# Patient Record
Sex: FEMALE | Race: WHITE | NOT HISPANIC OR LATINO | Employment: OTHER | URBAN - METROPOLITAN AREA
[De-identification: names, ages, dates, MRNs, and addresses within clinical notes are randomized per-mention and may not be internally consistent; named-entity substitution may affect disease eponyms.]

---

## 2017-01-04 RX ORDER — ATORVASTATIN CALCIUM 40 MG/1
40 TABLET, FILM COATED ORAL DAILY
Qty: 30 TABLET | Refills: 2 | Status: SHIPPED | OUTPATIENT
Start: 2017-01-04 | End: 2017-03-29 | Stop reason: SDUPTHER

## 2017-01-23 ENCOUNTER — CLINICAL SUPPORT (OUTPATIENT)
Dept: FAMILY MEDICINE CLINIC | Facility: CLINIC | Age: 82
End: 2017-01-23

## 2017-01-23 DIAGNOSIS — E53.8 B12 DEFICIENCY: ICD-10-CM

## 2017-01-23 PROCEDURE — 96372 THER/PROPH/DIAG INJ SC/IM: CPT | Performed by: INTERNAL MEDICINE

## 2017-01-23 RX ADMIN — CYANOCOBALAMIN 1000 MCG: 1000 INJECTION, SOLUTION INTRAMUSCULAR; SUBCUTANEOUS at 09:58

## 2017-02-27 ENCOUNTER — CLINICAL SUPPORT (OUTPATIENT)
Dept: FAMILY MEDICINE CLINIC | Facility: CLINIC | Age: 82
End: 2017-02-27

## 2017-02-27 DIAGNOSIS — E53.8 B12 DEFICIENCY: ICD-10-CM

## 2017-02-27 PROCEDURE — 90471 IMMUNIZATION ADMIN: CPT | Performed by: INTERNAL MEDICINE

## 2017-02-27 RX ADMIN — CYANOCOBALAMIN 1000 MCG: 1000 INJECTION, SOLUTION INTRAMUSCULAR; SUBCUTANEOUS at 09:45

## 2017-03-27 ENCOUNTER — CLINICAL SUPPORT (OUTPATIENT)
Dept: FAMILY MEDICINE CLINIC | Facility: CLINIC | Age: 82
End: 2017-03-27

## 2017-03-27 DIAGNOSIS — E53.8 B12 DEFICIENCY: ICD-10-CM

## 2017-03-27 PROCEDURE — 96372 THER/PROPH/DIAG INJ SC/IM: CPT | Performed by: INTERNAL MEDICINE

## 2017-03-27 RX ADMIN — CYANOCOBALAMIN 1000 MCG: 1000 INJECTION, SOLUTION INTRAMUSCULAR; SUBCUTANEOUS at 09:51

## 2017-03-29 RX ORDER — LEVOTHYROXINE SODIUM 0.05 MG/1
TABLET ORAL
Qty: 90 TABLET | Refills: 3 | Status: SHIPPED | OUTPATIENT
Start: 2017-03-29 | End: 2017-04-24 | Stop reason: SDUPTHER

## 2017-03-29 RX ORDER — ATORVASTATIN CALCIUM 40 MG/1
TABLET, FILM COATED ORAL
Qty: 30 TABLET | Refills: 12 | Status: SHIPPED | OUTPATIENT
Start: 2017-03-29 | End: 2018-03-14 | Stop reason: HOSPADM

## 2017-04-03 ENCOUNTER — RESULTS ENCOUNTER (OUTPATIENT)
Dept: FAMILY MEDICINE CLINIC | Facility: CLINIC | Age: 82
End: 2017-04-03

## 2017-04-03 DIAGNOSIS — E78.2 MIXED HYPERLIPIDEMIA: ICD-10-CM

## 2017-04-03 DIAGNOSIS — N18.30 CKD (CHRONIC KIDNEY DISEASE), STAGE III (HCC): ICD-10-CM

## 2017-04-03 DIAGNOSIS — E03.9 ACQUIRED HYPOTHYROIDISM: ICD-10-CM

## 2017-04-03 DIAGNOSIS — E53.8 B12 DEFICIENCY: ICD-10-CM

## 2017-04-03 DIAGNOSIS — I10 HTN (HYPERTENSION), BENIGN: ICD-10-CM

## 2017-04-03 DIAGNOSIS — D64.9 ANEMIA, UNSPECIFIED TYPE: ICD-10-CM

## 2017-04-19 LAB
ALBUMIN SERPL-MCNC: 4 G/DL (ref 3.2–4.6)
ALBUMIN/GLOB SERPL: 1.7 {RATIO} (ref 1.2–2.2)
ALP SERPL-CCNC: 89 IU/L (ref 39–117)
ALT SERPL-CCNC: 14 IU/L (ref 0–32)
AST SERPL-CCNC: 18 IU/L (ref 0–40)
BASOPHILS # BLD AUTO: 0 X10E3/UL (ref 0–0.2)
BASOPHILS NFR BLD AUTO: 1 %
BILIRUB SERPL-MCNC: 0.4 MG/DL (ref 0–1.2)
BUN SERPL-MCNC: 19 MG/DL (ref 10–36)
BUN/CREAT SERPL: 16 (ref 12–28)
CALCIUM SERPL-MCNC: 9 MG/DL (ref 8.7–10.3)
CHLORIDE SERPL-SCNC: 101 MMOL/L (ref 96–106)
CHOLEST SERPL-MCNC: 169 MG/DL (ref 100–199)
CO2 SERPL-SCNC: 23 MMOL/L (ref 18–29)
CREAT SERPL-MCNC: 1.16 MG/DL (ref 0.57–1)
EOSINOPHIL # BLD AUTO: 0.4 X10E3/UL (ref 0–0.4)
EOSINOPHIL NFR BLD AUTO: 5 %
ERYTHROCYTE [DISTWIDTH] IN BLOOD BY AUTOMATED COUNT: 13.8 % (ref 12.3–15.4)
GLOBULIN SER CALC-MCNC: 2.3 G/DL (ref 1.5–4.5)
GLUCOSE SERPL-MCNC: 99 MG/DL (ref 65–99)
HCT VFR BLD AUTO: 33.2 % (ref 34–46.6)
HDLC SERPL-MCNC: 60 MG/DL
HGB BLD-MCNC: 10.7 G/DL (ref 11.1–15.9)
IMM GRANULOCYTES # BLD: 0 X10E3/UL (ref 0–0.1)
IMM GRANULOCYTES NFR BLD: 0 %
IRON SATN MFR SERPL: 17 % (ref 15–55)
IRON SERPL-MCNC: 59 UG/DL (ref 27–139)
LDLC SERPL CALC-MCNC: 83 MG/DL (ref 0–99)
LYMPHOCYTES # BLD AUTO: 2 X10E3/UL (ref 0.7–3.1)
LYMPHOCYTES NFR BLD AUTO: 28 %
MCH RBC QN AUTO: 27.9 PG (ref 26.6–33)
MCHC RBC AUTO-ENTMCNC: 32.2 G/DL (ref 31.5–35.7)
MCV RBC AUTO: 87 FL (ref 79–97)
METHYLMALONATE SERPL-SCNC: 226 NMOL/L (ref 0–378)
MONOCYTES # BLD AUTO: 0.6 X10E3/UL (ref 0.1–0.9)
MONOCYTES NFR BLD AUTO: 8 %
NEUTROPHILS # BLD AUTO: 4 X10E3/UL (ref 1.4–7)
NEUTROPHILS NFR BLD AUTO: 58 %
PLATELET # BLD AUTO: 271 X10E3/UL (ref 150–379)
POTASSIUM SERPL-SCNC: 3.8 MMOL/L (ref 3.5–5.2)
PROT SERPL-MCNC: 6.3 G/DL (ref 6–8.5)
RBC # BLD AUTO: 3.83 X10E6/UL (ref 3.77–5.28)
SODIUM SERPL-SCNC: 139 MMOL/L (ref 134–144)
T3FREE SERPL-MCNC: 2.1 PG/ML (ref 2–4.4)
T4 FREE SERPL-MCNC: 0.95 NG/DL (ref 0.82–1.77)
TIBC SERPL-MCNC: 350 UG/DL (ref 250–450)
TRIGL SERPL-MCNC: 129 MG/DL (ref 0–149)
TSH SERPL DL<=0.005 MIU/L-ACNC: 13.34 UIU/ML (ref 0.45–4.5)
UIBC SERPL-MCNC: 291 UG/DL (ref 118–369)
VLDLC SERPL CALC-MCNC: 26 MG/DL (ref 5–40)
WBC # BLD AUTO: 7 X10E3/UL (ref 3.4–10.8)

## 2017-04-24 ENCOUNTER — OFFICE VISIT (OUTPATIENT)
Dept: FAMILY MEDICINE CLINIC | Facility: CLINIC | Age: 82
End: 2017-04-24

## 2017-04-24 VITALS
SYSTOLIC BLOOD PRESSURE: 126 MMHG | DIASTOLIC BLOOD PRESSURE: 64 MMHG | OXYGEN SATURATION: 98 % | HEIGHT: 65 IN | WEIGHT: 155 LBS | BODY MASS INDEX: 25.83 KG/M2 | HEART RATE: 117 BPM

## 2017-04-24 DIAGNOSIS — I10 HTN (HYPERTENSION), BENIGN: Primary | ICD-10-CM

## 2017-04-24 DIAGNOSIS — E03.9 ACQUIRED HYPOTHYROIDISM: ICD-10-CM

## 2017-04-24 DIAGNOSIS — D64.9 ANEMIA, UNSPECIFIED TYPE: ICD-10-CM

## 2017-04-24 DIAGNOSIS — E53.8 B12 DEFICIENCY: ICD-10-CM

## 2017-04-24 PROCEDURE — 96372 THER/PROPH/DIAG INJ SC/IM: CPT | Performed by: INTERNAL MEDICINE

## 2017-04-24 PROCEDURE — 99214 OFFICE O/P EST MOD 30 MIN: CPT | Performed by: INTERNAL MEDICINE

## 2017-04-24 RX ORDER — LEVOTHYROXINE SODIUM 0.07 MG/1
75 TABLET ORAL DAILY
Qty: 90 TABLET | Refills: 0 | Status: SHIPPED | OUTPATIENT
Start: 2017-04-24 | End: 2017-07-24 | Stop reason: SDUPTHER

## 2017-04-24 RX ORDER — OFLOXACIN 3 MG/ML
SOLUTION/ DROPS OPHTHALMIC
COMMUNITY
Start: 2017-04-07 | End: 2017-10-30

## 2017-04-24 RX ORDER — MULTIVIT WITH MINERALS/LUTEIN
1000 TABLET ORAL DAILY
Qty: 180 TABLET | Refills: 1 | Status: ON HOLD | OUTPATIENT
Start: 2017-04-24 | End: 2020-07-20

## 2017-04-24 RX ORDER — DOXYCYCLINE HYCLATE 50 MG/1
324 CAPSULE, GELATIN COATED ORAL 2 TIMES DAILY
Qty: 180 TABLET | Refills: 1 | Status: SHIPPED | OUTPATIENT
Start: 2017-04-24 | End: 2017-07-14 | Stop reason: SINTOL

## 2017-04-24 RX ADMIN — CYANOCOBALAMIN 1000 MCG: 1000 INJECTION, SOLUTION INTRAMUSCULAR; SUBCUTANEOUS at 10:22

## 2017-04-24 NOTE — PROGRESS NOTES
Chief Complaint   Patient presents with   • Hypertension     4 month f/u & review labs   • Hypothyroidism   • Anemia     She was diagnosed with hypothyroidism 40-50 years ago.  She was on a dose as high as 100 µg, but was gradually decreased, and she now takes 50.  She feels sluggish, although she has been dancing less recently.  She has gained 4 pounds since last we saw her.    She also has history of anemia that was evaluated by CBC in 2012 at which time iron deficiency and low normal B12 levels were found.  Iron supplements and B12 injections were started and both had followed by the [facility] until she saw us and had the B12 shots started almost a year ago.  She still has the fatigue that's noted above.  She does not take an iron supplement.    She also has chronic, benign, essential hypertension and hyperlipidemia, both of them present for several years.  She tolerates her medications without side effect.  She denies any cardiovascular or neurologic symptoms.  We had tried cutting back her hydrochlorothiazide, but she developed marked edema, so she resumed the 25 mg dose with resolution of the swelling in her ankles.    She has never smoked.  She has an occasional beer or wine.  She stays active by square dancing, although this has declined recently.    Family history reviewed; no bleeding issues or chronic anemia.    Allergies   Allergen Reactions   • Barbiturates Hives   • Codeine Hives       Current Outpatient Prescriptions:   •  amLODIPine (NORVASC) 10 MG tablet, TAKE ONE TABLET BY MOUTH EVERY DAY, Disp: 30 tablet, Rfl: 5  •  atorvastatin (LIPITOR) 40 MG tablet, TAKE ONE TABLET BY MOUTH EVERY DAY, Disp: 30 tablet, Rfl: 12  •  dorzolamide-timolol (COSOPT) 22.3-6.8 MG/ML ophthalmic solution, INSTILL ONE DROP INTO LEFT EYE TWICE DAILY, Disp: , Rfl: 6  •  hydrochlorothiazide (HYDRODIURIL) 25 MG tablet, Take 25 mg by mouth Daily., Disp: , Rfl:   •  latanoprost (XALATAN) 0.005 % ophthalmic solution, INSTILL ONE  "DROP INTO LEFT EYE AT BEDTIME, Disp: , Rfl: 6  •  levothyroxine (SYNTHROID, LEVOTHROID) 50 MCG tablet, TAKE ONE TABLET BY MOUTH EVERY DAY, Disp: 90 tablet, Rfl: 3  •  ofloxacin (OCUFLOX) 0.3 % ophthalmic solution, , Disp: , Rfl:   •  pantoprazole (PROTONIX) 40 MG EC tablet, TAKE ONE TABLET BY MOUTH EVERY DAY, Disp: 90 tablet, Rfl: 3  •  prednisoLONE acetate (PRED FORTE) 1 % ophthalmic suspension, INSTILL ONE DROP INTO LEFT EYE EVERY MORNING, Disp: , Rfl: 11    Current Facility-Administered Medications:   •  cyanocobalamin injection 1,000 mcg, 1,000 mcg, Intramuscular, Daily, Jae Bray MD, 1,000 mcg at 04/24/17 1022    ROS:     She denies any new vision changes, although there has been a decline in her vision: She has glaucoma.  Dyspepsia/reflux is controlled with pantoprazole; no dysphagia or odynophagia.  She denies chest pain, angina, claudication, and strokelike symptoms.  She denies easy bruisability and free bleeding.  Lower extremity edema resolved with resumption of hydrochlorothiazide 25 mg.  She has nocturnal cramps in her feet.    /64 (BP Location: Right arm, Patient Position: Sitting, Cuff Size: Adult)  Pulse 117  Ht 64.5\" (163.8 cm)  Wt 155 lb (70.3 kg)  SpO2 98%  Breastfeeding? No  BMI 26.19 kg/m2     EXAM:   She is well-developed, well-nourished, and in good spirits.  Her sclerae are anicteric and the conjunctivae are pink.  No thyromegaly or mass appreciated, no carotid bruit noted.  Regular rate and rhythm with a normal S1 and S2.  There is a 2/6 holosystolic murmur at the right upper sternal border, somewhat less audible along the left sternal border.  No S3 or S4 appreciated.  Abdomen is soft, nontender, with no evidence of hepatosplenomegaly or mass.  No abdominal bruit noted.  Bowel sounds are normal.  There is nonpitting lower extremity edema; mild-moderate varicosities are also noted.  She has good pedal pulses.  Station, gait, and coordination are normal.  Alert and " oriented ×4 and answers all questions appropriately.  No significant ecchymoses about her trunk or extremities.    Mei was seen today for hypertension, hypothyroidism and anemia.    Diagnoses and all orders for this visit:    HTN (hypertension), benign    Acquired hypothyroidism  -     T3, Free; Future  -     T4, Free; Future  -     TSH; Future    Anemia, unspecified type  -     CBC & Differential; Future  -     Iron Profile; Future  -     Ferritin; Future  -     Reticulocytes; Future  -     Erythropoietin; Future    B12 deficiency    Other orders  -     levothyroxine (SYNTHROID, LEVOTHROID) 75 MCG tablet; Take 1 tablet by mouth Daily.  -     ferrous gluconate (FERGON) 324 MG tablet; Take 1 tablet by mouth 2 (Two) Times a Day.  -     ascorbic acid (VITAMIN C) 1000 MG tablet; Take 1 tablet by mouth Daily.    For the blood pressure, she will continue the medications unchanged.  Labs were reviewed with her today, and all are in order.  Since going back on the higher dose of hydrochlorothiazide, her renal function has not changed, and her electrolytes are still in order.    Cholesterol levels look great on this dose of cholesterol medicine, and her liver enzymes are normal.    Thyroid appears undertreated at this point, with a TSH just above 13.  Therefore, we will ask her to use 75 µg of thyroid hormone, and we will recheck her labs in about 6 or 8 weeks.    Her iron stores are still in the normal range, but barely.  I have asked her to start taking an iron tablet with 1000 mg of vitamin C bid to see if that also helps with her stamina and her blood counts.  We will recheck those labs in about 6 or 8 weeks as well.

## 2017-05-03 RX ORDER — AMLODIPINE BESYLATE 10 MG/1
TABLET ORAL
Qty: 30 TABLET | Refills: 12 | Status: SHIPPED | OUTPATIENT
Start: 2017-05-03 | End: 2018-05-09 | Stop reason: SDUPTHER

## 2017-05-09 RX ORDER — HYDROCHLOROTHIAZIDE 25 MG/1
TABLET ORAL
Qty: 90 TABLET | Refills: 1 | Status: SHIPPED | OUTPATIENT
Start: 2017-05-09 | End: 2017-08-08 | Stop reason: SDUPTHER

## 2017-05-22 ENCOUNTER — CLINICAL SUPPORT (OUTPATIENT)
Dept: FAMILY MEDICINE CLINIC | Facility: CLINIC | Age: 82
End: 2017-05-22

## 2017-05-22 DIAGNOSIS — E53.8 B12 DEFICIENCY: ICD-10-CM

## 2017-05-22 PROCEDURE — 96372 THER/PROPH/DIAG INJ SC/IM: CPT | Performed by: INTERNAL MEDICINE

## 2017-05-22 RX ADMIN — CYANOCOBALAMIN 1000 MCG: 1000 INJECTION, SOLUTION INTRAMUSCULAR; SUBCUTANEOUS at 09:54

## 2017-06-12 ENCOUNTER — RESULTS ENCOUNTER (OUTPATIENT)
Dept: FAMILY MEDICINE CLINIC | Facility: CLINIC | Age: 82
End: 2017-06-12

## 2017-06-12 DIAGNOSIS — D64.9 ANEMIA, UNSPECIFIED TYPE: ICD-10-CM

## 2017-06-12 DIAGNOSIS — E03.9 ACQUIRED HYPOTHYROIDISM: ICD-10-CM

## 2017-06-26 ENCOUNTER — CLINICAL SUPPORT (OUTPATIENT)
Dept: FAMILY MEDICINE CLINIC | Facility: CLINIC | Age: 82
End: 2017-06-26

## 2017-06-26 DIAGNOSIS — E53.8 B12 DEFICIENCY: ICD-10-CM

## 2017-06-26 PROCEDURE — 96372 THER/PROPH/DIAG INJ SC/IM: CPT | Performed by: INTERNAL MEDICINE

## 2017-06-26 RX ADMIN — CYANOCOBALAMIN 1000 MCG: 1000 INJECTION, SOLUTION INTRAMUSCULAR; SUBCUTANEOUS at 10:15

## 2017-06-27 LAB
BASOPHILS # BLD AUTO: 0.03 10*3/MM3 (ref 0–0.2)
BASOPHILS NFR BLD AUTO: 0.5 % (ref 0–1.5)
EOSINOPHIL # BLD AUTO: 0.35 10*3/MM3 (ref 0–0.7)
EOSINOPHIL NFR BLD AUTO: 5.4 % (ref 0.3–6.2)
EPO SERPL-ACNC: 6.6 MIU/ML (ref 2.6–18.5)
ERYTHROCYTE [DISTWIDTH] IN BLOOD BY AUTOMATED COUNT: 13 % (ref 11.7–13)
FERRITIN SERPL-MCNC: 36.45 NG/ML (ref 13–150)
HCT VFR BLD AUTO: 36.4 % (ref 35.6–45.5)
HGB BLD-MCNC: 11.7 G/DL (ref 11.9–15.5)
IMM GRANULOCYTES # BLD: 0 10*3/MM3 (ref 0–0.03)
IMM GRANULOCYTES NFR BLD: 0 % (ref 0–0.5)
IRON SATN MFR SERPL: 17 % (ref 20–50)
IRON SERPL-MCNC: 67 MCG/DL (ref 37–145)
LYMPHOCYTES # BLD AUTO: 2.12 10*3/MM3 (ref 0.9–4.8)
LYMPHOCYTES NFR BLD AUTO: 32.9 % (ref 19.6–45.3)
MCH RBC QN AUTO: 29 PG (ref 26.9–32)
MCHC RBC AUTO-ENTMCNC: 32.1 G/DL (ref 32.4–36.3)
MCV RBC AUTO: 90.1 FL (ref 80.5–98.2)
MONOCYTES # BLD AUTO: 0.48 10*3/MM3 (ref 0.2–1.2)
MONOCYTES NFR BLD AUTO: 7.4 % (ref 5–12)
NEUTROPHILS # BLD AUTO: 3.47 10*3/MM3 (ref 1.9–8.1)
NEUTROPHILS NFR BLD AUTO: 53.8 % (ref 42.7–76)
PLATELET # BLD AUTO: 276 10*3/MM3 (ref 140–500)
RBC # BLD AUTO: 4.04 10*6/MM3 (ref 3.9–5.2)
RETICS/RBC NFR AUTO: 0.98 % (ref 0.5–1.5)
T3FREE SERPL-MCNC: 3 PG/ML (ref 2–4.4)
T4 FREE SERPL-MCNC: 1.71 NG/DL (ref 0.93–1.7)
TIBC SERPL-MCNC: 403 MCG/DL
TSH SERPL DL<=0.005 MIU/L-ACNC: 0.42 MIU/ML (ref 0.27–4.2)
UIBC SERPL-MCNC: 336 MCG/DL
WBC # BLD AUTO: 6.45 10*3/MM3 (ref 4.5–10.7)

## 2017-07-14 ENCOUNTER — OFFICE VISIT (OUTPATIENT)
Dept: FAMILY MEDICINE CLINIC | Facility: CLINIC | Age: 82
End: 2017-07-14

## 2017-07-14 VITALS
HEIGHT: 65 IN | OXYGEN SATURATION: 97 % | WEIGHT: 155.2 LBS | BODY MASS INDEX: 25.86 KG/M2 | HEART RATE: 83 BPM | SYSTOLIC BLOOD PRESSURE: 124 MMHG | DIASTOLIC BLOOD PRESSURE: 66 MMHG

## 2017-07-14 DIAGNOSIS — R59.0 AXILLARY LYMPHADENOPATHY: ICD-10-CM

## 2017-07-14 DIAGNOSIS — R91.1 PULMONARY NODULE: ICD-10-CM

## 2017-07-14 DIAGNOSIS — E03.9 ACQUIRED HYPOTHYROIDISM: ICD-10-CM

## 2017-07-14 DIAGNOSIS — D64.9 ANEMIA, UNSPECIFIED TYPE: Primary | ICD-10-CM

## 2017-07-14 DIAGNOSIS — R10.13 EPIGASTRIC PAIN: ICD-10-CM

## 2017-07-14 DIAGNOSIS — E53.8 B12 DEFICIENCY: ICD-10-CM

## 2017-07-14 PROCEDURE — 99214 OFFICE O/P EST MOD 30 MIN: CPT | Performed by: INTERNAL MEDICINE

## 2017-07-14 PROCEDURE — 96372 THER/PROPH/DIAG INJ SC/IM: CPT | Performed by: INTERNAL MEDICINE

## 2017-07-14 RX ORDER — SUCRALFATE 1 G/1
TABLET ORAL
Refills: 0 | COMMUNITY
Start: 2017-06-27 | End: 2017-07-14

## 2017-07-14 RX ORDER — RANITIDINE 300 MG/1
TABLET ORAL
Refills: 0 | COMMUNITY
Start: 2017-06-27 | End: 2017-07-14

## 2017-07-14 RX ADMIN — CYANOCOBALAMIN 1000 MCG: 1000 INJECTION, SOLUTION INTRAMUSCULAR; SUBCUTANEOUS at 10:27

## 2017-07-14 NOTE — PATIENT INSTRUCTIONS
Stop the medicines given to you by the emergency room.  Stay on the pantoprazole.  Stop taking the iron.    Keep taking the thyroid hormone at the 75 µg dose.    Return after you get the CT scans of the chest, abdomen and pelvis.

## 2017-07-14 NOTE — PROGRESS NOTES
Chief Complaint   Patient presents with   • Anemia     6 week f/u & review labs   • Hypothyroidism     She has a mild, iron deficiency anemia.  We started her on iron at an office visit in April and asked her to follow-up for retesting.  She also has a B12 deficiency and receives B12 injections once monthly.    She has chronic hypothyroidism diagnosed 40-50 years ago.  Dose was gradually decreased from 100 µg until she was on 50 µg, but she felt sluggish and was starting to gain weight.  TSH was above 13 in April, so we increased her dose to 75 µg.      She went to the urgent care center on 6/27/17 because of epigastric/substernal discomfort.  EKG was reportedly unremarkable, so she was treated with ranitidine and sucralfate for presumed reflux disease; She was already on pantoprazole once daily.  Symptoms have persisted unchanged since that evaluation and since starting those medications.  She reports intermittent nausea, postprandial discomfort, and occasional pain in her upper back, under the shoulder blades. new    No further waking.  Improved energy.  No edema.  No bright red blood per rectum or melena.  Mild nausea without vomiting.    Allergies   Allergen Reactions   • Barbiturates Hives   • Codeine Hives       Current Outpatient Prescriptions:   •  amLODIPine (NORVASC) 10 MG tablet, TAKE ONE TABLET BY MOUTH EVERY DAY, Disp: 30 tablet, Rfl: 12  •  ascorbic acid (VITAMIN C) 1000 MG tablet, Take 1 tablet by mouth Daily., Disp: 180 tablet, Rfl: 1  •  atorvastatin (LIPITOR) 40 MG tablet, TAKE ONE TABLET BY MOUTH EVERY DAY, Disp: 30 tablet, Rfl: 12  •  dorzolamide-timolol (COSOPT) 22.3-6.8 MG/ML ophthalmic solution, INSTILL ONE DROP INTO LEFT EYE TWICE DAILY, Disp: , Rfl: 6  •  ferrous gluconate (FERGON) 324 MG tablet, Take 1 tablet by mouth 2 (Two) Times a Day., Disp: 180 tablet, Rfl: 1  •  hydrochlorothiazide (HYDRODIURIL) 25 MG tablet, TAKE ONE TABLET BY MOUTH EVERY DAY, Disp: 90 tablet, Rfl: 1  •  latanoprost  "(XALATAN) 0.005 % ophthalmic solution, INSTILL ONE DROP INTO LEFT EYE AT BEDTIME, Disp: , Rfl: 6  •  levothyroxine (SYNTHROID, LEVOTHROID) 75 MCG tablet, Take 1 tablet by mouth Daily., Disp: 90 tablet, Rfl: 0  •  ofloxacin (OCUFLOX) 0.3 % ophthalmic solution, , Disp: , Rfl:   •  pantoprazole (PROTONIX) 40 MG EC tablet, TAKE ONE TABLET BY MOUTH EVERY DAY, Disp: 90 tablet, Rfl: 3  •  prednisoLONE acetate (PRED FORTE) 1 % ophthalmic suspension, INSTILL ONE DROP INTO LEFT EYE EVERY MORNING, Disp: , Rfl: 11  •  raNITIdine (ZANTAC) 300 MG tablet, TAKE ONE TABLET BY MOUTH NIGHTLY, Disp: , Rfl: 0  •  sucralfate (CARAFATE) 1 G tablet, Take 1 tablet by mouth 4 (four) times daily for 30 days, Disp: , Rfl: 0    Current Facility-Administered Medications:   •  cyanocobalamin injection 1,000 mcg, 1,000 mcg, Intramuscular, Daily, Jae Bray MD, 1,000 mcg at 07/14/17 1027      /66 (BP Location: Right arm, Patient Position: Sitting, Cuff Size: Adult)  Pulse 83  Ht 64.5\" (163.8 cm)  Wt 155 lb 3.2 oz (70.4 kg)  SpO2 97%  Breastfeeding? No  BMI 26.23 kg/m2     Well-developed, well-nourished, in no acute distress.  Her mood is upbeat and her affect is full and appropriate.  Regular rate and rhythm with a 2/6 systolic murmur throughout upper sternal border.  Normal S1 and S2.  No S3 appreciated.  Clear to auscultation bilaterally with good breath sounds at both bases.  Abdomen is soft, nondistended, with normoactive bowel sounds.  She is tender in the epigastric and right upper quadrant areas.  Oneill sign is negative, but there is increased pain with deep inspiration.     Mei was seen today for anemia and hypothyroidism.    Diagnoses and all orders for this visit:    Anemia, unspecified type    Acquired hypothyroidism    B12 deficiency    Epigastric pain  -     CT Abdomen Pelvis With Contrast    Pulmonary nodule  -     CT Chest With Contrast    Axillary lymphadenopathy  -     CT Chest With Contrast    Thyroid " hormone levels look good, so she will stay on 75 µg daily.    Iron levels look better, but the GI discomfort that has started since she started on the iron is concerning.  She will stop taking the iron for now.  She will stay on the pantoprazole and stop taking the ranitidine in the sucralfate.    Abdominal symptoms have developed since starting the iron and despite being on pantoprazole.  The iron may be contributing to this, so we will ask her to stop the iron for now.  We will check the CT scans as noted above and have her follow-up thereafter.

## 2017-07-22 ENCOUNTER — HOSPITAL ENCOUNTER (OUTPATIENT)
Dept: CT IMAGING | Facility: HOSPITAL | Age: 82
Discharge: HOME | End: 2017-07-22
Attending: INTERNAL MEDICINE | Admitting: INTERNAL MEDICINE

## 2017-07-22 PROCEDURE — 0 IOPAMIDOL 61 % SOLUTION: Performed by: INTERNAL MEDICINE

## 2017-07-22 PROCEDURE — 71260 CT THORAX DX C+: CPT

## 2017-07-22 PROCEDURE — 74177 CT ABD & PELVIS W/CONTRAST: CPT

## 2017-07-22 PROCEDURE — 82565 ASSAY OF CREATININE: CPT

## 2017-07-22 RX ADMIN — IOPAMIDOL 80 ML: 612 INJECTION, SOLUTION INTRAVENOUS at 10:15

## 2017-07-24 LAB — CREAT BLDA-MCNC: 1.4 MG/DL (ref 0.6–1.3)

## 2017-07-25 ENCOUNTER — OFFICE VISIT (OUTPATIENT)
Dept: FAMILY MEDICINE CLINIC | Facility: CLINIC | Age: 82
End: 2017-07-25

## 2017-07-25 VITALS
HEART RATE: 95 BPM | HEIGHT: 65 IN | WEIGHT: 155.7 LBS | BODY MASS INDEX: 25.94 KG/M2 | SYSTOLIC BLOOD PRESSURE: 130 MMHG | TEMPERATURE: 98.3 F | DIASTOLIC BLOOD PRESSURE: 60 MMHG | OXYGEN SATURATION: 94 %

## 2017-07-25 DIAGNOSIS — Z23 NEED FOR STREPTOCOCCUS PNEUMONIAE VACCINATION: Primary | ICD-10-CM

## 2017-07-25 DIAGNOSIS — Z00.00 MEDICARE ANNUAL WELLNESS VISIT, SUBSEQUENT: ICD-10-CM

## 2017-07-25 PROCEDURE — G0009 ADMIN PNEUMOCOCCAL VACCINE: HCPCS | Performed by: NURSE PRACTITIONER

## 2017-07-25 PROCEDURE — G0439 PPPS, SUBSEQ VISIT: HCPCS | Performed by: NURSE PRACTITIONER

## 2017-07-25 PROCEDURE — 90670 PCV13 VACCINE IM: CPT | Performed by: NURSE PRACTITIONER

## 2017-07-25 RX ORDER — LEVOTHYROXINE SODIUM 0.07 MG/1
TABLET ORAL
Qty: 90 TABLET | Refills: 3 | Status: SHIPPED | OUTPATIENT
Start: 2017-07-25 | End: 2018-08-27 | Stop reason: SDUPTHER

## 2017-07-25 NOTE — PATIENT INSTRUCTIONS
Medicare Wellness  Personal Prevention Plan of Service     Date of Office Visit:  2017  Encounter Provider:  COCO Garcia  Place of Service:  [facility] INTERNAL MEDICINE  Patient Name: Mei Crane  :  1926    As part of the Medicare Wellness portion of your visit today, we are providing you with this personalized preventive plan of services (PPPS). This plan is based upon recommendations of the [place] Preventive Services Task Force (USPSTF) and the Advisory Committee on Immunization Practices (ACIP).    This lists the preventive care services that should be considered, and provides dates of when you are due. Items listed as completed are up-to-date and do not require any further intervention.    Health Maintenance   Topic Date Due   • TDAP/TD VACCINES (1 - Tdap) 1945   • PNEUMOCOCCAL VACCINES (65+ LOW/MEDIUM RISK) (1 of 2 - PCV13) 1991   • ZOSTER VACCINE  2016   • DXA SCAN  2016   • INFLUENZA VACCINE  2017   • LIPID PANEL  2018   • MEDICARE ANNUAL WELLNESS  2018   • MAMMOGRAM  2018       Orders Placed This Encounter   Procedures   • Pneumococcal Conjugate Vaccine 13-Valent All       No Follow-up on file.

## 2017-07-25 NOTE — PROGRESS NOTES
QUICK REFERENCE INFORMATION:  The ABCs of the Annual Wellness Visit    Subsequent Medicare Wellness Visit    HEALTH RISK ASSESSMENT    2/14/1926    Recent Hospitalizations:  No hospitalization(s) within the last year..        Current Medical Providers:  Patient Care Team:  Jae Bray MD as PCP - General  Jae Bray MD as PCP - Claims Attributed  MD Cecil Dukes OD (Optometry)  Yannick Santo MD as Consulting Physician (Ophthalmology)        Smoking Status:  History   Smoking Status   • Never Smoker   Smokeless Tobacco   • Never Used       Alcohol Consumption:  History   Alcohol Use   • Yes     Comment: occasionally       Depression Screen:   PHQ-9 Depression Screening 7/25/2017   Little interest or pleasure in doing things 1   Feeling down, depressed, or hopeless 1   Trouble falling or staying asleep, or sleeping too much 0   Feeling tired or having little energy 1   Poor appetite or overeating 0   Feeling bad about yourself - or that you are a failure or have let yourself or your family down 0   Trouble concentrating on things, such as reading the newspaper or watching television 0   Moving or speaking so slowly that other people could have noticed. Or the opposite - being so fidgety or restless that you have been moving around a lot more than usual 0   Thoughts that you would be better off dead, or of hurting yourself in some way 0   PHQ-9 Total Score 3   If you checked off any problems, how difficult have these problems made it for you to do your work, take care of things at home, or get along with other people? Not difficult at all       Health Habits and Functional and Cognitive Screening:  Functional & Cognitive Status 7/25/2017   Do you have difficulty preparing food and eating? No   Do you have difficulty bathing yourself? No   Do you have difficulty getting dressed? No   Do you have difficulty using the toilet? No   Do you have difficulty moving around  from place to place? No   In the past year have you fallen or experienced a near fall? Yes   Do you need help using the phone?  No   Are you deaf or do you have serious difficulty hearing?  No   Do you need help with transportation? Yes   Do you need help shopping? No   Do you need help preparing meals?  No   Do you need help with housework?  No   Do you need help with laundry? No   Do you need help taking your medications? No   Do you need help managing money? No   Do you have difficulty concentrating, remembering or making decisions? No       Health Habits  Current Diet: Unhealthy Diet  Dental Exam: Up to date (Dr. Meyer)  Eye Exam: Up to date (Dawson)  Exercise (times per week): 1 times per week  Current Exercise Activities Include: Dancing      Does the patient have evidence of cognitive impairment? No    Aspirin use counseling: Contraindicated from taking ASA      Recent Lab Results:  CMP:  Lab Results   Component Value Date    GLU 99 04/17/2017    BUN 19 04/17/2017    CREATININE 1.40 (H) 07/22/2017    EGFRIFNONA 41 (L) 04/17/2017    EGFRIFAFRI 48 (L) 04/17/2017    BCR 16 04/17/2017     04/17/2017    K 3.8 04/17/2017    CO2 23 04/17/2017    CALCIUM 9.0 04/17/2017    PROTENTOTREF 6.3 04/17/2017    ALBUMIN 4.0 04/17/2017    LABGLOBREF 2.3 04/17/2017    LABIL2 1.7 04/17/2017    BILITOT 0.4 04/17/2017    ALKPHOS 89 04/17/2017    AST 18 04/17/2017    ALT 14 04/17/2017     Lipid Panel:  Lab Results   Component Value Date    TRIG 129 04/17/2017    HDL 60 04/17/2017    VLDL 26 04/17/2017     HbA1c:       Visual Acuity:   Visual Acuity Screening    Right eye Left eye Both eyes   Without correction: 20/200 20/100 20/100   With correction: 20/200 20/100 20/100       Age-appropriate Screening Schedule:  Refer to the list below for future screening recommendations based on patient's age, sex and/or medical conditions. Orders for these recommended tests are listed in the plan section. The patient has been  provided with a written plan.    Health Maintenance   Topic Date Due   • TDAP/TD VACCINES (1 - Tdap) 02/14/1945   • PNEUMOCOCCAL VACCINES (65+ LOW/MEDIUM RISK) (1 of 2 - PCV13) 02/14/1991   • ZOSTER VACCINE  02/24/2016   • DXA SCAN  07/25/2016   • INFLUENZA VACCINE  08/01/2017   • LIPID PANEL  04/17/2018   • MAMMOGRAM  08/01/2018        Subjective   History of Present Illness    Mei Crane is a 89 y.o. female who presents for an Subsequent Wellness Visit.    The following portions of the patient's history were reviewed and updated as appropriate: allergies, current medications, past family history, past medical history, past social history, past surgical history and problem list.    Outpatient Medications Prior to Visit   Medication Sig Dispense Refill   • amLODIPine (NORVASC) 10 MG tablet TAKE ONE TABLET BY MOUTH EVERY DAY 30 tablet 12   • ascorbic acid (VITAMIN C) 1000 MG tablet Take 1 tablet by mouth Daily. 180 tablet 1   • atorvastatin (LIPITOR) 40 MG tablet TAKE ONE TABLET BY MOUTH EVERY DAY 30 tablet 12   • dorzolamide-timolol (COSOPT) 22.3-6.8 MG/ML ophthalmic solution INSTILL ONE DROP INTO LEFT EYE TWICE DAILY  6   • hydrochlorothiazide (HYDRODIURIL) 25 MG tablet TAKE ONE TABLET BY MOUTH EVERY DAY 90 tablet 1   • latanoprost (XALATAN) 0.005 % ophthalmic solution INSTILL ONE DROP INTO LEFT EYE AT BEDTIME  6   • levothyroxine (SYNTHROID, LEVOTHROID) 75 MCG tablet Take 1 tablet by mouth Daily. 90 tablet 0   • ofloxacin (OCUFLOX) 0.3 % ophthalmic solution      • pantoprazole (PROTONIX) 40 MG EC tablet TAKE ONE TABLET BY MOUTH EVERY DAY 90 tablet 3   • prednisoLONE acetate (PRED FORTE) 1 % ophthalmic suspension INSTILL ONE DROP INTO LEFT EYE EVERY MORNING  11     Facility-Administered Medications Prior to Visit   Medication Dose Route Frequency Provider Last Rate Last Dose   • cyanocobalamin injection 1,000 mcg  1,000 mcg Intramuscular Daily Jae Bray MD   1,000 mcg at 07/14/17 1027       Patient  "Active Problem List   Diagnosis   • HTN (hypertension), benign   • Acquired hypothyroidism   • Hyperlipidemia   • Gastroesophageal reflux disease without esophagitis   • Glaucoma   • Macular degeneration   • Anemia   • Osteopenia   • CKD (chronic kidney disease), stage III   • B12 deficiency       Advance Care Planning:  has NO advance directive - information provided to the patient today    Identification of Risk Factors:  Risk factors include: cardiovascular risk, lack of transportation and vision limitations.    Review of Systems   Constitutional: Negative.    HENT: Negative.    Eyes: Positive for visual disturbance (has macular degeneration and glucoma).   Respiratory: Negative.    Cardiovascular: Negative.    Gastrointestinal: Negative.    Endocrine: Negative.    Genitourinary: Negative.    Musculoskeletal: Negative.    Skin: Negative.    Neurological: Negative.    Psychiatric/Behavioral: Negative.        Compared to one year ago, the patient feels her physical health is the same.  Compared to one year ago, the patient feels her mental health is the same.    Objective     Physical Exam   Constitutional: She is oriented to person, place, and time. She appears well-developed and well-nourished.   Cardiovascular: Normal rate, regular rhythm and intact distal pulses.  Exam reveals no gallop and no friction rub.    No murmur heard.  Pulmonary/Chest: Effort normal and breath sounds normal. No respiratory distress. She has no wheezes. She has no rales.   Neurological: She is alert and oriented to person, place, and time.   Skin: Skin is warm and dry.   Psychiatric:   No acute distress   Vitals reviewed.      Vitals:    07/25/17 0942   BP: 130/60   BP Location: Left arm   Patient Position: Sitting   Cuff Size: Adult   Pulse: 95   Temp: 98.3 °F (36.8 °C)   TempSrc: Oral   SpO2: 94%   Weight: 155 lb 11.2 oz (70.6 kg)   Height: 64.5\" (163.8 cm)   PainSc: 0-No pain       Body mass index is 26.31 kg/(m^2).  Discussed the " patient's BMI with her. The BMI is in the acceptable range.    Assessment/Plan   Patient Self-Management and Personalized Health Advice  The patient has been provided with information about: designing advance directives and preventive services including:   · Pneumococcal vaccine .    Visit Diagnoses:  No diagnosis found.    No orders of the defined types were placed in this encounter.      Outpatient Encounter Prescriptions as of 7/25/2017   Medication Sig Dispense Refill   • amLODIPine (NORVASC) 10 MG tablet TAKE ONE TABLET BY MOUTH EVERY DAY 30 tablet 12   • ascorbic acid (VITAMIN C) 1000 MG tablet Take 1 tablet by mouth Daily. 180 tablet 1   • atorvastatin (LIPITOR) 40 MG tablet TAKE ONE TABLET BY MOUTH EVERY DAY 30 tablet 12   • dorzolamide-timolol (COSOPT) 22.3-6.8 MG/ML ophthalmic solution INSTILL ONE DROP INTO LEFT EYE TWICE DAILY  6   • hydrochlorothiazide (HYDRODIURIL) 25 MG tablet TAKE ONE TABLET BY MOUTH EVERY DAY 90 tablet 1   • latanoprost (XALATAN) 0.005 % ophthalmic solution INSTILL ONE DROP INTO LEFT EYE AT BEDTIME  6   • levothyroxine (SYNTHROID, LEVOTHROID) 75 MCG tablet Take 1 tablet by mouth Daily. 90 tablet 0   • ofloxacin (OCUFLOX) 0.3 % ophthalmic solution      • pantoprazole (PROTONIX) 40 MG EC tablet TAKE ONE TABLET BY MOUTH EVERY DAY 90 tablet 3   • prednisoLONE acetate (PRED FORTE) 1 % ophthalmic suspension INSTILL ONE DROP INTO LEFT EYE EVERY MORNING  11     Facility-Administered Encounter Medications as of 7/25/2017   Medication Dose Route Frequency Provider Last Rate Last Dose   • cyanocobalamin injection 1,000 mcg  1,000 mcg Intramuscular Daily Jae Bray MD   1,000 mcg at 07/14/17 1027       Reviewed use of high risk medication in the elderly: yes  Reviewed for potential of harmful drug interactions in the elderly: yes    Follow Up:  No Follow-up on file.     An After Visit Summary and PPPS with all of these plans were given to the patient.

## 2017-08-07 ENCOUNTER — OFFICE VISIT (OUTPATIENT)
Dept: FAMILY MEDICINE CLINIC | Facility: CLINIC | Age: 82
End: 2017-08-07

## 2017-08-07 VITALS
BODY MASS INDEX: 25.87 KG/M2 | OXYGEN SATURATION: 97 % | WEIGHT: 155.3 LBS | DIASTOLIC BLOOD PRESSURE: 62 MMHG | SYSTOLIC BLOOD PRESSURE: 126 MMHG | HEART RATE: 96 BPM | HEIGHT: 65 IN

## 2017-08-07 DIAGNOSIS — R10.13 EPIGASTRIC PAIN: Primary | ICD-10-CM

## 2017-08-07 DIAGNOSIS — E03.9 ACQUIRED HYPOTHYROIDISM: ICD-10-CM

## 2017-08-07 DIAGNOSIS — N18.30 CKD (CHRONIC KIDNEY DISEASE), STAGE III (HCC): ICD-10-CM

## 2017-08-07 DIAGNOSIS — D64.9 ANEMIA, UNSPECIFIED TYPE: ICD-10-CM

## 2017-08-07 DIAGNOSIS — E53.8 B12 DEFICIENCY: ICD-10-CM

## 2017-08-07 DIAGNOSIS — R91.1 PULMONARY NODULE: ICD-10-CM

## 2017-08-07 PROBLEM — K80.20 CALCULUS OF GALLBLADDER WITHOUT CHOLECYSTITIS: Status: ACTIVE | Noted: 2017-08-07

## 2017-08-07 PROCEDURE — 99212 OFFICE O/P EST SF 10 MIN: CPT | Performed by: INTERNAL MEDICINE

## 2017-08-07 PROCEDURE — 96372 THER/PROPH/DIAG INJ SC/IM: CPT | Performed by: INTERNAL MEDICINE

## 2017-08-07 RX ORDER — DOXYCYCLINE HYCLATE 50 MG/1
324 CAPSULE, GELATIN COATED ORAL
Qty: 30 TABLET | Refills: 12 | Status: SHIPPED | OUTPATIENT
Start: 2017-08-07 | End: 2018-03-14

## 2017-08-07 RX ADMIN — CYANOCOBALAMIN 1000 MCG: 1000 INJECTION, SOLUTION INTRAMUSCULAR; SUBCUTANEOUS at 14:36

## 2017-08-07 NOTE — PROGRESS NOTES
Subjective   Mei Crane is a 89 y.o. female who presents today for:    Abdominal Pain (Discuss CT Chest & Abd/pelvis)    History of Present Illness   She went to the urgent care center on 6/27/17 because of epigastric/substernal discomfort.  EKG was reportedly unremarkable, so she was treated with ranitidine and sucralfate for presumed reflux disease; She was already on pantoprazole once daily.  Symptoms have persisted unchanged since that evaluation and since starting those medications.  She reported intermittent nausea, postprandial discomfort, and occasional pain in her upper back, under the shoulder blades.    We stopped the ranitidine and Carafate, and also stopped her iron supplement, and her symptoms subsided and have not recurred.  Labs done around that time were unrevealing; she still had an iron deficiency anemia.    Ms. Crane  reports that she has never smoked. She has never used smokeless tobacco. She reports that she drinks alcohol. She reports that she does not use illicit drugs.     Allergies   Allergen Reactions   • Barbiturates Hives   • Codeine Hives       Current Outpatient Prescriptions:   •  amLODIPine (NORVASC) 10 MG tablet, TAKE ONE TABLET BY MOUTH EVERY DAY, Disp: 30 tablet, Rfl: 12  •  ascorbic acid (VITAMIN C) 1000 MG tablet, Take 1 tablet by mouth Daily., Disp: 180 tablet, Rfl: 1  •  atorvastatin (LIPITOR) 40 MG tablet, TAKE ONE TABLET BY MOUTH EVERY DAY, Disp: 30 tablet, Rfl: 12  •  Cholecalciferol (VITAMIN D PO), Take  by mouth., Disp: , Rfl:   •  dorzolamide-timolol (COSOPT) 22.3-6.8 MG/ML ophthalmic solution, INSTILL ONE DROP INTO LEFT EYE TWICE DAILY, Disp: , Rfl: 6  •  hydrochlorothiazide (HYDRODIURIL) 25 MG tablet, TAKE ONE TABLET BY MOUTH EVERY DAY, Disp: 90 tablet, Rfl: 1  •  latanoprost (XALATAN) 0.005 % ophthalmic solution, INSTILL ONE DROP INTO LEFT EYE AT BEDTIME, Disp: , Rfl: 6  •  levothyroxine (SYNTHROID, LEVOTHROID) 75 MCG tablet, TAKE ONE TABLET BY MOUTH EVERY DAY,  "Disp: 90 tablet, Rfl: 3  •  ofloxacin (OCUFLOX) 0.3 % ophthalmic solution, , Disp: , Rfl:   •  pantoprazole (PROTONIX) 40 MG EC tablet, TAKE ONE TABLET BY MOUTH EVERY DAY, Disp: 90 tablet, Rfl: 3  •  prednisoLONE acetate (PRED FORTE) 1 % ophthalmic suspension, INSTILL ONE DROP INTO LEFT EYE EVERY MORNING, Disp: , Rfl: 11    Current Facility-Administered Medications:   •  cyanocobalamin injection 1,000 mcg, 1,000 mcg, Intramuscular, Daily, Jae Bray MD, 1,000 mcg at 07/14/17 1027      Review of Systems   Constitutional: Negative for diaphoresis.   Eyes: Positive for visual disturbance.   Respiratory: Negative for cough and chest tightness.    Cardiovascular: Negative for chest pain, palpitations and leg swelling.   Gastrointestinal: Negative for abdominal pain and blood in stool.   Musculoskeletal: Positive for arthralgias (feet) and back pain (improves with rest). Negative for myalgias and neck pain.   Neurological: Negative for dizziness, syncope, numbness and headaches.   Hematological: Does not bruise/bleed easily.         Objective   Vitals:    08/07/17 1422   BP: 126/62   BP Location: Left arm   Patient Position: Sitting   Cuff Size: Adult   Pulse: 96   SpO2: 97%   Weight: 155 lb 4.8 oz (70.4 kg)   Height: 64.5\" (163.8 cm)     Physical Exam  RRR; 1/6 systolic murmur at the USBs.  Are anicteric and the conjunctivae are pink.  No abdominal bruits.  Normoactive bowel sounds.    Assessment/Plan   Mei was seen today for abdominal pain.    Diagnoses and all orders for this visit:    Epigastric pain  Comments:  Improved; monitor for recurrence.    Pulmonary nodule  Comments:  Recheck in a year.    Anemia, unspecified type  Comments:  Restart the iron and vitamin C supplement.    B12 deficiency    Other orders  -     ferrous gluconate (FERGON) 324 MG tablet; Take 1 tablet by mouth Daily With Breakfast.      "

## 2017-08-08 RX ORDER — HYDROCHLOROTHIAZIDE 25 MG/1
TABLET ORAL
Qty: 90 TABLET | Refills: 1 | Status: SHIPPED | OUTPATIENT
Start: 2017-08-08 | End: 2018-03-14

## 2017-08-29 DIAGNOSIS — R92.8 ABNORMAL MAMMOGRAM OF LEFT BREAST: Primary | ICD-10-CM

## 2017-08-29 PROCEDURE — G0206 DX MAMMO INCL CAD UNI: HCPCS | Performed by: INTERNAL MEDICINE

## 2017-10-30 ENCOUNTER — OFFICE VISIT (OUTPATIENT)
Dept: FAMILY MEDICINE CLINIC | Facility: CLINIC | Age: 82
End: 2017-10-30

## 2017-10-30 VITALS
OXYGEN SATURATION: 93 % | BODY MASS INDEX: 26.11 KG/M2 | HEART RATE: 107 BPM | TEMPERATURE: 98 F | WEIGHT: 156.7 LBS | SYSTOLIC BLOOD PRESSURE: 124 MMHG | HEIGHT: 65 IN | DIASTOLIC BLOOD PRESSURE: 58 MMHG

## 2017-10-30 DIAGNOSIS — J40 BRONCHITIS: Primary | ICD-10-CM

## 2017-10-30 PROCEDURE — 99213 OFFICE O/P EST LOW 20 MIN: CPT | Performed by: NURSE PRACTITIONER

## 2017-10-30 RX ORDER — PREDNISONE 20 MG/1
20 TABLET ORAL 2 TIMES DAILY
Qty: 10 TABLET | Refills: 0 | Status: SHIPPED | OUTPATIENT
Start: 2017-10-30 | End: 2017-11-04

## 2017-10-30 RX ORDER — CEFDINIR 300 MG/1
CAPSULE ORAL
Refills: 0 | COMMUNITY
Start: 2017-10-27 | End: 2017-12-18

## 2017-10-30 RX ORDER — BENZONATATE 100 MG/1
CAPSULE ORAL
Refills: 0 | COMMUNITY
Start: 2017-10-27 | End: 2017-12-18

## 2017-10-30 NOTE — PROGRESS NOTES
Subjective   Mei Crane is a 89 y.o. female who presents today for:    Congestion (Went to  on 10/27/2017) and Dizziness    HPI Comments: Ms. Crane presents today for cough and congestion x 2 weeks, following her flu shot. She states that her cough is worse at night, and that she hasn't been able to get much sleep in the last two weeks because of it. She states that her cough is worse upon lying down, but is present continually, with moderate amounts of thin, clear sputum. She complains of no fever, chills, sinus pain/pressure, nasal drainage, or plugged ear sensation. She went to an urgent care clinic 2 days ago and was diagnosed with bronchitis, at which time she was prescribed tessalon pearls for cough and a seven-day course of Omnicef, which she states she has been taking as prescribed, but hasn't finished the course of antibiotics yet. She states that before she was given the tessalon, she was taking Mucinex, which provided no relief.      I have reviewed the patient's medical history in detail and updated the computerized patient record.    Ms. Crane  reports that she has never smoked. She has never used smokeless tobacco. She reports that she drinks alcohol. She reports that she does not use illicit drugs.     Allergies   Allergen Reactions   • Barbiturates Hives   • Codeine Hives       Current Outpatient Prescriptions:   •  amLODIPine (NORVASC) 10 MG tablet, TAKE ONE TABLET BY MOUTH EVERY DAY, Disp: 30 tablet, Rfl: 12  •  ascorbic acid (VITAMIN C) 1000 MG tablet, Take 1 tablet by mouth Daily., Disp: 180 tablet, Rfl: 1  •  atorvastatin (LIPITOR) 40 MG tablet, TAKE ONE TABLET BY MOUTH EVERY DAY, Disp: 30 tablet, Rfl: 12  •  benzonatate (TESSALON) 100 MG capsule, TAKE ONE CAPSULE BY MOUTH THREE TIMES DAILY AS NEEDED FOR COUGH, Disp: , Rfl: 0  •  cefdinir (OMNICEF) 300 MG capsule, TAKE ONE CAPSULE BY MOUTH TWICE DAILY FOR SEVEN DAYS, Disp: , Rfl: 0  •  Cholecalciferol (VITAMIN D PO), Take  by mouth.,  Disp: , Rfl:   •  dorzolamide-timolol (COSOPT) 22.3-6.8 MG/ML ophthalmic solution, INSTILL ONE DROP INTO LEFT EYE TWICE DAILY, Disp: , Rfl: 6  •  ferrous gluconate (FERGON) 324 MG tablet, Take 1 tablet by mouth Daily With Breakfast., Disp: 30 tablet, Rfl: 12  •  hydrochlorothiazide (HYDRODIURIL) 25 MG tablet, TAKE ONE TABLET BY MOUTH EVERY DAY, Disp: 90 tablet, Rfl: 1  •  latanoprost (XALATAN) 0.005 % ophthalmic solution, INSTILL ONE DROP INTO LEFT EYE AT BEDTIME, Disp: , Rfl: 6  •  levothyroxine (SYNTHROID, LEVOTHROID) 75 MCG tablet, TAKE ONE TABLET BY MOUTH EVERY DAY, Disp: 90 tablet, Rfl: 3  •  pantoprazole (PROTONIX) 40 MG EC tablet, TAKE ONE TABLET BY MOUTH EVERY DAY, Disp: 90 tablet, Rfl: 3    Current Facility-Administered Medications:   •  cyanocobalamin injection 1,000 mcg, 1,000 mcg, Intramuscular, Daily, Jae Bray MD, 1,000 mcg at 08/07/17 1436      Review of Systems   Constitutional: Positive for fatigue. Negative for chills and fever.   HENT: Positive for congestion and sneezing. Negative for ear discharge, ear pain, postnasal drip, rhinorrhea, sinus pain, sinus pressure, sore throat and tinnitus.    Eyes: Negative for pain, discharge, itching and visual disturbance.   Respiratory: Positive for cough, chest tightness and shortness of breath. Negative for wheezing.    Cardiovascular: Negative for chest pain, palpitations and leg swelling.   Gastrointestinal: Negative for abdominal distention, abdominal pain, constipation, diarrhea, nausea and vomiting.   Endocrine: Negative for polydipsia, polyphagia and polyuria.   Genitourinary: Negative for dysuria, frequency and urgency.   Musculoskeletal: Negative.    Skin: Negative.    Neurological: Negative.    Psychiatric/Behavioral: Negative.          Objective   Vitals:    10/30/17 1459   BP: 124/58   BP Location: Left arm   Patient Position: Sitting   Cuff Size: Adult   Pulse: 107   Temp: 98 °F (36.7 °C)   TempSrc: Oral   SpO2: 93%   Weight: 156 lb  "11.2 oz (71.1 kg)   Height: 64.5\" (163.8 cm)     Physical Exam   Constitutional: She is oriented to person, place, and time. She appears well-developed and well-nourished.   HENT:   Head: Normocephalic.   Right Ear: Hearing, tympanic membrane, external ear and ear canal normal.   Left Ear: Hearing, tympanic membrane, external ear and ear canal normal.   Nose: Nose normal. No mucosal edema, rhinorrhea or sinus tenderness. Right sinus exhibits no maxillary sinus tenderness and no frontal sinus tenderness. Left sinus exhibits no maxillary sinus tenderness and no frontal sinus tenderness.   Mouth/Throat: Uvula is midline, oropharynx is clear and moist and mucous membranes are normal. No oropharyngeal exudate.   Eyes: Conjunctivae and EOM are normal. Pupils are equal, round, and reactive to light. Right eye exhibits no discharge. Left eye exhibits no discharge.   Neck: Normal range of motion. Neck supple. No JVD present. No tracheal deviation present. No thyromegaly present.   Cardiovascular: Normal rate, regular rhythm and normal heart sounds.    No murmur heard.  Pulmonary/Chest: Effort normal. No stridor. No respiratory distress. She has decreased breath sounds in the right upper field, the right middle field, the right lower field, the left upper field, the left middle field and the left lower field. She has no wheezes. She has no rhonchi. She has no rales.   Abdominal: Soft. Bowel sounds are normal. She exhibits no distension. There is no tenderness.   Musculoskeletal: Normal range of motion.   Lymphadenopathy:     She has no cervical adenopathy.   Neurological: She is alert and oriented to person, place, and time.   Skin: Skin is warm and dry.   Psychiatric:   No acute abnormality   Vitals reviewed.        Assessment/Plan   Mei was seen today for congestion and dizziness.    Diagnoses and all orders for this visit:    Bronchitis    Other orders  -     predniSONE (DELTASONE) 20 MG tablet; Take 1 tablet by mouth 2 " (Two) Times a Day for 5 days.    1. Bronchitis: Educated patient on the importance of finishing her course of previously prescribed Omnicef to clear up the infectious organism that may be causing her bronchitis. Continue tessalon pearls for cough, but educated her to not take Mucinex concurrently with the tessalon, as this may worsen her cough. Ordered prednisone 20 mg BID x 5 days to decrease her lung consolidation and improve airflow.     2. Follow up if symptoms worsen, fail to alleviate, as needed, and at next scheduled appointment with Dr. Bray.

## 2017-11-01 RX ORDER — PANTOPRAZOLE SODIUM 40 MG/1
TABLET, DELAYED RELEASE ORAL
Qty: 90 TABLET | Refills: 1 | Status: SHIPPED | OUTPATIENT
Start: 2017-11-01 | End: 2018-02-23 | Stop reason: SDUPTHER

## 2017-12-01 ENCOUNTER — RESULTS ENCOUNTER (OUTPATIENT)
Dept: FAMILY MEDICINE CLINIC | Facility: CLINIC | Age: 82
End: 2017-12-01

## 2017-12-01 DIAGNOSIS — E03.9 ACQUIRED HYPOTHYROIDISM: ICD-10-CM

## 2017-12-01 DIAGNOSIS — D64.9 ANEMIA, UNSPECIFIED TYPE: ICD-10-CM

## 2017-12-01 DIAGNOSIS — N18.30 CKD (CHRONIC KIDNEY DISEASE), STAGE III (HCC): ICD-10-CM

## 2017-12-12 LAB
25(OH)D3+25(OH)D2 SERPL-MCNC: 40.8 NG/ML (ref 30–100)
ALBUMIN SERPL-MCNC: 4.1 G/DL (ref 3.5–5.2)
ALBUMIN/GLOB SERPL: 1.7 G/DL
ALP SERPL-CCNC: 69 U/L (ref 39–117)
ALT SERPL-CCNC: 12 U/L (ref 1–33)
AST SERPL-CCNC: 16 U/L (ref 1–32)
BASOPHILS # BLD AUTO: 0.04 10*3/MM3 (ref 0–0.2)
BASOPHILS NFR BLD AUTO: 0.6 % (ref 0–1.5)
BILIRUB SERPL-MCNC: 0.3 MG/DL (ref 0.1–1.2)
BUN SERPL-MCNC: 11 MG/DL (ref 8–23)
BUN/CREAT SERPL: 9.6 (ref 7–25)
CALCIUM SERPL-MCNC: 8.9 MG/DL (ref 8.2–9.6)
CHLORIDE SERPL-SCNC: 102 MMOL/L (ref 98–107)
CO2 SERPL-SCNC: 23.4 MMOL/L (ref 22–29)
CREAT SERPL-MCNC: 1.14 MG/DL (ref 0.57–1)
EOSINOPHIL # BLD AUTO: 0.24 10*3/MM3 (ref 0–0.7)
EOSINOPHIL NFR BLD AUTO: 3.5 % (ref 0.3–6.2)
ERYTHROCYTE [DISTWIDTH] IN BLOOD BY AUTOMATED COUNT: 13.6 % (ref 11.7–13)
FERRITIN SERPL-MCNC: 54.94 NG/ML (ref 13–150)
GFR SERPLBLD CREATININE-BSD FMLA CKD-EPI: 45 ML/MIN/1.73
GFR SERPLBLD CREATININE-BSD FMLA CKD-EPI: 54 ML/MIN/1.73
GLOBULIN SER CALC-MCNC: 2.4 GM/DL
GLUCOSE SERPL-MCNC: 89 MG/DL (ref 65–99)
HCT VFR BLD AUTO: 36.6 % (ref 35.6–45.5)
HGB BLD-MCNC: 11.1 G/DL (ref 11.9–15.5)
IMM GRANULOCYTES # BLD: 0.02 10*3/MM3 (ref 0–0.03)
IMM GRANULOCYTES NFR BLD: 0.3 % (ref 0–0.5)
IRON SATN MFR SERPL: 16 % (ref 20–50)
IRON SERPL-MCNC: 84 MCG/DL (ref 37–145)
LYMPHOCYTES # BLD AUTO: 2.53 10*3/MM3 (ref 0.9–4.8)
LYMPHOCYTES NFR BLD AUTO: 36.6 % (ref 19.6–45.3)
MCH RBC QN AUTO: 28 PG (ref 26.9–32)
MCHC RBC AUTO-ENTMCNC: 30.3 G/DL (ref 32.4–36.3)
MCV RBC AUTO: 92.2 FL (ref 80.5–98.2)
MONOCYTES # BLD AUTO: 0.6 10*3/MM3 (ref 0.2–1.2)
MONOCYTES NFR BLD AUTO: 8.7 % (ref 5–12)
NEUTROPHILS # BLD AUTO: 3.48 10*3/MM3 (ref 1.9–8.1)
NEUTROPHILS NFR BLD AUTO: 50.3 % (ref 42.7–76)
PLATELET # BLD AUTO: 331 10*3/MM3 (ref 140–500)
POTASSIUM SERPL-SCNC: 3.6 MMOL/L (ref 3.5–5.2)
PROT SERPL-MCNC: 6.5 G/DL (ref 6–8.5)
RBC # BLD AUTO: 3.97 10*6/MM3 (ref 3.9–5.2)
SODIUM SERPL-SCNC: 140 MMOL/L (ref 136–145)
T3FREE SERPL-MCNC: 2.1 PG/ML (ref 2–4.4)
T4 FREE SERPL-MCNC: 1.72 NG/DL (ref 0.93–1.7)
TIBC SERPL-MCNC: 522 MCG/DL
TSH SERPL DL<=0.005 MIU/L-ACNC: 3.68 MIU/ML (ref 0.27–4.2)
UIBC SERPL-MCNC: 438 MCG/DL
WBC # BLD AUTO: 6.91 10*3/MM3 (ref 4.5–10.7)

## 2017-12-18 ENCOUNTER — OFFICE VISIT (OUTPATIENT)
Dept: FAMILY MEDICINE CLINIC | Facility: CLINIC | Age: 82
End: 2017-12-18

## 2017-12-18 VITALS
WEIGHT: 154.2 LBS | HEART RATE: 100 BPM | SYSTOLIC BLOOD PRESSURE: 148 MMHG | BODY MASS INDEX: 25.69 KG/M2 | HEIGHT: 65 IN | OXYGEN SATURATION: 95 % | DIASTOLIC BLOOD PRESSURE: 60 MMHG

## 2017-12-18 DIAGNOSIS — N18.30 CKD (CHRONIC KIDNEY DISEASE), STAGE III (HCC): ICD-10-CM

## 2017-12-18 DIAGNOSIS — I10 HTN (HYPERTENSION), BENIGN: ICD-10-CM

## 2017-12-18 DIAGNOSIS — D64.9 ANEMIA, UNSPECIFIED TYPE: ICD-10-CM

## 2017-12-18 DIAGNOSIS — E03.9 ACQUIRED HYPOTHYROIDISM: ICD-10-CM

## 2017-12-18 DIAGNOSIS — J18.9 COMMUNITY ACQUIRED PNEUMONIA, UNSPECIFIED LATERALITY: Primary | ICD-10-CM

## 2017-12-18 PROCEDURE — 71020 XR CHEST PA AND LATERAL: CPT | Performed by: INTERNAL MEDICINE

## 2017-12-18 PROCEDURE — 99214 OFFICE O/P EST MOD 30 MIN: CPT | Performed by: INTERNAL MEDICINE

## 2017-12-18 RX ORDER — FENOFIBRATE 160 MG/1
160 TABLET ORAL DAILY
COMMUNITY
Start: 2017-11-30 | End: 2018-03-09 | Stop reason: SDUPTHER

## 2017-12-18 RX ORDER — ASPIRIN 81 MG/1
81 TABLET ORAL DAILY
COMMUNITY
End: 2020-06-05 | Stop reason: HOSPADM

## 2017-12-18 NOTE — PROGRESS NOTES
Subjective   Mei Crane is a 89 y.o. female who presents today for:    Anemia (4 month f/u & review labs) and Pneumonia ([facility] f/u)    History of Present Illness     [facility] was [facility] evaluated [facility], treated with omnicef and prednisone for bronchitis.  She was re-evaluated here shortly thereafter and advised to finish her meds.  She was hospitalized one week later for pneumonia, for 3 days, then spent 3 weeks at the [facility] for rehabilitation.  She is now at home with physical therapy and occupational therapy.  Per their report last week, she is nearing the end of physical therapy since she has reached her goals.  She has been homebound this whole time.  She is unable to drive herself, partially because of the leg weakness that developed in association with her illness, and partially because of macular degeneration primarily affecting her left eye.    She has a mild, iron deficiency anemia.  We started her on iron at an office visit in April and had her follow-up for retesting. Iron was held in August due to UGI symptoms / abdominal pain.  The pain improved once we stopped the iron.  She also has a B12 deficiency and receives B12 injections once monthly.      She has chronic HTN that has been stable.    She has chronic hypothyroidism that is treated with T4 supplement.    Ms. Crane  reports that she has never smoked. She has never used smokeless tobacco. She reports that she drinks alcohol. She reports that she does not use illicit drugs.     Allergies   Allergen Reactions   • Barbiturates Hives   • Codeine Hives       Current Outpatient Prescriptions:   •  amLODIPine (NORVASC) 10 MG tablet, TAKE ONE TABLET BY MOUTH EVERY DAY, Disp: 30 tablet, Rfl: 12  •  ascorbic acid (VITAMIN C) 1000 MG tablet, Take 1 tablet by mouth Daily., Disp: 180 tablet, Rfl: 1  •  aspirin 81 MG EC tablet, Take 81 mg by mouth Daily., Disp: , Rfl:   •  Cholecalciferol (VITAMIN D PO), Take  by mouth., Disp: , Rfl:   •   "dorzolamide-timolol (COSOPT) 22.3-6.8 MG/ML ophthalmic solution, INSTILL ONE DROP INTO LEFT EYE TWICE DAILY, Disp: , Rfl: 6  •  fenofibrate 160 MG tablet, Take 160 mg by mouth Daily., Disp: , Rfl:   •  latanoprost (XALATAN) 0.005 % ophthalmic solution, INSTILL ONE DROP INTO LEFT EYE AT BEDTIME, Disp: , Rfl: 6  •  levothyroxine (SYNTHROID, LEVOTHROID) 75 MCG tablet, TAKE ONE TABLET BY MOUTH EVERY DAY, Disp: 90 tablet, Rfl: 3  •  pantoprazole (PROTONIX) 40 MG EC tablet, TAKE ONE TABLET BY MOUTH EVERY DAY, Disp: 90 tablet, Rfl: 1  •  Probiotic Product (PROBIOTIC DAILY PO), Take 1 tablet by mouth Daily., Disp: , Rfl:   •  atorvastatin (LIPITOR) 40 MG tablet, TAKE ONE TABLET BY MOUTH EVERY DAY, Disp: 30 tablet, Rfl: 12        Current Facility-Administered Medications:   •  cyanocobalamin injection 1,000 mcg, 1,000 mcg, Intramuscular, Daily, Jae Bray MD, 1,000 mcg at 08/07/17 1436      Review of Systems   Constitutional: Positive for activity change and fatigue. Negative for fever and unexpected weight change.   Respiratory: Negative for shortness of breath.    Cardiovascular: Negative for chest pain and palpitations.   Musculoskeletal: Positive for gait problem.   Neurological: Positive for weakness (legs, after her hospitalizaiton).         Objective   Vitals:    12/18/17 1043 12/18/17 1113   BP: 146/70 148/60   BP Location: Right arm    Patient Position: Sitting    Cuff Size: Adult    Pulse: 100    SpO2: 95%    Weight: 69.9 kg (154 lb 3.2 oz)    Height: 163.8 cm (64.5\")      Physical Exam    Well-developed, well-nourished, in good spirits.  Left iris is deep blue; right iris is brown.  No scleral injection noted.  Conjunctiva are mildly pale.  Regular with frequent premature beats; 2/6 holosystolic murmur loudest at the right upper sternal border.   A few coarse crackles at the bases improve with deep inspiration.  She has normal respiratory effort.  No wheezes appreciated.    Assessment/Plan   Mei was " seen today for anemia and pneumonia.    Diagnoses and all orders for this visit:    HTN (hypertension), benign    Anemia, unspecified type    Acquired hypothyroidism    CKD (chronic kidney disease), stage III    Although it has been a long road to recovery, she appears to be recovering well from her pneumonia last month.  We will get a follow-up chest x-ray today.  She should finish up with physical therapy and occupational therapy at home.  She should get back into see her eye doctor to get the next shot for her macular degeneration.    We reviewed labs that were done in anticipation of today's office visit.  Her hemoglobin has not budged from the time of her hospitalization (11/7/17); her iron stores remain slightly low off an iron supplement.    Her renal function has remained stable through this ordeal.  She should continue to drink plenty of fluids.    Her thyroid hormone levels, reviewed today, were also normal.  We will make no change to her thyroid hormone dose.    Chest x-ray shows improved aeration; no evidence of pneumonia.  Not much change from CXR 4/29/16.

## 2018-02-26 RX ORDER — PANTOPRAZOLE SODIUM 40 MG/1
TABLET, DELAYED RELEASE ORAL
Qty: 90 TABLET | Refills: 1 | Status: SHIPPED | OUTPATIENT
Start: 2018-02-26 | End: 2018-11-26 | Stop reason: SDUPTHER

## 2018-03-08 ENCOUNTER — OFFICE VISIT (OUTPATIENT)
Dept: FAMILY MEDICINE CLINIC | Facility: CLINIC | Age: 83
End: 2018-03-08

## 2018-03-08 VITALS
OXYGEN SATURATION: 96 % | BODY MASS INDEX: 26.44 KG/M2 | HEIGHT: 65 IN | SYSTOLIC BLOOD PRESSURE: 114 MMHG | HEART RATE: 93 BPM | DIASTOLIC BLOOD PRESSURE: 64 MMHG | WEIGHT: 158.7 LBS

## 2018-03-08 DIAGNOSIS — E03.9 ACQUIRED HYPOTHYROIDISM: ICD-10-CM

## 2018-03-08 DIAGNOSIS — D64.9 ANEMIA, UNSPECIFIED TYPE: ICD-10-CM

## 2018-03-08 DIAGNOSIS — E53.8 B12 DEFICIENCY: ICD-10-CM

## 2018-03-08 DIAGNOSIS — I10 HTN (HYPERTENSION), BENIGN: Primary | ICD-10-CM

## 2018-03-08 DIAGNOSIS — N18.30 CKD (CHRONIC KIDNEY DISEASE), STAGE III (HCC): ICD-10-CM

## 2018-03-08 PROBLEM — J18.9 CAP (COMMUNITY ACQUIRED PNEUMONIA): Status: RESOLVED | Noted: 2017-11-07 | Resolved: 2018-03-08

## 2018-03-08 PROCEDURE — 99214 OFFICE O/P EST MOD 30 MIN: CPT | Performed by: INTERNAL MEDICINE

## 2018-03-08 RX ORDER — PREDNISOLONE ACETATE 10 MG/ML
1 SUSPENSION/ DROPS OPHTHALMIC DAILY
Refills: 3 | COMMUNITY
Start: 2018-02-23

## 2018-03-08 NOTE — PROGRESS NOTES
Subjective   Mei Crane is a 89 y.o. female who presents today for:    Anemia (3 month f/u); Hypothyroidism; and Chronic Kidney Disease    History of Present Illness   She has multifactorial anemia that was both B12 and iron deficiency related.  Despite supplements, counts remained a little on the low side.  Iron caused GI distress, so it was stopped and her GI symptoms resolved off of it.  She was getting B12 shots; those were stopped sometime in 2017 around the time of her hospitalization for pneumonia.  She has not taken an oral B12 supplement.  She complains of fatigue/low energy.    She has hypothyroidism treated with Synthroid.  Her most recent thyroid hormone levels were perfect (December, 2017).    She also has chronic hypertension previously treated with amlodipine and hydrochlorothiazide.  Following her hospitalization for pneumonia, the hydrochlorothiazide was stopped.  She continues to take amlodipine once daily.    She also has dyslipidemia managed with fenofibrate and atorvastatin.  She has chronic reflux symptoms treated with pantoprazole 40 mg daily.    Ms. Crane  reports that she has never smoked. She has never used smokeless tobacco. She reports that she drinks alcohol. She reports that she does not use illicit drugs.     Allergies   Allergen Reactions   • Barbiturates Hives   • Codeine Hives       Current Outpatient Prescriptions:   •  amLODIPine (NORVASC) 10 MG tablet, TAKE ONE TABLET BY MOUTH EVERY DAY, Disp: 30 tablet, Rfl: 12  •  ascorbic acid (VITAMIN C) 1000 MG tablet, Take 1 tablet by mouth Daily., Disp: 180 tablet, Rfl: 1  •  aspirin 81 MG EC tablet, Take 81 mg by mouth Daily., Disp: , Rfl:   •  atorvastatin (LIPITOR) 40 MG tablet, TAKE ONE TABLET BY MOUTH EVERY DAY, Disp: 30 tablet, Rfl: 12  •  Cholecalciferol (VITAMIN D PO), Take  by mouth., Disp: , Rfl:   •  dorzolamide-timolol (COSOPT) 22.3-6.8 MG/ML ophthalmic solution, INSTILL ONE DROP INTO LEFT EYE TWICE DAILY, Disp: , Rfl:  "6  •  fenofibrate 160 MG tablet, Take 160 mg by mouth Daily., Disp: , Rfl:   •  ferrous gluconate (FERGON) 324 MG tablet, Take 1 tablet by mouth Daily With Breakfast., Disp: 30 tablet, Rfl: 12  •  hydrochlorothiazide (HYDRODIURIL) 25 MG tablet, TAKE ONE TABLET BY MOUTH EVERY DAY, Disp: 90 tablet, Rfl: 1  •  levothyroxine (SYNTHROID, LEVOTHROID) 75 MCG tablet, TAKE ONE TABLET BY MOUTH EVERY DAY, Disp: 90 tablet, Rfl: 3  •  pantoprazole (PROTONIX) 40 MG EC tablet, TAKE ONE TABLET BY MOUTH EVERY DAY, Disp: 90 tablet, Rfl: 1  •  prednisoLONE acetate (PRED FORTE) 1 % ophthalmic suspension, INSTILL ONE DROP IN THE LEFT EYE FOUR TIMES DAILY, Disp: , Rfl: 3  •  Probiotic Product (PROBIOTIC DAILY PO), Take 1 tablet by mouth Daily., Disp: , Rfl:   •  latanoprost (XALATAN) 0.005 % ophthalmic solution, INSTILL ONE DROP INTO LEFT EYE AT BEDTIME, Disp: , Rfl: 6    Current Facility-Administered Medications:   •  cyanocobalamin injection 1,000 mcg, 1,000 mcg, Intramuscular, Daily, Jae Bray MD, 1,000 mcg at 08/07/17 1436      Review of Systems   Constitutional: Positive for fatigue.   Eyes: Positive for visual disturbance (deccreased bilaterally).   Respiratory: Negative for cough and shortness of breath.    Cardiovascular: Negative for chest pain and palpitations.   Musculoskeletal: Positive for arthralgias.   Hematological: Does not bruise/bleed easily.   Right thigh pain.      Objective   Vitals:    03/08/18 1538   BP: 114/64   BP Location: Right arm   Patient Position: Sitting   Cuff Size: Adult   Pulse: 93   SpO2: 96%   Weight: 72 kg (158 lb 11.2 oz)   Height: 163.8 cm (64.5\")     Physical Exam   Constitutional: She is oriented to person, place, and time. She appears well-developed and well-nourished. No distress.   Neck: Carotid bruit is not present. No thyroid mass and no thyromegaly present.   Cardiovascular: Normal rate, regular rhythm and S1 normal.    Murmur heard.   Systolic murmur is present with a grade of " 2/6   Pulmonary/Chest: Effort normal. She has no wheezes. She has no rales.   Neurological: She is alert and oriented to person, place, and time.   Nonpitting lower extremity edema bilaterally.  No lower extremity tenderness or warmth.  Nontender to palpation of the quad muscles of the right leg.  No lower extremity weakness; strength is 5+/5 on full extension at the knee and hip as well as plantar and dorsiflexion of the foot.      Assessment/Plan   Mei was seen today for anemia, hypothyroidism and chronic kidney disease.    Diagnoses and all orders for this visit:    HTN (hypertension), benign  -     Comprehensive Metabolic Panel    CKD (chronic kidney disease), stage III  -     Comprehensive Metabolic Panel  -     CBC & Differential  -     Vitamin D 25 Hydroxy  -     Microalbumin / Creatinine Urine Ratio - Urine, Clean Catch    Anemia, unspecified type  -     CBC & Differential  -     Iron Profile  -     Methylmalonic Acid, Serum  -     Vitamin B12  -     Ferritin    B12 deficiency    Acquired hypothyroidism  -     T3, Free  -     T4, Free  -     TSH    She will remain off the hydrochlorothiazide since her blood pressure is doing so well.    We will make no change in her medication regimen at this time.  I have asked her to return with her medications so that we can reconcile her meds to our list.    We will check the labs as ordered above.  We will give her a B12 shot after those labs are drawn and make further recommendations regarding B12 supplementation thereafter.  She should not restart iron until we see the results of her labs.  In essence, further recommendations will follow once we see her lab results.

## 2018-03-09 RX ORDER — FENOFIBRATE 160 MG/1
160 TABLET ORAL DAILY
Qty: 30 TABLET | Refills: 1 | Status: SHIPPED | OUTPATIENT
Start: 2018-03-09 | End: 2018-04-21 | Stop reason: SDUPTHER

## 2018-03-13 LAB
25(OH)D3+25(OH)D2 SERPL-MCNC: 33.6 NG/ML (ref 30–100)
ALBUMIN SERPL-MCNC: 4.6 G/DL (ref 3.2–4.6)
ALBUMIN/CREAT UR: 52.5 MG/G CREAT (ref 0–30)
ALBUMIN/GLOB SERPL: 2.2 {RATIO} (ref 1.2–2.2)
ALP SERPL-CCNC: 56 IU/L (ref 39–117)
ALT SERPL-CCNC: 12 IU/L (ref 0–32)
AST SERPL-CCNC: 20 IU/L (ref 0–40)
BASOPHILS # BLD AUTO: 0 X10E3/UL (ref 0–0.2)
BASOPHILS NFR BLD AUTO: 0 %
BILIRUB SERPL-MCNC: <0.2 MG/DL (ref 0–1.2)
BUN SERPL-MCNC: 21 MG/DL (ref 10–36)
BUN/CREAT SERPL: 16 (ref 12–28)
CALCIUM SERPL-MCNC: 9.2 MG/DL (ref 8.7–10.3)
CHLORIDE SERPL-SCNC: 102 MMOL/L (ref 96–106)
CO2 SERPL-SCNC: 23 MMOL/L (ref 18–29)
CREAT SERPL-MCNC: 1.28 MG/DL (ref 0.57–1)
CREAT UR-MCNC: 62.7 MG/DL
EOSINOPHIL # BLD AUTO: 0.4 X10E3/UL (ref 0–0.4)
EOSINOPHIL NFR BLD AUTO: 5 %
ERYTHROCYTE [DISTWIDTH] IN BLOOD BY AUTOMATED COUNT: 13.5 % (ref 12.3–15.4)
FERRITIN SERPL-MCNC: 50 NG/ML (ref 15–150)
GFR SERPLBLD CREATININE-BSD FMLA CKD-EPI: 36 ML/MIN/1.73
GFR SERPLBLD CREATININE-BSD FMLA CKD-EPI: 42 ML/MIN/1.73
GLOBULIN SER CALC-MCNC: 2.1 G/DL (ref 1.5–4.5)
GLUCOSE SERPL-MCNC: 101 MG/DL (ref 65–99)
HCT VFR BLD AUTO: 34.5 % (ref 34–46.6)
HGB BLD-MCNC: 11.1 G/DL (ref 11.1–15.9)
IMM GRANULOCYTES # BLD: 0 X10E3/UL (ref 0–0.1)
IMM GRANULOCYTES NFR BLD: 0 %
IRON SATN MFR SERPL: 12 % (ref 15–55)
IRON SERPL-MCNC: 58 UG/DL (ref 27–139)
LYMPHOCYTES # BLD AUTO: 2.8 X10E3/UL (ref 0.7–3.1)
LYMPHOCYTES NFR BLD AUTO: 38 %
MCH RBC QN AUTO: 27.5 PG (ref 26.6–33)
MCHC RBC AUTO-ENTMCNC: 32.2 G/DL (ref 31.5–35.7)
MCV RBC AUTO: 86 FL (ref 79–97)
METHYLMALONATE SERPL-SCNC: 286 NMOL/L (ref 0–378)
MICROALBUMIN UR-MCNC: 32.9 UG/ML
MONOCYTES # BLD AUTO: 0.5 X10E3/UL (ref 0.1–0.9)
MONOCYTES NFR BLD AUTO: 7 %
NEUTROPHILS # BLD AUTO: 3.5 X10E3/UL (ref 1.4–7)
NEUTROPHILS NFR BLD AUTO: 50 %
PLATELET # BLD AUTO: 348 X10E3/UL (ref 150–379)
POTASSIUM SERPL-SCNC: 4.1 MMOL/L (ref 3.5–5.2)
PROT SERPL-MCNC: 6.7 G/DL (ref 6–8.5)
RBC # BLD AUTO: 4.03 X10E6/UL (ref 3.77–5.28)
SODIUM SERPL-SCNC: 141 MMOL/L (ref 134–144)
T3FREE SERPL-MCNC: 1.9 PG/ML (ref 2–4.4)
T4 FREE SERPL-MCNC: 1.45 NG/DL (ref 0.82–1.77)
TIBC SERPL-MCNC: 486 UG/DL (ref 250–450)
TSH SERPL DL<=0.005 MIU/L-ACNC: 1.7 UIU/ML (ref 0.45–4.5)
UIBC SERPL-MCNC: 428 UG/DL (ref 118–369)
VIT B12 SERPL-MCNC: 455 PG/ML (ref 232–1245)
WBC # BLD AUTO: 7.3 X10E3/UL (ref 3.4–10.8)

## 2018-03-14 ENCOUNTER — CLINICAL SUPPORT (OUTPATIENT)
Dept: FAMILY MEDICINE CLINIC | Facility: CLINIC | Age: 83
End: 2018-03-14

## 2018-03-14 DIAGNOSIS — E53.8 B12 DEFICIENCY: ICD-10-CM

## 2018-03-14 PROCEDURE — 96372 THER/PROPH/DIAG INJ SC/IM: CPT | Performed by: INTERNAL MEDICINE

## 2018-03-14 RX ORDER — ACETAMINOPHEN 500 MG
1000 TABLET ORAL EVERY 6 HOURS PRN
COMMUNITY
End: 2020-04-21

## 2018-03-14 RX ORDER — FENOFIBRATE 160 MG/1
TABLET ORAL
Qty: 30 TABLET | Refills: 2 | OUTPATIENT
Start: 2018-03-14

## 2018-03-14 RX ADMIN — CYANOCOBALAMIN 1000 MCG: 1000 INJECTION, SOLUTION INTRAMUSCULAR; SUBCUTANEOUS at 09:11

## 2018-04-09 ENCOUNTER — CLINICAL SUPPORT (OUTPATIENT)
Dept: FAMILY MEDICINE CLINIC | Facility: CLINIC | Age: 83
End: 2018-04-09

## 2018-04-09 DIAGNOSIS — E53.8 B12 DEFICIENCY: ICD-10-CM

## 2018-04-09 PROCEDURE — 96372 THER/PROPH/DIAG INJ SC/IM: CPT | Performed by: INTERNAL MEDICINE

## 2018-04-09 RX ADMIN — CYANOCOBALAMIN 1000 MCG: 1000 INJECTION, SOLUTION INTRAMUSCULAR; SUBCUTANEOUS at 10:17

## 2018-04-23 RX ORDER — FENOFIBRATE 160 MG/1
TABLET ORAL
Qty: 30 TABLET | Refills: 2 | Status: SHIPPED | OUTPATIENT
Start: 2018-04-23 | End: 2018-06-08 | Stop reason: ALTCHOICE

## 2018-05-07 ENCOUNTER — CLINICAL SUPPORT (OUTPATIENT)
Dept: FAMILY MEDICINE CLINIC | Facility: CLINIC | Age: 83
End: 2018-05-07

## 2018-05-07 DIAGNOSIS — E53.8 B12 DEFICIENCY: ICD-10-CM

## 2018-05-07 PROCEDURE — 96372 THER/PROPH/DIAG INJ SC/IM: CPT | Performed by: INTERNAL MEDICINE

## 2018-05-07 RX ADMIN — CYANOCOBALAMIN 1000 MCG: 1000 INJECTION, SOLUTION INTRAMUSCULAR; SUBCUTANEOUS at 10:34

## 2018-05-09 RX ORDER — AMLODIPINE BESYLATE 10 MG/1
TABLET ORAL
Qty: 30 TABLET | Refills: 1 | Status: SHIPPED | OUTPATIENT
Start: 2018-05-09 | End: 2018-06-11 | Stop reason: SDUPTHER

## 2018-05-22 DIAGNOSIS — E53.8 B12 DEFICIENCY: ICD-10-CM

## 2018-05-22 DIAGNOSIS — E78.2 MIXED HYPERLIPIDEMIA: ICD-10-CM

## 2018-05-22 DIAGNOSIS — I10 HTN (HYPERTENSION), BENIGN: Primary | ICD-10-CM

## 2018-05-22 DIAGNOSIS — D64.9 ANEMIA, UNSPECIFIED TYPE: ICD-10-CM

## 2018-05-22 DIAGNOSIS — N18.30 CKD (CHRONIC KIDNEY DISEASE), STAGE III (HCC): ICD-10-CM

## 2018-05-22 DIAGNOSIS — E03.9 ACQUIRED HYPOTHYROIDISM: ICD-10-CM

## 2018-05-28 RX ORDER — LEVOTHYROXINE SODIUM 0.07 MG/1
TABLET ORAL
Qty: 90 TABLET | OUTPATIENT
Start: 2018-05-28

## 2018-05-30 ENCOUNTER — RESULTS ENCOUNTER (OUTPATIENT)
Dept: FAMILY MEDICINE CLINIC | Facility: CLINIC | Age: 83
End: 2018-05-30

## 2018-05-30 DIAGNOSIS — I10 HTN (HYPERTENSION), BENIGN: ICD-10-CM

## 2018-05-30 DIAGNOSIS — D64.9 ANEMIA, UNSPECIFIED TYPE: ICD-10-CM

## 2018-05-30 DIAGNOSIS — E53.8 B12 DEFICIENCY: ICD-10-CM

## 2018-05-30 DIAGNOSIS — E78.2 MIXED HYPERLIPIDEMIA: ICD-10-CM

## 2018-05-30 DIAGNOSIS — E03.9 ACQUIRED HYPOTHYROIDISM: ICD-10-CM

## 2018-05-30 DIAGNOSIS — N18.30 CKD (CHRONIC KIDNEY DISEASE), STAGE III (HCC): ICD-10-CM

## 2018-06-01 ENCOUNTER — CLINICAL SUPPORT (OUTPATIENT)
Dept: FAMILY MEDICINE CLINIC | Facility: CLINIC | Age: 83
End: 2018-06-01

## 2018-06-01 DIAGNOSIS — E53.8 B12 DEFICIENCY: ICD-10-CM

## 2018-06-01 PROCEDURE — 96372 THER/PROPH/DIAG INJ SC/IM: CPT | Performed by: INTERNAL MEDICINE

## 2018-06-01 RX ADMIN — CYANOCOBALAMIN 1000 MCG: 1000 INJECTION, SOLUTION INTRAMUSCULAR; SUBCUTANEOUS at 10:11

## 2018-06-05 LAB
ALBUMIN SERPL-MCNC: 4.2 G/DL (ref 3.5–5.2)
ALBUMIN/GLOB SERPL: 1.8 G/DL
ALP SERPL-CCNC: 47 U/L (ref 39–117)
ALT SERPL-CCNC: 10 U/L (ref 1–33)
AST SERPL-CCNC: 19 U/L (ref 1–32)
BASOPHILS # BLD AUTO: 0.02 10*3/MM3 (ref 0–0.2)
BASOPHILS NFR BLD AUTO: 0.3 % (ref 0–1.5)
BILIRUB SERPL-MCNC: 0.4 MG/DL (ref 0.1–1.2)
BUN SERPL-MCNC: 22 MG/DL (ref 8–23)
BUN/CREAT SERPL: 17.3 (ref 7–25)
CALCIUM SERPL-MCNC: 9.3 MG/DL (ref 8.2–9.6)
CHLORIDE SERPL-SCNC: 105 MMOL/L (ref 98–107)
CHOLEST SERPL-MCNC: 208 MG/DL (ref 0–200)
CO2 SERPL-SCNC: 23.5 MMOL/L (ref 22–29)
CREAT SERPL-MCNC: 1.27 MG/DL (ref 0.57–1)
EOSINOPHIL # BLD AUTO: 0.33 10*3/MM3 (ref 0–0.7)
EOSINOPHIL NFR BLD AUTO: 4.7 % (ref 0.3–6.2)
ERYTHROCYTE [DISTWIDTH] IN BLOOD BY AUTOMATED COUNT: 13.4 % (ref 11.7–13)
GFR SERPLBLD CREATININE-BSD FMLA CKD-EPI: 39 ML/MIN/1.73
GFR SERPLBLD CREATININE-BSD FMLA CKD-EPI: 48 ML/MIN/1.73
GLOBULIN SER CALC-MCNC: 2.3 GM/DL
GLUCOSE SERPL-MCNC: 93 MG/DL (ref 65–99)
HCT VFR BLD AUTO: 38.8 % (ref 35.6–45.5)
HDLC SERPL-MCNC: 58 MG/DL (ref 40–60)
HGB BLD-MCNC: 11.7 G/DL (ref 11.9–15.5)
IMM GRANULOCYTES # BLD: 0 10*3/MM3 (ref 0–0.03)
IMM GRANULOCYTES NFR BLD: 0 % (ref 0–0.5)
LDLC SERPL CALC-MCNC: 127 MG/DL (ref 0–100)
LYMPHOCYTES # BLD AUTO: 2.68 10*3/MM3 (ref 0.9–4.8)
LYMPHOCYTES NFR BLD AUTO: 38.5 % (ref 19.6–45.3)
MCH RBC QN AUTO: 27.9 PG (ref 26.9–32)
MCHC RBC AUTO-ENTMCNC: 30.2 G/DL (ref 32.4–36.3)
MCV RBC AUTO: 92.6 FL (ref 80.5–98.2)
METHYLMALONATE SERPL-SCNC: 340 NMOL/L (ref 0–378)
MONOCYTES # BLD AUTO: 0.49 10*3/MM3 (ref 0.2–1.2)
MONOCYTES NFR BLD AUTO: 7 % (ref 5–12)
NEUTROPHILS # BLD AUTO: 3.44 10*3/MM3 (ref 1.9–8.1)
NEUTROPHILS NFR BLD AUTO: 49.5 % (ref 42.7–76)
PLATELET # BLD AUTO: 323 10*3/MM3 (ref 140–500)
POTASSIUM SERPL-SCNC: 4.5 MMOL/L (ref 3.5–5.2)
PROT SERPL-MCNC: 6.5 G/DL (ref 6–8.5)
RBC # BLD AUTO: 4.19 10*6/MM3 (ref 3.9–5.2)
SODIUM SERPL-SCNC: 144 MMOL/L (ref 136–145)
T3FREE SERPL-MCNC: 2.2 PG/ML (ref 2–4.4)
T4 FREE SERPL-MCNC: 1.67 NG/DL (ref 0.93–1.7)
TRIGL SERPL-MCNC: 114 MG/DL (ref 0–150)
TSH SERPL DL<=0.005 MIU/L-ACNC: 0.93 MIU/ML (ref 0.27–4.2)
VIT B12 SERPL-MCNC: 455 PG/ML (ref 211–946)
VLDLC SERPL CALC-MCNC: 22.8 MG/DL (ref 5–40)
WBC # BLD AUTO: 6.96 10*3/MM3 (ref 4.5–10.7)

## 2018-06-08 ENCOUNTER — OFFICE VISIT (OUTPATIENT)
Dept: FAMILY MEDICINE CLINIC | Facility: CLINIC | Age: 83
End: 2018-06-08

## 2018-06-08 VITALS
DIASTOLIC BLOOD PRESSURE: 64 MMHG | HEART RATE: 101 BPM | HEIGHT: 65 IN | WEIGHT: 157.9 LBS | OXYGEN SATURATION: 96 % | BODY MASS INDEX: 26.31 KG/M2 | SYSTOLIC BLOOD PRESSURE: 150 MMHG

## 2018-06-08 DIAGNOSIS — I10 HTN (HYPERTENSION), BENIGN: Primary | ICD-10-CM

## 2018-06-08 DIAGNOSIS — E03.9 ACQUIRED HYPOTHYROIDISM: ICD-10-CM

## 2018-06-08 DIAGNOSIS — D64.9 ANEMIA, UNSPECIFIED TYPE: ICD-10-CM

## 2018-06-08 DIAGNOSIS — E78.2 MIXED HYPERLIPIDEMIA: ICD-10-CM

## 2018-06-08 DIAGNOSIS — E53.8 B12 DEFICIENCY: ICD-10-CM

## 2018-06-08 DIAGNOSIS — N18.30 CKD (CHRONIC KIDNEY DISEASE), STAGE III (HCC): ICD-10-CM

## 2018-06-08 PROCEDURE — 99214 OFFICE O/P EST MOD 30 MIN: CPT | Performed by: INTERNAL MEDICINE

## 2018-06-08 RX ORDER — HYDROCHLOROTHIAZIDE 12.5 MG/1
12.5 TABLET ORAL DAILY
Qty: 30 TABLET | Refills: 2 | Status: SHIPPED | OUTPATIENT
Start: 2018-06-08 | End: 2018-09-05 | Stop reason: SDUPTHER

## 2018-06-08 RX ORDER — FERROUS GLUCONATE 324(37.5)
324 TABLET ORAL
Qty: 30 TABLET | Refills: 6 | Status: SHIPPED | OUTPATIENT
Start: 2018-06-08 | End: 2020-09-22 | Stop reason: SDUPTHER

## 2018-06-08 NOTE — PROGRESS NOTES
Subjective   Mei Crane is a 89 y.o. female who presents today for:    Hypertension (3 month f/u & review labs); Anemia; Hypothyroidism; and Chronic Kidney Disease    History of Present Illness   She has multifactorial anemia that was both B12 and iron deficiency related.  Despite supplements, counts remained a little on the low side.  Iron caused GI distress, so it was stopped and her GI symptoms resolved off of it.  She was getting B12 shots; those were stopped sometime in 2017 around the time of her hospitalization for pneumonia, and to restart it out of our office visit 3/8/18 when we identified the B12 shots had been inadvertently discontinued.    She has hypothyroidism treated with Synthroid.  Her most recent thyroid hormone levels were perfect (December, 2017).     She also has chronic hypertension previously treated with amlodipine and hydrochlorothiazide.  Following her hospitalization for pneumonia, the hydrochlorothiazide was stopped.  She continues to take amlodipine once daily.    She also has dyslipidemia managed with fenofibrate and atorvastatin.      She has chronic reflux symptoms treated with pantoprazole 40 mg daily.    Ms. Crane  reports that she has never smoked. She has never used smokeless tobacco. She reports that she drinks alcohol. She reports that she does not use drugs.     Allergies   Allergen Reactions   • Barbiturates Hives   • Codeine Hives       Current Outpatient Prescriptions:   •  acetaminophen (TYLENOL) 500 MG tablet, Take 1,000 mg by mouth Every 6 (Six) Hours As Needed for Mild Pain  or Moderate Pain ., Disp: , Rfl:   •  amLODIPine (NORVASC) 10 MG tablet, TAKE ONE TABLET BY MOUTH EVERY DAY, Disp: 30 tablet, Rfl: 1  •  ascorbic acid (VITAMIN C) 1000 MG tablet, Take 1 tablet by mouth Daily., Disp: 180 tablet, Rfl: 1  •  aspirin 81 MG EC tablet, Take 81 mg by mouth Daily., Disp: , Rfl:   •  Cholecalciferol (VITAMIN D PO), Take 2,000 Units by mouth Daily., Disp: , Rfl:   •   "fenofibrate 160 MG tablet, TAKE ONE TABLET BY MOUTH EVERY DAY, Disp: 30 tablet, Rfl: 2  •  levothyroxine (SYNTHROID, LEVOTHROID) 75 MCG tablet, TAKE ONE TABLET BY MOUTH EVERY DAY, Disp: 90 tablet, Rfl: 3  •  pantoprazole (PROTONIX) 40 MG EC tablet, TAKE ONE TABLET BY MOUTH EVERY DAY, Disp: 90 tablet, Rfl: 1  •  prednisoLONE acetate (PRED FORTE) 1 % ophthalmic suspension, INSTILL ONE DROP IN THE LEFT EYE FOUR TIMES DAILY, Disp: , Rfl: 3    Current Facility-Administered Medications:   •  cyanocobalamin injection 1,000 mcg, 1,000 mcg, Intramuscular, Daily, Jae Bray MD, 1,000 mcg at 06/01/18 1011      Review of Systems   Constitutional: Positive for fatigue (fatigues easily).   Eyes: Positive for visual disturbance.   Respiratory: Negative for chest tightness and shortness of breath.    Cardiovascular: Positive for leg swelling (late-day pedal edema). Negative for chest pain.   Musculoskeletal: Positive for arthralgias.         Objective   Vitals:    06/08/18 1122   BP: 150/64   BP Location: Left arm   Patient Position: Sitting   Cuff Size: Adult   Pulse: 101   SpO2: 96%   Weight: 71.6 kg (157 lb 14.4 oz)   Height: 163.8 cm (64.5\")     Physical Exam  Well-developed, well-nourished, in no acute distress.  Alert and oriented ×4.  She answers all questions appropriately.  No thyromegaly or mass.  No carotid bruit.  Regular rate and rhythm.  No murmur or rub appreciated.  Chest is clear bilaterally.  Respiratory effort is normal.  Trace, nonpitting ankle edema bilaterally, worse in the right than the left.  Prominent varicosities noted in both lower extremities.  No erythema or warmth of the lower extremities noted.        Mei was seen today for hypertension, anemia, hypothyroidism and chronic kidney disease.    Diagnoses and all orders for this visit:    HTN (hypertension), benign  -     hydrochlorothiazide (HYDRODIURIL) 12.5 MG tablet; Take 1 tablet by mouth Daily.  Restart the hydrochlorothiazide.  We will " recheck her blood pressure when she comes in for a B12 shot.    Mixed hyperlipidemia  Stop the fenofibrate.  This can also contribute to the chronic kidney disease.  Monitor after starting the HCTZ.    B12 deficiency  B12 levels look great on labs reviewed today.  She will continue B12 shots every month.    Acquired hypothyroidism  Thyroid function studies are perfect.  She will stay on the same dose of thyroid hormone.    CKD (chronic kidney disease), stage III  As noted above.    Anemia, unspecified type  -     ferrous gluconate 324 (37.5 Fe) MG tablet tablet; Take 1 tablet by mouth Daily With Breakfast.  Resume the iron for the iron deficiency side of her anemia.  Fortunately, her hemoglobin is staying stable at 11.7.  We will keep an eye on her blood counts with labs in 4 months.

## 2018-06-11 RX ORDER — AMLODIPINE BESYLATE 10 MG/1
TABLET ORAL
Qty: 30 TABLET | Refills: 5 | Status: SHIPPED | OUTPATIENT
Start: 2018-06-11 | End: 2019-01-18 | Stop reason: SDUPTHER

## 2018-07-10 ENCOUNTER — CLINICAL SUPPORT (OUTPATIENT)
Dept: FAMILY MEDICINE CLINIC | Facility: CLINIC | Age: 83
End: 2018-07-10

## 2018-07-10 VITALS — DIASTOLIC BLOOD PRESSURE: 74 MMHG | SYSTOLIC BLOOD PRESSURE: 136 MMHG

## 2018-07-10 DIAGNOSIS — E53.8 B12 DEFICIENCY: ICD-10-CM

## 2018-07-10 PROCEDURE — 96372 THER/PROPH/DIAG INJ SC/IM: CPT | Performed by: INTERNAL MEDICINE

## 2018-07-10 RX ADMIN — CYANOCOBALAMIN 1000 MCG: 1000 INJECTION, SOLUTION INTRAMUSCULAR; SUBCUTANEOUS at 10:08

## 2018-08-07 ENCOUNTER — OFFICE VISIT (OUTPATIENT)
Dept: FAMILY MEDICINE CLINIC | Facility: CLINIC | Age: 83
End: 2018-08-07

## 2018-08-07 VITALS
HEIGHT: 65 IN | DIASTOLIC BLOOD PRESSURE: 64 MMHG | HEART RATE: 72 BPM | WEIGHT: 159 LBS | BODY MASS INDEX: 26.49 KG/M2 | SYSTOLIC BLOOD PRESSURE: 118 MMHG | OXYGEN SATURATION: 98 %

## 2018-08-07 DIAGNOSIS — M79.604 RIGHT LEG PAIN: Primary | ICD-10-CM

## 2018-08-07 DIAGNOSIS — N18.30 CKD (CHRONIC KIDNEY DISEASE), STAGE III (HCC): ICD-10-CM

## 2018-08-07 DIAGNOSIS — E53.8 B12 DEFICIENCY: ICD-10-CM

## 2018-08-07 PROCEDURE — 99213 OFFICE O/P EST LOW 20 MIN: CPT | Performed by: INTERNAL MEDICINE

## 2018-08-07 PROCEDURE — 96372 THER/PROPH/DIAG INJ SC/IM: CPT | Performed by: INTERNAL MEDICINE

## 2018-08-07 RX ADMIN — CYANOCOBALAMIN 1000 MCG: 1000 INJECTION, SOLUTION INTRAMUSCULAR; SUBCUTANEOUS at 15:40

## 2018-08-07 NOTE — PROGRESS NOTES
Chief Complaint   Patient presents with   • Leg Pain     R leg pain--ongoing       She describes the acute onset of right lateral thigh pain for the past week.  It's a fairly constant ache.  She denies any fall or trauma.  She has been using heat and Biofreeze to the area with good benefit.  Associated symptoms include numbness down along the lateral aspect of the right lower leg/calf.  The pain has started to cut into her exercising at the [facility], which she has been doing since 2002.    ROS: No back pain.  No foot numbness.  Mild, subjective weakness when the pain is worse.        Current Outpatient Prescriptions:   •  acetaminophen (TYLENOL) 500 MG tablet, Take 1,000 mg by mouth Every 6 (Six) Hours As Needed for Mild Pain  or Moderate Pain ., Disp: , Rfl:   •  amLODIPine (NORVASC) 10 MG tablet, TAKE ONE TABLET BY MOUTH EVERY DAY, Disp: 30 tablet, Rfl: 5  •  ascorbic acid (VITAMIN C) 1000 MG tablet, Take 1 tablet by mouth Daily., Disp: 180 tablet, Rfl: 1  •  aspirin 81 MG EC tablet, Take 81 mg by mouth Daily., Disp: , Rfl:   •  Cholecalciferol (VITAMIN D PO), Take 2,000 Units by mouth Daily., Disp: , Rfl:   •  ferrous gluconate 324 (37.5 Fe) MG tablet tablet, Take 1 tablet by mouth Daily With Breakfast., Disp: 30 tablet, Rfl: 6  •  hydrochlorothiazide (HYDRODIURIL) 12.5 MG tablet, Take 1 tablet by mouth Daily., Disp: 30 tablet, Rfl: 2  •  levothyroxine (SYNTHROID, LEVOTHROID) 75 MCG tablet, TAKE ONE TABLET BY MOUTH EVERY DAY, Disp: 90 tablet, Rfl: 3  •  pantoprazole (PROTONIX) 40 MG EC tablet, TAKE ONE TABLET BY MOUTH EVERY DAY, Disp: 90 tablet, Rfl: 1  •  prednisoLONE acetate (PRED FORTE) 1 % ophthalmic suspension, INSTILL ONE DROP IN THE LEFT EYE FOUR TIMES DAILY, Disp: , Rfl: 3    Current Facility-Administered Medications:   •  cyanocobalamin injection 1,000 mcg, 1,000 mcg, Intramuscular, Daily, Jae Bray MD, 1,000 mcg at 08/07/18 1540    Allergies   Allergen Reactions   • Barbiturates Hives   •  "Codeine Hives       /64 (BP Location: Left arm, Patient Position: Sitting, Cuff Size: Adult)   Pulse 72   Ht 163.8 cm (64.5\")   Wt 72.1 kg (159 lb)   SpO2 98%   Breastfeeding? No   BMI 26.87 kg/m²     Well-developed, well-nourished female in good spirits.  No rash or ecchymosis about the spine or the legs.  No tenderness about the lumbar spine or SI areas bilaterally.  No pain with range of motion testing at the spine.  Full range of motion of both hips without significant pain on flexion, extension, and internal/external rotation.  Negative straight leg raise and negative bowstring sign bilaterally.    There is tenderness over the right femoral trochanter with continued tenderness along the lateral aspect of the right thigh down to the lateral aspect of the right knee.      Mei was seen today for leg pain.    Diagnoses and all orders for this visit:    Right leg pain    CKD (chronic kidney disease), stage III    B12 deficiency    This is most consistent with IT band syndrome.  We will refer her to physical therapy for additional treatments.  She should continue using 2 extra strength Tylenol when it flares.  If it is at its worst and she cannot sleep because of it, she may try taking an Advil or Aleve.  She should minimize how much she takes because of her underlying chronic kidney disease.      "

## 2018-08-28 RX ORDER — LEVOTHYROXINE SODIUM 0.07 MG/1
TABLET ORAL
Qty: 90 TABLET | Refills: 3 | Status: SHIPPED | OUTPATIENT
Start: 2018-08-28 | End: 2019-08-14 | Stop reason: SDUPTHER

## 2018-09-01 ENCOUNTER — RESULTS ENCOUNTER (OUTPATIENT)
Dept: FAMILY MEDICINE CLINIC | Facility: CLINIC | Age: 83
End: 2018-09-01

## 2018-09-01 DIAGNOSIS — N18.30 CKD (CHRONIC KIDNEY DISEASE), STAGE III (HCC): ICD-10-CM

## 2018-09-01 DIAGNOSIS — D64.9 ANEMIA, UNSPECIFIED TYPE: ICD-10-CM

## 2018-09-01 DIAGNOSIS — I10 HTN (HYPERTENSION), BENIGN: ICD-10-CM

## 2018-09-01 DIAGNOSIS — E78.2 MIXED HYPERLIPIDEMIA: ICD-10-CM

## 2018-09-05 DIAGNOSIS — I10 HTN (HYPERTENSION), BENIGN: ICD-10-CM

## 2018-09-05 RX ORDER — HYDROCHLOROTHIAZIDE 12.5 MG/1
CAPSULE, GELATIN COATED ORAL
Qty: 30 CAPSULE | Refills: 6 | Status: SHIPPED | OUTPATIENT
Start: 2018-09-05 | End: 2019-04-29 | Stop reason: SDUPTHER

## 2018-09-11 ENCOUNTER — CLINICAL SUPPORT (OUTPATIENT)
Dept: FAMILY MEDICINE CLINIC | Facility: CLINIC | Age: 83
End: 2018-09-11

## 2018-09-11 ENCOUNTER — OFFICE VISIT (OUTPATIENT)
Dept: FAMILY MEDICINE CLINIC | Facility: CLINIC | Age: 83
End: 2018-09-11

## 2018-09-11 VITALS
WEIGHT: 158 LBS | DIASTOLIC BLOOD PRESSURE: 64 MMHG | HEIGHT: 65 IN | BODY MASS INDEX: 26.33 KG/M2 | OXYGEN SATURATION: 97 % | SYSTOLIC BLOOD PRESSURE: 150 MMHG | HEART RATE: 56 BPM

## 2018-09-11 DIAGNOSIS — Z00.00 MEDICARE ANNUAL WELLNESS VISIT, SUBSEQUENT: ICD-10-CM

## 2018-09-11 DIAGNOSIS — Z12.39 SCREENING FOR BREAST CANCER: Primary | ICD-10-CM

## 2018-09-11 DIAGNOSIS — E53.8 B12 DEFICIENCY: Primary | ICD-10-CM

## 2018-09-11 PROCEDURE — 96372 THER/PROPH/DIAG INJ SC/IM: CPT | Performed by: INTERNAL MEDICINE

## 2018-09-11 PROCEDURE — G0439 PPPS, SUBSEQ VISIT: HCPCS | Performed by: NURSE PRACTITIONER

## 2018-09-11 RX ORDER — CYANOCOBALAMIN 1000 UG/ML
1000 INJECTION, SOLUTION INTRAMUSCULAR; SUBCUTANEOUS
Status: DISCONTINUED | OUTPATIENT
Start: 2018-09-11 | End: 2021-06-11

## 2018-09-11 RX ADMIN — CYANOCOBALAMIN 1000 MCG: 1000 INJECTION, SOLUTION INTRAMUSCULAR; SUBCUTANEOUS at 09:44

## 2018-09-11 NOTE — PROGRESS NOTES
QUICK REFERENCE INFORMATION:  The ABCs of the Annual Wellness Visit    Subsequent Medicare Wellness Visit    HEALTH RISK ASSESSMENT    2/14/1926    Recent Hospitalizations:  Recently treated at the following:  Other: [facility], February.        Current Medical Providers:  Patient Care Team:  Jae Bray MD as PCP - General  Jae Bray MD as PCP - Claims Attributed  Jae Bray MD Peplinski, Lee Stanley, OD (Optometry)  Yannick Santo MD as Consulting Physician (Ophthalmology)        Smoking Status:  History   Smoking Status   • Never Smoker   Smokeless Tobacco   • Never Used       Alcohol Consumption:  History   Alcohol Use   • Yes     Comment: occasionally       Depression Screen:   PHQ-2/PHQ-9 Depression Screening 9/11/2018   Little interest or pleasure in doing things 0   Feeling down, depressed, or hopeless 0   Trouble falling or staying asleep, or sleeping too much -   Feeling tired or having little energy -   Poor appetite or overeating -   Feeling bad about yourself - or that you are a failure or have let yourself or your family down -   Trouble concentrating on things, such as reading the newspaper or watching television -   Moving or speaking so slowly that other people could have noticed. Or the opposite - being so fidgety or restless that you have been moving around a lot more than usual -   Thoughts that you would be better off dead, or of hurting yourself in some way -   Total Score 0   If you checked off any problems, how difficult have these problems made it for you to do your work, take care of things at home, or get along with other people? -       Health Habits and Functional and Cognitive Screening:  Functional & Cognitive Status 9/11/2018   Do you have difficulty preparing food and eating? No   Do you have difficulty bathing yourself, getting dressed or grooming yourself? No   Do you have difficulty using the toilet? No   Do you have difficulty  moving around from place to place? No   Do you have trouble with steps or getting out of a bed or a chair? No   In the past year have you fallen or experienced a near fall? No   Current Diet Limited Junk Food   Dental Exam Up to date   Eye Exam Up to date   Exercise (times per week) 4 times per week   Current Exercise Activities Include Aerobics   Do you need help using the phone?  No   Are you deaf or do you have serious difficulty hearing?  No   Do you need help with transportation? No   Do you need help shopping? No   Do you need help preparing meals?  No   Do you need help with housework?  No   Do you need help with laundry? No   Do you need help taking your medications? No   Do you need help managing money? No   Do you ever drive or ride in a car without wearing a seat belt? No   Have you felt unusual stress, anger or loneliness in the last month? No   Who do you live with? Alone   If you need help, do you have trouble finding someone available to you? No   Have you been bothered in the last four weeks by sexual problems? No   Do you have difficulty concentrating, remembering or making decisions? No           Does the patient have evidence of cognitive impairment? No    Aspirin use counseling: Taking ASA appropriately as indicated      Recent Lab Results:  CMP:  Lab Results   Component Value Date    GLU 93 06/01/2018    BUN 22 06/01/2018    CREATININE 1.27 (H) 06/01/2018    EGFRIFNONA 39 (L) 06/01/2018    EGFRIFAFRI 48 (L) 06/01/2018    BCR 17.3 06/01/2018     06/01/2018    K 4.5 06/01/2018    CO2 23.5 06/01/2018    CALCIUM 9.3 06/01/2018    PROTENTOTREF 6.5 06/01/2018    ALBUMIN 4.20 06/01/2018    LABGLOBREF 2.3 06/01/2018    LABIL2 1.8 06/01/2018    BILITOT 0.4 06/01/2018    ALKPHOS 47 06/01/2018    AST 19 06/01/2018    ALT 10 06/01/2018     Lipid Panel:  Lab Results   Component Value Date    TRIG 114 06/01/2018    HDL 58 06/01/2018    VLDL 22.8 06/01/2018     HbA1c:       Visual Acuity:  No exam data  present    Age-appropriate Screening Schedule:  Refer to the list below for future screening recommendations based on patient's age, sex and/or medical conditions. Orders for these recommended tests are listed in the plan section. The patient has been provided with a written plan.    Health Maintenance   Topic Date Due   • TDAP/TD VACCINES (1 - Tdap) 02/14/1945   • ZOSTER VACCINE (1 of 2) 02/14/1976   • DXA SCAN  07/25/2016   • PNEUMOCOCCAL VACCINES (65+ LOW/MEDIUM RISK) (2 of 2 - PPSV23) 07/25/2018   • INFLUENZA VACCINE  08/01/2018   • LIPID PANEL  06/01/2019   • MAMMOGRAM  09/08/2019        Subjective   History of Present Illness    Mei Crane is a 89 y.o. female who presents for an Subsequent Wellness Visit.    The following portions of the patient's history were reviewed and updated as appropriate: allergies, current medications, past family history, past medical history, past social history, past surgical history and problem list.    Outpatient Medications Prior to Visit   Medication Sig Dispense Refill   • acetaminophen (TYLENOL) 500 MG tablet Take 1,000 mg by mouth Every 6 (Six) Hours As Needed for Mild Pain  or Moderate Pain .     • amLODIPine (NORVASC) 10 MG tablet TAKE ONE TABLET BY MOUTH EVERY DAY 30 tablet 5   • ascorbic acid (VITAMIN C) 1000 MG tablet Take 1 tablet by mouth Daily. 180 tablet 1   • aspirin 81 MG EC tablet Take 81 mg by mouth Daily.     • Cholecalciferol (VITAMIN D PO) Take 2,000 Units by mouth Daily.     • ferrous gluconate 324 (37.5 Fe) MG tablet tablet Take 1 tablet by mouth Daily With Breakfast. 30 tablet 6   • hydrochlorothiazide (MICROZIDE) 12.5 MG capsule TAKE ONE CAPSULE BY MOUTH DAILY 30 capsule 6   • levothyroxine (SYNTHROID, LEVOTHROID) 75 MCG tablet TAKE ONE TABLET BY MOUTH EVERY DAY 90 tablet 3   • pantoprazole (PROTONIX) 40 MG EC tablet TAKE ONE TABLET BY MOUTH EVERY DAY 90 tablet 1   • prednisoLONE acetate (PRED FORTE) 1 % ophthalmic suspension INSTILL ONE DROP IN THE  "LEFT EYE FOUR TIMES DAILY  3     Facility-Administered Medications Prior to Visit   Medication Dose Route Frequency Provider Last Rate Last Dose   • cyanocobalamin injection 1,000 mcg  1,000 mcg Intramuscular Daily Jae Bray MD   1,000 mcg at 08/07/18 1540       Patient Active Problem List   Diagnosis   • HTN (hypertension), benign   • Acquired hypothyroidism   • Hyperlipidemia   • Gastroesophageal reflux disease without esophagitis   • Glaucoma   • Macular degeneration   • Anemia   • Osteopenia   • CKD (chronic kidney disease), stage III   • B12 deficiency   • Calculus of gallbladder without cholecystitis       Advance Care Planning:  has an advance directive - a copy HAS NOT been provided    Identification of Risk Factors:  Risk factors include: cardiovascular risk.    Review of Systems   Constitutional: Negative.    HENT: Negative.    Respiratory: Negative.    Cardiovascular: Negative.    Gastrointestinal: Negative.    Musculoskeletal: Negative.    Neurological: Negative.    All other systems reviewed and are negative.      Compared to one year ago, the patient feels her physical health is worse.  Compared to one year ago, the patient feels her mental health is the same.    Objective     Physical Exam   Constitutional: She is oriented to person, place, and time. She appears well-developed and well-nourished.   Neurological: She is alert and oriented to person, place, and time.   Skin: Skin is warm and dry.   Psychiatric:   No acute distress   Vitals reviewed.      Vitals:    09/11/18 0918   BP: 150/64   Pulse: 56   SpO2: 97%   Weight: 71.7 kg (158 lb)   Height: 163.8 cm (64.5\")       Patient's Body mass index is 26.7 kg/m². BMI is above normal parameters. Recommendations include: no follow-up required.      Assessment/Plan   Patient Self-Management and Personalized Health Advice  The patient has been provided with information about: prevention of cardiac or vascular disease and preventive services " including:   · none .    Visit Diagnoses:  No diagnosis found.    No orders of the defined types were placed in this encounter.      Outpatient Encounter Prescriptions as of 9/11/2018   Medication Sig Dispense Refill   • acetaminophen (TYLENOL) 500 MG tablet Take 1,000 mg by mouth Every 6 (Six) Hours As Needed for Mild Pain  or Moderate Pain .     • amLODIPine (NORVASC) 10 MG tablet TAKE ONE TABLET BY MOUTH EVERY DAY 30 tablet 5   • ascorbic acid (VITAMIN C) 1000 MG tablet Take 1 tablet by mouth Daily. 180 tablet 1   • aspirin 81 MG EC tablet Take 81 mg by mouth Daily.     • Cholecalciferol (VITAMIN D PO) Take 2,000 Units by mouth Daily.     • ferrous gluconate 324 (37.5 Fe) MG tablet tablet Take 1 tablet by mouth Daily With Breakfast. 30 tablet 6   • hydrochlorothiazide (MICROZIDE) 12.5 MG capsule TAKE ONE CAPSULE BY MOUTH DAILY 30 capsule 6   • levothyroxine (SYNTHROID, LEVOTHROID) 75 MCG tablet TAKE ONE TABLET BY MOUTH EVERY DAY 90 tablet 3   • pantoprazole (PROTONIX) 40 MG EC tablet TAKE ONE TABLET BY MOUTH EVERY DAY 90 tablet 1   • prednisoLONE acetate (PRED FORTE) 1 % ophthalmic suspension INSTILL ONE DROP IN THE LEFT EYE FOUR TIMES DAILY  3     Facility-Administered Encounter Medications as of 9/11/2018   Medication Dose Route Frequency Provider Last Rate Last Dose   • cyanocobalamin injection 1,000 mcg  1,000 mcg Intramuscular Daily Jae Bray MD   1,000 mcg at 08/07/18 1540       Reviewed use of high risk medication in the elderly: yes  Reviewed for potential of harmful drug interactions in the elderly: yes    Follow Up:  No Follow-up on file.     An After Visit Summary and PPPS with all of these plans were given to the patient.

## 2018-09-11 NOTE — PATIENT INSTRUCTIONS
Medicare Wellness  Personal Prevention Plan of Service     Date of Office Visit:  2018  Encounter Provider:  COCO Garcia  Place of Service:  [facility] INTERNAL MED[facility]INE  Patient Name: Mei Crane  :  1926    As part of the Medicare Wellness portion of your visit today, we are providing you with this personalized preventive plan of services (PPPS). This plan is based upon recommendations of the [place] Preventive Services Task Force (USPSTF) and the Advisory Committee on Immunization Practices (ACIP).    This lists the preventive care services that should be considered, and provides dates of when you are due. Items listed as completed are up-to-date and do not require any further intervention.    Health Maintenance   Topic Date Due   • TDAP/TD VACCINES (1 - Tdap) 1945   • ZOSTER VACCINE (1 of 2) 1976   • MEDICARE ANNUAL WELLNESS  2018   • PNEUMOCOCCAL VACCINES (65+ LOW/MEDIUM RISK) (2 of 2 - PPSV23) 2018   • INFLUENZA VACCINE  2018   • LIPID PANEL  2019   • MAMMOGRAM  2019   • DXA SCAN  2020       Orders Placed This Encounter   Procedures   • Mammo Screening Bilateral With CAD     Order Specific Question:   Reason for Exam:     Answer:   screening for breast cancer       No Follow-up on file.

## 2018-09-12 LAB
25(OH)D3+25(OH)D2 SERPL-MCNC: 47 NG/ML (ref 30–100)
ALBUMIN SERPL-MCNC: 4.2 G/DL (ref 3.5–5.2)
ALBUMIN/GLOB SERPL: 1.8 G/DL
ALP SERPL-CCNC: 82 U/L (ref 39–117)
ALT SERPL-CCNC: 14 U/L (ref 1–33)
AST SERPL-CCNC: 15 U/L (ref 1–32)
BASOPHILS # BLD AUTO: 0.02 10*3/MM3 (ref 0–0.2)
BASOPHILS NFR BLD AUTO: 0.3 % (ref 0–1.5)
BILIRUB SERPL-MCNC: 0.2 MG/DL (ref 0.1–1.2)
BUN SERPL-MCNC: 19 MG/DL (ref 8–23)
BUN/CREAT SERPL: 16.7 (ref 7–25)
CALCIUM SERPL-MCNC: 9.4 MG/DL (ref 8.2–9.6)
CHLORIDE SERPL-SCNC: 104 MMOL/L (ref 98–107)
CHOLEST SERPL-MCNC: 243 MG/DL (ref 0–200)
CO2 SERPL-SCNC: 25.6 MMOL/L (ref 22–29)
CREAT SERPL-MCNC: 1.14 MG/DL (ref 0.57–1)
EOSINOPHIL # BLD AUTO: 0.34 10*3/MM3 (ref 0–0.7)
EOSINOPHIL NFR BLD AUTO: 5.3 % (ref 0.3–6.2)
EPO SERPL-ACNC: 9.1 MIU/ML (ref 2.6–18.5)
ERYTHROCYTE [DISTWIDTH] IN BLOOD BY AUTOMATED COUNT: 13.5 % (ref 11.7–13)
FERRITIN SERPL-MCNC: 30.57 NG/ML (ref 13–150)
GLOBULIN SER CALC-MCNC: 2.3 GM/DL
GLUCOSE SERPL-MCNC: 94 MG/DL (ref 65–99)
HCT VFR BLD AUTO: 37.4 % (ref 35.6–45.5)
HDLC SERPL-MCNC: 53 MG/DL (ref 40–60)
HGB BLD-MCNC: 11.5 G/DL (ref 11.9–15.5)
IMM GRANULOCYTES # BLD: 0.01 10*3/MM3 (ref 0–0.03)
IMM GRANULOCYTES NFR BLD: 0.2 % (ref 0–0.5)
IRON SATN MFR SERPL: 14 % (ref 20–50)
IRON SERPL-MCNC: 63 MCG/DL (ref 37–145)
LDLC SERPL CALC-MCNC: 154 MG/DL (ref 0–100)
LYMPHOCYTES # BLD AUTO: 2.08 10*3/MM3 (ref 0.9–4.8)
LYMPHOCYTES NFR BLD AUTO: 32.2 % (ref 19.6–45.3)
MCH RBC QN AUTO: 27.5 PG (ref 26.9–32)
MCHC RBC AUTO-ENTMCNC: 30.7 G/DL (ref 32.4–36.3)
MCV RBC AUTO: 89.5 FL (ref 80.5–98.2)
MONOCYTES # BLD AUTO: 0.55 10*3/MM3 (ref 0.2–1.2)
MONOCYTES NFR BLD AUTO: 8.5 % (ref 5–12)
NEUTROPHILS # BLD AUTO: 3.47 10*3/MM3 (ref 1.9–8.1)
NEUTROPHILS NFR BLD AUTO: 53.7 % (ref 42.7–76)
PLATELET # BLD AUTO: 257 10*3/MM3 (ref 140–500)
POTASSIUM SERPL-SCNC: 4.1 MMOL/L (ref 3.5–5.2)
PROT SERPL-MCNC: 6.5 G/DL (ref 6–8.5)
RBC # BLD AUTO: 4.18 10*6/MM3 (ref 3.9–5.2)
RETICS/RBC NFR AUTO: 0.75 % (ref 0.5–1.5)
SODIUM SERPL-SCNC: 143 MMOL/L (ref 136–145)
TIBC SERPL-MCNC: 445 MCG/DL
TRIGL SERPL-MCNC: 180 MG/DL (ref 0–150)
UIBC SERPL-MCNC: 382 MCG/DL
VLDLC SERPL CALC-MCNC: 36 MG/DL (ref 5–40)
WBC # BLD AUTO: 6.46 10*3/MM3 (ref 4.5–10.7)

## 2018-10-12 ENCOUNTER — OFFICE VISIT (OUTPATIENT)
Dept: FAMILY MEDICINE CLINIC | Facility: CLINIC | Age: 83
End: 2018-10-12

## 2018-10-12 VITALS
OXYGEN SATURATION: 94 % | DIASTOLIC BLOOD PRESSURE: 62 MMHG | WEIGHT: 158.7 LBS | HEART RATE: 91 BPM | BODY MASS INDEX: 26.44 KG/M2 | SYSTOLIC BLOOD PRESSURE: 136 MMHG | HEIGHT: 65 IN

## 2018-10-12 DIAGNOSIS — E78.2 MIXED HYPERLIPIDEMIA: ICD-10-CM

## 2018-10-12 DIAGNOSIS — D64.9 ANEMIA, UNSPECIFIED TYPE: ICD-10-CM

## 2018-10-12 DIAGNOSIS — N18.30 CKD (CHRONIC KIDNEY DISEASE), STAGE III (HCC): ICD-10-CM

## 2018-10-12 DIAGNOSIS — I10 HTN (HYPERTENSION), BENIGN: Primary | ICD-10-CM

## 2018-10-12 DIAGNOSIS — M79.604 RIGHT LEG PAIN: ICD-10-CM

## 2018-10-12 DIAGNOSIS — Z23 FLU VACCINE NEED: ICD-10-CM

## 2018-10-12 PROCEDURE — 90662 IIV NO PRSV INCREASED AG IM: CPT | Performed by: INTERNAL MEDICINE

## 2018-10-12 PROCEDURE — 96372 THER/PROPH/DIAG INJ SC/IM: CPT | Performed by: INTERNAL MEDICINE

## 2018-10-12 PROCEDURE — 99214 OFFICE O/P EST MOD 30 MIN: CPT | Performed by: INTERNAL MEDICINE

## 2018-10-12 PROCEDURE — G0008 ADMIN INFLUENZA VIRUS VAC: HCPCS | Performed by: INTERNAL MEDICINE

## 2018-10-12 RX ADMIN — CYANOCOBALAMIN 1000 MCG: 1000 INJECTION, SOLUTION INTRAMUSCULAR; SUBCUTANEOUS at 16:44

## 2018-10-12 NOTE — PATIENT INSTRUCTIONS
Magnesium oxide 100 mg once a day (increase it to 200 mg if needed for muscle cramps or constipation).

## 2018-10-12 NOTE — PROGRESS NOTES
Subjective   Mei Crane is a 89 y.o. female who presents today for:    No chief complaint on file.    History of Present Illness     She has multifactorial anemia that was both B12 and iron deficiency related.  Despite supplements, counts remained a little on the low side.  Iron caused GI distress, but that has improved with only mild residual constipation.  B12 shots were inadvertently stopped, but she feels better now that she is back on once monthly B12 injections.     She has hypothyroidism treated with Synthroid.  Her most recent thyroid hormone levels were perfect (6/1/18).     She also has chronic hypertension previously treated with amlodipine and hydrochlorothiazide.  Following her hospitalization for pneumonia, the hydrochlorothiazide was stopped, and since restarted it.  She tolerates it without side effect.     She also has dyslipidemia managed with fenofibrate and atorvastatin.       She has chronic reflux symptoms treated with pantoprazole 40 mg daily.      Ms. Crane  reports that she has never smoked. She has never used smokeless tobacco. She reports that she drinks alcohol. She reports that she does not use drugs.     Allergies   Allergen Reactions   • Barbiturates Hives   • Codeine Hives       Current Outpatient Prescriptions:   •  acetaminophen (TYLENOL) 500 MG tablet, Take 1,000 mg by mouth Every 6 (Six) Hours As Needed for Mild Pain  or Moderate Pain ., Disp: , Rfl:   •  amLODIPine (NORVASC) 10 MG tablet, TAKE ONE TABLET BY MOUTH EVERY DAY, Disp: 30 tablet, Rfl: 5  •  ascorbic acid (VITAMIN C) 1000 MG tablet, Take 1 tablet by mouth Daily., Disp: 180 tablet, Rfl: 1  •  aspirin 81 MG EC tablet, Take 81 mg by mouth Daily., Disp: , Rfl:   •  Cholecalciferol (VITAMIN D PO), Take 2,000 Units by mouth Daily., Disp: , Rfl:   •  ferrous gluconate 324 (37.5 Fe) MG tablet tablet, Take 1 tablet by mouth Daily With Breakfast., Disp: 30 tablet, Rfl: 6  •  hydrochlorothiazide (MICROZIDE) 12.5 MG capsule,  "TAKE ONE CAPSULE BY MOUTH DAILY, Disp: 30 capsule, Rfl: 6  •  levothyroxine (SYNTHROID, LEVOTHROID) 75 MCG tablet, TAKE ONE TABLET BY MOUTH EVERY DAY, Disp: 90 tablet, Rfl: 3  •  pantoprazole (PROTONIX) 40 MG EC tablet, TAKE ONE TABLET BY MOUTH EVERY DAY, Disp: 90 tablet, Rfl: 1  •  prednisoLONE acetate (PRED FORTE) 1 % ophthalmic suspension, INSTILL ONE DROP IN THE LEFT EYE FOUR TIMES DAILY, Disp: , Rfl: 3    Current Facility-Administered Medications:   •  cyanocobalamin injection 1,000 mcg, 1,000 mcg, Intramuscular, Q28 Days, Jae Bray MD, 1,000 mcg at 10/12/18 1644      Review of Systems   Constitutional: Negative for activity change, appetite change and fatigue.   Eyes: Positive for visual disturbance (Chronic).   Respiratory: Negative for chest tightness and shortness of breath.    Cardiovascular: Positive for leg swelling. Negative for chest pain and palpitations.   Gastrointestinal: Positive for constipation. Negative for blood in stool, diarrhea, nausea and vomiting.   Musculoskeletal: Positive for myalgias. Negative for arthralgias.   Skin: Negative for rash.           Objective   Vitals:    10/12/18 1628   BP: 136/62   BP Location: Right arm   Patient Position: Sitting   Cuff Size: Adult   Pulse: 91   SpO2: 94%   Weight: 72 kg (158 lb 11.2 oz)   Height: 163.8 cm (64.5\")     Physical Exam    Well-developed, well-nourished female in no acute distress.  Sclerae are anicteric and the conjunctivae are pink.  No carotid bruit.  Regular with frequent premature beats.  2/6 holosystolic murmur across the precordium.  S3 is present.  Chest is clear bilaterally.  Respiratory effort is normal.  Abdomen is soft and nontender.  Bowel sounds are normoactive.  Trace, nonpitting ankle edema bilaterally.  Pedal pulses are full bilaterally.  Tender over the right greater trochanter upon palpation of the lateral aspect of the femur.  This tenderness persists over the lateral aspect of the femur to the right " knee.  Tight hamstrings bilaterally.  Negative straight leg raise and negative bowstring sign bilaterally.  Mild tenderness with right side bending; the pain is on the right side of the spine.  There is also tenderness on the right with left side bending.  No significant pain with flexion and extension at the lumbar spine.        Diagnoses and all orders for this visit:    HTN (hypertension), benign    Mixed hyperlipidemia    CKD (chronic kidney disease), stage III (CMS/HCC)    Anemia, unspecified type    Right leg pain    Flu vaccine need  -     Fluzone High Dose =>65Years    Other orders  -     diclofenac (VOLTAREN) 1 % gel gel; Apply 4 g topically to the appropriate area as directed 4 (Four) Times a Day As Needed (right leg pain).    Blood pressure is adequately controlled on the current regimen.  She has mild chronic kidney disease, but her creatinine looks better this time than the last 2 times.    Although she remains mildly anemic, her iron levels are unchanged.  The remainder of her secondary studies are normal.  We will not increase her iron supplement at this point because of the constipation.    With regard to the constipation and the muscle cramps she is starting to have a night, she should take magnesium as instructed.    For the right lateral leg pain, she may use the Voltaren gel.  She should start off using it 2-3 times a day and may decrease its use if she derives good benefit from it.

## 2018-11-14 ENCOUNTER — CLINICAL SUPPORT (OUTPATIENT)
Dept: FAMILY MEDICINE CLINIC | Facility: CLINIC | Age: 83
End: 2018-11-14

## 2018-11-14 DIAGNOSIS — E53.8 B12 DEFICIENCY: ICD-10-CM

## 2018-11-14 PROCEDURE — 96372 THER/PROPH/DIAG INJ SC/IM: CPT | Performed by: INTERNAL MEDICINE

## 2018-11-14 RX ADMIN — CYANOCOBALAMIN 1000 MCG: 1000 INJECTION, SOLUTION INTRAMUSCULAR; SUBCUTANEOUS at 10:57

## 2018-11-26 RX ORDER — PANTOPRAZOLE SODIUM 40 MG/1
TABLET, DELAYED RELEASE ORAL
Qty: 90 TABLET | Refills: 3 | Status: SHIPPED | OUTPATIENT
Start: 2018-11-26 | End: 2019-12-02 | Stop reason: SDUPTHER

## 2018-11-29 ENCOUNTER — OFFICE VISIT (OUTPATIENT)
Dept: FAMILY MEDICINE CLINIC | Facility: CLINIC | Age: 83
End: 2018-11-29

## 2018-11-29 VITALS
WEIGHT: 158.6 LBS | BODY MASS INDEX: 26.42 KG/M2 | HEIGHT: 65 IN | DIASTOLIC BLOOD PRESSURE: 60 MMHG | HEART RATE: 86 BPM | SYSTOLIC BLOOD PRESSURE: 126 MMHG | OXYGEN SATURATION: 91 %

## 2018-11-29 DIAGNOSIS — M79.604 RIGHT LEG PAIN: Primary | ICD-10-CM

## 2018-11-29 DIAGNOSIS — N18.30 CKD (CHRONIC KIDNEY DISEASE), STAGE III (HCC): ICD-10-CM

## 2018-11-29 PROCEDURE — 73552 X-RAY EXAM OF FEMUR 2/>: CPT | Performed by: INTERNAL MEDICINE

## 2018-11-29 PROCEDURE — 72170 X-RAY EXAM OF PELVIS: CPT | Performed by: INTERNAL MEDICINE

## 2018-11-29 PROCEDURE — 73590 X-RAY EXAM OF LOWER LEG: CPT | Performed by: INTERNAL MEDICINE

## 2018-11-29 PROCEDURE — 99213 OFFICE O/P EST LOW 20 MIN: CPT | Performed by: INTERNAL MEDICINE

## 2018-12-14 ENCOUNTER — CLINICAL SUPPORT (OUTPATIENT)
Dept: FAMILY MEDICINE CLINIC | Facility: CLINIC | Age: 83
End: 2018-12-14

## 2018-12-14 DIAGNOSIS — E53.8 B12 DEFICIENCY: ICD-10-CM

## 2018-12-14 PROCEDURE — 96372 THER/PROPH/DIAG INJ SC/IM: CPT | Performed by: INTERNAL MEDICINE

## 2018-12-14 RX ADMIN — CYANOCOBALAMIN 1000 MCG: 1000 INJECTION, SOLUTION INTRAMUSCULAR; SUBCUTANEOUS at 10:06

## 2018-12-20 DIAGNOSIS — M79.605 PAIN IN BOTH LOWER EXTREMITIES: Primary | ICD-10-CM

## 2018-12-20 DIAGNOSIS — I73.9 PERIPHERAL ARTERY DISEASE (HCC): ICD-10-CM

## 2018-12-20 DIAGNOSIS — M79.604 PAIN IN BOTH LOWER EXTREMITIES: Primary | ICD-10-CM

## 2019-01-17 ENCOUNTER — OFFICE VISIT (OUTPATIENT)
Dept: FAMILY MEDICINE CLINIC | Facility: CLINIC | Age: 84
End: 2019-01-17

## 2019-01-17 VITALS
HEIGHT: 65 IN | DIASTOLIC BLOOD PRESSURE: 72 MMHG | BODY MASS INDEX: 26.33 KG/M2 | SYSTOLIC BLOOD PRESSURE: 136 MMHG | WEIGHT: 158 LBS | HEART RATE: 105 BPM | OXYGEN SATURATION: 96 %

## 2019-01-17 DIAGNOSIS — R00.0 TACHYCARDIA: ICD-10-CM

## 2019-01-17 DIAGNOSIS — I10 HTN (HYPERTENSION), BENIGN: Primary | ICD-10-CM

## 2019-01-17 DIAGNOSIS — E53.8 B12 DEFICIENCY: ICD-10-CM

## 2019-01-17 DIAGNOSIS — N18.30 CKD (CHRONIC KIDNEY DISEASE), STAGE III (HCC): ICD-10-CM

## 2019-01-17 DIAGNOSIS — M79.604 PAIN IN BOTH LOWER EXTREMITIES: ICD-10-CM

## 2019-01-17 DIAGNOSIS — D64.9 ANEMIA, UNSPECIFIED TYPE: ICD-10-CM

## 2019-01-17 DIAGNOSIS — M79.605 PAIN IN BOTH LOWER EXTREMITIES: ICD-10-CM

## 2019-01-17 PROCEDURE — 99214 OFFICE O/P EST MOD 30 MIN: CPT | Performed by: INTERNAL MEDICINE

## 2019-01-17 PROCEDURE — 96372 THER/PROPH/DIAG INJ SC/IM: CPT | Performed by: INTERNAL MEDICINE

## 2019-01-17 PROCEDURE — 93000 ELECTROCARDIOGRAM COMPLETE: CPT | Performed by: INTERNAL MEDICINE

## 2019-01-17 RX ADMIN — CYANOCOBALAMIN 1000 MCG: 1000 INJECTION, SOLUTION INTRAMUSCULAR; SUBCUTANEOUS at 13:32

## 2019-01-17 NOTE — PROGRESS NOTES
Subjective   Mei Crane is a 89 y.o. female who presents today for:    Hypertension (3 month f/u); Anemia; and Hypothyroidism    History of Present Illness   She has chronic hypertension treated with the medications noted below.  She denies any strokelike symptoms.    She also has chronic hypothyroidism treated with levothyroxine 75 µg once daily.    In late November, 2018, she presented complaining of right leg pain that radiated from the right lateral hip down the right lateral leg into the anterior leg and right knee. She has pain in the right leg when she lies on that side.  Otherwise, the pain recurs when she arises after inactivity.  She also has pain with stairs.  She initially tried exercise with no benefit; in fact, it initially caused it to hurt worse.  She has tried Tylenol with no benefit.  She feels that ibuprofen taken sporadically has helped in the past.  She is able to go 10 minutes on the treadmill with no significant increase in pain; in fact, sometimes it feels better with the activity.    She has multifactorial anemia that was both B12 and iron deficiency related.  Despite supplements, counts remained a little on the low side.  Iron caused GI distress, but that has improved with only mild residual constipation.  B12 shots were inadvertently stopped, but she feels better now that she is back on once monthly B12 injections.    Ms. Crane  reports that  has never smoked. she has never used smokeless tobacco. She reports that she drinks alcohol. She reports that she does not use drugs.     Allergies   Allergen Reactions   • Barbiturates Hives   • Codeine Hives       Current Outpatient Medications:   •  acetaminophen (TYLENOL) 500 MG tablet, Take 1,000 mg by mouth Every 6 (Six) Hours As Needed for Mild Pain  or Moderate Pain ., Disp: , Rfl:   •  amLODIPine (NORVASC) 10 MG tablet, TAKE ONE TABLET BY MOUTH EVERY DAY, Disp: 30 tablet, Rfl: 5  •  ascorbic acid (VITAMIN C) 1000 MG tablet, Take 1  "tablet by mouth Daily., Disp: 180 tablet, Rfl: 1  •  aspirin 81 MG EC tablet, Take 81 mg by mouth Daily., Disp: , Rfl:   •  Cholecalciferol (VITAMIN D PO), Take 2,000 Units by mouth Daily., Disp: , Rfl:   •  diclofenac (VOLTAREN) 1 % gel gel, Apply 4 g topically to the appropriate area as directed 4 (Four) Times a Day As Needed (right leg pain)., Disp: 200 g, Rfl: 3  •  ferrous gluconate 324 (37.5 Fe) MG tablet tablet, Take 1 tablet by mouth Daily With Breakfast., Disp: 30 tablet, Rfl: 6  •  hydrochlorothiazide (MICROZIDE) 12.5 MG capsule, TAKE ONE CAPSULE BY MOUTH DAILY, Disp: 30 capsule, Rfl: 6  •  levothyroxine (SYNTHROID, LEVOTHROID) 75 MCG tablet, TAKE ONE TABLET BY MOUTH EVERY DAY, Disp: 90 tablet, Rfl: 3  •  pantoprazole (PROTONIX) 40 MG EC tablet, TAKE ONE TABLET BY MOUTH EVERY DAY, Disp: 90 tablet, Rfl: 3  •  prednisoLONE acetate (PRED FORTE) 1 % ophthalmic suspension, INSTILL ONE DROP IN THE LEFT EYE FOUR TIMES DAILY, Disp: , Rfl: 3    Current Facility-Administered Medications:   •  cyanocobalamin injection 1,000 mcg, 1,000 mcg, Intramuscular, Q28 Days, Jae Bray MD, 1,000 mcg at 01/17/19 1332      Review of Systems   Constitutional: Negative for diaphoresis.   Eyes: Negative for visual disturbance.   Respiratory: Negative for cough and chest tightness.    Cardiovascular: Negative for chest pain, palpitations and leg swelling.        Intermittent orthostatic symptoms.   Gastrointestinal: Negative for abdominal pain.   Musculoskeletal: Negative for myalgias.        Right anterior thigh pain, chronic.   Skin: Negative for rash and wound.   Neurological: Negative for dizziness, syncope, numbness and headaches.   Hematological: Does not bruise/bleed easily.         Objective   Vitals:    01/17/19 1320   BP: 136/72   BP Location: Left arm   Patient Position: Sitting   Cuff Size: Adult   Pulse: 105   SpO2: 96%   Weight: 71.7 kg (158 lb)   Height: 163.8 cm (64.5\")     Physical Exam  HR: " 104    Well-developed, well-nourished, in good spirits.  Sclerae are anicteric and the conjunctivae are pink.  Faint carotid bruit noted bilaterally.  Regular rhythm but tachycardic with frequent premature beats.  1/6 holosystolic murmur at the right upper sternal border.  No lower extremity edema.  Pedal pulses are present, but diminished.  Popliteal pulses are full bilaterally.  Femoral pulses are full bilaterally; there is no femoral bruit.          Mei was seen today for hypertension, anemia and hypothyroidism.    Diagnoses and all orders for this visit:    HTN (hypertension), benign  CKD (chronic kidney disease), stage III (CMS/HCC)  Blood pressure is adequately controlled with the current regimen.  We will need to recheck labs within the next month or 2.    Anemia, unspecified type  B12 deficiency  Most recent hemoglobin was 11.5 in 9/2018.  Recheck within the month.  Check with iron and B12 levels because of her history of deficiencies.    Pain in both lower extremities  Etiology is unclear.  Arterial Doppler report was reviewed today, and while she has mild disease bilaterally, I'm unsure if that is causing her pain.  X-rays from 11/29/18 showed no evidence of bony abnormality or right hip arthritis.  She may have arthritis in the spine causing radicular pain, but this is not a classic presentation for it.  No further recommendations were made today.  She will monitor her symptoms and report back if they escalate.    Tachycardia  -     ECG 12 Lead  Today's ECG shows a normal sinus rhythm with premature beats, left axis deviation, but is otherwise unremarkable.  I have no prior ECG for comparison.  Again, recheck labs as ordered above.  Further recommendations will follow thereafter.

## 2019-01-21 RX ORDER — AMLODIPINE BESYLATE 10 MG/1
TABLET ORAL
Qty: 30 TABLET | Refills: 5 | Status: SHIPPED | OUTPATIENT
Start: 2019-01-21 | End: 2019-08-07 | Stop reason: SDUPTHER

## 2019-01-31 DIAGNOSIS — E53.8 B12 DEFICIENCY: ICD-10-CM

## 2019-01-31 DIAGNOSIS — I10 HTN (HYPERTENSION), BENIGN: Primary | ICD-10-CM

## 2019-01-31 DIAGNOSIS — N18.30 CKD (CHRONIC KIDNEY DISEASE), STAGE III (HCC): ICD-10-CM

## 2019-01-31 DIAGNOSIS — E03.9 ACQUIRED HYPOTHYROIDISM: ICD-10-CM

## 2019-01-31 DIAGNOSIS — D64.9 ANEMIA, UNSPECIFIED TYPE: ICD-10-CM

## 2019-01-31 DIAGNOSIS — E78.2 MIXED HYPERLIPIDEMIA: ICD-10-CM

## 2019-02-03 ENCOUNTER — RESULTS ENCOUNTER (OUTPATIENT)
Dept: FAMILY MEDICINE CLINIC | Facility: CLINIC | Age: 84
End: 2019-02-03

## 2019-02-03 DIAGNOSIS — E78.2 MIXED HYPERLIPIDEMIA: ICD-10-CM

## 2019-02-03 DIAGNOSIS — N18.30 CKD (CHRONIC KIDNEY DISEASE), STAGE III (HCC): ICD-10-CM

## 2019-02-03 DIAGNOSIS — E03.9 ACQUIRED HYPOTHYROIDISM: ICD-10-CM

## 2019-02-03 DIAGNOSIS — D64.9 ANEMIA, UNSPECIFIED TYPE: ICD-10-CM

## 2019-02-15 LAB
25(OH)D3+25(OH)D2 SERPL-MCNC: 44.1 NG/ML (ref 30–100)
ALBUMIN SERPL-MCNC: 4.4 G/DL (ref 3.2–4.6)
ALBUMIN/GLOB SERPL: 1.9 {RATIO} (ref 1.2–2.2)
ALP SERPL-CCNC: 82 IU/L (ref 39–117)
ALT SERPL-CCNC: 9 IU/L (ref 0–32)
AST SERPL-CCNC: 17 IU/L (ref 0–40)
BILIRUB SERPL-MCNC: 0.3 MG/DL (ref 0–1.2)
BUN SERPL-MCNC: 24 MG/DL (ref 10–36)
BUN/CREAT SERPL: 18 (ref 12–28)
CALCIUM SERPL-MCNC: 9.4 MG/DL (ref 8.7–10.3)
CHLORIDE SERPL-SCNC: 102 MMOL/L (ref 96–106)
CHOLEST SERPL-MCNC: 276 MG/DL (ref 100–199)
CO2 SERPL-SCNC: 22 MMOL/L (ref 20–29)
CREAT SERPL-MCNC: 1.32 MG/DL (ref 0.57–1)
FERRITIN SERPL-MCNC: 48 NG/ML (ref 15–150)
GLOBULIN SER CALC-MCNC: 2.3 G/DL (ref 1.5–4.5)
GLUCOSE SERPL-MCNC: 104 MG/DL (ref 65–99)
HDLC SERPL-MCNC: 58 MG/DL
IRON SATN MFR SERPL: 17 % (ref 15–55)
IRON SERPL-MCNC: 59 UG/DL (ref 27–139)
LDLC SERPL CALC-MCNC: 191 MG/DL (ref 0–99)
Lab: NORMAL
METHYLMALONATE SERPL-SCNC: 250 NMOL/L (ref 0–378)
POTASSIUM SERPL-SCNC: 3.7 MMOL/L (ref 3.5–5.2)
PROT SERPL-MCNC: 6.7 G/DL (ref 6–8.5)
SODIUM SERPL-SCNC: 142 MMOL/L (ref 134–144)
T3FREE SERPL-MCNC: 2.3 PG/ML (ref 2–4.4)
T4 FREE SERPL-MCNC: 1.54 NG/DL (ref 0.82–1.77)
TIBC SERPL-MCNC: 340 UG/DL (ref 250–450)
TRIGL SERPL-MCNC: 133 MG/DL (ref 0–149)
TSH SERPL DL<=0.005 MIU/L-ACNC: 0.55 UIU/ML (ref 0.45–4.5)
UIBC SERPL-MCNC: 281 UG/DL (ref 118–369)
VIT B12 SERPL-MCNC: 758 PG/ML (ref 232–1245)
VLDLC SERPL CALC-MCNC: 27 MG/DL (ref 5–40)

## 2019-02-19 ENCOUNTER — OFFICE VISIT (OUTPATIENT)
Dept: FAMILY MEDICINE CLINIC | Facility: CLINIC | Age: 84
End: 2019-02-19

## 2019-02-19 VITALS
HEIGHT: 65 IN | OXYGEN SATURATION: 97 % | BODY MASS INDEX: 26.48 KG/M2 | DIASTOLIC BLOOD PRESSURE: 64 MMHG | HEART RATE: 85 BPM | WEIGHT: 158.9 LBS | SYSTOLIC BLOOD PRESSURE: 124 MMHG

## 2019-02-19 DIAGNOSIS — I10 HTN (HYPERTENSION), BENIGN: Primary | ICD-10-CM

## 2019-02-19 DIAGNOSIS — M51.36 DDD (DEGENERATIVE DISC DISEASE), LUMBAR: ICD-10-CM

## 2019-02-19 DIAGNOSIS — N18.30 CKD (CHRONIC KIDNEY DISEASE), STAGE III (HCC): ICD-10-CM

## 2019-02-19 DIAGNOSIS — E03.9 ACQUIRED HYPOTHYROIDISM: ICD-10-CM

## 2019-02-19 DIAGNOSIS — E78.2 MIXED HYPERLIPIDEMIA: ICD-10-CM

## 2019-02-19 DIAGNOSIS — E53.8 B12 DEFICIENCY: ICD-10-CM

## 2019-02-19 PROBLEM — M51.369 DDD (DEGENERATIVE DISC DISEASE), LUMBAR: Status: ACTIVE | Noted: 2019-02-19

## 2019-02-19 PROCEDURE — 96372 THER/PROPH/DIAG INJ SC/IM: CPT | Performed by: INTERNAL MEDICINE

## 2019-02-19 PROCEDURE — 99214 OFFICE O/P EST MOD 30 MIN: CPT | Performed by: INTERNAL MEDICINE

## 2019-02-19 RX ORDER — ATORVASTATIN CALCIUM 40 MG/1
40 TABLET, FILM COATED ORAL DAILY
Qty: 30 TABLET | Refills: 5 | Status: SHIPPED | OUTPATIENT
Start: 2019-02-19 | End: 2019-10-30 | Stop reason: SDUPTHER

## 2019-02-19 RX ADMIN — CYANOCOBALAMIN 1000 MCG: 1000 INJECTION, SOLUTION INTRAMUSCULAR; SUBCUTANEOUS at 14:10

## 2019-02-19 NOTE — PROGRESS NOTES
Subjective   Mei Crane is a 89 y.o. female who presents today for:    Hypertension (Follow Up on Labs) and Leg Pain    History of Present Illness     Her chronic hypertension is treated with medications below.  She denies any cardiovascular and stroke symptoms.  Next    She also has dyslipidemia and was treated with simvastatin in the remote past.  This was stopped so she could take amlodipine.  She was then put on Lipitor.  However, right hip/leg pain led us to stop it to see if her pain would subside.  There was no change in the leg pain.    She was found on a CT scan of the abdomen and pelvis to have prominent vascular calcifications, so she underwent lower extremity arterial Dopplers which were normal.  Leg pain has persisted unchanged.    She has chronic hypothyroidism treated with levothyroxine as noted below.  Next    She was also found to be anemic and both an iron and a B12 deficiency were uncovered.  She now receives a B12 injection every month.  She takes an iron supplement with vitamin C every day.        Ms. Crane  reports that  has never smoked. she has never used smokeless tobacco. She reports that she drinks alcohol. She reports that she does not use drugs.     Allergies   Allergen Reactions   • Barbiturates Hives   • Codeine Hives       Current Outpatient Medications:   •  acetaminophen (TYLENOL) 500 MG tablet, Take 1,000 mg by mouth Every 6 (Six) Hours As Needed for Mild Pain  or Moderate Pain ., Disp: , Rfl:   •  amLODIPine (NORVASC) 10 MG tablet, TAKE ONE TABLET BY MOUTH EVERY DAY, Disp: 30 tablet, Rfl: 5  •  ascorbic acid (VITAMIN C) 1000 MG tablet, Take 1 tablet by mouth Daily., Disp: 180 tablet, Rfl: 1  •  aspirin 81 MG EC tablet, Take 81 mg by mouth Daily., Disp: , Rfl:   •  Cholecalciferol (VITAMIN D PO), Take 2,000 Units by mouth Daily., Disp: , Rfl:   •  diclofenac (VOLTAREN) 1 % gel gel, Apply 4 g topically to the appropriate area as directed 4 (Four) Times a Day As Needed (right  "leg pain)., Disp: 200 g, Rfl: 3  •  ferrous gluconate 324 (37.5 Fe) MG tablet tablet, Take 1 tablet by mouth Daily With Breakfast., Disp: 30 tablet, Rfl: 6  •  hydrochlorothiazide (MICROZIDE) 12.5 MG capsule, TAKE ONE CAPSULE BY MOUTH DAILY, Disp: 30 capsule, Rfl: 6  •  levothyroxine (SYNTHROID, LEVOTHROID) 75 MCG tablet, TAKE ONE TABLET BY MOUTH EVERY DAY, Disp: 90 tablet, Rfl: 3  •  pantoprazole (PROTONIX) 40 MG EC tablet, TAKE ONE TABLET BY MOUTH EVERY DAY, Disp: 90 tablet, Rfl: 3  •  prednisoLONE acetate (PRED FORTE) 1 % ophthalmic suspension, INSTILL ONE DROP IN THE LEFT EYE FOUR TIMES DAILY, Disp: , Rfl: 3    Current Facility-Administered Medications:   •  cyanocobalamin injection 1,000 mcg, 1,000 mcg, Intramuscular, Q28 Days, Jae Bray MD, 1,000 mcg at 02/19/19 1410      Review of Systems   Constitutional: Positive for fatigue. Negative for diaphoresis.   Eyes: Negative for visual disturbance.   Respiratory: Negative for cough and chest tightness.    Cardiovascular: Negative for chest pain, palpitations and leg swelling.   Gastrointestinal: Negative for abdominal pain.   Endocrine: Negative for cold intolerance and heat intolerance.   Musculoskeletal: Positive for arthralgias and back pain. Negative for myalgias.   Neurological: Negative for dizziness, syncope, numbness and headaches.   Hematological: Does not bruise/bleed easily.         Objective   Vitals:    02/19/19 1404   BP: 124/64   Pulse: 85   SpO2: 97%   Weight: 72.1 kg (158 lb 14.4 oz)   Height: 163.8 cm (64.5\")     Physical Exam   Constitutional: She is oriented to person, place, and time. She appears well-developed and well-nourished. No distress.   Eyes: Conjunctivae are normal. No scleral icterus.   Cardiovascular: Normal rate and regular rhythm.  Occasional extrasystoles are present.   Murmur heard.   Systolic murmur is present with a grade of 2/6.  Pulmonary/Chest: Effort normal and breath sounds normal.   Abdominal: Soft. Bowel " sounds are normal.   Neurological: She is alert and oriented to person, place, and time. Coordination normal.   Psychiatric: She has a normal mood and affect. Her behavior is normal.             Mei was seen today for hypertension and leg pain.    Diagnoses and all orders for this visit:    HTN (hypertension), benign    Mixed hyperlipidemia    B12 deficiency    Acquired hypothyroidism    CKD (chronic kidney disease), stage III (CMS/HCC)    DDD (degenerative disc disease), lumbar  -     Ambulatory Referral to Hospital Pain Management Department    Other orders  -     atorvastatin (LIPITOR) 40 MG tablet; Take 1 tablet by mouth Daily.    Blood pressure is adequately controlled.  Renal function and electrolytes are stable, although the creatinine has bumped a bit up to 1.3.    B12 levels are excellent.  We'll continue with the shot once monthly.    Thyroid function tests are normal.  No change in dose.    Cholesterol levels are not excellent.  Off the Lipitor, she has felt no better, and her LDL is up to 190.  We will restart Lipitor 40 mg once daily and recheck labs in about 3 months.    The right buttock and right leg pain going down to the lower leg is likely the result of degenerative changes in her lumbar spine.  We reviewed the images from her CT of the abdomen and pelvis done 7/14/17.  This shows advanced degenerative changes at L5-S1 on the right.  Her also other degenerative changes noted in the lumbar spine, but none as severe as this level.  I have recommended pursuing an epidural injection to see if we can get her right leg out of pain.

## 2019-02-26 ENCOUNTER — OFFICE VISIT (OUTPATIENT)
Dept: FAMILY MEDICINE CLINIC | Facility: CLINIC | Age: 84
End: 2019-02-26

## 2019-02-26 VITALS
HEIGHT: 65 IN | RESPIRATION RATE: 16 BRPM | TEMPERATURE: 98.2 F | BODY MASS INDEX: 26.16 KG/M2 | WEIGHT: 157 LBS | SYSTOLIC BLOOD PRESSURE: 118 MMHG | DIASTOLIC BLOOD PRESSURE: 62 MMHG | OXYGEN SATURATION: 97 % | HEART RATE: 84 BPM

## 2019-02-26 DIAGNOSIS — R05.3 PERSISTENT COUGH: Primary | ICD-10-CM

## 2019-02-26 PROCEDURE — 99213 OFFICE O/P EST LOW 20 MIN: CPT | Performed by: NURSE PRACTITIONER

## 2019-02-26 RX ORDER — BENZONATATE 100 MG/1
100 CAPSULE ORAL 3 TIMES DAILY PRN
Qty: 90 CAPSULE | Refills: 2 | Status: SHIPPED | OUTPATIENT
Start: 2019-02-26 | End: 2019-09-17

## 2019-02-26 NOTE — PROGRESS NOTES
Subjective   Mei Crane is a 89 y.o. female.     Chief Complaint   Patient presents with   • Cough   • Nasal Congestion     Cough   This is a new problem. The current episode started in the past 7 days. The problem has been gradually worsening. Episode frequency: worse at night. The cough is productive of sputum (at times the cough is productive). Associated symptoms include postnasal drip. Pertinent negatives include no chills, ear congestion, fever, nasal congestion, rhinorrhea, shortness of breath or wheezing. The symptoms are aggravated by lying down. She has tried OTC cough suppressant for the symptoms. Her past medical history is significant for pneumonia (one year ago).      I have reviewed the patient's medical history in detail and updated the computerized patient record.    The following portions of the patient's history were reviewed and updated as appropriate: allergies, current medications, past family history, past medical history, past social history, past surgical history and problem list.       Current Outpatient Medications:   •  acetaminophen (TYLENOL) 500 MG tablet, Take 1,000 mg by mouth Every 6 (Six) Hours As Needed for Mild Pain  or Moderate Pain ., Disp: , Rfl:   •  amLODIPine (NORVASC) 10 MG tablet, TAKE ONE TABLET BY MOUTH EVERY DAY, Disp: 30 tablet, Rfl: 5  •  ascorbic acid (VITAMIN C) 1000 MG tablet, Take 1 tablet by mouth Daily., Disp: 180 tablet, Rfl: 1  •  aspirin 81 MG EC tablet, Take 81 mg by mouth Daily., Disp: , Rfl:   •  atorvastatin (LIPITOR) 40 MG tablet, Take 1 tablet by mouth Daily., Disp: 30 tablet, Rfl: 5  •  Cholecalciferol (VITAMIN D PO), Take 2,000 Units by mouth Daily., Disp: , Rfl:   •  diclofenac (VOLTAREN) 1 % gel gel, Apply 4 g topically to the appropriate area as directed 4 (Four) Times a Day As Needed (right leg pain)., Disp: 200 g, Rfl: 3  •  ferrous gluconate 324 (37.5 Fe) MG tablet tablet, Take 1 tablet by mouth Daily With Breakfast., Disp: 30 tablet, Rfl:  "6  •  hydrochlorothiazide (MICROZIDE) 12.5 MG capsule, TAKE ONE CAPSULE BY MOUTH DAILY, Disp: 30 capsule, Rfl: 6  •  levothyroxine (SYNTHROID, LEVOTHROID) 75 MCG tablet, TAKE ONE TABLET BY MOUTH EVERY DAY, Disp: 90 tablet, Rfl: 3  •  pantoprazole (PROTONIX) 40 MG EC tablet, TAKE ONE TABLET BY MOUTH EVERY DAY, Disp: 90 tablet, Rfl: 3  •  prednisoLONE acetate (PRED FORTE) 1 % ophthalmic suspension, INSTILL ONE DROP IN THE LEFT EYE FOUR TIMES DAILY, Disp: , Rfl: 3    Current Facility-Administered Medications:   •  cyanocobalamin injection 1,000 mcg, 1,000 mcg, Intramuscular, Q28 Days, Jae Bray MD, 1,000 mcg at 02/19/19 1410    Review of Systems   Constitutional: Negative.  Negative for chills and fever.   HENT: Positive for postnasal drip. Negative for congestion and rhinorrhea.    Respiratory: Positive for cough. Negative for shortness of breath and wheezing.    Cardiovascular: Negative.    Gastrointestinal: Negative.    Musculoskeletal: Negative.    Neurological: Negative.         Vitals:    02/26/19 0950   BP: 118/62   BP Location: Right arm   Patient Position: Sitting   Cuff Size: Adult   Pulse: 84   Resp: 16   Temp: 98.2 °F (36.8 °C)   TempSrc: Oral   SpO2: 97%   Weight: 71.2 kg (157 lb)   Height: 163.8 cm (64.5\")       Objective   Physical Exam   Constitutional: She is oriented to person, place, and time. She appears well-developed and well-nourished.   HENT:   Right Ear: Tympanic membrane normal.   Left Ear: Tympanic membrane normal.   Nose: Mucosal edema (red and swollen) and congestion present.   Mouth/Throat: Oropharynx is clear and moist.   Cardiovascular: Normal rate, regular rhythm and normal heart sounds.   Pulmonary/Chest: Effort normal and breath sounds normal. No respiratory distress. She has no wheezes. She has no rhonchi. She has no rales.   Neurological: She is alert and oriented to person, place, and time.   Skin: Skin is warm and dry.   Psychiatric:   No acute distress   Vitals " reviewed.        Assessment/Plan   Mei was seen today for cough and nasal congestion.    Diagnoses and all orders for this visit:    Persistent cough  -     benzonatate (TESSALON) 100 MG capsule; Take 1 capsule by mouth 3 (Three) Times a Day As Needed for Cough.      Her lungs are clear today. She does have nasal congestion and post nasal drainage is present. I have prescribed Tessalon 100 mg three times a day as needed for her cough and I have advised her to start an antihistamine daily to help dry up the drainage in her nose.   Follow up as needed.

## 2019-02-27 ENCOUNTER — APPOINTMENT (OUTPATIENT)
Dept: PAIN MEDICINE | Facility: HOSPITAL | Age: 84
End: 2019-02-27

## 2019-03-19 ENCOUNTER — CLINICAL SUPPORT (OUTPATIENT)
Dept: FAMILY MEDICINE CLINIC | Facility: CLINIC | Age: 84
End: 2019-03-19

## 2019-03-19 DIAGNOSIS — E53.8 B12 DEFICIENCY: ICD-10-CM

## 2019-03-19 PROCEDURE — 96372 THER/PROPH/DIAG INJ SC/IM: CPT | Performed by: INTERNAL MEDICINE

## 2019-03-19 PROCEDURE — 96372 THER/PROPH/DIAG INJ SC/IM: CPT | Performed by: NURSE PRACTITIONER

## 2019-03-19 RX ADMIN — CYANOCOBALAMIN 1000 MCG: 1000 INJECTION, SOLUTION INTRAMUSCULAR; SUBCUTANEOUS at 10:00

## 2019-03-27 DIAGNOSIS — E78.2 MIXED HYPERLIPIDEMIA: Primary | ICD-10-CM

## 2019-03-31 ENCOUNTER — RESULTS ENCOUNTER (OUTPATIENT)
Dept: FAMILY MEDICINE CLINIC | Facility: CLINIC | Age: 84
End: 2019-03-31

## 2019-03-31 DIAGNOSIS — E78.2 MIXED HYPERLIPIDEMIA: ICD-10-CM

## 2019-04-03 LAB
ALBUMIN SERPL-MCNC: 4.5 G/DL (ref 3.5–5.2)
ALBUMIN/GLOB SERPL: 2.4 G/DL
ALP SERPL-CCNC: 83 U/L (ref 39–117)
ALT SERPL-CCNC: 13 U/L (ref 1–33)
AST SERPL-CCNC: 18 U/L (ref 1–32)
BILIRUB SERPL-MCNC: 0.4 MG/DL (ref 0.2–1.2)
BUN SERPL-MCNC: 18 MG/DL (ref 8–23)
BUN/CREAT SERPL: 13.8 (ref 7–25)
CALCIUM SERPL-MCNC: 9.6 MG/DL (ref 8.2–9.6)
CHLORIDE SERPL-SCNC: 102 MMOL/L (ref 98–107)
CHOLEST SERPL-MCNC: 234 MG/DL (ref 0–200)
CO2 SERPL-SCNC: 27 MMOL/L (ref 22–29)
CREAT SERPL-MCNC: 1.3 MG/DL (ref 0.57–1)
GLOBULIN SER CALC-MCNC: 1.9 GM/DL
GLUCOSE SERPL-MCNC: 99 MG/DL (ref 65–99)
HDLC SERPL-MCNC: 58 MG/DL (ref 40–60)
LDLC SERPL CALC-MCNC: 143 MG/DL (ref 0–100)
POTASSIUM SERPL-SCNC: 3.6 MMOL/L (ref 3.5–5.2)
PROT SERPL-MCNC: 6.4 G/DL (ref 6–8.5)
SODIUM SERPL-SCNC: 142 MMOL/L (ref 136–145)
TRIGL SERPL-MCNC: 165 MG/DL (ref 0–150)
VLDLC SERPL CALC-MCNC: 33 MG/DL

## 2019-04-04 NOTE — PROGRESS NOTES
Please let her know her kidney function is improving, her glucose level is normal, and her cholesterol levels have improved slightly.

## 2019-04-16 ENCOUNTER — CLINICAL SUPPORT (OUTPATIENT)
Dept: FAMILY MEDICINE CLINIC | Facility: CLINIC | Age: 84
End: 2019-04-16

## 2019-04-16 DIAGNOSIS — E53.8 B12 DEFICIENCY: ICD-10-CM

## 2019-04-16 PROCEDURE — 96372 THER/PROPH/DIAG INJ SC/IM: CPT | Performed by: NURSE PRACTITIONER

## 2019-04-16 RX ADMIN — CYANOCOBALAMIN 1000 MCG: 1000 INJECTION, SOLUTION INTRAMUSCULAR; SUBCUTANEOUS at 09:59

## 2019-04-29 ENCOUNTER — OFFICE VISIT (OUTPATIENT)
Dept: FAMILY MEDICINE CLINIC | Facility: CLINIC | Age: 84
End: 2019-04-29

## 2019-04-29 VITALS
WEIGHT: 159.6 LBS | OXYGEN SATURATION: 95 % | DIASTOLIC BLOOD PRESSURE: 66 MMHG | TEMPERATURE: 98.3 F | RESPIRATION RATE: 16 BRPM | SYSTOLIC BLOOD PRESSURE: 120 MMHG | HEART RATE: 111 BPM | BODY MASS INDEX: 26.59 KG/M2 | HEIGHT: 65 IN

## 2019-04-29 DIAGNOSIS — I10 HTN (HYPERTENSION), BENIGN: ICD-10-CM

## 2019-04-29 DIAGNOSIS — R25.2 LEG CRAMPS: Primary | ICD-10-CM

## 2019-04-29 DIAGNOSIS — N76.1 SUBACUTE VAGINITIS: ICD-10-CM

## 2019-04-29 PROCEDURE — 99213 OFFICE O/P EST LOW 20 MIN: CPT | Performed by: NURSE PRACTITIONER

## 2019-04-29 RX ORDER — HYDROCHLOROTHIAZIDE 12.5 MG/1
12.5 CAPSULE, GELATIN COATED ORAL DAILY
Qty: 30 CAPSULE | Refills: 6 | Status: SHIPPED | OUTPATIENT
Start: 2019-04-29 | End: 2020-01-02

## 2019-04-29 RX ORDER — ADHESIVE TAPE 3"X 2.3 YD
1 TAPE, NON-MEDICATED TOPICAL DAILY
Qty: 30 TABLET | Refills: 5 | Status: SHIPPED | OUTPATIENT
Start: 2019-04-29 | End: 2020-01-29

## 2019-04-29 RX ORDER — POTASSIUM CHLORIDE 750 MG/1
10 TABLET, EXTENDED RELEASE ORAL DAILY
Qty: 30 TABLET | Refills: 5 | Status: SHIPPED | OUTPATIENT
Start: 2019-04-29 | End: 2020-07-22 | Stop reason: HOSPADM

## 2019-05-09 DIAGNOSIS — M25.559 ARTHRALGIA OF HIP, UNSPECIFIED LATERALITY: Primary | ICD-10-CM

## 2019-05-14 ENCOUNTER — CLINICAL SUPPORT (OUTPATIENT)
Dept: FAMILY MEDICINE CLINIC | Facility: CLINIC | Age: 84
End: 2019-05-14

## 2019-05-14 DIAGNOSIS — E53.8 B12 DEFICIENCY: ICD-10-CM

## 2019-05-14 PROCEDURE — 96372 THER/PROPH/DIAG INJ SC/IM: CPT | Performed by: NURSE PRACTITIONER

## 2019-05-14 RX ADMIN — CYANOCOBALAMIN 1000 MCG: 1000 INJECTION, SOLUTION INTRAMUSCULAR; SUBCUTANEOUS at 10:30

## 2019-06-11 ENCOUNTER — CLINICAL SUPPORT (OUTPATIENT)
Dept: FAMILY MEDICINE CLINIC | Facility: CLINIC | Age: 84
End: 2019-06-11

## 2019-06-11 DIAGNOSIS — E53.8 B12 DEFICIENCY: ICD-10-CM

## 2019-06-11 PROCEDURE — 96372 THER/PROPH/DIAG INJ SC/IM: CPT | Performed by: NURSE PRACTITIONER

## 2019-06-11 RX ADMIN — CYANOCOBALAMIN 1000 MCG: 1000 INJECTION, SOLUTION INTRAMUSCULAR; SUBCUTANEOUS at 10:32

## 2019-06-13 ENCOUNTER — OFFICE VISIT (OUTPATIENT)
Dept: ORTHOPEDIC SURGERY | Facility: CLINIC | Age: 84
End: 2019-06-13

## 2019-06-13 VITALS — TEMPERATURE: 98 F | BODY MASS INDEX: 26.98 KG/M2 | HEIGHT: 64 IN | WEIGHT: 158 LBS

## 2019-06-13 DIAGNOSIS — M70.61 TROCHANTERIC BURSITIS OF RIGHT HIP: ICD-10-CM

## 2019-06-13 DIAGNOSIS — M25.551 HIP PAIN, RIGHT: Primary | ICD-10-CM

## 2019-06-13 PROCEDURE — 99203 OFFICE O/P NEW LOW 30 MIN: CPT | Performed by: ORTHOPAEDIC SURGERY

## 2019-06-13 PROCEDURE — 73502 X-RAY EXAM HIP UNI 2-3 VIEWS: CPT | Performed by: ORTHOPAEDIC SURGERY

## 2019-06-13 PROCEDURE — 20610 DRAIN/INJ JOINT/BURSA W/O US: CPT | Performed by: ORTHOPAEDIC SURGERY

## 2019-06-13 RX ORDER — METHYLPREDNISOLONE ACETATE 80 MG/ML
80 INJECTION, SUSPENSION INTRA-ARTICULAR; INTRALESIONAL; INTRAMUSCULAR; SOFT TISSUE
Status: COMPLETED | OUTPATIENT
Start: 2019-06-13 | End: 2019-06-13

## 2019-06-13 RX ADMIN — METHYLPREDNISOLONE ACETATE 80 MG: 80 INJECTION, SUSPENSION INTRA-ARTICULAR; INTRALESIONAL; INTRAMUSCULAR; SOFT TISSUE at 10:58

## 2019-06-13 NOTE — PROGRESS NOTES
Patient Name: Mei Crane   YOB: 1926  Referring Primary Care Physician: Lexie Toussaint APRN  BMI: Body mass index is 27.12 kg/m².    Chief Complaint:    Chief Complaint   Patient presents with   • Right Hip - Establish Care, Pain        HPI:     Mei Crane is a 89 y.o. female who presents today for evaluation of   Chief Complaint   Patient presents with   • Right Hip - Establish Care, Pain   .  Patient is 89 years old and has a history of right hip pain.  She denies any falls.  She does have history of kidney disease and cannot take anti-inflammatories she has had some balance issues.  [facility]  The pain is achy  This problem is new to this examiner.     Subjective   Medications:   Home Medications:  Current Outpatient Medications on File Prior to Visit   Medication Sig   • acetaminophen (TYLENOL) 500 MG tablet Take 1,000 mg by mouth Every 6 (Six) Hours As Needed for Mild Pain  or Moderate Pain .   • amLODIPine (NORVASC) 10 MG tablet TAKE ONE TABLET BY MOUTH EVERY DAY   • ascorbic acid (VITAMIN C) 1000 MG tablet Take 1 tablet by mouth Daily.   • aspirin 81 MG EC tablet Take 81 mg by mouth Daily.   • atorvastatin (LIPITOR) 40 MG tablet Take 1 tablet by mouth Daily.   • benzonatate (TESSALON) 100 MG capsule Take 1 capsule by mouth 3 (Three) Times a Day As Needed for Cough.   • Cholecalciferol (VITAMIN D PO) Take 2,000 Units by mouth Daily.   • diclofenac (VOLTAREN) 1 % gel gel Apply 4 g topically to the appropriate area as directed 4 (Four) Times a Day As Needed (right leg pain).   • ferrous gluconate 324 (37.5 Fe) MG tablet tablet Take 1 tablet by mouth Daily With Breakfast.   • hydrochlorothiazide (MICROZIDE) 12.5 MG capsule Take 1 capsule by mouth Daily.   • levothyroxine (SYNTHROID, LEVOTHROID) 75 MCG tablet TAKE ONE TABLET BY MOUTH EVERY DAY   • Magnesium Oxide 200 MG tablet Take 1 tablet by mouth Daily.   • pantoprazole (PROTONIX) 40 MG EC tablet TAKE ONE  TABLET BY MOUTH EVERY DAY   • potassium chloride (K-DUR,KLOR-CON) 10 MEQ CR tablet Take 1 tablet by mouth Daily.   • prednisoLONE acetate (PRED FORTE) 1 % ophthalmic suspension INSTILL ONE DROP IN THE LEFT EYE FOUR TIMES DAILY   • terconazole (TERAZOL 7) 0.4 % vaginal cream Insert 1 applicator into the vagina Every Night.     Current Facility-Administered Medications on File Prior to Visit   Medication   • cyanocobalamin injection 1,000 mcg     Current Medications:  Scheduled Meds:    cyanocobalamin 1,000 mcg Intramuscular Q28 Days     Continuous Infusions:   PRN Meds:.    I have reviewed the patient's medical history in detail and updated the computerized patient record.  Review and summarization of old records includes:    Past Medical History:   Diagnosis Date   • Calculus of gallbladder without cholecystitis 8/7/2017   • CKD (chronic kidney disease), stage III (CMS/HCC) 4/20/2016   • DDD (degenerative disc disease), lumbar 2/19/2019      History reviewed. No pertinent surgical history.     Social History     Occupational History   • Not on file   Tobacco Use   • Smoking status: Never Smoker   • Smokeless tobacco: Never Used   Substance and Sexual Activity   • Alcohol use: Yes     Comment: occasionally   • Drug use: No   • Sexual activity: Not on file      Social History     Social History Narrative   • Not on file        Family History   Problem Relation Age of Onset   • Aneurysm Mother         AAA   • Deep vein thrombosis Father        ROS: 14 point review of systems was performed and all other systems were reviewed and are negative except for documented findings in HPI and today's encounter.     Allergies:   Allergies   Allergen Reactions   • Barbiturates Hives   • Codeine Hives     Constitutional:  Denies fever, shaking or chills   Eyes:  Denies change in visual acuity   HENT:  Denies nasal congestion or sore throat   Respiratory:  Denies cough or shortness of breath   Cardiovascular:  Denies chest pain or  "severe LE edema   GI:  Denies abdominal pain, nausea, vomiting, bloody stools or diarrhea   Musculoskeletal:  Numbness, tingling, pain, or loss of motor function only as noted above in history of present illness.  : Denies painful urination or hematuria  Integument:  Denies rash, lesion or ulceration   Neurologic:  Denies headache or focal weakness  Endocrine:  Denies lymphadenopathy  Psych:  Denies confusion or change in mental status   Hem:  Denies active bleeding    OBJECTIVE:  Physical Exam:   Temp 98 °F (36.7 °C)   Ht 162.6 cm (64\")   Wt 71.7 kg (158 lb)   BMI 27.12 kg/m²     General Appearance:    Alert, cooperative, in no acute distress                  Eyes: conjunctiva clear  ENT: external ears and nose atraumatic  CV: no peripheral edema  Resp: normal respiratory effort  Skin: no rashes or wounds; normal turgor  Psych: mood and affect appropriate  Lymph: no nodes appreciated  Neuro: gross sensation intact  Vascular:  Palpable peripheral pulse in noted extremity  Musculoskeletal Extremities: Exam today shows tenderness of her right trochanteric bursa Stinchfield is negative she has good range of motion of the hip SI joints nontender and she does not really have a limp she does walk with a wide-based gait    Radiology:   AP of the hips lateral right hip taken office today without comparison view show moderate arthritic change.    Assessment:     ICD-10-CM ICD-9-CM   1. Hip pain, right M25.551 719.45   2. Trochanteric bursitis of right hip M70.61 726.5        Large Joint Arthrocentesis: R greater trochanteric bursa  Date/Time: 6/13/2019 10:58 AM  Consent given by: patient  Site marked: site marked  Timeout: Immediately prior to procedure a time out was called to verify the correct patient, procedure, equipment, support staff and site/side marked as required   Supporting Documentation  Indications: pain and joint swelling   Procedure Details  Location: hip - R greater trochanteric bursa  Preparation: " Patient was prepped and draped in the usual sterile fashion  Needle size: 22 G  Approach: anterolateral  Medications administered: 80 mg methylPREDNISolone acetate 80 MG/ML; 4 mL lidocaine (cardiac)  Patient tolerance: patient tolerated the procedure well with no immediate complications             Plan: Biomechanics of pertinent body area discussed.  Risks, benefits, alternatives, comparisons, and complications of accepted medicines, injections, recommendations, surgical procedures, and therapies explained and education provided in laymen's terms. Natural history and expected course of this patient's diagnosis discussed along with evaluation of therapies. Questions answered. When appropriate I also discussed proper use of cane, walker, trekking poles.   Cortisone Injection. See procedure note.      6/13/2019    Much of this encounter note is an electronic transcription/translation of spoken language to printed text. The electronic translation of spoken language may permit erroneous, or at times, nonsensical words or phrases to be inadvertently transcribed; Although I have reviewed the note for such errors, some may still exist

## 2019-06-19 ENCOUNTER — OFFICE VISIT (OUTPATIENT)
Dept: FAMILY MEDICINE CLINIC | Facility: CLINIC | Age: 84
End: 2019-06-19

## 2019-06-19 VITALS
SYSTOLIC BLOOD PRESSURE: 118 MMHG | RESPIRATION RATE: 18 BRPM | HEART RATE: 111 BPM | HEIGHT: 64 IN | OXYGEN SATURATION: 99 % | DIASTOLIC BLOOD PRESSURE: 64 MMHG | WEIGHT: 157.1 LBS | BODY MASS INDEX: 26.82 KG/M2 | TEMPERATURE: 98 F

## 2019-06-19 DIAGNOSIS — E78.2 MIXED HYPERLIPIDEMIA: ICD-10-CM

## 2019-06-19 DIAGNOSIS — I10 HTN (HYPERTENSION), BENIGN: Primary | ICD-10-CM

## 2019-06-19 DIAGNOSIS — N18.30 CKD (CHRONIC KIDNEY DISEASE), STAGE III (HCC): ICD-10-CM

## 2019-06-19 PROCEDURE — 99213 OFFICE O/P EST LOW 20 MIN: CPT | Performed by: NURSE PRACTITIONER

## 2019-06-19 NOTE — PROGRESS NOTES
Subjective   Mei Crane is a 89 y.o. female.     Chief Complaint   Patient presents with   • Hypertension     Hypertension   This is a chronic problem. The current episode started more than 1 year ago. The problem has been rapidly improving since onset. The problem is controlled. Pertinent negatives include no chest pain, malaise/fatigue, palpitations, peripheral edema or shortness of breath. Agents associated with hypertension include thyroid hormones. There are no known risk factors for coronary artery disease. Current antihypertension treatment includes diuretics and calcium channel blockers. There are no compliance problems.       I have reviewed the patient's medical history in detail and updated the computerized patient record.    The following portions of the patient's history were reviewed and updated as appropriate: allergies, current medications, past family history, past medical history, past social history, past surgical history and problem list.       Current Outpatient Medications:   •  acetaminophen (TYLENOL) 500 MG tablet, Take 1,000 mg by mouth Every 6 (Six) Hours As Needed for Mild Pain  or Moderate Pain ., Disp: , Rfl:   •  amLODIPine (NORVASC) 10 MG tablet, TAKE ONE TABLET BY MOUTH EVERY DAY, Disp: 30 tablet, Rfl: 5  •  ascorbic acid (VITAMIN C) 1000 MG tablet, Take 1 tablet by mouth Daily., Disp: 180 tablet, Rfl: 1  •  aspirin 81 MG EC tablet, Take 81 mg by mouth Daily., Disp: , Rfl:   •  atorvastatin (LIPITOR) 40 MG tablet, Take 1 tablet by mouth Daily., Disp: 30 tablet, Rfl: 5  •  benzonatate (TESSALON) 100 MG capsule, Take 1 capsule by mouth 3 (Three) Times a Day As Needed for Cough., Disp: 90 capsule, Rfl: 2  •  Cholecalciferol (VITAMIN D PO), Take 2,000 Units by mouth Daily., Disp: , Rfl:   •  diclofenac (VOLTAREN) 1 % gel gel, Apply 4 g topically to the appropriate area as directed 4 (Four) Times a Day As Needed (right leg pain)., Disp: 200 g, Rfl: 3  •  ferrous gluconate 324 (37.5  "Fe) MG tablet tablet, Take 1 tablet by mouth Daily With Breakfast., Disp: 30 tablet, Rfl: 6  •  hydrochlorothiazide (MICROZIDE) 12.5 MG capsule, Take 1 capsule by mouth Daily., Disp: 30 capsule, Rfl: 6  •  levothyroxine (SYNTHROID, LEVOTHROID) 75 MCG tablet, TAKE ONE TABLET BY MOUTH EVERY DAY, Disp: 90 tablet, Rfl: 3  •  Magnesium Oxide 200 MG tablet, Take 1 tablet by mouth Daily., Disp: 30 tablet, Rfl: 5  •  pantoprazole (PROTONIX) 40 MG EC tablet, TAKE ONE TABLET BY MOUTH EVERY DAY, Disp: 90 tablet, Rfl: 3  •  potassium chloride (K-DUR,KLOR-CON) 10 MEQ CR tablet, Take 1 tablet by mouth Daily., Disp: 30 tablet, Rfl: 5  •  prednisoLONE acetate (PRED FORTE) 1 % ophthalmic suspension, INSTILL ONE DROP IN THE LEFT EYE FOUR TIMES DAILY, Disp: , Rfl: 3  •  terconazole (TERAZOL 7) 0.4 % vaginal cream, Insert 1 applicator into the vagina Every Night., Disp: 45 g, Rfl: 5    Current Facility-Administered Medications:   •  cyanocobalamin injection 1,000 mcg, 1,000 mcg, Intramuscular, Q28 Days, Jae Bray MD, 1,000 mcg at 06/11/19 1032    Review of Systems   Constitutional: Negative.  Negative for malaise/fatigue.   Respiratory: Negative.  Negative for shortness of breath.    Cardiovascular: Negative.  Negative for chest pain and palpitations.   Skin: Negative.    Neurological: Negative.         Vitals:    06/19/19 1121   BP: 118/64   BP Location: Right arm   Patient Position: Sitting   Cuff Size: Adult   Pulse: 111   Resp: 18   Temp: 98 °F (36.7 °C)   TempSrc: Oral   SpO2: 99%   Weight: 71.3 kg (157 lb 1.6 oz)   Height: 162.6 cm (64\")       Objective   Physical Exam   Constitutional: She is oriented to person, place, and time. She appears well-developed and well-nourished.   Cardiovascular: Normal rate, regular rhythm, normal heart sounds and intact distal pulses.   Pulmonary/Chest: Effort normal and breath sounds normal.   Neurological: She is alert and oriented to person, place, and time.   Skin: Skin is warm and " dry.   Psychiatric:   No acute distress   Vitals reviewed.        Assessment/Plan   Mei was seen today for hypertension.    Diagnoses and all orders for this visit:    HTN (hypertension), benign    CKD (chronic kidney disease), stage III (CMS/[facility])  -     Basic Metabolic Panel; Future    Mixed hyperlipidemia  -     Lipid Panel With / Chol / HDL Ratio; Future      Her blood pressure is 118/64 and stable. She is to continue taking HCTZ 12.5 mg daily, and Amlodipine 10 mg daily. She is to return in July for a nurse visit for her B 12 injection and also in September for her annual medicare wellness exam.

## 2019-07-09 ENCOUNTER — CLINICAL SUPPORT (OUTPATIENT)
Dept: FAMILY MEDICINE CLINIC | Facility: CLINIC | Age: 84
End: 2019-07-09

## 2019-07-09 RX ADMIN — CYANOCOBALAMIN 1000 MCG: 1000 INJECTION, SOLUTION INTRAMUSCULAR; SUBCUTANEOUS at 10:34

## 2019-07-22 ENCOUNTER — OFFICE VISIT (OUTPATIENT)
Dept: FAMILY MEDICINE CLINIC | Facility: CLINIC | Age: 84
End: 2019-07-22

## 2019-07-22 VITALS
DIASTOLIC BLOOD PRESSURE: 64 MMHG | BODY MASS INDEX: 27.31 KG/M2 | WEIGHT: 160 LBS | HEART RATE: 94 BPM | SYSTOLIC BLOOD PRESSURE: 126 MMHG | HEIGHT: 64 IN | OXYGEN SATURATION: 91 %

## 2019-07-22 DIAGNOSIS — D50.9 IRON DEFICIENCY ANEMIA, UNSPECIFIED IRON DEFICIENCY ANEMIA TYPE: Primary | ICD-10-CM

## 2019-07-22 PROCEDURE — 99213 OFFICE O/P EST LOW 20 MIN: CPT | Performed by: NURSE PRACTITIONER

## 2019-07-23 ENCOUNTER — TELEPHONE (OUTPATIENT)
Dept: ORTHOPEDIC SURGERY | Facility: CLINIC | Age: 84
End: 2019-07-23

## 2019-07-23 DIAGNOSIS — M51.36 DDD (DEGENERATIVE DISC DISEASE), LUMBAR: Primary | ICD-10-CM

## 2019-07-23 DIAGNOSIS — M70.61 TROCHANTERIC BURSITIS OF RIGHT HIP: ICD-10-CM

## 2019-07-23 NOTE — TELEPHONE ENCOUNTER
Notified pt that PT would be what's recommended she said she has tried PT and it didn't seem to help [facility] and will see if she can wait until her next injection

## 2019-07-23 NOTE — TELEPHONE ENCOUNTER
We can order some PT for her.  I will have [facility] call her to get set up if she would like. I do see that she did some last year.  This is the best recommended treatment to help with this but it is usually coming from the low back issues.

## 2019-07-25 LAB
BASOPHILS # BLD AUTO: 0.05 10*3/MM3 (ref 0–0.2)
BASOPHILS NFR BLD AUTO: 0.6 % (ref 0–1.5)
EOSINOPHIL # BLD AUTO: 0.27 10*3/MM3 (ref 0–0.4)
EOSINOPHIL NFR BLD AUTO: 3 % (ref 0.3–6.2)
ERYTHROCYTE [DISTWIDTH] IN BLOOD BY AUTOMATED COUNT: 13.2 % (ref 12.3–15.4)
HCT VFR BLD AUTO: 39.6 % (ref 34–46.6)
HGB BLD-MCNC: 12.2 G/DL (ref 12–15.9)
IMM GRANULOCYTES # BLD AUTO: 0.02 10*3/MM3 (ref 0–0.05)
IMM GRANULOCYTES NFR BLD AUTO: 0.2 % (ref 0–0.5)
LYMPHOCYTES # BLD AUTO: 2.93 10*3/MM3 (ref 0.7–3.1)
LYMPHOCYTES NFR BLD AUTO: 32.8 % (ref 19.6–45.3)
Lab: NORMAL
MCH RBC QN AUTO: 27.8 PG (ref 26.6–33)
MCHC RBC AUTO-ENTMCNC: 30.8 G/DL (ref 31.5–35.7)
MCV RBC AUTO: 90.2 FL (ref 79–97)
METHYLMALONATE SERPL-SCNC: 243 NMOL/L (ref 0–378)
MONOCYTES # BLD AUTO: 0.7 10*3/MM3 (ref 0.1–0.9)
MONOCYTES NFR BLD AUTO: 7.8 % (ref 5–12)
NEUTROPHILS # BLD AUTO: 4.97 10*3/MM3 (ref 1.7–7)
NEUTROPHILS NFR BLD AUTO: 55.6 % (ref 42.7–76)
NRBC BLD AUTO-RTO: 0 /100 WBC (ref 0–0.2)
PLATELET # BLD AUTO: 290 10*3/MM3 (ref 140–450)
RBC # BLD AUTO: 4.39 10*6/MM3 (ref 3.77–5.28)
WBC # BLD AUTO: 8.94 10*3/MM3 (ref 3.4–10.8)

## 2019-08-06 ENCOUNTER — OFFICE VISIT (OUTPATIENT)
Dept: FAMILY MEDICINE CLINIC | Facility: CLINIC | Age: 84
End: 2019-08-06

## 2019-08-06 VITALS
TEMPERATURE: 98.2 F | DIASTOLIC BLOOD PRESSURE: 60 MMHG | WEIGHT: 160 LBS | HEART RATE: 91 BPM | RESPIRATION RATE: 18 BRPM | HEIGHT: 64 IN | BODY MASS INDEX: 27.31 KG/M2 | OXYGEN SATURATION: 97 % | SYSTOLIC BLOOD PRESSURE: 124 MMHG

## 2019-08-06 DIAGNOSIS — M79.604 PAIN IN BOTH LOWER EXTREMITIES: Primary | ICD-10-CM

## 2019-08-06 DIAGNOSIS — M79.605 PAIN IN BOTH LOWER EXTREMITIES: Primary | ICD-10-CM

## 2019-08-06 PROCEDURE — 99213 OFFICE O/P EST LOW 20 MIN: CPT | Performed by: NURSE PRACTITIONER

## 2019-08-06 PROCEDURE — 96372 THER/PROPH/DIAG INJ SC/IM: CPT | Performed by: NURSE PRACTITIONER

## 2019-08-06 RX ORDER — DULOXETIN HYDROCHLORIDE 20 MG/1
20 CAPSULE, DELAYED RELEASE ORAL DAILY
Qty: 30 CAPSULE | Refills: 5 | Status: SHIPPED | OUTPATIENT
Start: 2019-08-06 | End: 2019-09-11 | Stop reason: SDUPTHER

## 2019-08-06 RX ADMIN — CYANOCOBALAMIN 1000 MCG: 1000 INJECTION, SOLUTION INTRAMUSCULAR; SUBCUTANEOUS at 10:06

## 2019-08-06 NOTE — PROGRESS NOTES
Subjective   Mei Crane is a 89 y.o. female.     Ms. Crane presents today with complaints of cramping of her feet and lower legs that are worse at night. She is under the care of Dr. Krishna for her back and hip pain. She had an injection in June to manage her pain but she is not due for another injection until next month. She is wanting to know if there is any thing else she can do for the pain in her lower legs.     I have reviewed the patient's medical history in detail and updated the computerized patient record.    The following portions of the patient's history were reviewed and updated as appropriate: allergies, current medications, past family history, past medical history, past social history, past surgical history and problem list.       Current Outpatient Medications:   •  acetaminophen (TYLENOL) 500 MG tablet, Take 1,000 mg by mouth Every 6 (Six) Hours As Needed for Mild Pain  or Moderate Pain ., Disp: , Rfl:   •  ascorbic acid (VITAMIN C) 1000 MG tablet, Take 1 tablet by mouth Daily., Disp: 180 tablet, Rfl: 1  •  aspirin 81 MG EC tablet, Take 81 mg by mouth Daily., Disp: , Rfl:   •  atorvastatin (LIPITOR) 40 MG tablet, Take 1 tablet by mouth Daily., Disp: 30 tablet, Rfl: 5  •  benzonatate (TESSALON) 100 MG capsule, Take 1 capsule by mouth 3 (Three) Times a Day As Needed for Cough., Disp: 90 capsule, Rfl: 2  •  Cholecalciferol (VITAMIN D PO), Take 2,000 Units by mouth Daily., Disp: , Rfl:   •  diclofenac (VOLTAREN) 1 % gel gel, Apply 4 g topically to the appropriate area as directed 4 (Four) Times a Day As Needed (right leg pain)., Disp: 200 g, Rfl: 3  •  ferrous gluconate 324 (37.5 Fe) MG tablet tablet, Take 1 tablet by mouth Daily With Breakfast., Disp: 30 tablet, Rfl: 6  •  hydrochlorothiazide (MICROZIDE) 12.5 MG capsule, Take 1 capsule by mouth Daily., Disp: 30 capsule, Rfl: 6  •  levothyroxine (SYNTHROID, LEVOTHROID) 75 MCG tablet, TAKE ONE TABLET BY MOUTH EVERY DAY, Disp: 90 tablet, Rfl: 3  •   "Magnesium Oxide 200 MG tablet, Take 1 tablet by mouth Daily., Disp: 30 tablet, Rfl: 5  •  pantoprazole (PROTONIX) 40 MG EC tablet, TAKE ONE TABLET BY MOUTH EVERY DAY, Disp: 90 tablet, Rfl: 3  •  potassium chloride (K-DUR,KLOR-CON) 10 MEQ CR tablet, Take 1 tablet by mouth Daily., Disp: 30 tablet, Rfl: 5  •  prednisoLONE acetate (PRED FORTE) 1 % ophthalmic suspension, INSTILL ONE DROP IN THE LEFT EYE FOUR TIMES DAILY, Disp: , Rfl: 3  •  terconazole (TERAZOL 7) 0.4 % vaginal cream, Insert 1 applicator into the vagina Every Night., Disp: 45 g, Rfl: 5  •  amLODIPine (NORVASC) 10 MG tablet, Take 1 tablet by mouth Daily., Disp: 30 tablet, Rfl: 5  •  DULoxetine (CYMBALTA) 20 MG capsule, Take 1 capsule by mouth Daily., Disp: 30 capsule, Rfl: 5    Current Facility-Administered Medications:   •  cyanocobalamin injection 1,000 mcg, 1,000 mcg, Intramuscular, Q28 Days, Jae Bray MD, 1,000 mcg at 08/06/19 1006    Review of Systems   Constitutional: Negative.    Musculoskeletal: Positive for arthralgias.        Lower extremity pain that is worse at night.   Neurological: Negative.         Vitals:    08/06/19 1001   BP: 124/60   BP Location: Right arm   Patient Position: Sitting   Cuff Size: Adult   Pulse: 91   Resp: 18   Temp: 98.2 °F (36.8 °C)   TempSrc: Oral   SpO2: 97%   Weight: 72.6 kg (160 lb)   Height: 162.6 cm (64\")       Objective   Physical Exam   Constitutional: She is oriented to person, place, and time. She appears well-developed and well-nourished.   Musculoskeletal: Normal range of motion. She exhibits no edema or tenderness.   Neurological: She is alert and oriented to person, place, and time.   Skin: Skin is warm and dry.   Psychiatric:   No acute distress   Vitals reviewed.        Assessment/Plan   Mei was seen today for extremity weakness.    Diagnoses and all orders for this visit:    Pain in both lower extremities    Other orders  -     DULoxetine (CYMBALTA) 20 MG capsule; Take 1 capsule by mouth " Daily.      Ms Crane presents today with complaints of leg pain and cramps that is worse at night. We discussed various options to help with the symptoms. She is to start Cymbalta 20 mg nightly to help with the pain.   She is to follow up at her next scheduled appointment.

## 2019-08-07 LAB
BUN SERPL-MCNC: 19 MG/DL (ref 8–23)
BUN/CREAT SERPL: 16.5 (ref 7–25)
CALCIUM SERPL-MCNC: 9.1 MG/DL (ref 8.2–9.6)
CHLORIDE SERPL-SCNC: 102 MMOL/L (ref 98–107)
CO2 SERPL-SCNC: 25.9 MMOL/L (ref 22–29)
CREAT SERPL-MCNC: 1.15 MG/DL (ref 0.57–1)
GLUCOSE SERPL-MCNC: 119 MG/DL (ref 65–99)
POTASSIUM SERPL-SCNC: 3.9 MMOL/L (ref 3.5–5.2)
SODIUM SERPL-SCNC: 142 MMOL/L (ref 136–145)

## 2019-08-07 RX ORDER — AMLODIPINE BESYLATE 10 MG/1
10 TABLET ORAL DAILY
Qty: 30 TABLET | Refills: 5 | Status: SHIPPED | OUTPATIENT
Start: 2019-08-07 | End: 2020-03-16

## 2019-08-07 NOTE — PROGRESS NOTES
Please let her know her kidney function is improving and her sodium and potassium levels are normal.

## 2019-08-14 RX ORDER — LEVOTHYROXINE SODIUM 0.07 MG/1
75 TABLET ORAL DAILY
Qty: 90 TABLET | Refills: 1 | Status: SHIPPED | OUTPATIENT
Start: 2019-08-14 | End: 2020-02-17

## 2019-09-01 ENCOUNTER — RESULTS ENCOUNTER (OUTPATIENT)
Dept: FAMILY MEDICINE CLINIC | Facility: CLINIC | Age: 84
End: 2019-09-01

## 2019-09-01 DIAGNOSIS — E78.2 MIXED HYPERLIPIDEMIA: ICD-10-CM

## 2019-09-01 DIAGNOSIS — N18.30 CKD (CHRONIC KIDNEY DISEASE), STAGE III (HCC): ICD-10-CM

## 2019-09-11 ENCOUNTER — OFFICE VISIT (OUTPATIENT)
Dept: FAMILY MEDICINE CLINIC | Facility: CLINIC | Age: 84
End: 2019-09-11

## 2019-09-11 VITALS
HEART RATE: 85 BPM | HEIGHT: 64 IN | OXYGEN SATURATION: 99 % | RESPIRATION RATE: 18 BRPM | BODY MASS INDEX: 27.61 KG/M2 | SYSTOLIC BLOOD PRESSURE: 124 MMHG | WEIGHT: 161.7 LBS | DIASTOLIC BLOOD PRESSURE: 60 MMHG

## 2019-09-11 DIAGNOSIS — Z76.0 MEDICATION REFILL: ICD-10-CM

## 2019-09-11 DIAGNOSIS — M25.551 PAIN OF RIGHT HIP JOINT: ICD-10-CM

## 2019-09-11 DIAGNOSIS — E03.9 ACQUIRED HYPOTHYROIDISM: Primary | ICD-10-CM

## 2019-09-11 PROCEDURE — 99213 OFFICE O/P EST LOW 20 MIN: CPT | Performed by: NURSE PRACTITIONER

## 2019-09-11 RX ORDER — DULOXETIN HYDROCHLORIDE 20 MG/1
20 CAPSULE, DELAYED RELEASE ORAL DAILY
Qty: 30 CAPSULE | Refills: 5 | Status: SHIPPED | OUTPATIENT
Start: 2019-09-11 | End: 2019-09-11 | Stop reason: DRUGHIGH

## 2019-09-11 NOTE — PROGRESS NOTES
Subjective   Mei Crane is a 89 y.o. female.     Ms. Crane presents today to get a new prescription for Cymbalta 20 mg. I had started her on Cymbalta at her last visit for chronic hip pain. She never started the medication because it was too expensive. She wants a new prescription today so she take take it to another pharmacy to get filled.      I have reviewed the patient's medical history in detail and updated the computerized patient record.    The following portions of the patient's history were reviewed and updated as appropriate: allergies, current medications, past family history, past medical history, past social history, past surgical history and problem list.     Current Outpatient Medications:   •  acetaminophen (TYLENOL) 500 MG tablet, Take 1,000 mg by mouth Every 6 (Six) Hours As Needed for Mild Pain  or Moderate Pain ., Disp: , Rfl:   •  amLODIPine (NORVASC) 10 MG tablet, Take 1 tablet by mouth Daily., Disp: 30 tablet, Rfl: 5  •  ascorbic acid (VITAMIN C) 1000 MG tablet, Take 1 tablet by mouth Daily., Disp: 180 tablet, Rfl: 1  •  aspirin 81 MG EC tablet, Take 81 mg by mouth Daily., Disp: , Rfl:   •  atorvastatin (LIPITOR) 40 MG tablet, Take 1 tablet by mouth Daily., Disp: 30 tablet, Rfl: 5  •  benzonatate (TESSALON) 100 MG capsule, Take 1 capsule by mouth 3 (Three) Times a Day As Needed for Cough., Disp: 90 capsule, Rfl: 2  •  Cholecalciferol (VITAMIN D PO), Take 2,000 Units by mouth Daily., Disp: , Rfl:   •  diclofenac (VOLTAREN) 1 % gel gel, Apply 4 g topically to the appropriate area as directed 4 (Four) Times a Day As Needed (right leg pain)., Disp: 200 g, Rfl: 3  •  DULoxetine (CYMBALTA) 20 MG capsule, Take 1 capsule by mouth Daily., Disp: 30 capsule, Rfl: 5  •  ferrous gluconate 324 (37.5 Fe) MG tablet tablet, Take 1 tablet by mouth Daily With Breakfast., Disp: 30 tablet, Rfl: 6  •  hydrochlorothiazide (MICROZIDE) 12.5 MG capsule, Take 1 capsule by mouth Daily., Disp: 30 capsule, Rfl: 6  •  " levothyroxine (SYNTHROID, LEVOTHROID) 75 MCG tablet, Take 1 tablet by mouth Daily., Disp: 90 tablet, Rfl: 1  •  Magnesium Oxide 200 MG tablet, Take 1 tablet by mouth Daily., Disp: 30 tablet, Rfl: 5  •  pantoprazole (PROTONIX) 40 MG EC tablet, TAKE ONE TABLET BY MOUTH EVERY DAY, Disp: 90 tablet, Rfl: 3  •  potassium chloride (K-DUR,KLOR-CON) 10 MEQ CR tablet, Take 1 tablet by mouth Daily., Disp: 30 tablet, Rfl: 5  •  prednisoLONE acetate (PRED FORTE) 1 % ophthalmic suspension, INSTILL ONE DROP IN THE LEFT EYE FOUR TIMES DAILY, Disp: , Rfl: 3  •  terconazole (TERAZOL 7) 0.4 % vaginal cream, Insert 1 applicator into the vagina Every Night., Disp: 45 g, Rfl: 5    Current Facility-Administered Medications:   •  cyanocobalamin injection 1,000 mcg, 1,000 mcg, Intramuscular, Q28 Days, Jae Bray MD, 1,000 mcg at 08/06/19 1006     Review of Systems   Constitutional: Positive for fatigue.   Musculoskeletal: Positive for arthralgias (right hip and leg pain).   Neurological: Negative.         Vitals:    09/11/19 0850   BP: 124/60   BP Location: Left arm   Patient Position: Sitting   Cuff Size: Adult   Pulse: 85   Resp: 18   SpO2: 99%   Weight: 73.3 kg (161 lb 11.2 oz)   Height: 162.6 cm (64\")       Objective   Physical Exam   Constitutional: She is oriented to person, place, and time. She appears well-developed and well-nourished.   Neurological: She is alert and oriented to person, place, and time.   Psychiatric:   No acute distress   Vitals reviewed.        Assessment/Plan   Mei was seen today for hip pain.    Diagnoses and all orders for this visit:    Acquired hypothyroidism  -     TSH    Pain of right hip joint    Medication refill    Other orders  -     DULoxetine (CYMBALTA) 20 MG capsule; Take 1 capsule by mouth Daily.      Ms. Crane came in today to have her labs drawn. She made an appointment with me to get a new prescription for Cymbalta 20 mg so she can take to another pharmacy. I have given her a new " prescription for the Cymbalta today.  She has also been instructed to call Dr. JERMAINE Krishna to follow up on her hip pain and to get another hip injection.  She is complaining of fatigue. I have ordered a TSH to be done today with her other labs.  Return next week for her medicare wellness exam.

## 2019-09-12 LAB
BUN SERPL-MCNC: 21 MG/DL (ref 8–23)
BUN/CREAT SERPL: 17.8 (ref 7–25)
CALCIUM SERPL-MCNC: 9.1 MG/DL (ref 8.2–9.6)
CHLORIDE SERPL-SCNC: 101 MMOL/L (ref 98–107)
CHOLEST SERPL-MCNC: 181 MG/DL (ref 0–200)
CHOLEST/HDLC SERPL: 3.77 {RATIO}
CO2 SERPL-SCNC: 26.5 MMOL/L (ref 22–29)
CREAT SERPL-MCNC: 1.18 MG/DL (ref 0.57–1)
GLUCOSE SERPL-MCNC: 98 MG/DL (ref 65–99)
HDLC SERPL-MCNC: 48 MG/DL (ref 40–60)
LDLC SERPL CALC-MCNC: 96 MG/DL (ref 0–100)
POTASSIUM SERPL-SCNC: 3.9 MMOL/L (ref 3.5–5.2)
SODIUM SERPL-SCNC: 143 MMOL/L (ref 136–145)
TRIGL SERPL-MCNC: 186 MG/DL (ref 0–150)
TSH SERPL DL<=0.005 MIU/L-ACNC: 0.24 UIU/ML (ref 0.27–4.2)
VLDLC SERPL CALC-MCNC: 37.2 MG/DL

## 2019-09-17 ENCOUNTER — OFFICE VISIT (OUTPATIENT)
Dept: FAMILY MEDICINE CLINIC | Facility: CLINIC | Age: 84
End: 2019-09-17

## 2019-09-17 VITALS
BODY MASS INDEX: 27.49 KG/M2 | WEIGHT: 161 LBS | DIASTOLIC BLOOD PRESSURE: 68 MMHG | SYSTOLIC BLOOD PRESSURE: 116 MMHG | HEART RATE: 120 BPM | OXYGEN SATURATION: 95 % | HEIGHT: 64 IN | RESPIRATION RATE: 18 BRPM | TEMPERATURE: 98.1 F

## 2019-09-17 DIAGNOSIS — Z00.00 MEDICARE ANNUAL WELLNESS VISIT, SUBSEQUENT: Primary | ICD-10-CM

## 2019-09-17 DIAGNOSIS — Z23 NEED FOR 23-POLYVALENT PNEUMOCOCCAL POLYSACCHARIDE VACCINE: ICD-10-CM

## 2019-09-17 PROCEDURE — G0439 PPPS, SUBSEQ VISIT: HCPCS | Performed by: NURSE PRACTITIONER

## 2019-09-17 PROCEDURE — G0009 ADMIN PNEUMOCOCCAL VACCINE: HCPCS | Performed by: NURSE PRACTITIONER

## 2019-09-17 PROCEDURE — 96372 THER/PROPH/DIAG INJ SC/IM: CPT | Performed by: NURSE PRACTITIONER

## 2019-09-17 PROCEDURE — 90732 PPSV23 VACC 2 YRS+ SUBQ/IM: CPT | Performed by: NURSE PRACTITIONER

## 2019-09-17 RX ADMIN — CYANOCOBALAMIN 1000 MCG: 1000 INJECTION, SOLUTION INTRAMUSCULAR; SUBCUTANEOUS at 10:16

## 2019-09-17 NOTE — PATIENT INSTRUCTIONS
Medicare Wellness  Personal Prevention Plan of Service     Date of Office Visit:  2019  Encounter Provider:  COCO Garcia  Place of Service:  [facility] INTERNAL MEDICINE  Patient Name: Mei Crane  :  1926    As part of the Medicare Wellness portion of your visit today, we are providing you with this personalized preventive plan of services (PPPS). This plan is based upon recommendations of the [place] Preventive Services Task Force (USPSTF) and the Advisory Committee on Immunization Practices (ACIP).    This lists the preventive care services that should be considered, and provides dates of when you are due. Items listed as completed are up-to-date and do not require any further intervention.    Health Maintenance   Topic Date Due   • TDAP/TD VACCINES (1 - Tdap) 1945   • PNEUMOCOCCAL VACCINES (65+ LOW/MEDIUM RISK) (2 of 2 - PPSV23) 2018   • INFLUENZA VACCINE  2019   • MEDICARE ANNUAL WELLNESS  2019   • LIPID PANEL  2020   • DXA SCAN  2020   • MAMMOGRAM  2020   • ZOSTER VACCINE  Discontinued       No orders of the defined types were placed in this encounter.      No Follow-up on file.

## 2019-09-24 ENCOUNTER — CLINICAL SUPPORT (OUTPATIENT)
Dept: ORTHOPEDIC SURGERY | Facility: CLINIC | Age: 84
End: 2019-09-24

## 2019-09-24 VITALS — TEMPERATURE: 97.9 F | BODY MASS INDEX: 27.49 KG/M2 | HEIGHT: 64 IN | WEIGHT: 161 LBS

## 2019-09-24 DIAGNOSIS — M70.61 GREATER TROCHANTERIC BURSITIS OF RIGHT HIP: Primary | ICD-10-CM

## 2019-09-24 PROCEDURE — 20610 DRAIN/INJ JOINT/BURSA W/O US: CPT | Performed by: NURSE PRACTITIONER

## 2019-09-24 PROCEDURE — 99213 OFFICE O/P EST LOW 20 MIN: CPT | Performed by: NURSE PRACTITIONER

## 2019-09-24 RX ORDER — METHYLPREDNISOLONE ACETATE 80 MG/ML
80 INJECTION, SUSPENSION INTRA-ARTICULAR; INTRALESIONAL; INTRAMUSCULAR; SOFT TISSUE
Status: COMPLETED | OUTPATIENT
Start: 2019-09-24 | End: 2019-09-24

## 2019-09-24 RX ADMIN — METHYLPREDNISOLONE ACETATE 80 MG: 80 INJECTION, SUSPENSION INTRA-ARTICULAR; INTRALESIONAL; INTRAMUSCULAR; SOFT TISSUE at 08:02

## 2019-09-24 NOTE — PROGRESS NOTES
Patient: Mei Crane  YOB: 1926  Date of Service: 9/24/2019    Chief Complaints:   Chief Complaint   Patient presents with   • Right Hip - Follow-up, Pain       Subjective:    History of Present Illness: Pt is seen in the office today with complaints of   Chief Complaint   Patient presents with   • Right Hip - Follow-up, Pain   . Patient is 89 years old and has a history of right hip pain.  She denies any falls.  She does have history of kidney disease and cannot take anti-inflammatories she has had some balance issues.  She works out the [employer] times a week. She saw Dr. Rodriguez in June and had a cortisone injection that only last a few weeks.  The pain is achy HIP: TIMING:  The pain is described as ACUTE ON CHRONIC.  AGGRAVATING FACTORS:  Is worsened by prolonged standing, sitting, and walking activities.  CHARACTERISTICS:  aching, stiffness, and difficulty walking., LOCATION: greater trochanteric tenderness, ASSOCIATED SYMPTOMS:  Reports numbness, tingling or radicular symptoms. and RELIEVING FACTORS:  Previous treatment has included cortisone injections  OTC Tylenol  activtiy modification.    This problem is new to this examiner.     Allergies:   Allergies   Allergen Reactions   • Barbiturates Hives   • Codeine Hives       Medications:   Home Medications:  Current Outpatient Medications on File Prior to Visit   Medication Sig   • acetaminophen (TYLENOL) 500 MG tablet Take 1,000 mg by mouth Every 6 (Six) Hours As Needed for Mild Pain  or Moderate Pain .   • amLODIPine (NORVASC) 10 MG tablet Take 1 tablet by mouth Daily.   • ascorbic acid (VITAMIN C) 1000 MG tablet Take 1 tablet by mouth Daily.   • aspirin 81 MG EC tablet Take 81 mg by mouth Daily.   • atorvastatin (LIPITOR) 40 MG tablet Take 1 tablet by mouth Daily.   • Cholecalciferol (VITAMIN D PO) Take 2,000 Units by mouth Daily.   • ferrous gluconate 324 (37.5 Fe) MG tablet tablet Take 1 tablet by mouth Daily With Breakfast.   •  hydrochlorothiazide (MICROZIDE) 12.5 MG capsule Take 1 capsule by mouth Daily.   • levothyroxine (SYNTHROID, LEVOTHROID) 75 MCG tablet Take 1 tablet by mouth Daily.   • Magnesium Oxide 200 MG tablet Take 1 tablet by mouth Daily.   • pantoprazole (PROTONIX) 40 MG EC tablet TAKE ONE TABLET BY MOUTH EVERY DAY   • potassium chloride (K-DUR,KLOR-CON) 10 MEQ CR tablet Take 1 tablet by mouth Daily.   • prednisoLONE acetate (PRED FORTE) 1 % ophthalmic suspension INSTILL ONE DROP IN THE LEFT EYE FOUR TIMES DAILY   • terconazole (TERAZOL 7) 0.4 % vaginal cream Insert 1 applicator into the vagina Every Night.     Current Facility-Administered Medications on File Prior to Visit   Medication   • cyanocobalamin injection 1,000 mcg     Current Medications:  Scheduled Meds:    cyanocobalamin 1,000 mcg Intramuscular Q28 Days     Continuous Infusions:   PRN Meds:.    I have reviewed the patient's medical history in detail and updated the computerized patient record.  Review and summarization of old records include:    Past Medical History:   Diagnosis Date   • Calculus of gallbladder without cholecystitis 8/7/2017   • CKD (chronic kidney disease), stage III (CMS/[facility]) 4/20/2016   • DDD (degenerative disc disease), lumbar 2/19/2019      No past surgical history on file.     Social History     Occupational History   • Not on file   Tobacco Use   • Smoking status: Never Smoker   • Smokeless tobacco: Never Used   Substance and Sexual Activity   • Alcohol use: Yes     Comment: occasionally   • Drug use: No   • Sexual activity: Not on file      Social History     Social History Narrative   • Not on file        Family History   Problem Relation Age of Onset   • Aneurysm Mother         AAA   • Deep vein thrombosis Father        ROS: 14 point review of systems was performed and was negative except for documented findings in HPI and today's encounter.     Allergies:   Allergies   Allergen Reactions   • Barbiturates Hives   • Codeine Hives  "    Constitutional:  Denies fever, shaking or chills   Eyes:  Denies change in visual acuity   HENT:  Denies nasal congestion or sore throat   Respiratory:  Denies cough or shortness of breath   Cardiovascular:  Denies chest pain or severe LE edema   GI:  Denies abdominal pain, nausea, vomiting, bloody stools or diarrhea   Musculoskeletal:  Numbness, tingling, or loss of motor function only as noted above in history of present illness.  : Denies painful urination or hematuria  Integument:  Denies rash, lesion or ulceration   Neurologic:  Denies headache or focal weakness  Endocrine:  Denies lymphadenopathy  Psych:  Denies confusion or change in mental status   Hem:  Denies active bleeding      Physical Exam: 89 y.o. female  Wt Readings from Last 3 Encounters:   09/24/19 73 kg (161 lb)   09/17/19 73 kg (161 lb)   09/11/19 73.3 kg (161 lb 11.2 oz)     Ht Readings from Last 1 Encounters:   09/24/19 162.6 cm (64\")     Body mass index is 27.64 kg/m².  Vitals:    09/24/19 1239   Temp: 97.9 °F (36.6 °C)     Vital signs reviewed.   General Appearance:    Alert, cooperative, in no acute distress                  Eyes: conjunctiva clear  ENT: external ears and nose atraumatic  CV: no peripheral edema  Resp: normal respiratory effort  Skin: no rashes or wounds; normal turgor  Psych: mood and affect appropriate  Lymph: no nodes appreciated  Neuro: gross sensation intact  Vascular:  Palpable peripheral pulse in noted extremity  Musculoskeletal Extremities: HIP Exam: antalgic and stiff-legged gait without assistive device right hip, Stinchfield negative, stiffness, KARYNA test negative and trochanteric bursa tenderness 2+ pedal pulses and brisk capillary refill Pedal edema none      Diagnostic Data:  Imaging done previously in the office, images were personally viewed and discussed with the patient:    Indication: pain related symptoms,  Views: 2V AP&LAT right hip(s)   Findings: moderate arthritis  Comparison views: viewed last " xray done in the office.      Procedure:  Large Joint Arthrocentesis: R greater trochanteric bursa  Date/Time: 9/24/2019 8:02 AM  Consent given by: patient  Site marked: site marked  Timeout: Immediately prior to procedure a time out was called to verify the correct patient, procedure, equipment, support staff and site/side marked as required   Supporting Documentation  Indications: pain and joint swelling   Procedure Details  Location: hip - R greater trochanteric bursa  Preparation: Patient was prepped and draped in the usual sterile fashion  Needle size: 22 G  Approach: anterolateral  Medications administered: 4 mL lidocaine (cardiac); 80 mg methylPREDNISolone acetate 80 MG/ML  Patient tolerance: patient tolerated the procedure well with no immediate complications          Assessment:     ICD-10-CM ICD-9-CM   1. Greater trochanteric bursitis of right hip M70.61 726.5       Plan:   Follow up as indicated.  Ice, elevate, and rest as needed.   15 min spent face to face with patient 11 min spent counseling about:  Biomechanics of pertinent body area discussed.  Risks, benefits, alternatives, comparisons, and complications of accepted medicines, injections, recommendations, surgical procedures, and therapies explained and education provided in laymen's terms. Natural history and expected course of this patient's diagnosis discussed along with evaluation of therapies. Questions answered. When appropriate I also discussed proper use of cane, walker, trekking poles.   EXERCISES:  Advice on benefits of, and types of regular/moderate exercise including biomechanical forces involved as it pertains to this complaint.  RICE: Rest, ice, compression, and elevation therapy, Cryotherapy/brachy therapy, and or OTC linaments as indicated with instructions.   Cortisone Injection. See procedure note.    9/24/2019  Jennifer Alfaro, APRN

## 2019-10-15 ENCOUNTER — FLU SHOT (OUTPATIENT)
Dept: FAMILY MEDICINE CLINIC | Facility: CLINIC | Age: 84
End: 2019-10-15

## 2019-10-15 DIAGNOSIS — Z23 FLU VACCINE NEED: Primary | ICD-10-CM

## 2019-10-15 PROCEDURE — G0008 ADMIN INFLUENZA VIRUS VAC: HCPCS | Performed by: NURSE PRACTITIONER

## 2019-10-15 PROCEDURE — 90653 IIV ADJUVANT VACCINE IM: CPT | Performed by: NURSE PRACTITIONER

## 2019-10-16 ENCOUNTER — CLINICAL SUPPORT (OUTPATIENT)
Dept: FAMILY MEDICINE CLINIC | Facility: CLINIC | Age: 84
End: 2019-10-16

## 2019-10-16 PROCEDURE — 96372 THER/PROPH/DIAG INJ SC/IM: CPT | Performed by: NURSE PRACTITIONER

## 2019-10-16 RX ADMIN — CYANOCOBALAMIN 1000 MCG: 1000 INJECTION, SOLUTION INTRAMUSCULAR; SUBCUTANEOUS at 12:23

## 2019-10-30 RX ORDER — ATORVASTATIN CALCIUM 40 MG/1
40 TABLET, FILM COATED ORAL DAILY
Qty: 30 TABLET | Refills: 5 | Status: SHIPPED | OUTPATIENT
Start: 2019-10-30 | End: 2020-06-05 | Stop reason: HOSPADM

## 2019-11-11 ENCOUNTER — OFFICE VISIT (OUTPATIENT)
Dept: FAMILY MEDICINE CLINIC | Facility: CLINIC | Age: 84
End: 2019-11-11

## 2019-11-11 VITALS
TEMPERATURE: 98 F | HEART RATE: 120 BPM | WEIGHT: 160 LBS | DIASTOLIC BLOOD PRESSURE: 68 MMHG | OXYGEN SATURATION: 98 % | BODY MASS INDEX: 27.31 KG/M2 | RESPIRATION RATE: 18 BRPM | HEIGHT: 64 IN | SYSTOLIC BLOOD PRESSURE: 118 MMHG

## 2019-11-11 DIAGNOSIS — M51.36 DDD (DEGENERATIVE DISC DISEASE), LUMBAR: Primary | ICD-10-CM

## 2019-11-11 DIAGNOSIS — M79.671 FOOT PAIN, BILATERAL: ICD-10-CM

## 2019-11-11 DIAGNOSIS — M79.605 LEG PAIN, BILATERAL: ICD-10-CM

## 2019-11-11 DIAGNOSIS — M79.604 LEG PAIN, BILATERAL: ICD-10-CM

## 2019-11-11 DIAGNOSIS — M70.61 TROCHANTERIC BURSITIS OF RIGHT HIP: ICD-10-CM

## 2019-11-11 DIAGNOSIS — M79.672 FOOT PAIN, BILATERAL: ICD-10-CM

## 2019-11-11 PROCEDURE — 96372 THER/PROPH/DIAG INJ SC/IM: CPT | Performed by: NURSE PRACTITIONER

## 2019-11-11 PROCEDURE — 99213 OFFICE O/P EST LOW 20 MIN: CPT | Performed by: NURSE PRACTITIONER

## 2019-11-11 RX ORDER — CELECOXIB 50 MG/1
50 CAPSULE ORAL 2 TIMES DAILY
Qty: 60 CAPSULE | Refills: 5 | Status: SHIPPED | OUTPATIENT
Start: 2019-11-11 | End: 2019-12-06

## 2019-11-11 RX ADMIN — CYANOCOBALAMIN 1000 MCG: 1000 INJECTION, SOLUTION INTRAMUSCULAR; SUBCUTANEOUS at 12:10

## 2019-11-11 NOTE — PROGRESS NOTES
Subjective   Mei Crane is a 89 y.o. female.     Chief Complaint   Patient presents with   • Leg Pain     On going    • Hip Pain     Ms. Crane presents today with complaints of hip, leg and foot pain. This is not a new problem. She back injections every three months, the last injection was in October. She states the injections are not working for her. She has tried Diclofenac but is unable to take it because it makes her nauseated and she has tried Baclofen but she has difficulty walking after taking it. She states she has stiffness in her feet and legs at times and also numbness in her feet.      I have reviewed the patient's medical history in detail and updated the computerized patient record.    The following portions of the patient's history were reviewed and updated as appropriate: allergies, current medications, past family history, past medical history, past social history, past surgical history and problem list.       Current Outpatient Medications:   •  acetaminophen (TYLENOL) 500 MG tablet, Take 1,000 mg by mouth Every 6 (Six) Hours As Needed for Mild Pain  or Moderate Pain ., Disp: , Rfl:   •  amLODIPine (NORVASC) 10 MG tablet, Take 1 tablet by mouth Daily., Disp: 30 tablet, Rfl: 5  •  ascorbic acid (VITAMIN C) 1000 MG tablet, Take 1 tablet by mouth Daily., Disp: 180 tablet, Rfl: 1  •  aspirin 81 MG EC tablet, Take 81 mg by mouth Daily., Disp: , Rfl:   •  atorvastatin (LIPITOR) 40 MG tablet, Take 1 tablet by mouth Daily., Disp: 30 tablet, Rfl: 5  •  Cholecalciferol (VITAMIN D PO), Take 2,000 Units by mouth Daily., Disp: , Rfl:   •  ferrous gluconate 324 (37.5 Fe) MG tablet tablet, Take 1 tablet by mouth Daily With Breakfast., Disp: 30 tablet, Rfl: 6  •  hydrochlorothiazide (MICROZIDE) 12.5 MG capsule, Take 1 capsule by mouth Daily., Disp: 30 capsule, Rfl: 6  •  levothyroxine (SYNTHROID, LEVOTHROID) 75 MCG tablet, Take 1 tablet by mouth Daily., Disp: 90 tablet, Rfl: 1  •  Magnesium Oxide 200 MG  "tablet, Take 1 tablet by mouth Daily., Disp: 30 tablet, Rfl: 5  •  pantoprazole (PROTONIX) 40 MG EC tablet, TAKE ONE TABLET BY MOUTH EVERY DAY, Disp: 90 tablet, Rfl: 3  •  potassium chloride (K-DUR,KLOR-CON) 10 MEQ CR tablet, Take 1 tablet by mouth Daily., Disp: 30 tablet, Rfl: 5  •  prednisoLONE acetate (PRED FORTE) 1 % ophthalmic suspension, INSTILL ONE DROP IN THE LEFT EYE FOUR TIMES DAILY, Disp: , Rfl: 3  •  terconazole (TERAZOL 7) 0.4 % vaginal cream, Insert 1 applicator into the vagina Every Night., Disp: 45 g, Rfl: 5  •  celecoxib (CeleBREX) 50 MG capsule, Take 1 capsule by mouth 2 (Two) Times a Day., Disp: 60 capsule, Rfl: 5    Current Facility-Administered Medications:   •  cyanocobalamin injection 1,000 mcg, 1,000 mcg, Intramuscular, Q28 Days, Jae Bray MD, 1,000 mcg at 11/11/19 1210    Review of Systems   Constitutional: Negative.    Musculoskeletal: Positive for arthralgias (in her hips, feet and legs) and back pain.   Neurological: Positive for numbness (in her feet at times).        Vitals:    11/11/19 1145   BP: 118/68   BP Location: Left arm   Patient Position: Sitting   Cuff Size: Adult   Pulse: 120   Resp: 18   Temp: 98 °F (36.7 °C)   TempSrc: Oral   SpO2: 98%   Weight: 72.6 kg (160 lb)   Height: 162.6 cm (64\")       Objective   Physical Exam   Constitutional: She is oriented to person, place, and time. She appears well-developed and well-nourished.   Musculoskeletal: Normal range of motion. She exhibits no edema.   Neurological: She is alert and oriented to person, place, and time.   Skin: Skin is warm and dry.   Psychiatric:   No acute distress   Vitals reviewed.        Assessment/Plan   Mei was seen today for leg pain and hip pain.    Diagnoses and all orders for this visit:    DDD (degenerative disc disease), lumbar    Trochanteric bursitis of right hip    Foot pain, bilateral    Leg pain, bilateral    Other orders  -     celecoxib (CeleBREX) 50 MG capsule; Take 1 capsule by mouth " 2 (Two) Times a Day.      Ms. Crane presents today with complaints of pain in her hips, feet and legs. She is limites with the medications she can take due to side effects. She is to start Celebrex 50 mg twice a day.   She is to follow up as needed and at her next scheduled appointment.

## 2019-12-02 RX ORDER — PANTOPRAZOLE SODIUM 40 MG/1
40 TABLET, DELAYED RELEASE ORAL DAILY
Qty: 90 TABLET | Refills: 3 | Status: SHIPPED | OUTPATIENT
Start: 2019-12-02 | End: 2020-12-01

## 2019-12-06 ENCOUNTER — OFFICE VISIT (OUTPATIENT)
Dept: FAMILY MEDICINE CLINIC | Facility: CLINIC | Age: 84
End: 2019-12-06

## 2019-12-06 VITALS
HEART RATE: 107 BPM | HEIGHT: 64 IN | TEMPERATURE: 98 F | DIASTOLIC BLOOD PRESSURE: 82 MMHG | WEIGHT: 160 LBS | SYSTOLIC BLOOD PRESSURE: 120 MMHG | RESPIRATION RATE: 18 BRPM | OXYGEN SATURATION: 98 % | BODY MASS INDEX: 27.31 KG/M2

## 2019-12-06 DIAGNOSIS — M51.36 DDD (DEGENERATIVE DISC DISEASE), LUMBAR: Primary | ICD-10-CM

## 2019-12-06 DIAGNOSIS — M79.604 PAIN OF RIGHT LOWER EXTREMITY: ICD-10-CM

## 2019-12-06 DIAGNOSIS — M25.551 PAIN OF RIGHT HIP JOINT: ICD-10-CM

## 2019-12-06 PROCEDURE — 96372 THER/PROPH/DIAG INJ SC/IM: CPT | Performed by: NURSE PRACTITIONER

## 2019-12-06 PROCEDURE — 99214 OFFICE O/P EST MOD 30 MIN: CPT | Performed by: NURSE PRACTITIONER

## 2019-12-06 RX ADMIN — CYANOCOBALAMIN 1000 MCG: 1000 INJECTION, SOLUTION INTRAMUSCULAR; SUBCUTANEOUS at 11:37

## 2019-12-06 NOTE — PROGRESS NOTES
Subjective   Mei Crane is a 89 y.o. female.     Chief Complaint   Patient presents with   • Degenrative Disc Disease     Ms. Crane presents today to follow up on medication changes made at her last visit for her chronic back pain due to degenerative disc disease. She has been under the care of Dr. Krishna for her back pain and is receiving injections every three months. She has been returning to see me about a week or so prior to her next injection stating that the injections do not take away her pain and that she is unable to get around like she used to because her hips and legs hurt now. She has tried many oral medication, the most recent being Celebrex. She reports she cannot take the medication because it does not help to get rid of her pain.       I have reviewed the patient's medical history in detail and updated the computerized patient record.    The following portions of the patient's history were reviewed and updated as appropriate: allergies, current medications, past family history, past medical history, past social history, past surgical history and problem list.       Current Outpatient Medications:   •  acetaminophen (TYLENOL) 500 MG tablet, Take 1,000 mg by mouth Every 6 (Six) Hours As Needed for Mild Pain  or Moderate Pain ., Disp: , Rfl:   •  amLODIPine (NORVASC) 10 MG tablet, Take 1 tablet by mouth Daily., Disp: 30 tablet, Rfl: 5  •  ascorbic acid (VITAMIN C) 1000 MG tablet, Take 1 tablet by mouth Daily., Disp: 180 tablet, Rfl: 1  •  aspirin 81 MG EC tablet, Take 81 mg by mouth Daily., Disp: , Rfl:   •  atorvastatin (LIPITOR) 40 MG tablet, Take 1 tablet by mouth Daily., Disp: 30 tablet, Rfl: 5  •  celecoxib (CeleBREX) 50 MG capsule, Take 1 capsule by mouth 2 (Two) Times a Day., Disp: 60 capsule, Rfl: 5  •  Cholecalciferol (VITAMIN D PO), Take 2,000 Units by mouth Daily., Disp: , Rfl:   •  ferrous gluconate 324 (37.5 Fe) MG tablet tablet, Take 1 tablet by mouth Daily With Breakfast., Disp: 30  "tablet, Rfl: 6  •  hydrochlorothiazide (MICROZIDE) 12.5 MG capsule, Take 1 capsule by mouth Daily., Disp: 30 capsule, Rfl: 6  •  levothyroxine (SYNTHROID, LEVOTHROID) 75 MCG tablet, Take 1 tablet by mouth Daily., Disp: 90 tablet, Rfl: 1  •  Magnesium Oxide 200 MG tablet, Take 1 tablet by mouth Daily., Disp: 30 tablet, Rfl: 5  •  pantoprazole (PROTONIX) 40 MG EC tablet, Take 1 tablet by mouth Daily., Disp: 90 tablet, Rfl: 3  •  potassium chloride (K-DUR,KLOR-CON) 10 MEQ CR tablet, Take 1 tablet by mouth Daily., Disp: 30 tablet, Rfl: 5  •  prednisoLONE acetate (PRED FORTE) 1 % ophthalmic suspension, INSTILL ONE DROP IN THE LEFT EYE FOUR TIMES DAILY, Disp: , Rfl: 3  •  terconazole (TERAZOL 7) 0.4 % vaginal cream, Insert 1 applicator into the vagina Every Night., Disp: 45 g, Rfl: 5    Current Facility-Administered Medications:   •  cyanocobalamin injection 1,000 mcg, 1,000 mcg, Intramuscular, Q28 Days, Jae Bray MD, 1,000 mcg at 11/11/19 1210     Review of Systems   Constitutional: Negative.    Respiratory: Negative.    Cardiovascular: Negative.    Musculoskeletal: Positive for arthralgias.        Lower back, right hip and leg pain   Neurological: Negative.         Vitals:    12/06/19 1119   BP: 120/82   BP Location: Right arm   Patient Position: Sitting   Cuff Size: Adult   Pulse: 107   Resp: 18   Temp: 98 °F (36.7 °C)   TempSrc: Oral   SpO2: 98%   Weight: 72.6 kg (160 lb)   Height: 162.6 cm (64\")       Objective   Physical Exam   Constitutional: She is oriented to person, place, and time. She appears well-developed and well-nourished.   Neurological: She is alert and oriented to person, place, and time.   Skin: Skin is warm and dry.   Psychiatric:   No acute distress   Vitals reviewed.        Assessment/Plan   Mei was seen today for degenrative disc disease.    Diagnoses and all orders for this visit:    DDD (degenerative disc disease), lumbar  -     MRI Lumbar Spine Without Contrast    Pain of right hip " joint  -     MRI hip right wo contrast  -     MRI Lumbar Spine Without Contrast    Pain of right lower extremity  -     MRI hip right wo contrast  -     MRI Lumbar Spine Without Contrast        Ms. Crane presents today to follow up on medication changes made at her last visit for her chronic back pain due to degenerative disc disease. She has been under the care of Dr. Krishna for her back pain and is receiving injections every three months, which she reports does not relieve her pain.   As noted she is no longer taking the Celebrex. I am perplex as to what to prescribe for her next. At the age of 89 I do not feel that an oral narcotic is appropriate choice for long term management of her pain. I have discussed this with her at previous visits. I have also discussed with her that at her age she is limited on what can be done to aleve her pain. I will get an MRI of her hips and lumbar spine to rule out any fractures or acute processes.   She is to follow up as needed and at her next scheduled appointment.

## 2019-12-18 ENCOUNTER — TELEPHONE (OUTPATIENT)
Dept: ORTHOPEDIC SURGERY | Facility: CLINIC | Age: 84
End: 2019-12-18

## 2019-12-31 ENCOUNTER — OFFICE VISIT (OUTPATIENT)
Dept: FAMILY MEDICINE CLINIC | Facility: CLINIC | Age: 84
End: 2019-12-31

## 2019-12-31 VITALS
OXYGEN SATURATION: 98 % | HEART RATE: 84 BPM | SYSTOLIC BLOOD PRESSURE: 124 MMHG | WEIGHT: 157 LBS | BODY MASS INDEX: 26.8 KG/M2 | RESPIRATION RATE: 18 BRPM | HEIGHT: 64 IN | DIASTOLIC BLOOD PRESSURE: 84 MMHG

## 2019-12-31 DIAGNOSIS — G89.29 OTHER CHRONIC PAIN: Primary | ICD-10-CM

## 2019-12-31 PROCEDURE — 99213 OFFICE O/P EST LOW 20 MIN: CPT | Performed by: NURSE PRACTITIONER

## 2019-12-31 RX ORDER — GABAPENTIN 100 MG/1
100 CAPSULE ORAL 2 TIMES DAILY
Qty: 60 CAPSULE | Refills: 0 | Status: SHIPPED | OUTPATIENT
Start: 2019-12-31 | End: 2020-01-16 | Stop reason: DRUGHIGH

## 2020-01-02 DIAGNOSIS — I10 HTN (HYPERTENSION), BENIGN: ICD-10-CM

## 2020-01-02 RX ORDER — HYDROCHLOROTHIAZIDE 12.5 MG/1
CAPSULE, GELATIN COATED ORAL
Qty: 30 CAPSULE | Refills: 6 | Status: SHIPPED | OUTPATIENT
Start: 2020-01-02 | End: 2020-03-20 | Stop reason: SINTOL

## 2020-01-06 ENCOUNTER — CLINICAL SUPPORT (OUTPATIENT)
Dept: FAMILY MEDICINE CLINIC | Facility: CLINIC | Age: 85
End: 2020-01-06

## 2020-01-06 PROCEDURE — 96372 THER/PROPH/DIAG INJ SC/IM: CPT | Performed by: NURSE PRACTITIONER

## 2020-01-06 RX ADMIN — CYANOCOBALAMIN 1000 MCG: 1000 INJECTION, SOLUTION INTRAMUSCULAR; SUBCUTANEOUS at 10:49

## 2020-01-14 ENCOUNTER — CONSULT (OUTPATIENT)
Dept: ORTHOPEDIC SURGERY | Facility: CLINIC | Age: 85
End: 2020-01-14

## 2020-01-14 VITALS — TEMPERATURE: 98 F | BODY MASS INDEX: 26.8 KG/M2 | HEIGHT: 64 IN | WEIGHT: 157 LBS

## 2020-01-14 DIAGNOSIS — M54.50 LOW BACK PAIN, UNSPECIFIED BACK PAIN LATERALITY, UNSPECIFIED CHRONICITY, UNSPECIFIED WHETHER SCIATICA PRESENT: Primary | ICD-10-CM

## 2020-01-14 DIAGNOSIS — M48.062 SPINAL STENOSIS OF LUMBAR REGION WITH NEUROGENIC CLAUDICATION: ICD-10-CM

## 2020-01-14 PROCEDURE — 72100 X-RAY EXAM L-S SPINE 2/3 VWS: CPT | Performed by: ORTHOPAEDIC SURGERY

## 2020-01-14 PROCEDURE — 99214 OFFICE O/P EST MOD 30 MIN: CPT | Performed by: ORTHOPAEDIC SURGERY

## 2020-01-14 NOTE — PROGRESS NOTES
New patient or new problem visit    Chief Complaint   Patient presents with   • Lumbar Spine - Pain       HPI: She is complaining of right leg pain and low back pain more leg than back pain is stabbing constant worse with activity.  Biofreeze helps a little hip injection did not.  No balance difficulties bowel or bladder complaints.    PFSH: See chart- reviewed    Review of Systems   Constitutional: Negative for chills, fever and unexpected weight change.   HENT: Negative for trouble swallowing and voice change.    Eyes: Negative for visual disturbance.   Respiratory: Negative for cough and shortness of breath.    Cardiovascular: Negative for chest pain and leg swelling.   Gastrointestinal: Negative for abdominal pain, nausea and vomiting.   Endocrine: Negative for cold intolerance and heat intolerance.   Genitourinary: Negative for difficulty urinating, frequency and urgency.   Skin: Negative for rash and wound.   Allergic/Immunologic: Negative for immunocompromised state.   Neurological: Negative for weakness and numbness.   Hematological: Does not bruise/bleed easily.   Psychiatric/Behavioral: Negative for dysphoric mood. The patient is not nervous/anxious.        PE: Constitutional: Vital signs above-noted.  Awake, alert and oriented she looks a score younger than her stated age of 89    Psychiatric: Affect and insight do not appear grossly disturbed.    Pulmonary: Breathing is unlabored, color is good.    Skin: Warm, dry and normal turgor    Cardiac: Pedal pulses intact.  No edema.    Eyesight and hearing appear adequate for examination purposes      Musculoskeletal:  There is no tenderness to percussion and palpation of the spine. Motion appears undisturbed.  Posture is unremarkable to coronal and sagittal inspection.    The skin about the area is intact.  There is no palpable or visible deformity.  There is no local spasm.       Neurologic:   Reflexes are absent in the patellae and achilles.   Motor function  is undisturbed in quadriceps, EHL, and gastrocnemius   sensation appears symmetrically intact to light touch.  In the bilateral lower extremities there is no evidence of atrophy.   Clonus is absent..  Gait appears undisturbed.  SLR test negative      MEDICAL DECISION MAKING    XRAY: Plain film x-rays show a slight scoliosis which is well-balanced and multilevel spondylosis.  There is some calcific changes in the aorta.  MRI scan of the lumbar spine shows severe stenosis at L3-4 moderate changes at 4 5 and lesser changes at adjacent levels.  Reviewed the radiologist report with which I agree.    Other: n/a    Impression: Lumbar spinal stenosis over multiple levels with primary leg versus back pain    Plan: For now lumbar epidural steroid injections and hopefully this will work.  If she does fail to improve and consider surgery we might obtain a myelogram and CT scan in order to selectively consolidate levels to minimize the extent of intervention.  Follow-up as needed.

## 2020-01-16 ENCOUNTER — OFFICE VISIT (OUTPATIENT)
Dept: FAMILY MEDICINE CLINIC | Facility: CLINIC | Age: 85
End: 2020-01-16

## 2020-01-16 VITALS
OXYGEN SATURATION: 98 % | BODY MASS INDEX: 26.98 KG/M2 | TEMPERATURE: 98 F | HEART RATE: 82 BPM | SYSTOLIC BLOOD PRESSURE: 124 MMHG | DIASTOLIC BLOOD PRESSURE: 86 MMHG | WEIGHT: 158 LBS | HEIGHT: 64 IN | RESPIRATION RATE: 18 BRPM

## 2020-01-16 DIAGNOSIS — M51.36 DDD (DEGENERATIVE DISC DISEASE), LUMBAR: Primary | ICD-10-CM

## 2020-01-16 PROCEDURE — 99213 OFFICE O/P EST LOW 20 MIN: CPT | Performed by: NURSE PRACTITIONER

## 2020-01-16 RX ORDER — GABAPENTIN 300 MG/1
300 CAPSULE ORAL 2 TIMES DAILY
Qty: 60 CAPSULE | Refills: 0 | Status: SHIPPED | OUTPATIENT
Start: 2020-01-16 | End: 2020-01-29

## 2020-01-16 NOTE — PROGRESS NOTES
Subjective   Mei Crane is a 89 y.o. female.     Chief Complaint   Patient presents with   • Degenrative disc disease     Lumbar      Ms Crane presents today to follow up on her chronic back and leg pain after starting Gabapentin 100 mg twice a day at her last visit. She reports that the gabapentin does not completely relieve  her pain.        I have reviewed the patient's medical history in detail and updated the computerized patient record.    The following portions of the patient's history were reviewed and updated as appropriate: allergies, current medications, past family history, past medical history, past social history, past surgical history and problem list.       Current Outpatient Medications:   •  acetaminophen (TYLENOL) 500 MG tablet, Take 1,000 mg by mouth Every 6 (Six) Hours As Needed for Mild Pain  or Moderate Pain ., Disp: , Rfl:   •  amLODIPine (NORVASC) 10 MG tablet, Take 1 tablet by mouth Daily., Disp: 30 tablet, Rfl: 5  •  ascorbic acid (VITAMIN C) 1000 MG tablet, Take 1 tablet by mouth Daily., Disp: 180 tablet, Rfl: 1  •  aspirin 81 MG EC tablet, Take 81 mg by mouth Daily., Disp: , Rfl:   •  atorvastatin (LIPITOR) 40 MG tablet, Take 1 tablet by mouth Daily., Disp: 30 tablet, Rfl: 5  •  Cholecalciferol (VITAMIN D PO), Take 2,000 Units by mouth Daily., Disp: , Rfl:   •  ferrous gluconate 324 (37.5 Fe) MG tablet tablet, Take 1 tablet by mouth Daily With Breakfast., Disp: 30 tablet, Rfl: 6  •  gabapentin (NEURONTIN) 100 MG capsule, Take 1 capsule by mouth 2 (Two) Times a Day., Disp: 60 capsule, Rfl: 0  •  hydroCHLOROthiazide (MICROZIDE) 12.5 MG capsule, TAKE ONE CAPSULE BY MOUTH EVERY DAY, Disp: 30 capsule, Rfl: 6  •  levothyroxine (SYNTHROID, LEVOTHROID) 75 MCG tablet, Take 1 tablet by mouth Daily., Disp: 90 tablet, Rfl: 1  •  Magnesium Oxide 200 MG tablet, Take 1 tablet by mouth Daily., Disp: 30 tablet, Rfl: 5  •  pantoprazole (PROTONIX) 40 MG EC tablet, Take 1 tablet by mouth Daily.,  Disp: 90 tablet, Rfl: 3  •  potassium chloride (K-DUR,KLOR-CON) 10 MEQ CR tablet, Take 1 tablet by mouth Daily., Disp: 30 tablet, Rfl: 5  •  prednisoLONE acetate (PRED FORTE) 1 % ophthalmic suspension, INSTILL ONE DROP IN THE LEFT EYE FOUR TIMES DAILY, Disp: , Rfl: 3  •  terconazole (TERAZOL 7) 0.4 % vaginal cream, Insert 1 applicator into the vagina Every Night., Disp: 45 g, Rfl: 5    Current Facility-Administered Medications:   •  cyanocobalamin injection 1,000 mcg, 1,000 mcg, Intramuscular, Q28 Days, Jae Bray MD, 1,000 mcg at 01/06/20 1049    Review of Systems   Constitutional: Negative.    Respiratory: Negative.    Cardiovascular: Negative.    Musculoskeletal: Positive for arthralgias and back pain.   Skin: Negative.        Objective      Vitals:    01/16/20 1241   BP: 124/86   Pulse: 82   Resp: 18   Temp: 98 °F (36.7 °C)   SpO2: 98%     Physical Exam   Constitutional: She is oriented to person, place, and time. She appears well-developed and well-nourished.   Cardiovascular: Normal rate, regular rhythm, normal heart sounds and intact distal pulses.   Pulmonary/Chest: Effort normal and breath sounds normal.   Musculoskeletal: Normal range of motion. She exhibits no edema.   Neurological: She is alert and oriented to person, place, and time.   Skin: Skin is warm and dry.   Psychiatric:   No acute distress   Vitals reviewed.        Assessment/Plan   Mei was seen today for degenrative disc disease.    Diagnoses and all orders for this visit:    DDD (degenerative disc disease), lumbar  -     gabapentin (NEURONTIN) 300 MG capsule; Take 1 capsule by mouth 2 (Two) Times a Day.      Ms. Crane presents today to follow up on her chronic back and leg pain. I will increase the Gabapentin 300 mg twice a day. I will only give her 30 days at this time to see if the dose works for her.   She is to follow up in 30 days to re-evaluate her pain control.

## 2020-01-17 ENCOUNTER — TELEPHONE (OUTPATIENT)
Dept: ORTHOPEDIC SURGERY | Facility: CLINIC | Age: 85
End: 2020-01-17

## 2020-01-17 DIAGNOSIS — M48.062 SPINAL STENOSIS OF LUMBAR REGION WITH NEUROGENIC CLAUDICATION: Primary | ICD-10-CM

## 2020-01-17 NOTE — TELEPHONE ENCOUNTER
Patient called stating she wants to know if she can have LESI's done at [facility] [place] ([facility]). She can get transportation easier. Also, patient is not clear on exactly what LESI's are. Needs explanation.

## 2020-01-17 NOTE — TELEPHONE ENCOUNTER
Spoke with patient regarding her epidurals; she no longer had any questions about them but is requesting to have them done at [facility] due to transportation. I told her I was unaware as to whether that was a service that was provided at that location but that we would try.

## 2020-01-21 ENCOUNTER — TELEPHONE (OUTPATIENT)
Dept: ORTHOPEDIC SURGERY | Facility: CLINIC | Age: 85
End: 2020-01-21

## 2020-01-21 NOTE — TELEPHONE ENCOUNTER
CALLED PT ON HER CELL AND HER SISTER TOOK MESSAGE TO LET HER KNOW I FAXED HER INFO TO [facility] PER HER REQUEST AND IT COULD TAKE 2 TO 3 DAYS TO GET A PHONE CALL FOR EPIDURAL AND SHE SAID SHE WOULD TELL HER/DM

## 2020-01-21 NOTE — TELEPHONE ENCOUNTER
Patient called wanting to know if the epidural injections have been approved for the [facility] facility in [place] , if she's unable to get the injection there it's ok to schedule at [facility] [place]. Would like to be kept informed.

## 2020-01-21 NOTE — TELEPHONE ENCOUNTER
Sindy,         I sent to [facility] pain [facility] yesterday and it takes them a bit to load her into the sytem to call her and I will call and let her know.    Katie

## 2020-01-22 ENCOUNTER — TELEPHONE (OUTPATIENT)
Dept: ORTHOPEDIC SURGERY | Facility: CLINIC | Age: 85
End: 2020-01-22

## 2020-01-22 DIAGNOSIS — M48.062 SPINAL STENOSIS OF LUMBAR REGION WITH NEUROGENIC CLAUDICATION: Primary | ICD-10-CM

## 2020-01-22 NOTE — TELEPHONE ENCOUNTER
Pt wanted to go to [facility] for pain mgmt but they do not offer it, so she would like order for lumbar epidural put in for [facility] please/dm

## 2020-01-29 ENCOUNTER — HOSPITAL ENCOUNTER (OUTPATIENT)
Dept: GENERAL RADIOLOGY | Facility: HOSPITAL | Age: 85
Discharge: HOME | End: 2020-01-29

## 2020-01-29 ENCOUNTER — ANESTHESIA (OUTPATIENT)
Dept: PAIN MEDICINE | Facility: HOSPITAL | Age: 85
End: 2020-01-29

## 2020-01-29 ENCOUNTER — HOSPITAL ENCOUNTER (OUTPATIENT)
Dept: PAIN MEDICINE | Facility: HOSPITAL | Age: 85
Discharge: HOME | End: 2020-01-29
Admitting: ORTHOPAEDIC SURGERY

## 2020-01-29 ENCOUNTER — ANESTHESIA EVENT (OUTPATIENT)
Dept: PAIN MEDICINE | Facility: HOSPITAL | Age: 85
End: 2020-01-29

## 2020-01-29 VITALS
HEIGHT: 64 IN | TEMPERATURE: 97.8 F | SYSTOLIC BLOOD PRESSURE: 166 MMHG | DIASTOLIC BLOOD PRESSURE: 84 MMHG | BODY MASS INDEX: 25.61 KG/M2 | RESPIRATION RATE: 16 BRPM | OXYGEN SATURATION: 96 % | HEART RATE: 96 BPM | WEIGHT: 150 LBS

## 2020-01-29 DIAGNOSIS — M48.062 SPINAL STENOSIS OF LUMBAR REGION WITH NEUROGENIC CLAUDICATION: Primary | ICD-10-CM

## 2020-01-29 DIAGNOSIS — R52 PAIN: ICD-10-CM

## 2020-01-29 PROCEDURE — 77003 FLUOROGUIDE FOR SPINE INJECT: CPT

## 2020-01-29 PROCEDURE — C1755 CATHETER, INTRASPINAL: HCPCS

## 2020-01-29 PROCEDURE — 25010000002 METHYLPREDNISOLONE PER 80 MG: Performed by: ANESTHESIOLOGY

## 2020-01-29 RX ORDER — METHYLPREDNISOLONE ACETATE 80 MG/ML
80 INJECTION, SUSPENSION INTRA-ARTICULAR; INTRALESIONAL; INTRAMUSCULAR; SOFT TISSUE ONCE
Status: COMPLETED | OUTPATIENT
Start: 2020-01-29 | End: 2020-01-29

## 2020-01-29 RX ORDER — FENTANYL CITRATE 50 UG/ML
50 INJECTION, SOLUTION INTRAMUSCULAR; INTRAVENOUS AS NEEDED
Status: DISCONTINUED | OUTPATIENT
Start: 2020-01-29 | End: 2020-01-30 | Stop reason: HOSPADM

## 2020-01-29 RX ORDER — MIDAZOLAM HYDROCHLORIDE 1 MG/ML
1 INJECTION INTRAMUSCULAR; INTRAVENOUS
Status: DISCONTINUED | OUTPATIENT
Start: 2020-01-29 | End: 2020-01-30 | Stop reason: HOSPADM

## 2020-01-29 RX ORDER — LIDOCAINE HYDROCHLORIDE 10 MG/ML
1 INJECTION, SOLUTION INFILTRATION; PERINEURAL ONCE
Status: DISCONTINUED | OUTPATIENT
Start: 2020-01-29 | End: 2020-01-30 | Stop reason: HOSPADM

## 2020-01-29 RX ADMIN — METHYLPREDNISOLONE ACETATE 80 MG: 80 INJECTION, SUSPENSION INTRA-ARTICULAR; INTRALESIONAL; INTRAMUSCULAR; SOFT TISSUE at 12:54

## 2020-01-29 NOTE — H&P
"[facility]    History and Physical    Patient Name: Mei Crane  :  1926  MRN:  [id number]  Date of Admission: 2020    Subjective     Patient is a 89 y.o. female presents with chief complaint of chronic, intermitent, moderate, severe low back and leg: bilateral pain.  Onset of symptoms was gradual starting several months ago.  Symptoms are associated/aggravated by activity or twisting. Symptoms improve with rest    The following portions of the patients history were reviewed and updated as appropriate: current medications, allergies, past medical history, past surgical history, past family history, past social history and problem list                Objective     Past Medical History:   Past Medical History:   Diagnosis Date   • Calculus of gallbladder without cholecystitis 2017   • CKD (chronic kidney disease), stage III (CMS/HCC) 2016   • DDD (degenerative disc disease), lumbar 2019   • Disease of thyroid gland    • Hyperlipidemia      Past Surgical History:   Past Surgical History:   Procedure Laterality Date   • CATARACT EXTRACTION Bilateral    • HYSTERECTOMY      age 35     Family History:   Family History   Problem Relation Age of Onset   • Aneurysm Mother         AAA   • Deep vein thrombosis Father      Social History:   Social History     Tobacco Use   • Smoking status: Never Smoker   • Smokeless tobacco: Never Used   Substance Use Topics   • Alcohol use: Yes     Comment: occasionally   • Drug use: No       Vital Signs Range for the last 24 hours  Temperature: Temp:  [36.6 °C (97.8 °F)] 36.6 °C (97.8 °F)   Temp Source: Temp src: Oral   BP: BP: (154)/(80) 154/80   Pulse: Heart Rate:  [100] 100   Respirations: Resp:  [14] 14   SPO2: SpO2:  [94 %] 94 %   O2 Amount (l/min):     O2 Devices Device (Oxygen Therapy): room air   Weight: Weight:  [68 kg (150 lb)] 68 kg (150 lb)     Flowsheet Rows      First Filed Value   Admission Height  162.6 cm (64\") Documented at 2020 1224 "   Admission Weight  68 kg (150 lb) Documented at 01/29/2020 1224          --------------------------------------------------------------------------------    Current Outpatient Medications   Medication Sig Dispense Refill   • acetaminophen (TYLENOL) 500 MG tablet Take 1,000 mg by mouth Every 6 (Six) Hours As Needed for Mild Pain  or Moderate Pain .     • amLODIPine (NORVASC) 10 MG tablet Take 1 tablet by mouth Daily. 30 tablet 5   • ascorbic acid (VITAMIN C) 1000 MG tablet Take 1 tablet by mouth Daily. 180 tablet 1   • atorvastatin (LIPITOR) 40 MG tablet Take 1 tablet by mouth Daily. 30 tablet 5   • Cholecalciferol (VITAMIN D PO) Take 2,000 Units by mouth Daily.     • ferrous gluconate 324 (37.5 Fe) MG tablet tablet Take 1 tablet by mouth Daily With Breakfast. 30 tablet 6   • hydroCHLOROthiazide (MICROZIDE) 12.5 MG capsule TAKE ONE CAPSULE BY MOUTH EVERY DAY 30 capsule 6   • levothyroxine (SYNTHROID, LEVOTHROID) 75 MCG tablet Take 1 tablet by mouth Daily. 90 tablet 1   • pantoprazole (PROTONIX) 40 MG EC tablet Take 1 tablet by mouth Daily. 90 tablet 3   • potassium chloride (K-DUR,KLOR-CON) 10 MEQ CR tablet Take 1 tablet by mouth Daily. 30 tablet 5   • prednisoLONE acetate (PRED FORTE) 1 % ophthalmic suspension INSTILL ONE DROP IN THE LEFT EYE FOUR TIMES DAILY  3   • terconazole (TERAZOL 7) 0.4 % vaginal cream Insert 1 applicator into the vagina Every Night. 45 g 5   • aspirin 81 MG EC tablet Take 81 mg by mouth Daily.       Current Facility-Administered Medications   Medication Dose Route Frequency Provider Last Rate Last Dose   • cyanocobalamin injection 1,000 mcg  1,000 mcg Intramuscular Q28 Days Jae Bray MD   1,000 mcg at 01/06/20 1049       --------------------------------------------------------------------------------  Assessment/Plan      Anesthesia Evaluation     Patient summary reviewed and Nursing notes reviewed         Pain impairs ability to perform ADLs: Sleeping  Modalities previously  tried to control pain with limited effectiveness within the last 4-6 weeks: Rest     Airway   Mallampati: II  TM distance: >3 FB  Neck ROM: full  Dental - normal exam     Pulmonary - negative pulmonary ROS and normal exam   Cardiovascular - normal exam    (+) hypertension, hyperlipidemia,       Neuro/Psych- negative ROS and neuro exam normal  GI/Hepatic/Renal/Endo    (+)  GERD,  renal disease,     Musculoskeletal (-) normal exam    (+) back pain, radiculopathy  Abdominal  - normal exam   Substance History - negative use     OB/GYN negative ob/gyn ROS         Other   arthritis,                 Diagnosis and Plan    Treatment Plan  ASA 3      Procedures: Lumbar Epidural Steroid Injection(LESI), With fluoroscopy,       Anesthetic plan and risks discussed with patient.          Diagnosis     * Lumbar spinal stenosis [M48.061]     * Lumbar radiculopathy [M54.16]

## 2020-01-29 NOTE — ANESTHESIA PROCEDURE NOTES
PAIN Epidural block      Patient reassessed immediately prior to procedure    Patient location during procedure: pain clinic  Indication:procedure for pain  Performed By  Anesthesiologist: Anand Jeffries MD  Preanesthetic Checklist  Completed: patient identified, site marked, surgical consent, pre-op evaluation, timeout performed, risks and benefits discussed and monitors and equipment checked  Additional Notes  Depomedrol - 80mg    Needle position confirmed by fluoroscopy. No contrast due to renal function.    Diagnosis  Post-Op Diagnosis Codes:     * Lumbar spinal stenosis (M48.061)     * Lumbar radiculopathy (M54.16)    Prep:  Pt Position:prone  Sterile Tech:cap, gloves, mask and sterile barrier  Prep:chlorhexidine gluconate and isopropyl alcohol  Monitoring:blood pressure monitoring, continuous pulse oximetry and EKG  Procedure:Sedation: no     Approach:right paramedian  Guidance: fluoroscopy  Location:lumbar  Level:3-4  Needle Type:Tuohy  Needle Gauge:20  Aspiration:negative  Medications:  Depomedrol:80 mg  Preservative Free Saline:2mL  Isovue:0mL    Post Assessment:  Post-procedure: bandaide.  Pt Tolerance:patient tolerated the procedure well with no apparent complications  Complications:no

## 2020-02-06 ENCOUNTER — CLINICAL SUPPORT (OUTPATIENT)
Dept: FAMILY MEDICINE CLINIC | Facility: CLINIC | Age: 85
End: 2020-02-06

## 2020-02-06 DIAGNOSIS — E53.8 B12 DEFICIENCY: ICD-10-CM

## 2020-02-06 PROCEDURE — 96372 THER/PROPH/DIAG INJ SC/IM: CPT | Performed by: NURSE PRACTITIONER

## 2020-02-06 RX ADMIN — CYANOCOBALAMIN 1000 MCG: 1000 INJECTION, SOLUTION INTRAMUSCULAR; SUBCUTANEOUS at 10:28

## 2020-02-17 ENCOUNTER — OFFICE VISIT (OUTPATIENT)
Dept: FAMILY MEDICINE CLINIC | Facility: CLINIC | Age: 85
End: 2020-02-17

## 2020-02-17 VITALS
DIASTOLIC BLOOD PRESSURE: 86 MMHG | WEIGHT: 154 LBS | SYSTOLIC BLOOD PRESSURE: 134 MMHG | BODY MASS INDEX: 26.29 KG/M2 | HEIGHT: 64 IN | HEART RATE: 84 BPM | OXYGEN SATURATION: 96 % | RESPIRATION RATE: 18 BRPM

## 2020-02-17 DIAGNOSIS — M51.36 DDD (DEGENERATIVE DISC DISEASE), LUMBAR: Primary | ICD-10-CM

## 2020-02-17 PROCEDURE — 99213 OFFICE O/P EST LOW 20 MIN: CPT | Performed by: NURSE PRACTITIONER

## 2020-02-17 RX ORDER — LEVOTHYROXINE SODIUM 0.07 MG/1
TABLET ORAL
Qty: 90 TABLET | Refills: 1 | Status: SHIPPED | OUTPATIENT
Start: 2020-02-17 | End: 2020-03-20 | Stop reason: DRUGHIGH

## 2020-02-17 NOTE — PROGRESS NOTES
Subjective   Mei Crane is a 89 y.o. female.     Chief Complaint   Patient presents with   • DDD (degenrative disc disease)     Ms. Crane presents today for a CSE to get refills on Gabapentin. She had stopped taking the gabapentin because it was making her loopy. I have discussed with her that we have tried multiple medications that she has stopped on her own because she did not like the way she felt while taking the medication or it did not help her leg pain.     I have reviewed the patient's medical history in detail and updated the computerized patient record.    The following portions of the patient's history were reviewed and updated as appropriate: allergies, current medications, past family history, past medical history, past social history, past surgical history and problem list.       Current Outpatient Medications:   •  acetaminophen (TYLENOL) 500 MG tablet, Take 1,000 mg by mouth Every 6 (Six) Hours As Needed for Mild Pain  or Moderate Pain ., Disp: , Rfl:   •  amLODIPine (NORVASC) 10 MG tablet, Take 1 tablet by mouth Daily., Disp: 30 tablet, Rfl: 5  •  ascorbic acid (VITAMIN C) 1000 MG tablet, Take 1 tablet by mouth Daily., Disp: 180 tablet, Rfl: 1  •  aspirin 81 MG EC tablet, Take 81 mg by mouth Daily., Disp: , Rfl:   •  atorvastatin (LIPITOR) 40 MG tablet, Take 1 tablet by mouth Daily., Disp: 30 tablet, Rfl: 5  •  Cholecalciferol (VITAMIN D PO), Take 2,000 Units by mouth Daily., Disp: , Rfl:   •  ferrous gluconate 324 (37.5 Fe) MG tablet tablet, Take 1 tablet by mouth Daily With Breakfast., Disp: 30 tablet, Rfl: 6  •  hydroCHLOROthiazide (MICROZIDE) 12.5 MG capsule, TAKE ONE CAPSULE BY MOUTH EVERY DAY, Disp: 30 capsule, Rfl: 6  •  levothyroxine (SYNTHROID, LEVOTHROID) 75 MCG tablet, TAKE ONE TABLET BY MOUTH EVERY DAY, Disp: 90 tablet, Rfl: 1  •  pantoprazole (PROTONIX) 40 MG EC tablet, Take 1 tablet by mouth Daily., Disp: 90 tablet, Rfl: 3  •  potassium chloride (K-DUR,KLOR-CON) 10 MEQ CR  tablet, Take 1 tablet by mouth Daily., Disp: 30 tablet, Rfl: 5  •  prednisoLONE acetate (PRED FORTE) 1 % ophthalmic suspension, INSTILL ONE DROP IN THE LEFT EYE FOUR TIMES DAILY, Disp: , Rfl: 3  •  terconazole (TERAZOL 7) 0.4 % vaginal cream, Insert 1 applicator into the vagina Every Night., Disp: 45 g, Rfl: 5    Current Facility-Administered Medications:   •  cyanocobalamin injection 1,000 mcg, 1,000 mcg, Intramuscular, Q28 Days, Jae Bray MD, 1,000 mcg at 02/06/20 1028    Review of Systems   Constitutional: Negative.    Respiratory: Negative.    Cardiovascular: Negative.    Musculoskeletal: Positive for arthralgias (leg pain and foot pain) and back pain.   Neurological: Negative.        Objective      Vitals:    02/17/20 1122   BP: 134/86   Pulse: 84   Resp: 18   SpO2: 96%     Physical Exam   Constitutional: She is oriented to person, place, and time. She appears well-developed and well-nourished.   Musculoskeletal: Normal range of motion. She exhibits no edema.   Neurological: She is alert and oriented to person, place, and time.   Skin: Skin is warm and dry.   Psychiatric:   No acute distress   Vitals reviewed.        Assessment/Plan   Mei was seen today for ddd (degenrative disc disease).    Diagnoses and all orders for this visit:    DDD (degenerative disc disease), lumbar      Ms. Crane presents today for a CSE to get refills on Gabapentin. She had stopped taking the gabapentin because it was making her loopy. I have discussed with her that we have tried multiple medications that she has stopped on her own because she did not like the way she felt while taking the medication or it did not help her leg pain. I discussed that due to her age I would not prescribed opiates for her leg pain. She has decided to take the gabapentin at night only to see if it will help her leg pain while she sleeps.   She is to follow up with me next month and we will discuss the effectiveness of the gabapentin at  bedtime then.     Addendum:  I have not signed a controlled substance agreement with Ms. Crane today because she may or may not continue taking Gabapentin. I have not given her any new pr

## 2020-02-26 ENCOUNTER — HOSPITAL ENCOUNTER (OUTPATIENT)
Dept: GENERAL RADIOLOGY | Facility: HOSPITAL | Age: 85
Discharge: HOME | End: 2020-02-26

## 2020-02-26 ENCOUNTER — ANESTHESIA EVENT (OUTPATIENT)
Dept: PAIN MEDICINE | Facility: HOSPITAL | Age: 85
End: 2020-02-26

## 2020-02-26 ENCOUNTER — HOSPITAL ENCOUNTER (OUTPATIENT)
Dept: PAIN MEDICINE | Facility: HOSPITAL | Age: 85
Discharge: HOME | End: 2020-02-26
Admitting: ANESTHESIOLOGY

## 2020-02-26 ENCOUNTER — ANESTHESIA (OUTPATIENT)
Dept: PAIN MEDICINE | Facility: HOSPITAL | Age: 85
End: 2020-02-26

## 2020-02-26 VITALS
OXYGEN SATURATION: 95 % | HEART RATE: 96 BPM | DIASTOLIC BLOOD PRESSURE: 88 MMHG | RESPIRATION RATE: 16 BRPM | TEMPERATURE: 98 F | SYSTOLIC BLOOD PRESSURE: 171 MMHG

## 2020-02-26 DIAGNOSIS — R52 PAIN: ICD-10-CM

## 2020-02-26 DIAGNOSIS — M48.062 SPINAL STENOSIS OF LUMBAR REGION WITH NEUROGENIC CLAUDICATION: Primary | ICD-10-CM

## 2020-02-26 PROCEDURE — C1755 CATHETER, INTRASPINAL: HCPCS

## 2020-02-26 PROCEDURE — 25010000002 METHYLPREDNISOLONE PER 80 MG: Performed by: ANESTHESIOLOGY

## 2020-02-26 PROCEDURE — 77003 FLUOROGUIDE FOR SPINE INJECT: CPT

## 2020-02-26 RX ORDER — FENTANYL CITRATE 50 UG/ML
50 INJECTION, SOLUTION INTRAMUSCULAR; INTRAVENOUS AS NEEDED
Status: DISCONTINUED | OUTPATIENT
Start: 2020-02-26 | End: 2020-02-27 | Stop reason: HOSPADM

## 2020-02-26 RX ORDER — LIDOCAINE HYDROCHLORIDE 10 MG/ML
1 INJECTION, SOLUTION INFILTRATION; PERINEURAL ONCE
Status: DISCONTINUED | OUTPATIENT
Start: 2020-02-26 | End: 2020-02-27 | Stop reason: HOSPADM

## 2020-02-26 RX ORDER — METHYLPREDNISOLONE ACETATE 80 MG/ML
80 INJECTION, SUSPENSION INTRA-ARTICULAR; INTRALESIONAL; INTRAMUSCULAR; SOFT TISSUE ONCE
Status: COMPLETED | OUTPATIENT
Start: 2020-02-26 | End: 2020-02-26

## 2020-02-26 RX ORDER — MIDAZOLAM HYDROCHLORIDE 1 MG/ML
1 INJECTION INTRAMUSCULAR; INTRAVENOUS
Status: DISCONTINUED | OUTPATIENT
Start: 2020-02-26 | End: 2020-02-27 | Stop reason: HOSPADM

## 2020-02-26 RX ADMIN — METHYLPREDNISOLONE ACETATE 80 MG: 80 INJECTION, SUSPENSION INTRA-ARTICULAR; INTRALESIONAL; INTRAMUSCULAR; SOFT TISSUE at 12:46

## 2020-02-26 NOTE — INTERVAL H&P NOTE
[facility]  H&P reviewed. No changes since last visit.  Patient states   25-50% improvement since the last procedure/injection.    Diagnosis     * Lumbar spinal stenosis [M48.061]     * Lumbar radiculopathy [M54.16]      Airway assessed since last visit. Airway class equals: 2.

## 2020-03-05 DIAGNOSIS — E03.9 ACQUIRED HYPOTHYROIDISM: Primary | ICD-10-CM

## 2020-03-05 DIAGNOSIS — N18.30 CKD (CHRONIC KIDNEY DISEASE), STAGE III (HCC): ICD-10-CM

## 2020-03-05 DIAGNOSIS — E53.8 B12 DEFICIENCY: ICD-10-CM

## 2020-03-08 ENCOUNTER — RESULTS ENCOUNTER (OUTPATIENT)
Dept: FAMILY MEDICINE CLINIC | Facility: CLINIC | Age: 85
End: 2020-03-08

## 2020-03-08 DIAGNOSIS — E53.8 B12 DEFICIENCY: ICD-10-CM

## 2020-03-08 DIAGNOSIS — E03.9 ACQUIRED HYPOTHYROIDISM: ICD-10-CM

## 2020-03-08 DIAGNOSIS — N18.30 CKD (CHRONIC KIDNEY DISEASE), STAGE III (HCC): ICD-10-CM

## 2020-03-10 ENCOUNTER — CLINICAL SUPPORT (OUTPATIENT)
Dept: FAMILY MEDICINE CLINIC | Facility: CLINIC | Age: 85
End: 2020-03-10

## 2020-03-10 ENCOUNTER — LAB (OUTPATIENT)
Dept: FAMILY MEDICINE CLINIC | Facility: CLINIC | Age: 85
End: 2020-03-10

## 2020-03-10 DIAGNOSIS — E53.8 B12 DEFICIENCY: ICD-10-CM

## 2020-03-10 PROCEDURE — 96372 THER/PROPH/DIAG INJ SC/IM: CPT | Performed by: NURSE PRACTITIONER

## 2020-03-10 RX ADMIN — CYANOCOBALAMIN 1000 MCG: 1000 INJECTION, SOLUTION INTRAMUSCULAR; SUBCUTANEOUS at 10:14

## 2020-03-11 LAB
ALBUMIN SERPL-MCNC: 3.9 G/DL (ref 3.5–5.2)
ALBUMIN/GLOB SERPL: 1.7 G/DL
ALP SERPL-CCNC: 77 U/L (ref 39–117)
ALT SERPL-CCNC: 19 U/L (ref 1–33)
AST SERPL-CCNC: 20 U/L (ref 1–32)
BILIRUB SERPL-MCNC: 0.4 MG/DL (ref 0.2–1.2)
BUN SERPL-MCNC: 32 MG/DL (ref 8–23)
BUN/CREAT SERPL: 25.2 (ref 7–25)
CALCIUM SERPL-MCNC: 9.1 MG/DL (ref 8.2–9.6)
CHLORIDE SERPL-SCNC: 103 MMOL/L (ref 98–107)
CO2 SERPL-SCNC: 25 MMOL/L (ref 22–29)
CREAT SERPL-MCNC: 1.27 MG/DL (ref 0.57–1)
ERYTHROCYTE [DISTWIDTH] IN BLOOD BY AUTOMATED COUNT: 13.1 % (ref 12.3–15.4)
GLOBULIN SER CALC-MCNC: 2.3 GM/DL
GLUCOSE SERPL-MCNC: 92 MG/DL (ref 65–99)
HCT VFR BLD AUTO: 36.2 % (ref 34–46.6)
HGB BLD-MCNC: 12 G/DL (ref 12–15.9)
MCH RBC QN AUTO: 28.1 PG (ref 26.6–33)
MCHC RBC AUTO-ENTMCNC: 33.1 G/DL (ref 31.5–35.7)
MCV RBC AUTO: 84.8 FL (ref 79–97)
PLATELET # BLD AUTO: 309 10*3/MM3 (ref 140–450)
POTASSIUM SERPL-SCNC: 4.1 MMOL/L (ref 3.5–5.2)
PROT SERPL-MCNC: 6.2 G/DL (ref 6–8.5)
RBC # BLD AUTO: 4.27 10*6/MM3 (ref 3.77–5.28)
SODIUM SERPL-SCNC: 139 MMOL/L (ref 136–145)
TSH SERPL DL<=0.005 MIU/L-ACNC: 0.17 UIU/ML (ref 0.27–4.2)
VIT B12 SERPL-MCNC: >2000 PG/ML (ref 211–946)
WBC # BLD AUTO: 9.99 10*3/MM3 (ref 3.4–10.8)

## 2020-03-16 ENCOUNTER — TELEPHONE (OUTPATIENT)
Dept: FAMILY MEDICINE CLINIC | Facility: CLINIC | Age: 85
End: 2020-03-16

## 2020-03-16 RX ORDER — AMLODIPINE BESYLATE 10 MG/1
10 TABLET ORAL DAILY
Qty: 30 TABLET | Refills: 5 | Status: SHIPPED | OUTPATIENT
Start: 2020-03-16 | End: 2020-09-22 | Stop reason: SDUPTHER

## 2020-03-20 ENCOUNTER — TELEPHONE (OUTPATIENT)
Dept: FAMILY MEDICINE CLINIC | Facility: CLINIC | Age: 85
End: 2020-03-20

## 2020-03-20 RX ORDER — LEVOTHYROXINE SODIUM 0.05 MG/1
50 TABLET ORAL DAILY
Qty: 90 TABLET | Refills: 1 | Status: SHIPPED | OUTPATIENT
Start: 2020-03-20 | End: 2020-09-22 | Stop reason: ALTCHOICE

## 2020-03-20 NOTE — TELEPHONE ENCOUNTER
CALLED PATIENT AND GAVE HER THE RESULTS AND INFORMATION.  PATIENT IS RELUCTANT TO CHANGE HER THYROID DOSE, SHE STATES SHE FEELS FINE, BUT WILL CHANGE DOSE IF THAT IS WHAT LATESHA SAUNDERS WANTS.  PATIENT ALSO STATES SHE HAS BEEN TAKING IBUPROFEN SEVERAL TIMES A DAY, AND THAT SHE DOES NOT HAVE A PROBLEM URINATING AND THAT IBUPROFEN IS THE ONLY THING THAT WORKS.  I ADVISED PATIENT TO STOP THE IBUPROFEN AND REPLACE WITH TYLENOL.  PATIENT WAS AGREEABLE TO THE CHANGES.    COPY OF LABS WERE MAILED TO PATIENT.

## 2020-03-20 NOTE — TELEPHONE ENCOUNTER
----- Message from COCO Garcia sent at 3/20/2020  1:32 PM EDT -----  Please call Ms. Crane and let her know her thyroid level is very low which means I have decreasing her dose of medication. I will send a new prescription in. Also her kidney function is decreasing and she needs to stop Hydrochlorothyazide. She also should not take any Ibuprofen, Aleve, Naproxsyn, or aspirin. She should only take Tylenol.   The rest of her labs are normal.

## 2020-04-20 RX ORDER — GABAPENTIN 300 MG/1
CAPSULE ORAL
Qty: 60 CAPSULE | Refills: 0 | OUTPATIENT
Start: 2020-04-20

## 2020-04-21 ENCOUNTER — OFFICE VISIT (OUTPATIENT)
Dept: FAMILY MEDICINE CLINIC | Facility: CLINIC | Age: 85
End: 2020-04-21

## 2020-04-21 DIAGNOSIS — M79.2 NEUROPATHIC PAIN: Primary | ICD-10-CM

## 2020-04-21 PROCEDURE — 99441 PR PHYS/QHP TELEPHONE EVALUATION 5-10 MIN: CPT | Performed by: NURSE PRACTITIONER

## 2020-04-21 RX ORDER — GABAPENTIN 300 MG/1
CAPSULE ORAL
Qty: 60 CAPSULE | Refills: 0 | OUTPATIENT
Start: 2020-04-21

## 2020-04-21 RX ORDER — IBUPROFEN 400 MG/1
400 TABLET ORAL EVERY 6 HOURS PRN
COMMUNITY
End: 2020-07-09

## 2020-04-21 RX ORDER — HYDROCHLOROTHIAZIDE 12.5 MG/1
12.5 CAPSULE, GELATIN COATED ORAL DAILY
COMMUNITY
Start: 2020-03-26 | End: 2020-06-17 | Stop reason: SDUPTHER

## 2020-04-21 RX ORDER — GABAPENTIN 100 MG/1
100 CAPSULE ORAL 2 TIMES DAILY
Qty: 60 CAPSULE | Refills: 3 | Status: SHIPPED | OUTPATIENT
Start: 2020-04-21 | End: 2020-04-23 | Stop reason: DRUGHIGH

## 2020-04-21 NOTE — PROGRESS NOTES
Subjective   Mei Crane is a 89 y.o. female.     Chief Complaint   Patient presents with   • Foot Pain     CSE Gabapentin   • Leg Pain     You have chosen to receive care through a telephone visit. Do you consent to use a telephone visit for your medical care today? Yes  This visit has been rescheduled as a phone visit to comply with patient safety concerns in accordance with [facility] recommendations. Total time of discussion was 10 minutes.    Leg Pain    Incident onset: no incident. There was no injury mechanism. The pain is present in the left leg and right leg. The quality of the pain is described as aching and burning. The pain is severe. The pain has been constant since onset. The symptoms are aggravated by movement. Treatments tried: gabapentin. The treatment provided moderate relief.      I have reviewed the patient's medical history in detail and updated the computerized patient record.    The following portions of the patient's history were reviewed and updated as appropriate: allergies, current medications, past family history, past medical history, past social history, past surgical history and problem list.      Current Outpatient Medications:   •  acetaminophen (TYLENOL) 500 MG tablet, Take 1,000 mg by mouth Every 6 (Six) Hours As Needed for Mild Pain  or Moderate Pain ., Disp: , Rfl:   •  amLODIPine (NORVASC) 10 MG tablet, Take 1 tablet by mouth Daily., Disp: 30 tablet, Rfl: 5  •  ascorbic acid (VITAMIN C) 1000 MG tablet, Take 1 tablet by mouth Daily., Disp: 180 tablet, Rfl: 1  •  aspirin 81 MG EC tablet, Take 81 mg by mouth Daily., Disp: , Rfl:   •  atorvastatin (LIPITOR) 40 MG tablet, Take 1 tablet by mouth Daily., Disp: 30 tablet, Rfl: 5  •  Cholecalciferol (VITAMIN D PO), Take 2,000 Units by mouth Daily., Disp: , Rfl:   •  hydroCHLOROthiazide (MICROZIDE) 12.5 MG capsule, Take 12.5 mg by mouth Daily., Disp: , Rfl:   •  ibuprofen (ADVIL,MOTRIN) 400 MG tablet, Take 400 mg by mouth Every 6 (Six) Hours  As Needed for Mild Pain ., Disp: , Rfl:   •  levothyroxine (Synthroid) 50 MCG tablet, Take 1 tablet by mouth Daily., Disp: 90 tablet, Rfl: 1  •  pantoprazole (PROTONIX) 40 MG EC tablet, Take 1 tablet by mouth Daily., Disp: 90 tablet, Rfl: 3  •  potassium chloride (K-DUR,KLOR-CON) 10 MEQ CR tablet, Take 1 tablet by mouth Daily., Disp: 30 tablet, Rfl: 5  •  prednisoLONE acetate (PRED FORTE) 1 % ophthalmic suspension, INSTILL ONE DROP IN THE LEFT EYE FOUR TIMES DAILY, Disp: , Rfl: 3  •  ferrous gluconate 324 (37.5 Fe) MG tablet tablet, Take 1 tablet by mouth Daily With Breakfast., Disp: 30 tablet, Rfl: 6    Current Facility-Administered Medications:   •  cyanocobalamin injection 1,000 mcg, 1,000 mcg, Intramuscular, Q28 Days, Jae Bray MD, 1,000 mcg at 03/10/20 1014     Review of Systems   Constitutional: Negative.    Respiratory: Negative.    Cardiovascular: Negative.    Musculoskeletal: Positive for arthralgias and myalgias.   Neurological: Negative for confusion.        Neuropathic foot and leg pain       Objective    There were no vitals filed for this visit.     Physical Exam  - deferred telephone visit.     Assessment/Plan   Mei was seen today for foot pain and leg pain.    Diagnoses and all orders for this visit:    Neuropathic pain  -     gabapentin (NEURONTIN) 100 MG capsule; Take 1 capsule by mouth 2 (Two) Times a Day.      Ms. Crane presents per telephone visit to follow up on her neuropathic leg and foot pain since starting Gabapentin. She has decided to continue taking the Gabapentin and would like to take it twice a day.   Due to the Covid-19 pandemic and the associated restrictions I am unable to have her sign a controled substance agreement with me today. I will refill the Gabapentin 100 mg twice a day today. She is to follow up in 3 months and at that time I will do a face to face CSE.

## 2020-04-23 RX ORDER — GABAPENTIN 300 MG/1
300 CAPSULE ORAL 2 TIMES DAILY
Qty: 60 CAPSULE | Refills: 5 | Status: SHIPPED | OUTPATIENT
Start: 2020-04-23 | End: 2020-05-21

## 2020-05-06 ENCOUNTER — APPOINTMENT (OUTPATIENT)
Dept: PAIN MEDICINE | Facility: HOSPITAL | Age: 85
End: 2020-05-06

## 2020-05-20 ENCOUNTER — ANESTHESIA (OUTPATIENT)
Dept: PAIN MEDICINE | Facility: HOSPITAL | Age: 85
End: 2020-05-20

## 2020-05-20 ENCOUNTER — HOSPITAL ENCOUNTER (OUTPATIENT)
Dept: GENERAL RADIOLOGY | Facility: HOSPITAL | Age: 85
Discharge: HOME | End: 2020-05-20

## 2020-05-20 ENCOUNTER — ANESTHESIA EVENT (OUTPATIENT)
Dept: PAIN MEDICINE | Facility: HOSPITAL | Age: 85
End: 2020-05-20

## 2020-05-20 ENCOUNTER — HOSPITAL ENCOUNTER (OUTPATIENT)
Dept: PAIN MEDICINE | Facility: HOSPITAL | Age: 85
Discharge: HOME | End: 2020-05-20
Admitting: ANESTHESIOLOGY

## 2020-05-20 VITALS
HEART RATE: 84 BPM | WEIGHT: 154 LBS | DIASTOLIC BLOOD PRESSURE: 84 MMHG | RESPIRATION RATE: 14 BRPM | OXYGEN SATURATION: 94 % | BODY MASS INDEX: 25.66 KG/M2 | HEIGHT: 65 IN | SYSTOLIC BLOOD PRESSURE: 185 MMHG | TEMPERATURE: 98.4 F

## 2020-05-20 DIAGNOSIS — M48.062 SPINAL STENOSIS OF LUMBAR REGION WITH NEUROGENIC CLAUDICATION: ICD-10-CM

## 2020-05-20 DIAGNOSIS — R52 PAIN: ICD-10-CM

## 2020-05-20 PROCEDURE — C1755 CATHETER, INTRASPINAL: HCPCS

## 2020-05-20 PROCEDURE — 77003 FLUOROGUIDE FOR SPINE INJECT: CPT

## 2020-05-20 PROCEDURE — 25010000002 METHYLPREDNISOLONE PER 80 MG: Performed by: ANESTHESIOLOGY

## 2020-05-20 PROCEDURE — A9270 NON-COVERED ITEM OR SERVICE: HCPCS | Performed by: ANESTHESIOLOGY

## 2020-05-20 PROCEDURE — 63710000001 ACETAMINOPHEN 325 MG TABLET: Performed by: ANESTHESIOLOGY

## 2020-05-20 RX ORDER — METHYLPREDNISOLONE ACETATE 80 MG/ML
80 INJECTION, SUSPENSION INTRA-ARTICULAR; INTRALESIONAL; INTRAMUSCULAR; SOFT TISSUE ONCE
Status: COMPLETED | OUTPATIENT
Start: 2020-05-20 | End: 2020-05-20

## 2020-05-20 RX ORDER — SODIUM CHLORIDE 0.9 % (FLUSH) 0.9 %
1-10 SYRINGE (ML) INJECTION AS NEEDED
Status: DISCONTINUED | OUTPATIENT
Start: 2020-05-20 | End: 2020-05-21 | Stop reason: HOSPADM

## 2020-05-20 RX ORDER — LIDOCAINE HYDROCHLORIDE 10 MG/ML
1 INJECTION, SOLUTION INFILTRATION; PERINEURAL ONCE AS NEEDED
Status: DISCONTINUED | OUTPATIENT
Start: 2020-05-20 | End: 2020-05-21 | Stop reason: HOSPADM

## 2020-05-20 RX ORDER — MIDAZOLAM HYDROCHLORIDE 1 MG/ML
1 INJECTION INTRAMUSCULAR; INTRAVENOUS AS NEEDED
Status: DISCONTINUED | OUTPATIENT
Start: 2020-05-20 | End: 2020-05-21 | Stop reason: HOSPADM

## 2020-05-20 RX ORDER — FENTANYL CITRATE 50 UG/ML
50 INJECTION, SOLUTION INTRAMUSCULAR; INTRAVENOUS AS NEEDED
Status: DISCONTINUED | OUTPATIENT
Start: 2020-05-20 | End: 2020-05-21 | Stop reason: HOSPADM

## 2020-05-20 RX ORDER — ACETAMINOPHEN 325 MG/1
650 TABLET ORAL EVERY 6 HOURS PRN
Status: DISCONTINUED | OUTPATIENT
Start: 2020-05-20 | End: 2020-05-21 | Stop reason: HOSPADM

## 2020-05-20 RX ADMIN — METHYLPREDNISOLONE ACETATE 80 MG: 80 INJECTION, SUSPENSION INTRA-ARTICULAR; INTRALESIONAL; INTRAMUSCULAR; SOFT TISSUE at 10:28

## 2020-05-20 RX ADMIN — ACETAMINOPHEN 325 MG: 325 TABLET, FILM COATED ORAL at 10:39

## 2020-05-20 NOTE — ANESTHESIA PROCEDURE NOTES
PAIN Epidural block      Patient reassessed immediately prior to procedure    Patient location during procedure: pain clinic  Indication:procedure for pain  Performed By  Anesthesiologist: Jae Boone MD  Preanesthetic Checklist  Completed: patient identified and risks and benefits discussed  Additional Notes  Diagnosis:     Post-Op Diagnosis Codes:     * Spinal stenosis of lumbar region with neurogenic claudication (M48.062)    Sedation:  None    No dye secondary to kidney disease    A lumbar epidural steroid injection with fluoroscopic guidance was performed.  Under fluoroscopic guidance, the epidural space was identified and accessed, confirmed by loss of resistance to saline.  The above medications were injected uneventfully.    Prep:  Pt Position:prone  Sterile Tech:cap, gloves, mask and sterile barrier  Prep:chlorhexidine gluconate and isopropyl alcohol  Monitoring:blood pressure monitoring, continuous pulse oximetry and EKG  Procedure:Sedation: no     Approach:left paramedian  Guidance: fluoroscopy  Location:lumbar  Level:3-4  Needle Type:Tuohy  Needle Gauge:20  Aspiration:negative  Medications:  Depomedrol:80  Preservative Free Saline:1mL    Post Assessment:  Pt Tolerance:patient tolerated the procedure well with no apparent complications  Complications:no

## 2020-05-20 NOTE — H&P
INTERVAL HISTORY:    The patient returns for another Lumbar epidural steroid injection today.  They have received 50% improvement since their last injection with a pain level of 4/10 at its worst recently.    Conservative measures tried in the interim walking Neurontin ibuprofen.    Current Outpatient Medications on File Prior to Encounter   Medication Sig Dispense Refill   • amLODIPine (NORVASC) 10 MG tablet Take 1 tablet by mouth Daily. 30 tablet 5   • ascorbic acid (VITAMIN C) 1000 MG tablet Take 1 tablet by mouth Daily. 180 tablet 1   • aspirin 81 MG EC tablet Take 81 mg by mouth Daily.     • atorvastatin (LIPITOR) 40 MG tablet Take 1 tablet by mouth Daily. 30 tablet 5   • Cholecalciferol (VITAMIN D PO) Take 2,000 Units by mouth Daily.     • ferrous gluconate 324 (37.5 Fe) MG tablet tablet Take 1 tablet by mouth Daily With Breakfast. 30 tablet 6   • gabapentin (NEURONTIN) 300 MG capsule Take 1 capsule by mouth 2 (Two) Times a Day. 60 capsule 5   • hydroCHLOROthiazide (MICROZIDE) 12.5 MG capsule Take 12.5 mg by mouth Daily.     • ibuprofen (ADVIL,MOTRIN) 400 MG tablet Take 400 mg by mouth Every 6 (Six) Hours As Needed for Mild Pain .     • levothyroxine (Synthroid) 50 MCG tablet Take 1 tablet by mouth Daily. 90 tablet 1   • pantoprazole (PROTONIX) 40 MG EC tablet Take 1 tablet by mouth Daily. 90 tablet 3   • potassium chloride (K-DUR,KLOR-CON) 10 MEQ CR tablet Take 1 tablet by mouth Daily. 30 tablet 5   • prednisoLONE acetate (PRED FORTE) 1 % ophthalmic suspension INSTILL ONE DROP IN THE LEFT EYE FOUR TIMES DAILY  3     Current Facility-Administered Medications on File Prior to Encounter   Medication Dose Route Frequency Provider Last Rate Last Dose   • cyanocobalamin injection 1,000 mcg  1,000 mcg Intramuscular Q28 Days Jae Bray MD   1,000 mcg at 03/10/20 1014       Past Medical History:   Diagnosis Date   • Calculus of gallbladder without cholecystitis 8/7/2017   • CKD (chronic kidney disease),  stage III (CMS/[facility]) 4/20/2016   • DDD (degenerative disc disease), lumbar 2/19/2019   • Disease of thyroid gland    • Hyperlipidemia    • Low back pain        No hematologic infectious or constitutional symptoms  Negative screen for CHE      Exam:  There were no vitals taken for this visit.  Airway Mallampatti 2  Alert and oriented      Diagnosis:  Post-Op Diagnosis Codes:     * Spinal stenosis of lumbar region with neurogenic claudication [M48.062]    Plan:  Lumbar 3 epidural steroid injection under fluoroscopic guidance    I have encouraged them to continue:  1.  Physical therapy exercises at home as prescribed by physical therapy or from the pain clinic handout (given to the patient).  Continuation of these exercises every day, or multiple times per week, even when the patient has good pain relief, was stressed to the patient as a preventative measure to decrease the frequency and severity of future pain episodes.  2.  Continue pain medicines as already prescribed.  If patient not currently taking any, it is recommended to begin Acetaminophen 1000 mg po q 8 hours.  If other medicines containing Acetaminophen are currently prescribed, maintain daily dose at 3000mg.    3.  If they can tolerate NSAIDS, it is recommended to take Ibuprofen 600 mg po q 6 hours for 7 days during pain exacerbations.   Alternatively, they may substitute an NSAID of their choice (e.g. Aleve)  4.  Heat and ice to the affected area as tolerated for pain control.  It was discussed that heating pads can cause burns.  5.  Low impact exercise such as walking or water exercise was recommended to maintain overall health and aid in weight control.   6.  Follow up as needed for subsequent injections.  7.  Patient was counseled to abstain from tobacco products.

## 2020-05-21 ENCOUNTER — OFFICE VISIT (OUTPATIENT)
Dept: FAMILY MEDICINE CLINIC | Facility: CLINIC | Age: 85
End: 2020-05-21

## 2020-05-21 VITALS
TEMPERATURE: 99 F | BODY MASS INDEX: 26.66 KG/M2 | HEART RATE: 86 BPM | SYSTOLIC BLOOD PRESSURE: 136 MMHG | DIASTOLIC BLOOD PRESSURE: 70 MMHG | WEIGHT: 160 LBS | HEIGHT: 65 IN | OXYGEN SATURATION: 96 %

## 2020-05-21 DIAGNOSIS — M54.31 RIGHT SCIATIC NERVE PAIN: Primary | ICD-10-CM

## 2020-05-21 PROCEDURE — 99213 OFFICE O/P EST LOW 20 MIN: CPT | Performed by: NURSE PRACTITIONER

## 2020-05-21 RX ORDER — CYCLOBENZAPRINE HCL 5 MG
5 TABLET ORAL 3 TIMES DAILY PRN
Qty: 15 TABLET | Refills: 0 | Status: SHIPPED | OUTPATIENT
Start: 2020-05-21 | End: 2020-08-27

## 2020-05-21 NOTE — PROGRESS NOTES
Subjective   Mei Crane is a 89 y.o. female.     Chief Complaint   Patient presents with   • Spasms     in glutes   • Numbness     RT leg     Ms. Crane presents today with complaints of right sciatic nerve pain and muscle spasms in her buttocks. She reports that this has been going on for several weeks. This is not a new problem for her. She has been in on several occasions with lower back pain and pain in her legs. She is seeing Orthopedics and received a spinal injection yesterday. She is also taking Gabapentin for her leg pains. She reports that this does not help her.        I have reviewed the patient's medical history in detail and updated the computerized patient record.    The following portions of the patient's history were reviewed and updated as appropriate: allergies, current medications, past family history, past medical history, past social history, past surgical history and problem list.    Current Outpatient Medications on File Prior to Visit   Medication Sig   • amLODIPine (NORVASC) 10 MG tablet Take 1 tablet by mouth Daily.   • ascorbic acid (VITAMIN C) 1000 MG tablet Take 1 tablet by mouth Daily.   • aspirin 81 MG EC tablet Take 81 mg by mouth Daily.   • Cholecalciferol (VITAMIN D PO) Take 2,000 Units by mouth Daily.   • gabapentin (NEURONTIN) 300 MG capsule Take 1 capsule by mouth 2 (Two) Times a Day.   • hydroCHLOROthiazide (MICROZIDE) 12.5 MG capsule Take 12.5 mg by mouth Daily.   • ibuprofen (ADVIL,MOTRIN) 400 MG tablet Take 400 mg by mouth Every 6 (Six) Hours As Needed for Mild Pain .   • levothyroxine (Synthroid) 50 MCG tablet Take 1 tablet by mouth Daily.   • pantoprazole (PROTONIX) 40 MG EC tablet Take 1 tablet by mouth Daily.   • potassium chloride (K-DUR,KLOR-CON) 10 MEQ CR tablet Take 1 tablet by mouth Daily.   • prednisoLONE acetate (PRED FORTE) 1 % ophthalmic suspension INSTILL ONE DROP IN THE LEFT EYE FOUR TIMES DAILY   • atorvastatin (LIPITOR) 40 MG tablet Take 1 tablet by  mouth Daily.   • ferrous gluconate 324 (37.5 Fe) MG tablet tablet Take 1 tablet by mouth Daily With Breakfast.     Current Facility-Administered Medications on File Prior to Visit   Medication   • cyanocobalamin injection 1,000 mcg   • [DISCONTINUED] acetaminophen (TYLENOL) tablet 650 mg   • [DISCONTINUED] fentaNYL citrate (PF) (SUBLIMAZE) injection 50 mcg   • [DISCONTINUED] iopamidol (ISOVUE-M 200) injection 41%   • [DISCONTINUED] lidocaine (XYLOCAINE) 1 % injection 1 mL   • [DISCONTINUED] midazolam (VERSED) injection 1 mg   • [DISCONTINUED] sodium chloride 0.9 % flush 1-10 mL     Review of Systems   Respiratory: Negative.    Cardiovascular: Negative.    Musculoskeletal: Positive for arthralgias and myalgias.        Right sciatic nerve pain and muscle spasms.    Neurological: Positive for weakness (has to use a cane for support).       Objective    Vitals:    05/21/20 1139   BP: 136/70   Pulse: 86   Temp: 99 °F (37.2 °C)   SpO2: 96%     Physical Exam   Constitutional: She is oriented to person, place, and time. She appears well-developed and well-nourished.   Musculoskeletal: Normal range of motion. She exhibits no edema.   Neurological: She is alert and oriented to person, place, and time.   Skin: Skin is warm and dry.   Psychiatric:   Agitated    Vitals reviewed.        Assessment/Plan   Mei was seen today for spasms and numbness.    Diagnoses and all orders for this visit:    Right sciatic nerve pain  -     cyclobenzaprine (FLEXERIL) 5 MG tablet; Take 1 tablet by mouth 3 (Three) Times a Day As Needed for Muscle Spasms.      I have discussed with Ms. Crane that her recent spinal injection may have irritated the sciatic nerve which is why she is in such pain and having muscle spasms. She dose not agree and that this has been going before her recent spinal injection.   I have tried her on multiple medications over the past year to help her with her back and leg pain. She has reported that most of the medication  we have tried do not relieve her pain so she can get back to being as active as she used to be. I have explained to her that the medication may never fully resolve the pain and that she may not be able to be as active as she once was.   I have tried since January to get her to decide if she is going to take the Gabapentin as prescribed. She repeatedly reports it does not work but then later wants to try it for another 30 days. I am discontinuing the gabapentin at this time since she reports it does not work for her and since I cannot get her to commit to taking it regularly.      She is to follow up as needed and at her next scheduled appointment.

## 2020-06-01 ENCOUNTER — APPOINTMENT (OUTPATIENT)
Dept: CARDIOLOGY | Facility: HOSPITAL | Age: 85
End: 2020-06-01

## 2020-06-01 ENCOUNTER — HOSPITAL ENCOUNTER (INPATIENT)
Facility: HOSPITAL | Age: 85
LOS: 4 days | Discharge: NURSING, REHAB OR LONG-TERM CARE | End: 2020-06-05
Attending: HOSPITALIST | Admitting: HOSPITALIST

## 2020-06-01 PROBLEM — R77.8 ELEVATED TROPONIN: Status: ACTIVE | Noted: 2020-06-01

## 2020-06-01 PROBLEM — R79.89 ELEVATED TROPONIN: Status: ACTIVE | Noted: 2020-06-01

## 2020-06-01 PROBLEM — R29.898 BILATERAL LEG WEAKNESS: Status: ACTIVE | Noted: 2020-06-01

## 2020-06-01 PROBLEM — R53.1 WEAKNESS: Status: ACTIVE | Noted: 2020-06-01

## 2020-06-01 LAB
ANION GAP SERPL CALCULATED.3IONS-SCNC: 12.4 MMOL/L (ref 5–15)
ANION GAP SERPL CALCULATED.3IONS-SCNC: 9.4 MMOL/L (ref 5–15)
AORTIC DIMENSIONLESS INDEX: 0.6 (DI)
APTT PPP: 101.1 SECONDS (ref 22.7–35.4)
APTT PPP: 40.9 SECONDS (ref 22.7–35.4)
APTT PPP: 72.2 SECONDS (ref 22.7–35.4)
BASOPHILS # BLD AUTO: 0.03 10*3/MM3 (ref 0–0.2)
BASOPHILS # BLD AUTO: 0.03 10*3/MM3 (ref 0–0.2)
BASOPHILS # BLD AUTO: 0.04 10*3/MM3 (ref 0–0.2)
BASOPHILS NFR BLD AUTO: 0.3 % (ref 0–1.5)
BASOPHILS NFR BLD AUTO: 0.3 % (ref 0–1.5)
BASOPHILS NFR BLD AUTO: 0.4 % (ref 0–1.5)
BH CV ECHO MEAS - ACS: 0.6 CM
BH CV ECHO MEAS - AI DEC SLOPE: 523 CM/SEC^2
BH CV ECHO MEAS - AI MAX PG: 84.6 MMHG
BH CV ECHO MEAS - AI MAX VEL: 460 CM/SEC
BH CV ECHO MEAS - AI P1/2T: 257.6 MSEC
BH CV ECHO MEAS - AO MAX PG: 42 MMHG
BH CV ECHO MEAS - AO MEAN PG (FULL): 17 MMHG
BH CV ECHO MEAS - AO MEAN PG: 23 MMHG
BH CV ECHO MEAS - AO V2 MAX: 322 CM/SEC
BH CV ECHO MEAS - AO V2 MEAN: 223 CM/SEC
BH CV ECHO MEAS - AO V2 VTI: 60.1 CM
BH CV ECHO MEAS - AVA(I,A): 1.8 CM^2
BH CV ECHO MEAS - AVA(I,D): 1.8 CM^2
BH CV ECHO MEAS - BSA(HAYCOCK): 1.8 M^2
BH CV ECHO MEAS - BSA: 1.8 M^2
BH CV ECHO MEAS - BZI_BMI: 26.6 KILOGRAMS/M^2
BH CV ECHO MEAS - BZI_METRIC_HEIGHT: 162.6 CM
BH CV ECHO MEAS - BZI_METRIC_WEIGHT: 70.3 KG
BH CV ECHO MEAS - EDV(CUBED): 74.1 ML
BH CV ECHO MEAS - EDV(MOD-SP2): 45 ML
BH CV ECHO MEAS - EDV(MOD-SP4): 46 ML
BH CV ECHO MEAS - EDV(TEICH): 78.6 ML
BH CV ECHO MEAS - EF(CUBED): 70.4 %
BH CV ECHO MEAS - EF(MOD-BP): 78 %
BH CV ECHO MEAS - EF(MOD-SP2): 82.2 %
BH CV ECHO MEAS - EF(MOD-SP4): 76.1 %
BH CV ECHO MEAS - EF(TEICH): 62.4 %
BH CV ECHO MEAS - ESV(CUBED): 22 ML
BH CV ECHO MEAS - ESV(MOD-SP2): 8 ML
BH CV ECHO MEAS - ESV(MOD-SP4): 11 ML
BH CV ECHO MEAS - ESV(TEICH): 29.6 ML
BH CV ECHO MEAS - FS: 33.3 %
BH CV ECHO MEAS - IVS/LVPW: 1.1
BH CV ECHO MEAS - IVSD: 1.1 CM
BH CV ECHO MEAS - LAT PEAK E' VEL: 6 CM/SEC
BH CV ECHO MEAS - LV DIASTOLIC VOL/BSA (35-75): 26.2 ML/M^2
BH CV ECHO MEAS - LV MASS(C)D: 147 GRAMS
BH CV ECHO MEAS - LV MASS(C)DI: 83.7 GRAMS/M^2
BH CV ECHO MEAS - LV MAX PG: 10 MMHG
BH CV ECHO MEAS - LV MEAN PG: 6 MMHG
BH CV ECHO MEAS - LV SYSTOLIC VOL/BSA (12-30): 6.3 ML/M^2
BH CV ECHO MEAS - LV V1 MAX: 157 CM/SEC
BH CV ECHO MEAS - LV V1 MEAN: 115 CM/SEC
BH CV ECHO MEAS - LV V1 VTI: 33.6 CM
BH CV ECHO MEAS - LVIDD: 4.2 CM
BH CV ECHO MEAS - LVIDS: 2.8 CM
BH CV ECHO MEAS - LVLD AP2: 6.1 CM
BH CV ECHO MEAS - LVLD AP4: 6.2 CM
BH CV ECHO MEAS - LVLS AP2: 4.8 CM
BH CV ECHO MEAS - LVLS AP4: 4.6 CM
BH CV ECHO MEAS - LVOT AREA (M): 3.1 CM^2
BH CV ECHO MEAS - LVOT AREA: 3.1 CM^2
BH CV ECHO MEAS - LVOT DIAM: 2 CM
BH CV ECHO MEAS - LVPWD: 1 CM
BH CV ECHO MEAS - MED PEAK E' VEL: 7 CM/SEC
BH CV ECHO MEAS - MR MAX PG: 133.6 MMHG
BH CV ECHO MEAS - MR MAX VEL: 578 CM/SEC
BH CV ECHO MEAS - MV A DUR: 0.1 SEC
BH CV ECHO MEAS - MV A MAX VEL: 168 CM/SEC
BH CV ECHO MEAS - MV DEC SLOPE: 477 CM/SEC^2
BH CV ECHO MEAS - MV DEC TIME: 220 SEC
BH CV ECHO MEAS - MV E MAX VEL: 125 CM/SEC
BH CV ECHO MEAS - MV E/A: 0.74
BH CV ECHO MEAS - MV MAX PG: 15 MMHG
BH CV ECHO MEAS - MV MEAN PG: 6 MMHG
BH CV ECHO MEAS - MV P1/2T MAX VEL: 122.5 CM/SEC
BH CV ECHO MEAS - MV P1/2T: 75.2 MSEC
BH CV ECHO MEAS - MV V2 MEAN: 112 CM/SEC
BH CV ECHO MEAS - MV V2 VTI: 33.4 CM
BH CV ECHO MEAS - MVA P1/2T LCG: 1.8 CM^2
BH CV ECHO MEAS - MVA(P1/2T): 2.9 CM^2
BH CV ECHO MEAS - MVA(VTI): 3.2 CM^2
BH CV ECHO MEAS - PA ACC SLOPE: 1066 CM/SEC^2
BH CV ECHO MEAS - PA ACC TIME: 0.11 SEC
BH CV ECHO MEAS - PA MAX PG: 5.6 MMHG
BH CV ECHO MEAS - PA PR(ACCEL): 30.4 MMHG
BH CV ECHO MEAS - PA V2 MAX: 118 CM/SEC
BH CV ECHO MEAS - QP/QS: 0.47
BH CV ECHO MEAS - RAP SYSTOLE: 3 MMHG
BH CV ECHO MEAS - RV MEAN PG: 1 MMHG
BH CV ECHO MEAS - RV V1 MEAN: 42 CM/SEC
BH CV ECHO MEAS - RV V1 VTI: 13 CM
BH CV ECHO MEAS - RVOT AREA: 3.8 CM^2
BH CV ECHO MEAS - RVOT DIAM: 2.2 CM
BH CV ECHO MEAS - RVSP: 43 MMHG
BH CV ECHO MEAS - SI(CUBED): 29.7 ML/M^2
BH CV ECHO MEAS - SI(LVOT): 60.1 ML/M^2
BH CV ECHO MEAS - SI(MOD-SP2): 21.1 ML/M^2
BH CV ECHO MEAS - SI(MOD-SP4): 19.9 ML/M^2
BH CV ECHO MEAS - SI(TEICH): 27.9 ML/M^2
BH CV ECHO MEAS - SV(CUBED): 52.1 ML
BH CV ECHO MEAS - SV(LVOT): 105.6 ML
BH CV ECHO MEAS - SV(MOD-SP2): 37 ML
BH CV ECHO MEAS - SV(MOD-SP4): 35 ML
BH CV ECHO MEAS - SV(RVOT): 49.4 ML
BH CV ECHO MEAS - SV(TEICH): 49 ML
BH CV ECHO MEAS - TAPSE (>1.6): 2 CM2
BH CV ECHO MEAS - TR MAX VEL: 315 CM/SEC
BH CV ECHO MEASUREMENTS AVERAGE E/E' RATIO: 19.23
BH CV VAS BP RIGHT ARM: NORMAL MMHG
BH CV XLRA - RV BASE: 3 CM
BH CV XLRA - RV LENGTH: 5 CM
BH CV XLRA - RV MID: 1 CM
BH CV XLRA - TDI S': 15 CM/SEC
BUN BLD-MCNC: 21 MG/DL (ref 8–23)
BUN BLD-MCNC: 22 MG/DL (ref 8–23)
BUN/CREAT SERPL: 17.6 (ref 7–25)
BUN/CREAT SERPL: 17.9 (ref 7–25)
CALCIUM SPEC-SCNC: 8.3 MG/DL (ref 8.2–9.6)
CALCIUM SPEC-SCNC: 8.4 MG/DL (ref 8.2–9.6)
CHLORIDE SERPL-SCNC: 103 MMOL/L (ref 98–107)
CHLORIDE SERPL-SCNC: 103 MMOL/L (ref 98–107)
CO2 SERPL-SCNC: 23.6 MMOL/L (ref 22–29)
CO2 SERPL-SCNC: 24.6 MMOL/L (ref 22–29)
CREAT BLD-MCNC: 1.19 MG/DL (ref 0.57–1)
CREAT BLD-MCNC: 1.23 MG/DL (ref 0.57–1)
DEPRECATED RDW RBC AUTO: 40.6 FL (ref 37–54)
DEPRECATED RDW RBC AUTO: 41.5 FL (ref 37–54)
DEPRECATED RDW RBC AUTO: 43.9 FL (ref 37–54)
EOSINOPHIL # BLD AUTO: 0.03 10*3/MM3 (ref 0–0.4)
EOSINOPHIL # BLD AUTO: 0.05 10*3/MM3 (ref 0–0.4)
EOSINOPHIL # BLD AUTO: 0.13 10*3/MM3 (ref 0–0.4)
EOSINOPHIL NFR BLD AUTO: 0.3 % (ref 0.3–6.2)
EOSINOPHIL NFR BLD AUTO: 0.4 % (ref 0.3–6.2)
EOSINOPHIL NFR BLD AUTO: 1.4 % (ref 0.3–6.2)
ERYTHROCYTE [DISTWIDTH] IN BLOOD BY AUTOMATED COUNT: 12.6 % (ref 12.3–15.4)
ERYTHROCYTE [DISTWIDTH] IN BLOOD BY AUTOMATED COUNT: 12.9 % (ref 12.3–15.4)
ERYTHROCYTE [DISTWIDTH] IN BLOOD BY AUTOMATED COUNT: 13.2 % (ref 12.3–15.4)
GFR SERPL CREATININE-BSD FRML MDRD: 41 ML/MIN/1.73
GFR SERPL CREATININE-BSD FRML MDRD: 42 ML/MIN/1.73
GLUCOSE BLD-MCNC: 107 MG/DL (ref 65–99)
GLUCOSE BLD-MCNC: 114 MG/DL (ref 65–99)
HCT VFR BLD AUTO: 30.7 % (ref 34–46.6)
HCT VFR BLD AUTO: 31.7 % (ref 34–46.6)
HCT VFR BLD AUTO: 32.7 % (ref 34–46.6)
HGB BLD-MCNC: 10.3 G/DL (ref 12–15.9)
HGB BLD-MCNC: 10.5 G/DL (ref 12–15.9)
HGB BLD-MCNC: 10.7 G/DL (ref 12–15.9)
IMM GRANULOCYTES # BLD AUTO: 0.03 10*3/MM3 (ref 0–0.05)
IMM GRANULOCYTES NFR BLD AUTO: 0.3 % (ref 0–0.5)
INR PPP: 1.06 (ref 0.9–1.1)
LEFT ATRIUM VOLUME INDEX: 40 ML/M2
LYMPHOCYTES # BLD AUTO: 1.58 10*3/MM3 (ref 0.7–3.1)
LYMPHOCYTES # BLD AUTO: 1.61 10*3/MM3 (ref 0.7–3.1)
LYMPHOCYTES # BLD AUTO: 1.88 10*3/MM3 (ref 0.7–3.1)
LYMPHOCYTES NFR BLD AUTO: 13.6 % (ref 19.6–45.3)
LYMPHOCYTES NFR BLD AUTO: 14.1 % (ref 19.6–45.3)
LYMPHOCYTES NFR BLD AUTO: 20.1 % (ref 19.6–45.3)
MAXIMAL PREDICTED HEART RATE: 126 BPM
MCH RBC QN AUTO: 29.2 PG (ref 26.6–33)
MCH RBC QN AUTO: 29.5 PG (ref 26.6–33)
MCH RBC QN AUTO: 29.6 PG (ref 26.6–33)
MCHC RBC AUTO-ENTMCNC: 32.7 G/DL (ref 31.5–35.7)
MCHC RBC AUTO-ENTMCNC: 33.1 G/DL (ref 31.5–35.7)
MCHC RBC AUTO-ENTMCNC: 33.6 G/DL (ref 31.5–35.7)
MCV RBC AUTO: 88.2 FL (ref 79–97)
MCV RBC AUTO: 88.3 FL (ref 79–97)
MCV RBC AUTO: 90.1 FL (ref 79–97)
MONOCYTES # BLD AUTO: 0.93 10*3/MM3 (ref 0.1–0.9)
MONOCYTES # BLD AUTO: 1.19 10*3/MM3 (ref 0.1–0.9)
MONOCYTES # BLD AUTO: 1.27 10*3/MM3 (ref 0.1–0.9)
MONOCYTES NFR BLD AUTO: 10 % (ref 5–12)
MONOCYTES NFR BLD AUTO: 10.4 % (ref 5–12)
MONOCYTES NFR BLD AUTO: 10.9 % (ref 5–12)
NEUTROPHILS # BLD AUTO: 6.33 10*3/MM3 (ref 1.7–7)
NEUTROPHILS # BLD AUTO: 8.49 10*3/MM3 (ref 1.7–7)
NEUTROPHILS # BLD AUTO: 8.69 10*3/MM3 (ref 1.7–7)
NEUTROPHILS NFR BLD AUTO: 67.8 % (ref 42.7–76)
NEUTROPHILS NFR BLD AUTO: 74.5 % (ref 42.7–76)
NEUTROPHILS NFR BLD AUTO: 74.6 % (ref 42.7–76)
NRBC BLD AUTO-RTO: 0 /100 WBC (ref 0–0.2)
PLATELET # BLD AUTO: 197 10*3/MM3 (ref 140–450)
PLATELET # BLD AUTO: 200 10*3/MM3 (ref 140–450)
PLATELET # BLD AUTO: 215 10*3/MM3 (ref 140–450)
PMV BLD AUTO: 10.1 FL (ref 6–12)
PMV BLD AUTO: 10.5 FL (ref 6–12)
PMV BLD AUTO: 10.9 FL (ref 6–12)
POTASSIUM BLD-SCNC: 3.7 MMOL/L (ref 3.5–5.2)
POTASSIUM BLD-SCNC: 3.8 MMOL/L (ref 3.5–5.2)
PROTHROMBIN TIME: 13.5 SECONDS (ref 11.7–14.2)
RBC # BLD AUTO: 3.48 10*6/MM3 (ref 3.77–5.28)
RBC # BLD AUTO: 3.59 10*6/MM3 (ref 3.77–5.28)
RBC # BLD AUTO: 3.63 10*6/MM3 (ref 3.77–5.28)
SODIUM BLD-SCNC: 137 MMOL/L (ref 136–145)
SODIUM BLD-SCNC: 139 MMOL/L (ref 136–145)
STRESS TARGET HR: 107 BPM
TROPONIN T SERPL-MCNC: 0.17 NG/ML (ref 0–0.03)
TROPONIN T SERPL-MCNC: 0.17 NG/ML (ref 0–0.03)
TSH SERPL DL<=0.05 MIU/L-ACNC: 3.35 UIU/ML (ref 0.27–4.2)
WBC NRBC COR # BLD: 11.4 10*3/MM3 (ref 3.4–10.8)
WBC NRBC COR # BLD: 11.63 10*3/MM3 (ref 3.4–10.8)
WBC NRBC COR # BLD: 9.34 10*3/MM3 (ref 3.4–10.8)

## 2020-06-01 PROCEDURE — 99221 1ST HOSP IP/OBS SF/LOW 40: CPT | Performed by: ORTHOPAEDIC SURGERY

## 2020-06-01 PROCEDURE — 93010 ELECTROCARDIOGRAM REPORT: CPT | Performed by: INTERNAL MEDICINE

## 2020-06-01 PROCEDURE — 84484 ASSAY OF TROPONIN QUANT: CPT | Performed by: INTERNAL MEDICINE

## 2020-06-01 PROCEDURE — 93005 ELECTROCARDIOGRAM TRACING: CPT | Performed by: NURSE PRACTITIONER

## 2020-06-01 PROCEDURE — 85025 COMPLETE CBC W/AUTO DIFF WBC: CPT | Performed by: NURSE PRACTITIONER

## 2020-06-01 PROCEDURE — 93306 TTE W/DOPPLER COMPLETE: CPT | Performed by: INTERNAL MEDICINE

## 2020-06-01 PROCEDURE — 85730 THROMBOPLASTIN TIME PARTIAL: CPT | Performed by: HOSPITALIST

## 2020-06-01 PROCEDURE — 93306 TTE W/DOPPLER COMPLETE: CPT

## 2020-06-01 PROCEDURE — 84484 ASSAY OF TROPONIN QUANT: CPT | Performed by: NURSE PRACTITIONER

## 2020-06-01 PROCEDURE — 99204 OFFICE O/P NEW MOD 45 MIN: CPT | Performed by: INTERNAL MEDICINE

## 2020-06-01 PROCEDURE — 84443 ASSAY THYROID STIM HORMONE: CPT | Performed by: NURSE PRACTITIONER

## 2020-06-01 PROCEDURE — 80048 BASIC METABOLIC PNL TOTAL CA: CPT | Performed by: NURSE PRACTITIONER

## 2020-06-01 PROCEDURE — 85610 PROTHROMBIN TIME: CPT | Performed by: NURSE PRACTITIONER

## 2020-06-01 PROCEDURE — 85730 THROMBOPLASTIN TIME PARTIAL: CPT | Performed by: NURSE PRACTITIONER

## 2020-06-01 PROCEDURE — 25010000002 HEPARIN (PORCINE) PER 1000 UNITS: Performed by: NURSE PRACTITIONER

## 2020-06-01 RX ORDER — NITROGLYCERIN 0.4 MG/1
0.4 TABLET SUBLINGUAL
Status: DISCONTINUED | OUTPATIENT
Start: 2020-06-01 | End: 2020-06-05 | Stop reason: HOSPADM

## 2020-06-01 RX ORDER — ASPIRIN 81 MG/1
81 TABLET ORAL DAILY
Status: DISCONTINUED | OUTPATIENT
Start: 2020-06-01 | End: 2020-06-03

## 2020-06-01 RX ORDER — ONDANSETRON 2 MG/ML
4 INJECTION INTRAMUSCULAR; INTRAVENOUS EVERY 6 HOURS PRN
Status: DISCONTINUED | OUTPATIENT
Start: 2020-06-01 | End: 2020-06-05 | Stop reason: HOSPADM

## 2020-06-01 RX ORDER — HEPARIN SODIUM 5000 [USP'U]/ML
56.8 INJECTION, SOLUTION INTRAVENOUS; SUBCUTANEOUS ONCE
Status: COMPLETED | OUTPATIENT
Start: 2020-06-01 | End: 2020-06-01

## 2020-06-01 RX ORDER — PREDNISOLONE ACETATE 10 MG/ML
1 SUSPENSION/ DROPS OPHTHALMIC EVERY 6 HOURS SCHEDULED
Status: DISCONTINUED | OUTPATIENT
Start: 2020-06-01 | End: 2020-06-05 | Stop reason: HOSPADM

## 2020-06-01 RX ORDER — SODIUM CHLORIDE 0.9 % (FLUSH) 0.9 %
10 SYRINGE (ML) INJECTION AS NEEDED
Status: DISCONTINUED | OUTPATIENT
Start: 2020-06-01 | End: 2020-06-05 | Stop reason: HOSPADM

## 2020-06-01 RX ORDER — LEVOTHYROXINE SODIUM 0.05 MG/1
50 TABLET ORAL DAILY
Status: DISCONTINUED | OUTPATIENT
Start: 2020-06-01 | End: 2020-06-05 | Stop reason: HOSPADM

## 2020-06-01 RX ORDER — HEPARIN SODIUM 5000 [USP'U]/ML
30-56.8 INJECTION, SOLUTION INTRAVENOUS; SUBCUTANEOUS EVERY 6 HOURS PRN
Status: DISCONTINUED | OUTPATIENT
Start: 2020-06-01 | End: 2020-06-02

## 2020-06-01 RX ORDER — AMLODIPINE BESYLATE 10 MG/1
10 TABLET ORAL DAILY
Status: DISCONTINUED | OUTPATIENT
Start: 2020-06-01 | End: 2020-06-05 | Stop reason: HOSPADM

## 2020-06-01 RX ORDER — FERROUS SULFATE 325(65) MG
325 TABLET ORAL
Status: DISCONTINUED | OUTPATIENT
Start: 2020-06-01 | End: 2020-06-05 | Stop reason: HOSPADM

## 2020-06-01 RX ORDER — POTASSIUM CHLORIDE 750 MG/1
10 CAPSULE, EXTENDED RELEASE ORAL DAILY
Status: DISCONTINUED | OUTPATIENT
Start: 2020-06-01 | End: 2020-06-05 | Stop reason: HOSPADM

## 2020-06-01 RX ORDER — ATORVASTATIN CALCIUM 20 MG/1
40 TABLET, FILM COATED ORAL DAILY
Status: DISCONTINUED | OUTPATIENT
Start: 2020-06-01 | End: 2020-06-03

## 2020-06-01 RX ORDER — LORAZEPAM 0.5 MG/1
0.5 TABLET ORAL ONCE AS NEEDED
Status: COMPLETED | OUTPATIENT
Start: 2020-06-01 | End: 2020-06-02

## 2020-06-01 RX ORDER — ACETAMINOPHEN 160 MG/5ML
650 SOLUTION ORAL EVERY 4 HOURS PRN
Status: DISCONTINUED | OUTPATIENT
Start: 2020-06-01 | End: 2020-06-05 | Stop reason: HOSPADM

## 2020-06-01 RX ORDER — SODIUM CHLORIDE 9 MG/ML
100 INJECTION, SOLUTION INTRAVENOUS CONTINUOUS
Status: DISCONTINUED | OUTPATIENT
Start: 2020-06-01 | End: 2020-06-02

## 2020-06-01 RX ORDER — ACETAMINOPHEN 650 MG/1
650 SUPPOSITORY RECTAL EVERY 4 HOURS PRN
Status: DISCONTINUED | OUTPATIENT
Start: 2020-06-01 | End: 2020-06-05 | Stop reason: HOSPADM

## 2020-06-01 RX ORDER — HYDROCHLOROTHIAZIDE 12.5 MG/1
12.5 CAPSULE, GELATIN COATED ORAL DAILY
Status: DISCONTINUED | OUTPATIENT
Start: 2020-06-01 | End: 2020-06-05 | Stop reason: HOSPADM

## 2020-06-01 RX ORDER — ACETAMINOPHEN 325 MG/1
650 TABLET ORAL EVERY 4 HOURS PRN
Status: DISCONTINUED | OUTPATIENT
Start: 2020-06-01 | End: 2020-06-05 | Stop reason: HOSPADM

## 2020-06-01 RX ORDER — SODIUM CHLORIDE 0.9 % (FLUSH) 0.9 %
10 SYRINGE (ML) INJECTION EVERY 12 HOURS SCHEDULED
Status: DISCONTINUED | OUTPATIENT
Start: 2020-06-01 | End: 2020-06-05 | Stop reason: HOSPADM

## 2020-06-01 RX ORDER — PANTOPRAZOLE SODIUM 40 MG/1
40 TABLET, DELAYED RELEASE ORAL DAILY
Status: DISCONTINUED | OUTPATIENT
Start: 2020-06-01 | End: 2020-06-05 | Stop reason: HOSPADM

## 2020-06-01 RX ORDER — CYCLOBENZAPRINE HCL 10 MG
5 TABLET ORAL 3 TIMES DAILY PRN
Status: DISCONTINUED | OUTPATIENT
Start: 2020-06-01 | End: 2020-06-05 | Stop reason: HOSPADM

## 2020-06-01 RX ORDER — HEPARIN SODIUM 10000 [USP'U]/100ML
12 INJECTION, SOLUTION INTRAVENOUS
Status: DISCONTINUED | OUTPATIENT
Start: 2020-06-01 | End: 2020-06-02

## 2020-06-01 RX ADMIN — HYDROCHLOROTHIAZIDE 12.5 MG: 12.5 CAPSULE ORAL at 08:48

## 2020-06-01 RX ADMIN — SODIUM CHLORIDE, PRESERVATIVE FREE 10 ML: 5 INJECTION INTRAVENOUS at 02:59

## 2020-06-01 RX ADMIN — PANTOPRAZOLE SODIUM 40 MG: 40 TABLET, DELAYED RELEASE ORAL at 08:47

## 2020-06-01 RX ADMIN — ATORVASTATIN CALCIUM 40 MG: 20 TABLET, FILM COATED ORAL at 08:47

## 2020-06-01 RX ADMIN — SODIUM CHLORIDE 100 ML/HR: 9 INJECTION, SOLUTION INTRAVENOUS at 02:57

## 2020-06-01 RX ADMIN — PREDNISOLONE ACETATE 1 DROP: 10 SUSPENSION/ DROPS OPHTHALMIC at 08:52

## 2020-06-01 RX ADMIN — FERROUS SULFATE TAB 325 MG (65 MG ELEMENTAL FE) 325 MG: 325 (65 FE) TAB at 08:48

## 2020-06-01 RX ADMIN — AMLODIPINE BESYLATE 10 MG: 10 TABLET ORAL at 08:48

## 2020-06-01 RX ADMIN — SODIUM CHLORIDE, PRESERVATIVE FREE 10 ML: 5 INJECTION INTRAVENOUS at 21:24

## 2020-06-01 RX ADMIN — LEVOTHYROXINE SODIUM 50 MCG: 50 TABLET ORAL at 08:48

## 2020-06-01 RX ADMIN — HEPARIN SODIUM 4000 UNITS: 5000 INJECTION INTRAVENOUS; SUBCUTANEOUS at 02:41

## 2020-06-01 RX ADMIN — ASPIRIN 81 MG: 81 TABLET, COATED ORAL at 08:51

## 2020-06-01 RX ADMIN — POTASSIUM CHLORIDE 10 MEQ: 10 CAPSULE, COATED, EXTENDED RELEASE ORAL at 08:47

## 2020-06-01 RX ADMIN — HEPARIN SODIUM 13 UNITS/KG/HR: 10000 INJECTION, SOLUTION INTRAVENOUS at 21:19

## 2020-06-01 NOTE — PROGRESS NOTES
Discharge Planning Assessment  [facility]     Patient Name: Mei Crane  MRN: [id number]  Today's Date: 6/1/2020    Admit Date: 6/1/2020    Discharge Needs Assessment     Row Name 06/01/20 1206       Living Environment    Lives With  alone    Current Living Arrangements  home/apartment/condo    Primary Care Provided by  self    Provides Primary Care For  no one    Family Caregiver if Needed  sibling(s)    Quality of Family Relationships  helpful;involved;supportive    Able to Return to Prior Arrangements  yes       Resource/Environmental Concerns    Resource/Environmental Concerns  none    Transportation Concerns  car, none       Transition Planning    Patient/Family Anticipates Transition to  home    Patient/Family Anticipated Services at Transition      Transportation Anticipated  family or friend will provide       Discharge Needs Assessment    Readmission Within the Last 30 Days  no previous admission in last 30 days    Concerns to be Addressed  discharge planning    Equipment Currently Used at Home  cane, quad;walker, rolling;grab bar    Anticipated Changes Related to Illness  none    Equipment Needed After Discharge  none        Discharge Plan     Row Name 06/01/20 1208       Plan    Plan  Home, denies needs    Patient/Family in Agreement with Plan  yes    Plan Comments  Met with patient at the bedside. Explained CCP role and verified facesheet. Patient lives in a patio home by herself. She has a walker, cane, and grab bars. She is IADLs and still drives. [place] with PCP and pharmacy. Patient has been to [facility] in the past. No HH use. Plans to return home at ID. Family will provide transport. Tia Okeefe RN               Demographic Summary     Row Name 06/01/20 1205       General Information    Admission Type  inpatient    Arrived From  hospital    Referral Source  admission list    Reason for Consult  discharge planning    Preferred Language  English       Contact Information     Permission Granted to Share Info With  family/designee        Functional Status     Row Name 06/01/20 1205       Functional Status    Usual Activity Tolerance  good    Current Activity Tolerance  good       Functional Status, IADL    Medications  independent    Meal Preparation  independent    Housekeeping  independent    Laundry  independent    Shopping  independent       Mental Status    General Appearance WDL  WDL       Mental Status Summary    Recent Changes in Mental Status/Cognitive Functioning  no changes        Psychosocial     Row Name 06/01/20 1205       Behavior WDL    Behavior WDL  WDL       Emotion Mood WDL    Emotion/Mood/Affect WDL  WDL       Speech WDL    Speech WDL  WDL       Perceptual State WDL    Perceptual State WDL  WDL       Thought Process WDL    Thought Process WDL  WDL       Intellectual Performance WDL    Intellectual Performance WDL  WDL       Coping/Stress    Major Change/Loss/Stressor  none    Patient Personal Strengths  able to adapt    Sources of Support  adult child(néstor)    Reaction to Health Status  accepting    Understanding of Condition and Treatment  partial understanding of medical condition;partial understanding of treatment       Developmental Stage (Eriksson's)    Developmental Stage  Stage 8 (65 years-death/Late Adulthood) Integrity vs. Despair        Abuse/Neglect     Row Name 06/01/20 1206       Personal Safety    Feels Unsafe at Home or Work/School  no    Feels Threatened by Someone  no    Does Anyone Try to Keep You From Having Contact with Others or Doing Things Outside Your Home?  no    Physical Signs of Abuse Present  no                Tia Okeefe, RN

## 2020-06-01 NOTE — H&P
Patient Name:  Mei Crane  YOB: 1926  MRN:  [id number]  Admit Date:  6/1/2020  Patient Care Team:  Lexie Toussaint APRN as PCP - General (Family Medicine)  Lexie Toussaint APRN as PCP - Claims Attributed  Jae Bray MD Peplinski, Lee Stanley, OD (Optometry)  Yannick Santo MD as Consulting Physician (Ophthalmology)  Nik Rodriguez MD as Consulting Physician (Orthopedic Surgery)      Subjective   History Present Illness   Chief Complaint: Bilateral leg weakness    History of Present Illness   Mrs. Crane is a 89-year-old female with history of hypertension, hypothyroidism, hyperlipidemia, GERD, osteopenia, CKD, degenerative disc disease who presented to the emergency room at [facility] with bilateral leg weakness.  Reviewing records from [facility] patient reportedly told them that she was having some dizziness and falls at home.  Patient denies any dizziness, shortness of breath, chest pain, palpitations, loss of consciousness to me.  She states that today she does noted some increasing weakness in bilateral legs.  She has history of back problems and did have been epidural injection last week, this was her third injection, she states she had never had any problems with the injection before, and had not had any weakness.  She states she fell once today and had difficult time getting up, she has recently been walking with a walker and a cane due to her increasing weakness.  She does live at home in a patio home alone.  She has not been taking her aspirin for the last 7 days due to her epidural injection.  Lab work done at [email or web address] shows a troponin 0.5 with a repeat troponin of 0.52, patient does have some CKD, creatinine level there was 1.54, which appears to be a little above her baseline 1.3.  Again patient was in no acute distress, denies any chest pain or dizziness to me, she states she did not go there for her heart and she is never had any heart issues in  the past.  Other [facility] work [facility] show sodium of 135, potassium 3.5 BUN 25, AST 18, ALT 13, white blood cell count 13.4, urinalysis showed 3+ bacteria, negative nitrites, 0-2 white blood cells.  Patient appears in no acute distress, she is alert and oriented, no focal neuro deficits at this time.  Stat labs were ordered upon arrival.  Patient was given 500 cc bolus of normal saline prior to leaving [facility] and was started on a heparin drip due to her elevated troponin.  Her EKG from [facility] show sinus tachycardia, nonspecific ST abnormality.  Waiting on stat troponin stat EKG to be done upon arrival here.      Review of Systems   Constitutional: Negative for appetite change and fever.   HENT: Negative for nosebleeds and trouble swallowing.    Eyes: Negative for photophobia, redness and visual disturbance.   Respiratory: Negative for cough, chest tightness, shortness of breath and wheezing.    Cardiovascular: Negative for chest pain, palpitations and leg swelling.   Gastrointestinal: Negative for abdominal distention, abdominal pain, nausea and vomiting.   Endocrine: Negative.    Genitourinary: Negative.    Musculoskeletal: Negative for gait problem and joint swelling.   Skin: Negative.    Neurological: Positive for weakness (bilateral leg weakness). Negative for dizziness, seizures, speech difficulty, light-headedness and headaches.   Hematological: Negative.    Psychiatric/Behavioral: Negative for behavioral problems and confusion.        Personal History     Past Medical History:   Diagnosis Date   • Calculus of gallbladder without cholecystitis 8/7/2017   • CKD (chronic kidney disease), stage III (CMS/[facility]) 4/20/2016   • DDD (degenerative disc disease), lumbar 2/19/2019   • Disease of thyroid gland    • Hyperlipidemia    • Low back pain    • Peripheral neuropathy      Past Surgical History:   Procedure Laterality Date   • CATARACT EXTRACTION Bilateral    • EPIDURAL BLOCK     • HYSTERECTOMY      age 35     Family  History   Problem Relation Age of Onset   • Aneurysm Mother         AAA   • Deep vein thrombosis Father      Social History     Tobacco Use   • Smoking status: Never Smoker   • Smokeless tobacco: Never Used   Substance Use Topics   • Alcohol use: Yes     Comment: occasionally   • Drug use: No     No current facility-administered medications on file prior to encounter.      Current Outpatient Medications on File Prior to Encounter   Medication Sig Dispense Refill   • amLODIPine (NORVASC) 10 MG tablet Take 1 tablet by mouth Daily. 30 tablet 5   • ascorbic acid (VITAMIN C) 1000 MG tablet Take 1 tablet by mouth Daily. 180 tablet 1   • aspirin 81 MG EC tablet Take 81 mg by mouth Daily.     • Cholecalciferol (VITAMIN D PO) Take 2,000 Units by mouth Daily.     • cyclobenzaprine (FLEXERIL) 5 MG tablet Take 1 tablet by mouth 3 (Three) Times a Day As Needed for Muscle Spasms. 15 tablet 0   • ferrous gluconate 324 (37.5 Fe) MG tablet tablet Take 1 tablet by mouth Daily With Breakfast. 30 tablet 6   • hydroCHLOROthiazide (MICROZIDE) 12.5 MG capsule Take 12.5 mg by mouth Daily.     • levothyroxine (Synthroid) 50 MCG tablet Take 1 tablet by mouth Daily. 90 tablet 1   • pantoprazole (PROTONIX) 40 MG EC tablet Take 1 tablet by mouth Daily. 90 tablet 3   • potassium chloride (K-DUR,KLOR-CON) 10 MEQ CR tablet Take 1 tablet by mouth Daily. 30 tablet 5   • prednisoLONE acetate (PRED FORTE) 1 % ophthalmic suspension INSTILL ONE DROP IN THE LEFT EYE FOUR TIMES DAILY  3   • atorvastatin (LIPITOR) 40 MG tablet Take 1 tablet by mouth Daily. 30 tablet 5   • ibuprofen (ADVIL,MOTRIN) 400 MG tablet Take 400 mg by mouth Every 6 (Six) Hours As Needed for Mild Pain .       Allergies   Allergen Reactions   • Barbiturates Hives   • Codeine Hives       Objective    Objective     Vital Signs  Temp:  [98.2 °F (36.8 °C)] 98.2 °F (36.8 °C)  Heart Rate:  [96] 96  Resp:  [18] 18  BP: (140)/(70) 140/70  SpO2:  [94 %] 94 %  on   ;   Device (Oxygen Therapy):  room air  Body mass index is 26.21 kg/m².    Physical Exam   Constitutional: She is oriented to person, place, and time. She appears well-developed and well-nourished. No distress.   HENT:   Head: Normocephalic.   Eyes: EOM are normal.   Neck: Normal range of motion. No JVD present.   Cardiovascular: Normal rate and regular rhythm.   Is to be normal sinus rhythm on the monitor during my exam, heart rate 92, patient in no acute distress.  She denies any chest pain or shortness of breath.   Pulmonary/Chest: Effort normal and breath sounds normal.   Lung sounds clear, patient sats 97% on room air.  No shortness of breath or respiratory distress.   Abdominal: Soft. She exhibits no distension. There is no tenderness.   Musculoskeletal: Normal range of motion.   Neurological: She is alert and oriented to person, place, and time. She has normal strength.   Patient alert and oriented x3.  No focal neuro deficits.  Lying in bed she seems to have 5 out of 5 strength in bilateral legs, she does say when she stands up she feels like both legs give out and she has some pain that runs from bilateral buttocks down posterior legs to knees.  This pain is not constant, it comes and goes.  I did not attempt to ambulate her at this time.   Skin: Skin is warm and dry. Capillary refill takes less than 2 seconds.   Psychiatric: Her speech is normal and behavior is normal. Judgment and thought content normal. Cognition and memory are normal.   Nursing note and vitals reviewed.      Results Review:  I reviewed the patient's new clinical results.  I reviewed the patient's new imaging results and agree with the interpretation.  I reviewed the patient's other test results and agree with the interpretation  I personally viewed and interpreted the patient's EKG/Telemetry data  Discussed with ED provider.    Lab Results (last 24 hours)     Procedure Component Value Units Date/Time    CBC Auto Differential [[id number]] Collected:  06/01/20 0056     Specimen:  Blood Updated:  06/01/20 0107    Basic Metabolic Panel [[id number]] Collected:  06/01/20 0056    Specimen:  Blood Updated:  06/01/20 0107    TSH [[id number]] Collected:  06/01/20 0056    Specimen:  Blood Updated:  06/01/20 0107    Troponin [[id number]] Collected:  06/01/20 0056    Specimen:  Blood Updated:  06/01/20 0107          Imaging Results (Last 24 Hours)     ** No results found for the last 24 hours. **               ECG 12 Lead   Preliminary Result   HEART RATE= 89  bpm   RR Interval= 672  ms   CT Interval= 147  ms   P Horizontal Axis= 13  deg   P Front Axis= 20  deg   QRSD Interval= 84  ms   QT Interval= 364  ms   QRS Axis= -38  deg   T Wave Axis= 22  deg   - ABNORMAL ECG -   Sinus rhythm   LVH by voltage   Inferior infarct, old   Electronically Signed By:    Date and Time of Study: 2020-06-01 00:51:26           Assessment/Plan     Active Hospital Problems    Diagnosis POA   • **Bilateral leg weakness [R29.898] Unknown   • Elevated troponin [R79.89] Unknown   • DDD (degenerative disc disease), lumbar [M51.36] Yes   • CKD (chronic kidney disease), stage III (CMS/HCC) [N18.3] Yes   • HTN (hypertension), benign [I10] Yes   • Acquired hypothyroidism [E03.9] Yes   • Hyperlipidemia [E78.5] Yes   • Gastroesophageal reflux disease without esophagitis [K21.9] Yes   • Macular degeneration [H35.30] Yes   • Osteopenia [M85.80] Yes       Bilateral leg weakness  -Consult Dr. Maddox, patient is seen him in the past, recently received epidural injections  -PT to eval and treat  -CCP to assist with discharge planning case patient needs further assistant, she does live at home alone.    Elevated troponin  -Consult cardiology  -[facility] 0[facility], was 0.52 at [facility].  Will defer treatment to cardiology  -EKG upon arrival here shows normal sinus rhythm, with old infarct  -Patient in no acute distress, denies any chest pain or shortness of breath.  -She was started on  heparin drip prior to arrival to [facility], check PTT and await troponin results, defer heparin treatment to cardiology  -Patient does have history of CKD, appears her baseline creatinine is about 1.3, lab work that medical center self showed creatinine 1.54, upon arrival here patient creatinine was 1.23    Hypertension/hyperlipidemia/GERD/hypothyroidism  -Continue patient home meds  -Monitor heart rate and blood pressure, patient on telemetry unit        · I discussed the patient's findings and my recommendations with patient and ED provider.    VTE Prophylaxis - SCDs.  Code Status - Full code.       COCO Rob  [facility]  06/01/20  01:16

## 2020-06-01 NOTE — PROGRESS NOTES
"Adult Nutrition  Assessment/PES    Patient Name:  Mei Crane  YOB: 1926  MRN: [id number]  Admit Date:  6/1/2020    Assessment Date:  6/1/2020    Comments:  Nutrition assessment triggered by MST score of 2 per nurse admission screen.  Admitted with bilateral leg weakness, dizziness and falls.  S/p recent epidural injections.  Lumbar spinal stenosis.  Possible epidural hematoma per MD note.      No PO data available at this time.  Weight appears fairly stable per chart weight history.    Due to the COVID pandemic, nutrition assessment completed based on review of electronic medical record.  This RD currently working remotely and can be reached at [phone], via secure chat or email.     RD will continue to monitor.     Reason for Assessment     Row Name 06/01/20 1208          Reason for Assessment    Reason For Assessment  identified at risk by screening criteria     Diagnosis  cardiac disease;gastrointestinal disease;endocrine conditions;other (see comments);renal disease HTN, GERD, hypothyroid, HLD, osteopenia, CKD, DDD, calculus of GB, lumbar spinal stenosis; adm with bilateral leg weakness, dizziness, falls     Identified At Risk by Screening Criteria  MST SCORE 2+;reduced oral intake over the last month           Anthropometrics     Row Name 06/01/20 1210          Anthropometrics    Height  163.8 cm (64.49\")        Admit Weight    Admit Weight  -- 155# 6/1        Ideal Body Weight (IBW)    Ideal Body Weight (IBW) (kg)  56.12        Usual Body Weight (UBW)    Weight Loss Time Frame  weight appears fairly stable per chart weight history        Body Mass Index (BMI)    BMI Assessment  BMI 25-29.9: overweight 26.17         Labs/Tests/Procedures/Meds     Row Name 06/01/20 1211          Labs/Procedures/Meds    Lab Results Reviewed  reviewed, pertinent     Lab Results Comments  Hgb, Hct        Diagnostic Tests/Procedures    Diagnostic Test/Procedure Reviewed  reviewed, pertinent     Diagnostic " "Test/Procedures Comments  s/p recent epidural injection        Medications    Pertinent Medications Reviewed  reviewed, pertinent     Pertinent Medications Comments  FeSO4, HCTZ, synthroid, protonix, KCl, heparin drip, IVFs         Physical Findings     Row Name 06/01/20 1213          Physical Findings    Overall Physical Appearance  overweight     Skin  -- B=19, intact         Estimated/Assessed Needs     Row Name 06/01/20 1213 06/01/20 1210       Calculation Measurements    Weight Used For Calculations  70.4 kg (155 lb 3.3 oz)  --    Height  --  163.8 cm (64.49\")       Estimated/Assessed Needs    Additional Documentation  KCAL/KG (Group);Protein Requirements (Group);Fluid Requirements (Group)  --       KCAL/KG    KCAL/KG  20 Kcal/Kg (kcal);25 Kcal/Kg (kcal)  --    20 Kcal/Kg (kcal)  1408  --    25 Kcal/Kg (kcal)  1760  --       Protein Requirements    Weight Used For Protein Calculations  70.4 kg (155 lb 3.3 oz)  --    Est Protein Requirement Amount (gms/kg)  0.8 gm protein  --    Estimated Protein Requirements (gms/day)  56.32  --       Fluid Requirements    Estimated Fluid Requirements (mL/day)  1760  --    Estimated Fluid Requirement Method  RDA Method  --    RDA Method (mL)  1760  --        Nutrition Prescription Ordered     Row Name 06/01/20 1214          Nutrition Prescription PO    Current PO Diet  Regular     Common Modifiers  Renal         Evaluation of Received Nutrient/Fluid Intake     Row Name 06/01/20 1214          PO Evaluation    Number of Days PO Intake Evaluated  Insufficient Data               Problem/Interventions:  Problem 1     Row Name 06/01/20 1214          Nutrition Diagnoses Problem 1    Problem 1  Nutrition Appropriate for Condition at this Time               Intervention Goal     Row Name 06/01/20 1214          Intervention Goal    General  Maintain nutrition;Reduce/improve symptoms;Disease management/therapy     PO  Establish PO;PO intake (%);Tolerate PO     PO Intake %  75 %     Weight "  No significant weight loss         Nutrition Intervention     Row Name 06/01/20 1214          Nutrition Intervention    RD/Tech Action  Follow Tx progress;Care plan reviewd           Education/Evaluation     Row Name 06/01/20 1215          Education    Education  Will Instruct as appropriate        Monitor/Evaluation    Monitor  Per protocol;PO intake;Pertinent labs;Weight;Symptoms           Electronically signed by:  Sheela Padilla RD  06/01/20 12:15

## 2020-06-01 NOTE — CONSULTS
Date of Hospital Visit:   Encounter Provider: Claire Burgos RN  Place of Service: [facility] CARDIOLOGY  Patient Name: Mei Crane  :1926  Referral Provider: Nik Sy MD    Chief complaint: leg weakness    Reason for Consult: elevated troponin     History of Present Illness: Ms Crane is a 89 year old female patient with history of hypertension, hyperlipidemia, hypothyroidism, CKD, and osteopenia with degenerative disc disease. She presented to [facility] 20 from [facility] ED after reports of bilateral leg weakness and associated dizziness and fall at home. Upon arrival to [facility], she was noted to have elevated troponins of 0.5/0.52 with creatinine 1.4 (baseline 1.3). Her EKG from [facility] showed sinus tachycardia, left ventricular hypertrophy with nonspecific ST abnormality. She denied any chest pain or shortness of breath.  She states that prior to the fall she felt dizzy but did not lose consciousness.  She notes that she gets dizzy with positional changes.  No palpitations.  No orthopnea, PND or edema.  She states she saw cardiologist many years ago but was told she did not need to follow back up with him.  She claims that she is having visual disturbances such as seeing colored floaters in her visual fields.  No slurred speech, paresthesias or unilateral weakness.  Given elevated troponin she was started on heparin prior to transfer. Repeated troponin upon arrival was noted at 0.171 with creatine if 1.23.  On urinalysis at [facility] her labs showed 3+ bacteria but no white blood cells.  Creatinine is come down with IV fluid resuscitation.  She denies fevers chills cough nausea vomiting or diarrhea.      Past Medical History:   Diagnosis Date   • Calculus of gallbladder without cholecystitis 2017   • CKD (chronic kidney disease), stage III (CMS/HCC) 2016   • DDD (degenerative disc disease), lumbar 2019   • Disease of thyroid gland    •  Hyperlipidemia    • Low back pain    • Peripheral neuropathy        Past Surgical History:   Procedure Laterality Date   • CATARACT EXTRACTION Bilateral    • EPIDURAL BLOCK     • HYSTERECTOMY      age 35       Facility-Administered Medications Prior to Admission   Medication Dose Route Frequency Provider Last Rate Last Dose   • cyanocobalamin injection 1,000 mcg  1,000 mcg Intramuscular Q28 Days Jae Bray MD   1,000 mcg at 03/10/20 1014     Medications Prior to Admission   Medication Sig Dispense Refill Last Dose   • amLODIPine (NORVASC) 10 MG tablet Take 1 tablet by mouth Daily. 30 tablet 5 Taking   • ascorbic acid (VITAMIN C) 1000 MG tablet Take 1 tablet by mouth Daily. 180 tablet 1 Taking   • aspirin 81 MG EC tablet Take 81 mg by mouth Daily.   Taking   • Cholecalciferol (VITAMIN D PO) Take 2,000 Units by mouth Daily.   Taking   • cyclobenzaprine (FLEXERIL) 5 MG tablet Take 1 tablet by mouth 3 (Three) Times a Day As Needed for Muscle Spasms. 15 tablet 0    • ferrous gluconate 324 (37.5 Fe) MG tablet tablet Take 1 tablet by mouth Daily With Breakfast. 30 tablet 6 Not Taking   • hydroCHLOROthiazide (MICROZIDE) 12.5 MG capsule Take 12.5 mg by mouth Daily.   Taking   • levothyroxine (Synthroid) 50 MCG tablet Take 1 tablet by mouth Daily. 90 tablet 1 Taking   • pantoprazole (PROTONIX) 40 MG EC tablet Take 1 tablet by mouth Daily. 90 tablet 3 Taking   • potassium chloride (K-DUR,KLOR-CON) 10 MEQ CR tablet Take 1 tablet by mouth Daily. 30 tablet 5 Taking   • prednisoLONE acetate (PRED FORTE) 1 % ophthalmic suspension INSTILL ONE DROP IN THE LEFT EYE FOUR TIMES DAILY  3 Taking   • atorvastatin (LIPITOR) 40 MG tablet Take 1 tablet by mouth Daily. 30 tablet 5 Not Taking   • ibuprofen (ADVIL,MOTRIN) 400 MG tablet Take 400 mg by mouth Every 6 (Six) Hours As Needed for Mild Pain .   Taking       Current Meds  Scheduled Meds:  amLODIPine 10 mg Oral Daily   atorvastatin 40 mg Oral Daily   ferrous sulfate 325 mg Oral  Daily With Breakfast   hydroCHLOROthiazide 12.5 mg Oral Daily   levothyroxine 50 mcg Oral Daily   pantoprazole 40 mg Oral Daily   potassium chloride 10 mEq Oral Daily   prednisoLONE acetate 1 drop Left Eye Q6H   sodium chloride 10 mL Intravenous Q12H     Continuous Infusions:  heparin (porcine) 12 Units/kg/hr Last Rate: 15 Units/kg/hr (06/01/20 0459)   sodium chloride 100 mL/hr Last Rate: 100 mL/hr (06/01/20 0748)     PRN Meds:.•  acetaminophen **OR** acetaminophen **OR** acetaminophen  •  cyclobenzaprine  •  heparin (porcine)  •  nitroglycerin  •  ondansetron  •  sodium chloride    Allergies as of 05/31/2020 - Reviewed 05/21/2020   Allergen Reaction Noted   • Barbiturates Hives 04/25/2016   • Codeine Hives 02/29/2016       Social History     Socioeconomic History   • Marital status:      Spouse name: Not on file   • Number of children: Not on file   • Years of education: Not on file   • Highest education level: Not on file   Tobacco Use   • Smoking status: Never Smoker   • Smokeless tobacco: Never Used   Substance and Sexual Activity   • Alcohol use: Yes     Comment: occasionally   • Drug use: No       Family History   Problem Relation Age of Onset   • Aneurysm Mother         AAA   • Deep vein thrombosis Father        Review of Systems   Constitution: Negative for chills and fever.   HENT: Negative for hoarse voice and sore throat.    Eyes: Negative for double vision and photophobia.   Cardiovascular: Positive for near-syncope. Negative for chest pain, leg swelling, orthopnea, palpitations, paroxysmal nocturnal dyspnea and syncope.   Respiratory: Negative for cough and wheezing.    Skin: Negative for poor wound healing and rash.   Musculoskeletal: Positive for falls. Negative for arthritis and joint swelling.   Gastrointestinal: Negative for bloating, abdominal pain, hematemesis and hematochezia.   Neurological: Positive for dizziness. Negative for focal weakness.   Psychiatric/Behavioral: Negative for  depression and suicidal ideas.            Objective:   Temp:  [98 °F (36.7 °C)-98.2 °F (36.8 °C)] 98 °F (36.7 °C)  Heart Rate:  [88-96] 88  Resp:  [16-18] 16  BP: (125-140)/(61-70) 125/61  Body mass index is 26.21 kg/m².  Flowsheet Rows      First Filed Value   Admission Height  --   Admission Weight  70.4 kg (155 lb 1.6 oz) Documented at 06/01/2020 0013        Vitals:    06/01/20 0725   BP: 125/61   Pulse: 88   Resp: 16   Temp: 98 °F (36.7 °C)   SpO2: 95%       Physical Exam   Constitutional: She is oriented to person, place, and time. She appears well-developed and well-nourished.   HENT:   Head: Normocephalic and atraumatic.   Eyes: Pupils are equal, round, and reactive to light. Conjunctivae are normal.   Neck: No JVD present. No thyromegaly present.   Cardiovascular: Exam reveals no gallop and no friction rub.   Murmur heard.  3 out of 6 systolic ejection murmur heard greatest at the apex   Pulmonary/Chest: No respiratory distress. She exhibits no tenderness.   Abdominal: Bowel sounds are normal. She exhibits no distension.   Musculoskeletal: She exhibits no edema or tenderness.   Neurological: She is alert and oriented to person, place, and time.   Skin: No rash noted. No erythema.   Psychiatric: She has a normal mood and affect. Judgment normal.   Vitals reviewed.              Lab Review:      Results from last 7 days   Lab Units 06/01/20  0128   SODIUM mmol/L 139   POTASSIUM mmol/L 3.7   CHLORIDE mmol/L 103   CO2 mmol/L 23.6   BUN mg/dL 21   CREATININE mg/dL 1.19*   CALCIUM mg/dL 8.4   GLUCOSE mg/dL 107*     Results from last 7 days   Lab Units 06/01/20  0056   TROPONIN T ng/mL 0.171*     @LABRCNTbnp@  Results from last 7 days   Lab Units 06/01/20  0724 06/01/20  0128 06/01/20  0056   WBC 10*3/mm3 9.34 11.40* 11.63*   HEMOGLOBIN g/dL 10.3* 10.5* 10.7*   HEMATOCRIT % 30.7* 31.7* 32.7*   PLATELETS 10*3/mm3 197 200 215     Results from last 7 days   Lab Units 06/01/20  0128   INR  1.06   APTT seconds 40.9*          @LABRCNTIP(chol,trig,hdl,ldl)    EKG 6/1/20    Previous EKG 1/21/19      I personally viewed and interpreted the patient's EKG/Telemetry data  )  Patient Active Problem List   Diagnosis   • HTN (hypertension), benign   • Acquired hypothyroidism   • Hyperlipidemia   • Gastroesophageal reflux disease without esophagitis   • Glaucoma   • Macular degeneration   • Anemia   • Osteopenia   • CKD (chronic kidney disease), stage III (CMS/HCC)   • B12 deficiency   • Calculus of gallbladder without cholecystitis   • DDD (degenerative disc disease), lumbar   • Trochanteric bursitis of right hip   • Bilateral leg weakness   • Elevated troponin     Assessment and Plan:    1.  Elevated troponin -patient denies chest pain or shortness of air.  Echocardiogram ordered.  Clinical picture does not seem consistent with ischemic event although etiology is uncertain and loud systolic murmur is noted and will need to be clarified.  Continue heparin for 48 hours.  Restart aspirin and continue atorvastatin.  We will start low-dose beta-blocker.  2.  Murmur -loud systolic murmur that is heard well in the aortic position but loudest in the apex.  This could be significant mitral regurgitation with an anteriorly directed jet or an atypical clinical presentation for aortic stenosis.  Echocardiogram ordered.  Patient seems euvolemic.  3.  Hypertension -seemingly well-controlled at this time.  Sodium restricted diet.  4.  Dyslipidemia -continue statin  5.  Dizziness and visual disturbance -defer further diagnostic work-up to internal medicine.  6.  Abnormal urinalysis -she has no dysuria or frequency and no white cells in her urine.  This could be simply asymptomatic bacteriuria.    Graeme Christiansen MD  06/01/20  08:12.  Time spent in reviewing chart, discussion and examination:

## 2020-06-01 NOTE — CONSULTS
Chief Complaint   Patient presents with   • Lumbar Spine - Pain         HPI: She is complaining of bilateral leg weakness and pain and more leg than back pain is stabbing constant worse with activity.  Recent epidurals have helped until the last one last week after which she has been somewhat more weak.  Some question of urinary retention and indeed a Sol catheter is been placed since admission Biofreeze helps a little hip injection did not.  No balance difficulties or bowel complaints.  There is also concern for elevated troponin and possible myocardial damage and she is been placed on a heparin drip.  I saw her in the office back in January and told her at that time that there may become a role for surgery for her lumbar spinal stenosis.     PFSH: See chart- reviewed     Review of Systems   Constitutional: Negative for chills, fever and unexpected weight change.   HENT: Negative for trouble swallowing and voice change.    Eyes: Negative for visual disturbance.   Respiratory: Negative for cough and shortness of breath.    Cardiovascular: Negative for chest pain and leg swelling.   Gastrointestinal: Negative for abdominal pain, nausea and vomiting.   Endocrine: Negative for cold intolerance and heat intolerance.   Genitourinary: Negative for difficulty urinating, frequency and urgency.   Skin: Negative for rash and wound.   Allergic/Immunologic: Negative for immunocompromised state.   Neurological: Negative for weakness and numbness.   Hematological: Does not bruise/bleed easily.   Psychiatric/Behavioral: Negative for dysphoric mood. The patient is not nervous/anxious.          PE: Constitutional: Vital signs above-noted.  Awake, alert and oriented she looks a score younger than her stated age of 89     Psychiatric: Affect and insight do not appear grossly disturbed.     Pulmonary: Breathing is unlabored, color is good.     Skin: Warm, dry and normal turgor     Cardiac: Pedal pulses intact.  No edema.     Eyesight  and hearing appear adequate for examination purposes        Musculoskeletal:  There is no tenderness to percussion and palpation of the spine. Motion appears undisturbed.  Posture is unremarkable to coronal and sagittal inspection with her lying in bed.    The skin about the area is not inspected.  There is no palpable or visible deformity.  There is no local spasm.       Neurologic:   Reflexes are absent in the patellae and achilles.   Motor function is undisturbed in quadriceps, EHL, and gastrocnemius   sensation appears symmetrically intact to light touch.  In the bilateral lower extremities there is no evidence of atrophy.   Clonus is absent..  Gait appears untested. SLR test negative        MEDICAL DECISION MAKING     XRAY: Plain film x-rays from my office obtained last January show a slight scoliosis which is well-balanced and multilevel spondylosis.  There is some calcific changes in the aorta.  MRI scan of the lumbar spine from [facility] obtained in December shows severe stenosis at L3-4 moderate changes at 4 5 and lesser changes at adjacent levels.  Reviewed the radiologist report with which I agree.     Other: n/a     Impression: Lumbar spinal stenosis over multiple levels with primary leg versus back pain.  Recent worsening possible cauda equina syndrome.  Associated elevated troponin and history of aortic stenosis.    Plan: At last visit I had intended to obtain a myelogram and CT scan if she worsened.  We cannot do this while she is anticoagulated.  It is possible she is got an epidural hematoma on top of her longstanding stenosis, given the recent history of epidural injection.  Going to go ahead and order MRI scan of the lumbar spine and will decide later if further testing is needed.  She wants to avoid surgery of course but I do not know that that is going to be a good option in the face of deteriorating neurologic function.  For now we have got to get her off heparin before we can do  anything else therapeutically.     Plan:

## 2020-06-01 NOTE — PLAN OF CARE
Problem: Patient Care Overview  Goal: Plan of Care Review  Outcome: Ongoing (interventions implemented as appropriate)  Flowsheets (Taken 2020 162)  Progress: no change  Outcome Summary: Report changes to visual fields. MD aware. Denies pain or nausea. Heparin drip continues. Generalized weakness. Refusing MRI at this time.     Problem: Fall Risk (Adult)  Goal: Identify Related Risk Factors and Signs and Symptoms  Description  Related risk factors and signs and symptoms are identified upon initiation of Human Response Clinical Practice Guideline (CPG).  Outcome: Outcome(s) achieved  Flowsheets (Taken 2020 162)  Related Risk Factors (Fall Risk): age-related changes;gait/mobility problems;history of falls  Goal: Absence of Fall  Description  Patient will demonstrate the desired outcomes by discharge/transition of care.  Outcome: Ongoing (interventions implemented as appropriate)  Flowsheets (Taken 2020 162)  Absence of Fall: making progress toward outcome     Problem: Pain, Chronic (Adult)  Goal: Identify Related Risk Factors and Signs and Symptoms  Description  Related risk factors and signs and symptoms are identified upon initiation of Human Response Clinical Practice Guideline (CPG).  Outcome: Outcome(s) achieved  Goal: Acceptable Pain/Comfort Level and Functional Ability  Description  Patient will demonstrate the desired outcomes by discharge/transition of care.  Outcome: Ongoing (interventions implemented as appropriate)  Flowsheets (Taken 2020 1626)  Acceptable Pain/Comfort Level and Functional Ability: making progress toward outcome     Problem: Cardiac: ACS (Acute Coronary Syndrome) (Adult)  Description  Prevent and manage potential problems includin. cardiovascular structural defects  2. chest pain (angina)  3. dysrhythmia/arrhythmia  4. embolism  5. heart failure/shock  6. ischemia leading to infarction  7. pericarditis  8. situational response  Goal: Signs and Symptoms of Listed  Potential Problems Will be Absent, Minimized or Managed (Cardiac: ACS)  Description  Signs and symptoms of listed potential problems will be absent, minimized or managed by discharge/transition of care (reference Cardiac: ACS (Acute Coronary Syndrome) (Adult) CPG).  Outcome: Ongoing (interventions implemented as appropriate)  Flowsheets (Taken 6/1/2020 1626)  Problems Assessed (Acute Coronary Syndrome): all  Problems Present (Acute Coronary Syn): none     Problem: Activity Intolerance (Adult)  Goal: Identify Related Risk Factors and Signs and Symptoms  Description  Related risk factors and signs and symptoms are identified upon initiation of Human Response Clinical Practice Guideline (CPG).  Outcome: Outcome(s) achieved  Flowsheets (Taken 6/1/2020 1626)  Related Risk Factors (Activity Intolerance): fluid/electrolyte imbalance;generalized weakness  Signs and Symptoms (Activity Intolerance): dizziness/faintness  Goal: Activity Tolerance  Description  Patient will demonstrate the desired outcomes by discharge/transition of care.  Outcome: Ongoing (interventions implemented as appropriate)  Flowsheets (Taken 6/1/2020 1626)  Activity Tolerance: making progress toward outcome  Goal: Effective Energy Conservation Techniques  Description  Patient will demonstrate the desired outcomes by discharge/transition of care.  Outcome: Ongoing (interventions implemented as appropriate)  Flowsheets (Taken 6/1/2020 1626)  Effective Energy Conservation Techniques: making progress toward outcome

## 2020-06-02 ENCOUNTER — APPOINTMENT (OUTPATIENT)
Dept: MRI IMAGING | Facility: HOSPITAL | Age: 85
End: 2020-06-02

## 2020-06-02 PROBLEM — R42 DIZZINESS: Status: ACTIVE | Noted: 2020-06-02

## 2020-06-02 PROBLEM — R44.1 HALLUCINATIONS, VISUAL: Status: ACTIVE | Noted: 2020-06-02

## 2020-06-02 LAB
APTT PPP: 71.6 SECONDS (ref 22.7–35.4)
BASOPHILS # BLD AUTO: 0.02 10*3/MM3 (ref 0–0.2)
BASOPHILS NFR BLD AUTO: 0.3 % (ref 0–1.5)
DEPRECATED RDW RBC AUTO: 42.1 FL (ref 37–54)
EOSINOPHIL # BLD AUTO: 0.23 10*3/MM3 (ref 0–0.4)
EOSINOPHIL NFR BLD AUTO: 2.9 % (ref 0.3–6.2)
ERYTHROCYTE [DISTWIDTH] IN BLOOD BY AUTOMATED COUNT: 12.7 % (ref 12.3–15.4)
HCT VFR BLD AUTO: 31.7 % (ref 34–46.6)
HGB BLD-MCNC: 10.3 G/DL (ref 12–15.9)
IMM GRANULOCYTES # BLD AUTO: 0.03 10*3/MM3 (ref 0–0.05)
IMM GRANULOCYTES NFR BLD AUTO: 0.4 % (ref 0–0.5)
LYMPHOCYTES # BLD AUTO: 1.63 10*3/MM3 (ref 0.7–3.1)
LYMPHOCYTES NFR BLD AUTO: 20.6 % (ref 19.6–45.3)
MCH RBC QN AUTO: 29 PG (ref 26.6–33)
MCHC RBC AUTO-ENTMCNC: 32.5 G/DL (ref 31.5–35.7)
MCV RBC AUTO: 89.3 FL (ref 79–97)
MONOCYTES # BLD AUTO: 0.78 10*3/MM3 (ref 0.1–0.9)
MONOCYTES NFR BLD AUTO: 9.9 % (ref 5–12)
NEUTROPHILS # BLD AUTO: 5.22 10*3/MM3 (ref 1.7–7)
NEUTROPHILS NFR BLD AUTO: 65.9 % (ref 42.7–76)
NRBC BLD AUTO-RTO: 0 /100 WBC (ref 0–0.2)
PLATELET # BLD AUTO: 212 10*3/MM3 (ref 140–450)
PMV BLD AUTO: 11.5 FL (ref 6–12)
RBC # BLD AUTO: 3.55 10*6/MM3 (ref 3.77–5.28)
TROPONIN T SERPL-MCNC: 0.14 NG/ML (ref 0–0.03)
WBC NRBC COR # BLD: 7.91 10*3/MM3 (ref 3.4–10.8)

## 2020-06-02 PROCEDURE — 84484 ASSAY OF TROPONIN QUANT: CPT | Performed by: INTERNAL MEDICINE

## 2020-06-02 PROCEDURE — 85730 THROMBOPLASTIN TIME PARTIAL: CPT | Performed by: NURSE PRACTITIONER

## 2020-06-02 PROCEDURE — 0 GADOBENATE DIMEGLUMINE 529 MG/ML SOLUTION: Performed by: INTERNAL MEDICINE

## 2020-06-02 PROCEDURE — 99232 SBSQ HOSP IP/OBS MODERATE 35: CPT | Performed by: ORTHOPAEDIC SURGERY

## 2020-06-02 PROCEDURE — 94762 N-INVAS EAR/PLS OXIMTRY CONT: CPT

## 2020-06-02 PROCEDURE — 25010000002 ENOXAPARIN PER 10 MG: Performed by: INTERNAL MEDICINE

## 2020-06-02 PROCEDURE — 72158 MRI LUMBAR SPINE W/O & W/DYE: CPT

## 2020-06-02 PROCEDURE — 93010 ELECTROCARDIOGRAM REPORT: CPT | Performed by: INTERNAL MEDICINE

## 2020-06-02 PROCEDURE — 85025 COMPLETE CBC W/AUTO DIFF WBC: CPT | Performed by: NURSE PRACTITIONER

## 2020-06-02 PROCEDURE — 93005 ELECTROCARDIOGRAM TRACING: CPT | Performed by: INTERNAL MEDICINE

## 2020-06-02 PROCEDURE — A9577 INJ MULTIHANCE: HCPCS | Performed by: INTERNAL MEDICINE

## 2020-06-02 PROCEDURE — 94799 UNLISTED PULMONARY SVC/PX: CPT

## 2020-06-02 PROCEDURE — 99232 SBSQ HOSP IP/OBS MODERATE 35: CPT | Performed by: INTERNAL MEDICINE

## 2020-06-02 RX ORDER — LORAZEPAM 0.5 MG/1
0.5 TABLET ORAL ONCE AS NEEDED
Status: COMPLETED | OUTPATIENT
Start: 2020-06-02 | End: 2020-06-03

## 2020-06-02 RX ORDER — CARVEDILOL 3.12 MG/1
3.12 TABLET ORAL 2 TIMES DAILY WITH MEALS
Status: DISCONTINUED | OUTPATIENT
Start: 2020-06-02 | End: 2020-06-02

## 2020-06-02 RX ORDER — CEFAZOLIN SODIUM 2 G/100ML
2 INJECTION, SOLUTION INTRAVENOUS ONCE
Status: CANCELLED | OUTPATIENT
Start: 2020-06-04 | End: 2020-06-02

## 2020-06-02 RX ADMIN — SODIUM CHLORIDE 100 ML/HR: 9 INJECTION, SOLUTION INTRAVENOUS at 06:05

## 2020-06-02 RX ADMIN — CARVEDILOL 3.12 MG: 3.12 TABLET, FILM COATED ORAL at 11:59

## 2020-06-02 RX ADMIN — PANTOPRAZOLE SODIUM 40 MG: 40 TABLET, DELAYED RELEASE ORAL at 09:07

## 2020-06-02 RX ADMIN — GADOBENATE DIMEGLUMINE 14 ML: 529 INJECTION, SOLUTION INTRAVENOUS at 10:07

## 2020-06-02 RX ADMIN — POTASSIUM CHLORIDE 10 MEQ: 10 CAPSULE, COATED, EXTENDED RELEASE ORAL at 09:07

## 2020-06-02 RX ADMIN — ATORVASTATIN CALCIUM 40 MG: 20 TABLET, FILM COATED ORAL at 09:07

## 2020-06-02 RX ADMIN — LORAZEPAM 0.5 MG: 0.5 TABLET ORAL at 09:15

## 2020-06-02 RX ADMIN — HYDROCHLOROTHIAZIDE 12.5 MG: 12.5 CAPSULE ORAL at 09:07

## 2020-06-02 RX ADMIN — ASPIRIN 81 MG: 81 TABLET, COATED ORAL at 09:07

## 2020-06-02 RX ADMIN — PREDNISOLONE ACETATE 1 DROP: 10 SUSPENSION/ DROPS OPHTHALMIC at 06:28

## 2020-06-02 RX ADMIN — METOPROLOL TARTRATE 25 MG: 25 TABLET ORAL at 16:45

## 2020-06-02 RX ADMIN — FERROUS SULFATE TAB 325 MG (65 MG ELEMENTAL FE) 325 MG: 325 (65 FE) TAB at 09:08

## 2020-06-02 RX ADMIN — AMLODIPINE BESYLATE 10 MG: 10 TABLET ORAL at 09:08

## 2020-06-02 RX ADMIN — ENOXAPARIN SODIUM 70 MG: 80 INJECTION SUBCUTANEOUS at 16:45

## 2020-06-02 RX ADMIN — SODIUM CHLORIDE, PRESERVATIVE FREE 10 ML: 5 INJECTION INTRAVENOUS at 20:28

## 2020-06-02 RX ADMIN — LEVOTHYROXINE SODIUM 50 MCG: 50 TABLET ORAL at 09:07

## 2020-06-02 NOTE — PROGRESS NOTES
"[facility]    Patient Name: Mei Crane  :1926  89 y.o.  LOS: 1  Encounter Provider: Graeme Christiansen Jr, MD      Patient Care Team:  Lexie Toussaint APRN as PCP - General (Family Medicine)  Lexie Toussaint APRN as PCP - Claims Attributed  Jae Bray MD Peplinski, Lee Stanley, OD (Optometry)  Yannick Santo MD as Consulting Physician (Ophthalmology)  Nik Rodriguez MD as Consulting Physician (Orthopedic Surgery)    Chief Complaint: Follow-up syncope,visual disturbances, elevated cardiac biomarkers, moderate aortic and mitral stenosis    Interval History: No acute issues overnight.  Echocardiogram results noted.       Objective   Vital Signs  Temp:  [97.7 °F (36.5 °C)-97.8 °F (36.6 °C)] 97.7 °F (36.5 °C)  Heart Rate:  [87-96] 88  Resp:  [16] 16  BP: (120-146)/(56-77) 145/72    Intake/Output Summary (Last 24 hours) at 2020 0918  Last data filed at 2020 0903  Gross per 24 hour   Intake 620 ml   Output 700 ml   Net -80 ml     Flowsheet Rows      First Filed Value   Admission Height  163.8 cm (64.49\") Documented at 2020 1210   Admission Weight  70.4 kg (155 lb 1.6 oz) Documented at 2020 0013            Physical Exam   Constitutional: She is oriented to person, place, and time. She appears well-developed and well-nourished.   HENT:   Head: Normocephalic and atraumatic.   Eyes: Pupils are equal, round, and reactive to light. Conjunctivae are normal.   Neck: No JVD present. No thyromegaly present.   Cardiovascular: Exam reveals no gallop and no friction rub.   Murmur heard.  3-6 systolic ejection murmur heard greatest in the aortic position   Pulmonary/Chest: No respiratory distress. She exhibits no tenderness.   Abdominal: Bowel sounds are normal. She exhibits no distension.   Musculoskeletal: She exhibits no edema or tenderness.   Neurological: She is alert and oriented to person, place, and time.   Skin: No rash noted. No erythema.   Psychiatric: She " has a normal mood and affect. Judgment normal.   Vitals reviewed.        Pertinent Test Results:  Results from last 7 days   Lab Units 06/01/20  0128 06/01/20  0056   SODIUM mmol/L 139 137   POTASSIUM mmol/L 3.7 3.8   CHLORIDE mmol/L 103 103   CO2 mmol/L 23.6 24.6   BUN mg/dL 21 22   CREATININE mg/dL 1.19* 1.23*   GLUCOSE mg/dL 107* 114*   CALCIUM mg/dL 8.4 8.3     Results from last 7 days   Lab Units 06/01/20  0724 06/01/20  0056   TROPONIN T ng/mL 0.173* 0.171*     Results from last 7 days   Lab Units 06/02/20  0516 06/01/20  0724 06/01/20  0128 06/01/20  0056   WBC 10*3/mm3 7.91 9.34 11.40* 11.63*   HEMOGLOBIN g/dL 10.3* 10.3* 10.5* 10.7*   HEMATOCRIT % 31.7* 30.7* 31.7* 32.7*   PLATELETS 10*3/mm3 212 197 200 215     Results from last 7 days   Lab Units 06/02/20  0516 06/01/20  1638 06/01/20  0910 06/01/20 0128   INR   --   --   --  1.06   APTT seconds 71.6* 72.2* 101.1* 40.9*               Invalid input(s): LDLCALC      Results from last 7 days   Lab Units 06/01/20  0056   TSH uIU/mL 3.350           Medication Review:     amLODIPine 10 mg Oral Daily   aspirin 81 mg Oral Daily   atorvastatin 40 mg Oral Daily   ferrous sulfate 325 mg Oral Daily With Breakfast   hydroCHLOROthiazide 12.5 mg Oral Daily   levothyroxine 50 mcg Oral Daily   pantoprazole 40 mg Oral Daily   potassium chloride 10 mEq Oral Daily   prednisoLONE acetate 1 drop Left Eye Q6H   sodium chloride 10 mL Intravenous Q12H          heparin (porcine) 12 Units/kg/hr Last Rate: 13 Units/kg/hr (06/01/20 2119)   sodium chloride 100 mL/hr Last Rate: 100 mL/hr (06/02/20 0605)       Assessment/Plan   1.  Elevated troponin -patient denies chest pain or shortness of air.  Echocardiogram with hyperdynamic left ventricular ejection fraction and moderate valvular disease.  Moderate aortic stenosis and mitral stenosis with mild aortic regurgitation.  No regional wall motion abnormalities were noted.  Clinical picture does not seem consistent with ischemic event  although etiology is uncertain.  She went into atrial fibrillation with RVR this afternoon and this may have caused her elevated biomarkers.  I would prefer to treat this conservatively however we will consider noninvasive testing if the patient is considering surgical intervention for spinal issues.   Will discuss with orthopedic surgery once final decision is made.  Continue heparin.  Continue aspirin and atorvastatin.    Will switch beta-blocker from carvedilol to frequent dosing metoprolol.  2.  Murmur -moderate valvular disease with no evidence of volume overload.  Aggressive afterload reduction.  3.  Hypertension -labile.  Titrate beta-blocker.  Sodium restricted diet.  4.  Dyslipidemia -continue statin  5.  Dizziness and visual disturbance -defer further diagnostic work-up to internal medicine.  6.  Abnormal urinalysis -she has no dysuria or frequency and no white cells in her urine.  This could be simply asymptomatic bacteriuria.    Graeme Christiansen Jr, MD  [facility]  06/02/20  9:18 AM

## 2020-06-02 NOTE — PROGRESS NOTES
Orthopedic Progress Note      Patient: Mei Crane    YOB: 1926    Medical Record Number: [id number]    Attending Physician: Teressa Campos MD    Date of Admission: 6/1/2020 12:06 AM    Admitting Dx:  Weakness [R53.1]    Current Problem List:   Patient Active Problem List   Diagnosis   • HTN (hypertension), benign   • Acquired hypothyroidism   • Hyperlipidemia   • Gastroesophageal reflux disease without esophagitis   • Glaucoma   • Macular degeneration   • Anemia   • Osteopenia   • CKD (chronic kidney disease), stage III (CMS/HCC)   • B12 deficiency   • Calculus of gallbladder without cholecystitis   • DDD (degenerative disc disease), lumbar   • Trochanteric bursitis of right hip   • Bilateral leg weakness   • Elevated troponin   • Weakness         Past Medical History:   Diagnosis Date   • Calculus of gallbladder without cholecystitis 8/7/2017   • CKD (chronic kidney disease), stage III (CMS/HCC) 4/20/2016   • DDD (degenerative disc disease), lumbar 2/19/2019   • Disease of thyroid gland    • Hyperlipidemia    • Low back pain    • Peripheral neuropathy        Current Medications:  Scheduled Meds:  amLODIPine 10 mg Oral Daily   aspirin 81 mg Oral Daily   atorvastatin 40 mg Oral Daily   carvedilol 3.125 mg Oral BID With Meals   ferrous sulfate 325 mg Oral Daily With Breakfast   hydroCHLOROthiazide 12.5 mg Oral Daily   levothyroxine 50 mcg Oral Daily   pantoprazole 40 mg Oral Daily   potassium chloride 10 mEq Oral Daily   prednisoLONE acetate 1 drop Left Eye Q6H   sodium chloride 10 mL Intravenous Q12H     PRN Meds:.•  acetaminophen **OR** acetaminophen **OR** acetaminophen  •  cyclobenzaprine  •  LORazepam  •  nitroglycerin  •  ondansetron  •  sodium chloride    SUBJECTIVE: 89 y.o.  female. Awake and alert.  Continues to complain of LLE pain.     OBJECTIVE:   Vitals:    06/01/20 2357 06/02/20 0716 06/02/20 1307 06/02/20 1335   BP: 145/72 137/76 116/64 109/61   BP Location: Right arm Right arm      Patient Position: Lying Lying     Pulse: 88 87 (!) 154 100   Resp: 16 16     Temp: 97.7 °F (36.5 °C) 97.7 °F (36.5 °C)     TempSrc: Temporal Temporal     SpO2: 95% 96%     Weight:       Height:         I/O last 3 completed shifts:  In: 2440 [P.O.:440; I.V.:2000]  Out: 1100 [Urine:1100]    Diagnostic Tests:   Lab Results (last 24 hours)     Procedure Component Value Units Date/Time    Troponin [[id number]]  (Abnormal) Collected:  06/02/20 0517    Specimen:  Blood Updated:  06/02/20 1001     Troponin T 0.140 ng/mL     Narrative:       Troponin T Reference Range:  <= 0.03 ng/mL-   Negative for AMI  >0.03 ng/mL-     Abnormal for myocardial necrosis.  Clinicians would have to utilize clinical acumen, EKG, Troponin and serial changes to determine if it is an Acute Myocardial Infarction or myocardial injury due to an underlying chronic condition.       Results may be falsely decreased if patient taking Biotin.      aPTT [[id number]]  (Abnormal) Collected:  06/02/20 0516    Specimen:  Blood Updated:  06/02/20 0640     PTT 71.6 seconds     CBC & Differential [[id number]] Collected:  06/02/20 0516    Specimen:  Blood Updated:  06/02/20 0625    Narrative:       The following orders were created for panel order CBC & Differential.  Procedure                               Abnormality         Status                     ---------                               -----------         ------                     CBC Auto Differential[[id number]]        Abnormal            Final result                 Please view results for these tests on the individual orders.    CBC Auto Differential [[id number]]  (Abnormal) Collected:  06/02/20 0516    Specimen:  Blood Updated:  06/02/20 0625     WBC 7.91 10*3/mm3      RBC 3.55 10*6/mm3      Hemoglobin 10.3 g/dL      Hematocrit 31.7 %      MCV 89.3 fL      MCH 29.0 pg      MCHC 32.5 g/dL      RDW 12.7 %      RDW-SD 42.1 fl      MPV 11.5 fL      Platelets 212 10*3/mm3      Neutrophil % 65.9 %       Lymphocyte % 20.6 %      Monocyte % 9.9 %      Eosinophil % 2.9 %      Basophil % 0.3 %      Immature Grans % 0.4 %      Neutrophils, Absolute 5.22 10*3/mm3      Lymphocytes, Absolute 1.63 10*3/mm3      Monocytes, Absolute 0.78 10*3/mm3      Eosinophils, Absolute 0.23 10*3/mm3      Basophils, Absolute 0.02 10*3/mm3      Immature Grans, Absolute 0.03 10*3/mm3      nRBC 0.0 /100 WBC     aPTT [[id number]]  (Abnormal) Collected:  06/01/20 1638    Specimen:  Blood Updated:  06/01/20 1748     PTT 72.2 seconds           PHYSICAL EXAM:  Minimal back pain, but continues to complain of LLE pain and weakness.  MRI results noted. She has tried LESI in the past with no relief.  Troponin elevated.  Stress test ordered.       ASSESSMENT & PLAN:    Have discussed MRI with Dr. Maddox.  Have discussed with  patient and family member present in the room both non- surgical and surgical options.   Can try PT if she is not wanting surgery.  Surgery consists of L3-L5 Laminectomy and Fusion.  Patient understands that she is at increased risk due to her age of medical history.  Dr. Maddox to discuss more in detail with patient and family tomorrow.  If patient is cleared to proceed and wants surgical option, then will plan surgery on Thursday.        Date: 6/2/2020    Tasia Recinos RN  I reviewed the MRI scan.  Severe stenosis at 3 4 where she spondylolisthetic and at 4 5.  I would add a fusion at each level using simply local bone graft.  Would take about 2 hours and entail some risk.  She does not want surgery but with her progressive weakness it really need should be done unless she wants to adopt a new system with mobilization which is likely going to mean a lot of wheelchair bed to chair and some household walker ambulation.  She is going to think about it I will talk to her more tomorrow.

## 2020-06-02 NOTE — DISCHARGE PLACEMENT REQUEST
"Mei Crane (89 y.o. Female)     Date of Birth Social Security Number Address Home Phone MRN    02/14/1926  [address] [address] [phone] [id number]    Restoration Marital Status          Restorationist        Admission Date Admission Type Admitting Provider Attending Provider Department, Room/Bed    6/1/20 Urgent Nik Sy MD Hayden, Juliana, MD [address], [address]    Discharge Date Discharge Disposition Discharge Destination                       Attending Provider:  Teressa Campos MD    Allergies:  Barbiturates, Codeine    Isolation:  None   Infection:  None   Code Status:  CPR    Ht:  163.8 cm (64.49\")   Wt:  70.3 kg (155 lb)    Admission Cmt:  None   Principal Problem:  Bilateral leg weakness [R29.898]                 Active Insurance as of 6/1/2020     Primary Coverage     Payor Plan Insurance Group Employer/Plan Group    MEDICARE MEDICARE A & B      Payor Plan Address Payor Plan Phone Number Payor Plan Fax Number Effective Dates    [address] [phone]  2/1/1991 - None Entered    [facility] [id number]       Subscriber Name Subscriber Birth Date Member ID       MEI CRANE 2/14/1926 [id number]           Secondary Coverage     Payor Plan Insurance Group Employer/Plan Group    [facility] SUPP [id number]     Payor Plan Address Payor Plan Phone Number Payor Plan Fax Number Effective Dates    [address]   12/1/2016 - None Entered    [facility] [id number]       Subscriber Name Subscriber Birth Date Member ID       MEI CRANE 2/14/1926 [id number]                 Emergency Contacts      (Rel.) Home Phone Work Phone Mobile Phone    Emily Jean-Baptiste (Sister) -- -- [phone]            {Outbreak/Travel/Exposure Documentation......;  Question Available Choices Patient Response   Outbreak Screen: Do you currently have the following symptoms?        Fever/Cough/Shortness of breath/Sudden loss of taste or " smell/Sore throat/Muscleaches/Chills/No/Unknown  No (06/01/20 0021)   Outbreak Screen: In the last 14 days, have you had contact with anyone to have the 2019 Novel Coronavirus or anyone being tested for the 2019 Novel Coronavirus?  Yes/No/Unknown              No (06/01/20 0021)   Outbreak Screen: Who was notified?    Free text  (not recorded)   Travel Screen: Have you traveled in the last month? If so, to what country have you traveled? If [place] what state? Yes/No/Unknown  List of all countries  List of all States No (06/01/20 0502)  (not recorded)  (not recorded)   Infection Risk: Do you currently have the following symptoms?  (If cough is selected, the Tuberculosis Screen is performed.) Cough/Fever/Rash/No No (06/01/20 0502)   Tuberculosis Screen: Do you have any of the following Tuberculosis Risks?  · Have you lived or spent time with anyone who had or may have TB?  · Have you lived in or visited any of the following areas for more than one month: [place], [place], [place], [place] or [place], the [place] or [place]?  · Do you have HIV/AIDS?  · Have you lived in or worked in a nursing home, homeless shelter, correctional facility, or substance abuse treatment facility?   · No    If Yes do you have any of the following symptoms? Yes responses display to the right    If Yes symptoms listed are:  Cough greater than or equal to 3 weeks/Loss of appetite/Unexplained weight loss/Night sweats/Bloody sputum or hemoptysis/Hoarseness/Fever/Fatique/Chest pain/No (not recorded)  (not recorded)   Exposure Screen: Have you been exposed to any of these contagious diseases in the last month? Measles/Chickenpox/Meningitis/Pertussis/Whooping Cough/No No (06/01/20 0502)

## 2020-06-02 NOTE — PROGRESS NOTES
"    Name: Mei Crane ADMIT: 2020   : 1926  PCP: MartinaLexie irving, [facility]    MRN: [id number] LOS: 1 days   AGE/SEX: 89 y.o. female  ROOM: [facility]     Subjective   Subjective   Sitting up in chair. Sister at bedside. Back from MRI. Denies any dyspnea, chest pain, nausea, vomiting or cough. States she doesn't really have any back pain, but her leg weakness is her biggest complaint. Her sister thinks her right leg is weaker than the left. Patient reports dizziness (worse than her baseline) as well as seeing \"flowers\" since Thursday. States she had an old stroke before.     Objective   Objective   Vital Signs  Temp:  [97.7 °F (36.5 °C)-97.8 °F (36.6 °C)] 97.7 °F (36.5 °C)  Heart Rate:  [] 102  Resp:  [16-18] 18  BP: (109-146)/(61-78) 118/78  SpO2:  [93 %-96 %] 93 %  on  Flow (L/min):  [1] 1;   Device (Oxygen Therapy): nasal cannula  Body mass index is 26.2 kg/m².     Physical Exam   Constitutional: She is oriented to person, place, and time. She appears well-developed and well-nourished. No distress.   HENT:   Head: Normocephalic and atraumatic.   Nose: Nose normal.   Mouth/Throat: Oropharynx is clear and moist.   Eyes: Conjunctivae are normal. Right eye exhibits no discharge. Left eye exhibits no discharge.   Neck: Normal range of motion. Neck supple.   Cardiovascular: Normal rate, regular rhythm, normal heart sounds and intact distal pulses.   Pulmonary/Chest: Effort normal and breath sounds normal. No respiratory distress.   Abdominal: Soft. Bowel sounds are normal. She exhibits no distension. There is no tenderness.   Musculoskeletal: Normal range of motion. She exhibits no edema or tenderness.   Neurological: She is alert and oriented to person, place, and time. She exhibits normal muscle tone. Coordination normal.   Skin: Skin is warm and dry. She is not diaphoretic. No erythema.   Psychiatric: She has a normal mood and affect. Her behavior is normal.   Nursing note and vitals " reviewed.    Results Review:       I reviewed the patient's new clinical results.  Results from last 7 days   Lab Units 06/02/20  0516 06/01/20  0724 06/01/20  0128 06/01/20  0056   WBC 10*3/mm3 7.91 9.34 11.40* 11.63*   HEMOGLOBIN g/dL 10.3* 10.3* 10.5* 10.7*   PLATELETS 10*3/mm3 212 197 200 215     Results from last 7 days   Lab Units 06/01/20  0128 06/01/20  0056   SODIUM mmol/L 139 137   POTASSIUM mmol/L 3.7 3.8   CHLORIDE mmol/L 103 103   CO2 mmol/L 23.6 24.6   BUN mg/dL 21 22   CREATININE mg/dL 1.19* 1.23*   GLUCOSE mg/dL 107* 114*   Estimated Creatinine Clearance: 28.1 mL/min (A) (by C-G formula based on SCr of 1.19 mg/dL (H)).    Results from last 7 days   Lab Units 06/01/20  0128 06/01/20  0056   CALCIUM mg/dL 8.4 8.3       No results found for: HGBA1C, POCGLU      amLODIPine 10 mg Oral Daily   aspirin 81 mg Oral Daily   atorvastatin 40 mg Oral Daily   enoxaparin 1 mg/kg Subcutaneous Q12H   ferrous sulfate 325 mg Oral Daily With Breakfast   hydroCHLOROthiazide 12.5 mg Oral Daily   levothyroxine 50 mcg Oral Daily   metoprolol tartrate 25 mg Oral Q8H   pantoprazole 40 mg Oral Daily   potassium chloride 10 mEq Oral Daily   prednisoLONE acetate 1 drop Left Eye Q6H   sodium chloride 10 mL Intravenous Q12H      Diet Regular; Renal  NPO Diet  NPO Diet NPO Except: Sips With Meds       Assessment/Plan     Active Hospital Problems    Diagnosis  POA   • **Bilateral leg weakness [R29.898]  Unknown   • Dizziness [R42]  Yes   • Hallucinations, visual [R44.1]  Unknown   • Elevated troponin [R79.89]  Unknown   • Weakness [R53.1]  Yes   • DDD (degenerative disc disease), lumbar [M51.36]  Yes   • CKD (chronic kidney disease), stage III (CMS/HCC) [N18.3]  Yes   • HTN (hypertension), benign [I10]  Yes   • Acquired hypothyroidism [E03.9]  Yes   • Hyperlipidemia [E78.5]  Yes   • Gastroesophageal reflux disease without esophagitis [K21.9]  Yes   • Macular degeneration [H35.30]  Yes   • Osteopenia [M85.80]  Yes      Resolved  Hospital Problems   No resolved problems to display.     Bilateral leg weakness/lumbar DDD  -Orthopedic surgery consulted. MRI lumbar spine showing severe stenosis in lumbar spine. S/P epidurals. Surgical versus non-surgical options discussed. ? L3-L5 laminectomy and fusion tentatively for Thursday if decides surgical route.  -PT consulted.   -CCP helping with placement.     Elevated troponin  -Cardiology consulted. Heparin gtt initially started, but now discontinued and on Lovenox.  -EKG upon arrival here shows normal sinus rhythm, with old infarct.  -Echocardiogram with hyperdynamic left ventricular EF and moderate valvular disease. Moderate aortic stenosis and mitral stenosis with mild aortic regurgitation. Wall motion okay. Doubt ACS at this point.  -Went into afib with RVR this afternoon. Coreg switched to metoprolol. Plans for conservative management. May need stress testing, but waiting on final plans from Ortho.    CKD3  -Creatinine 1.19 on 6/1/20. Repeat labs in AM.  -She is eating/drinking. Avoid nephrotoxins.     Hypertension  -BP stable. Continue same regimen.    Hyperlipidemia  -Statin continued.    Dizziness/visual hallucinations  -Reports since last Thursday in addition to recurrent falls and leg weakness.  -Will check MRI brain. Neurology consult possible pending findings.       I discussed the patient's findings and my recommendations with patient and Dr. Campos.     VTE Prophylaxis - Lovenox.  Code Status - Full code.  Disposition - TBD.      [facility] Yun  [facility]  06/02/20  14:53

## 2020-06-02 NOTE — PROGRESS NOTES
Continued Stay Note  [facility]     Patient Name: Mei Crane  MRN: [id number]  Today's Date: 6/2/2020    Admit Date: 6/1/2020    Discharge Plan     Row Name 06/02/20 1405       Plan    Plan  SNF    Patient/Family in Agreement with Plan  yes    Plan Comments  Met with patient at the bedside provided IMM. Discussed rehab at DC. Nathalie agreeable to DC to SNF. She has been to [facility] in the past and would like a referral there and to other [facility] facilites close to [facility]. Left VM with Rebecca/[facility] and placed referral in Epic. Tia Okeefe RN        Discharge Codes    No documentation.             Tia Okeefe RN

## 2020-06-02 NOTE — PLAN OF CARE
Problem: Patient Care Overview  Goal: Plan of Care Review  Outcome: Ongoing (interventions implemented as appropriate)  Flowsheets (Taken 6/2/2020 0643)  Plan of Care Reviewed With: patient  Outcome Summary: Patient having periods of sleep apnea tonight 02 at 2lpm placed on patient she was informed of this stated that she did not know she had an issue with this, will continue to monitor patient closely.     Problem: Patient Care Overview  Goal: Plan of Care Review  Outcome: Ongoing (interventions implemented as appropriate)  Flowsheets (Taken 6/2/2020 0643)  Plan of Care Reviewed With: patient  Outcome Summary: Patient having periods of sleep apnea tonight 02 at 2lpm placed on patient she was informed of this stated that she did not know she had an issue with this, will continue to monitor patient closely.     Problem: Fall Risk (Adult)  Goal: Absence of Fall  Outcome: Ongoing (interventions implemented as appropriate)     Problem: Skin Injury Risk (Adult)  Goal: Identify Related Risk Factors and Signs and Symptoms  Outcome: Ongoing (interventions implemented as appropriate)

## 2020-06-02 NOTE — NURSING NOTE
Pt converted to afib w/ rvr. HR max 162 non-sustained. Averaging 110s-130. Pt asymptomatic. EKG obtained. Cardio NP aware.

## 2020-06-02 NOTE — PLAN OF CARE
"  Problem: Patient Care Overview  Goal: Plan of Care Review  Outcome: Ongoing (interventions implemented as appropriate)  Flowsheets (Taken 2020)  Progress: no change  Plan of Care Reviewed With: patient  Outcome Summary: Lumbar MRI completed - surg rec'd.  will speak with pt in am. Continues to c/o \"seeing flowers\" in all visual fields. MRI of brain in am on 6/3. Denies CP or SOA. Converted to afib for approx 1-2 hours, reverted to sinus w/o intervention. Cardio aware. Meds adjusted.  Stress test canceled indefinitely.  Heparin and IVF d/c. Lovenox started. Comfortable, up in chair at present.     Problem: Fall Risk (Adult)  Goal: Absence of Fall  Description  Patient will demonstrate the desired outcomes by discharge/transition of care.  Outcome: Ongoing (interventions implemented as appropriate)  Flowsheets (Taken 2020)  Absence of Fall: making progress toward outcome     Problem: Pain, Chronic (Adult)  Goal: Acceptable Pain/Comfort Level and Functional Ability  Description  Patient will demonstrate the desired outcomes by discharge/transition of care.  Outcome: Ongoing (interventions implemented as appropriate)  Flowsheets (Taken 2020)  Acceptable Pain/Comfort Level and Functional Ability: making progress toward outcome     Problem: Cardiac: ACS (Acute Coronary Syndrome) (Adult)  Description  Prevent and manage potential problems includin. cardiovascular structural defects  2. chest pain (angina)  3. dysrhythmia/arrhythmia  4. embolism  5. heart failure/shock  6. ischemia leading to infarction  7. pericarditis  8. situational response  Goal: Signs and Symptoms of Listed Potential Problems Will be Absent, Minimized or Managed (Cardiac: ACS)  Description  Signs and symptoms of listed potential problems will be absent, minimized or managed by discharge/transition of care (reference Cardiac: ACS (Acute Coronary Syndrome) (Adult) CPG).  Outcome: Ongoing (interventions " implemented as appropriate)  Flowsheets (Taken 2020 172)  Problems Assessed (Acute Coronary Syndrome): all  Problems Present (Acute Coronary Syn): dysrhythmia/arrhythmia     Problem: Activity Intolerance (Adult)  Goal: Activity Tolerance  Description  Patient will demonstrate the desired outcomes by discharge/transition of care.  Outcome: Ongoing (interventions implemented as appropriate)  Flowsheets (Taken 2020 172)  Activity Tolerance: making progress toward outcome  Goal: Effective Energy Conservation Techniques  Description  Patient will demonstrate the desired outcomes by discharge/transition of care.  Outcome: Ongoing (interventions implemented as appropriate)  Flowsheets (Taken 2020 172)  Effective Energy Conservation Techniques: making progress toward outcome     Problem: Skin Injury Risk (Adult)  Goal: Identify Related Risk Factors and Signs and Symptoms  Description  Related risk factors and signs and symptoms are identified upon initiation of Human Response Clinical Practice Guideline (CPG).  Outcome: Outcome(s) achieved  Flowsheets (Taken 2020)  Related Risk Factors (Skin Injury Risk): advanced age; cognitive impairment; mobility impaired  Goal: Skin Health and Integrity  Description  Patient will demonstrate the desired outcomes by discharge/transition of care.  Outcome: Ongoing (interventions implemented as appropriate)  Flowsheets (Taken 2020 172)  Skin Health and Integrity: making progress toward outcome     Problem: Arrhythmia/Dysrhythmia (Symptomatic) (Adult)  Description  Prevent and manage potential problems includin. chest pain (angina)  2. electrophysiologic conduction defect  3. embolism  4. hemodynamic instability  5. hypoxia/hypoxemia  6. situational response  7. syncope  Goal: Signs and Symptoms of Listed Potential Problems Will be Absent, Minimized or Managed (Arrhythmia/Dysrhythmia)  Description  Signs and symptoms of listed potential problems will be  absent, minimized or managed by discharge/transition of care (reference Arrhythmia/Dysrhythmia (Symptomatic) (Adult) CPG).  Outcome: Ongoing (interventions implemented as appropriate)  Flowsheets (Taken 6/2/2020 1722)  Problems Assessed (Arrhythmia/Dysrhythmia): all  Problems Present (Dysrhythmia): electrophysiologic conduction defect

## 2020-06-02 NOTE — PROGRESS NOTES
Continued Stay Note  [facility]     Patient Name: Mei Crane  MRN: [id number]  Today's Date: 6/2/2020    Admit Date: 6/1/2020    Discharge Plan     Row Name 06/02/20 1431       Plan    Plan Comments  Inbound call from Rebecca/[facility]. [facility] does have beds available. Per notes possible surgery on Thursday. Rebecca to follow along and plan for DC to [facility] when stable. Partial packet in CCP office. Tia Okeefe RN    Row Name 06/02/20 1403       Plan    Plan  SNF    Patient/Family in Agreement with Plan  yes    Plan Comments  Met with patient at the bedside provided IMM. Discussed rehab at DC. Nathalie agreeable to DC to SNF. She has been to [facility] in the past and would like a referral there and to other [facility] facilites close to [facility]. Left VM with Rebecca/Jayda and placed referral in Epic. Tia Okeefe RN        Discharge Codes    No documentation.             Tia Okeefe RN

## 2020-06-03 ENCOUNTER — APPOINTMENT (OUTPATIENT)
Dept: MRI IMAGING | Facility: HOSPITAL | Age: 85
End: 2020-06-03

## 2020-06-03 PROBLEM — I63.9 CVA (CEREBROVASCULAR ACCIDENT): Status: ACTIVE | Noted: 2020-06-03

## 2020-06-03 LAB
ANION GAP SERPL CALCULATED.3IONS-SCNC: 8.3 MMOL/L (ref 5–15)
BUN BLD-MCNC: 16 MG/DL (ref 8–23)
BUN/CREAT SERPL: 16.7 (ref 7–25)
CALCIUM SPEC-SCNC: 8.5 MG/DL (ref 8.2–9.6)
CHLORIDE SERPL-SCNC: 106 MMOL/L (ref 98–107)
CO2 SERPL-SCNC: 23.7 MMOL/L (ref 22–29)
CREAT BLD-MCNC: 0.96 MG/DL (ref 0.57–1)
DEPRECATED RDW RBC AUTO: 40.4 FL (ref 37–54)
ERYTHROCYTE [DISTWIDTH] IN BLOOD BY AUTOMATED COUNT: 12.5 % (ref 12.3–15.4)
GFR SERPL CREATININE-BSD FRML MDRD: 54 ML/MIN/1.73
GLUCOSE BLD-MCNC: 93 MG/DL (ref 65–99)
GLUCOSE BLDC GLUCOMTR-MCNC: 109 MG/DL (ref 70–130)
HCT VFR BLD AUTO: 30.3 % (ref 34–46.6)
HGB BLD-MCNC: 10 G/DL (ref 12–15.9)
MCH RBC QN AUTO: 29.1 PG (ref 26.6–33)
MCHC RBC AUTO-ENTMCNC: 33 G/DL (ref 31.5–35.7)
MCV RBC AUTO: 88.1 FL (ref 79–97)
PLATELET # BLD AUTO: 221 10*3/MM3 (ref 140–450)
PMV BLD AUTO: 11.1 FL (ref 6–12)
POTASSIUM BLD-SCNC: 3.7 MMOL/L (ref 3.5–5.2)
RBC # BLD AUTO: 3.44 10*6/MM3 (ref 3.77–5.28)
SODIUM BLD-SCNC: 138 MMOL/L (ref 136–145)
WBC NRBC COR # BLD: 8.72 10*3/MM3 (ref 3.4–10.8)

## 2020-06-03 PROCEDURE — 25010000002 ENOXAPARIN PER 10 MG: Performed by: INTERNAL MEDICINE

## 2020-06-03 PROCEDURE — 99231 SBSQ HOSP IP/OBS SF/LOW 25: CPT | Performed by: ORTHOPAEDIC SURGERY

## 2020-06-03 PROCEDURE — 80048 BASIC METABOLIC PNL TOTAL CA: CPT | Performed by: NURSE PRACTITIONER

## 2020-06-03 PROCEDURE — 85027 COMPLETE CBC AUTOMATED: CPT | Performed by: NURSE PRACTITIONER

## 2020-06-03 PROCEDURE — 82962 GLUCOSE BLOOD TEST: CPT

## 2020-06-03 PROCEDURE — 92610 EVALUATE SWALLOWING FUNCTION: CPT

## 2020-06-03 PROCEDURE — 70551 MRI BRAIN STEM W/O DYE: CPT

## 2020-06-03 PROCEDURE — 99232 SBSQ HOSP IP/OBS MODERATE 35: CPT | Performed by: INTERNAL MEDICINE

## 2020-06-03 PROCEDURE — 99222 1ST HOSP IP/OBS MODERATE 55: CPT | Performed by: PSYCHIATRY & NEUROLOGY

## 2020-06-03 RX ORDER — ATORVASTATIN CALCIUM 80 MG/1
80 TABLET, FILM COATED ORAL NIGHTLY
Status: DISCONTINUED | OUTPATIENT
Start: 2020-06-04 | End: 2020-06-05 | Stop reason: HOSPADM

## 2020-06-03 RX ORDER — SODIUM CHLORIDE 0.9 % (FLUSH) 0.9 %
10 SYRINGE (ML) INJECTION AS NEEDED
Status: DISCONTINUED | OUTPATIENT
Start: 2020-06-03 | End: 2020-06-05 | Stop reason: HOSPADM

## 2020-06-03 RX ORDER — ASPIRIN 81 MG/1
81 TABLET, CHEWABLE ORAL DAILY
Status: DISCONTINUED | OUTPATIENT
Start: 2020-06-04 | End: 2020-06-04

## 2020-06-03 RX ORDER — ASPIRIN 300 MG/1
300 SUPPOSITORY RECTAL DAILY
Status: DISCONTINUED | OUTPATIENT
Start: 2020-06-04 | End: 2020-06-04

## 2020-06-03 RX ORDER — SODIUM CHLORIDE 0.9 % (FLUSH) 0.9 %
10 SYRINGE (ML) INJECTION EVERY 12 HOURS SCHEDULED
Status: DISCONTINUED | OUTPATIENT
Start: 2020-06-03 | End: 2020-06-05 | Stop reason: HOSPADM

## 2020-06-03 RX ADMIN — ATORVASTATIN CALCIUM 40 MG: 20 TABLET, FILM COATED ORAL at 09:15

## 2020-06-03 RX ADMIN — LORAZEPAM 0.5 MG: 0.5 TABLET ORAL at 07:39

## 2020-06-03 RX ADMIN — AMLODIPINE BESYLATE 10 MG: 10 TABLET ORAL at 09:15

## 2020-06-03 RX ADMIN — PREDNISOLONE ACETATE 1 DROP: 10 SUSPENSION/ DROPS OPHTHALMIC at 06:35

## 2020-06-03 RX ADMIN — PREDNISOLONE ACETATE 1 DROP: 10 SUSPENSION/ DROPS OPHTHALMIC at 12:07

## 2020-06-03 RX ADMIN — LEVOTHYROXINE SODIUM 50 MCG: 50 TABLET ORAL at 06:37

## 2020-06-03 RX ADMIN — METOPROLOL TARTRATE 25 MG: 25 TABLET ORAL at 17:36

## 2020-06-03 RX ADMIN — PREDNISOLONE ACETATE 1 DROP: 10 SUSPENSION/ DROPS OPHTHALMIC at 17:40

## 2020-06-03 RX ADMIN — FERROUS SULFATE TAB 325 MG (65 MG ELEMENTAL FE) 325 MG: 325 (65 FE) TAB at 09:15

## 2020-06-03 RX ADMIN — ASPIRIN 81 MG: 81 TABLET, COATED ORAL at 09:15

## 2020-06-03 RX ADMIN — HYDROCHLOROTHIAZIDE 12.5 MG: 12.5 CAPSULE ORAL at 09:15

## 2020-06-03 RX ADMIN — METOPROLOL TARTRATE 25 MG: 25 TABLET ORAL at 23:20

## 2020-06-03 RX ADMIN — SODIUM CHLORIDE, PRESERVATIVE FREE 10 ML: 5 INJECTION INTRAVENOUS at 21:24

## 2020-06-03 RX ADMIN — SODIUM CHLORIDE, PRESERVATIVE FREE 10 ML: 5 INJECTION INTRAVENOUS at 21:25

## 2020-06-03 RX ADMIN — METOPROLOL TARTRATE 25 MG: 25 TABLET ORAL at 00:11

## 2020-06-03 RX ADMIN — ENOXAPARIN SODIUM 70 MG: 80 INJECTION SUBCUTANEOUS at 17:34

## 2020-06-03 RX ADMIN — SODIUM CHLORIDE, PRESERVATIVE FREE 10 ML: 5 INJECTION INTRAVENOUS at 09:17

## 2020-06-03 RX ADMIN — METOPROLOL TARTRATE 25 MG: 25 TABLET ORAL at 06:35

## 2020-06-03 RX ADMIN — POTASSIUM CHLORIDE 10 MEQ: 10 CAPSULE, COATED, EXTENDED RELEASE ORAL at 09:15

## 2020-06-03 RX ADMIN — PREDNISOLONE ACETATE 1 DROP: 10 SUSPENSION/ DROPS OPHTHALMIC at 00:49

## 2020-06-03 RX ADMIN — SODIUM CHLORIDE, PRESERVATIVE FREE 10 ML: 5 INJECTION INTRAVENOUS at 17:37

## 2020-06-03 RX ADMIN — PANTOPRAZOLE SODIUM 40 MG: 40 TABLET, DELAYED RELEASE ORAL at 06:37

## 2020-06-03 NOTE — CONSULTS
Neurology Note    Patient:  Mei Crane    YOB: 1926    REFERRING PHYSICIAN:  Nik Sy MD    CHIEF COMPLAINT:    Leg weakness    HISTORY OF PRESENT ILLNESS:   The patient is a 89 y.o. female with chronic LBP, DDD ls spine, peripheral neuropathy, HTN, HLP, CKD,  who lives alone, developed increasing difficulty with gait and falls, leg weakness, dizziness for several days. She has been living at home. Recently started using a walker. On admission on 6/1 noted to have elevated troponin, cardiology consulted and ordered heparin. Yday her ECG showed new onset Afib. She is on tx Lovenox. MRI brain with multiple bilateral cerebral small acute infarcts, 2 in right PICA territory in the right cerebellum. MRI LS spine with moderate to severe stenosis at L4-5. Currently denies new concerns. Cleared for po diet.    Past Medical History:  Past Medical History:   Diagnosis Date   • Calculus of gallbladder without cholecystitis 8/7/2017   • CKD (chronic kidney disease), stage III (CMS/HCC) 4/20/2016   • DDD (degenerative disc disease), lumbar 2/19/2019   • Disease of thyroid gland    • Hyperlipidemia    • Low back pain    • Peripheral neuropathy        Past Surgical History:  Past Surgical History:   Procedure Laterality Date   • CATARACT EXTRACTION Bilateral    • EPIDURAL BLOCK     • HYSTERECTOMY      age 35       Social History:   Social History     Socioeconomic History   • Marital status:      Spouse name: Not on file   • Number of children: Not on file   • Years of education: Not on file   • Highest education level: Not on file   Tobacco Use   • Smoking status: Never Smoker   • Smokeless tobacco: Never Used   Substance and Sexual Activity   • Alcohol use: Yes     Comment: occasionally   • Drug use: No        Family History:   Family History   Problem Relation Age of Onset   • Aneurysm Mother         AAA   • Deep vein thrombosis Father        Medications Prior to Admission:    Prior to  Admission medications    Medication Sig Start Date End Date Taking? Authorizing Provider   amLODIPine (NORVASC) 10 MG tablet Take 1 tablet by mouth Daily. 3/16/20  Yes Lexie Toussaint APRN   ascorbic acid (VITAMIN C) 1000 MG tablet Take 1 tablet by mouth Daily. 4/24/17  Yes Jae Bray MD   aspirin 81 MG EC tablet Take 81 mg by mouth Daily.   Yes Hayley Kelley MD   Cholecalciferol (VITAMIN D PO) Take 2,000 Units by mouth Daily.   Yes Hayley Kelley MD   cyclobenzaprine (FLEXERIL) 5 MG tablet Take 1 tablet by mouth 3 (Three) Times a Day As Needed for Muscle Spasms. 5/21/20  Yes Lexie Toussaint APRN   ferrous gluconate 324 (37.5 Fe) MG tablet tablet Take 1 tablet by mouth Daily With Breakfast. 6/8/18  Yes Jae Bray MD   hydroCHLOROthiazide (MICROZIDE) 12.5 MG capsule Take 12.5 mg by mouth Daily. 3/26/20  Yes Hayley Kelley MD   levothyroxine (Synthroid) 50 MCG tablet Take 1 tablet by mouth Daily. 3/20/20  Yes Lexie Toussaint APRN   pantoprazole (PROTONIX) 40 MG EC tablet Take 1 tablet by mouth Daily. 12/2/19  Yes Lexie Toussaint APRN   potassium chloride (K-DUR,KLOR-CON) 10 MEQ CR tablet Take 1 tablet by mouth Daily. 4/29/19  Yes Lexie Toussaint APRN   prednisoLONE acetate (PRED FORTE) 1 % ophthalmic suspension INSTILL ONE DROP IN THE LEFT EYE FOUR TIMES DAILY 2/23/18  Yes Hayley Kelley MD   atorvastatin (LIPITOR) 40 MG tablet Take 1 tablet by mouth Daily. 10/30/19   Lexie Toussaint APRN   ibuprofen (ADVIL,MOTRIN) 400 MG tablet Take 400 mg by mouth Every 6 (Six) Hours As Needed for Mild Pain .    Hayley Kelley MD       Allergies:  Barbiturates and Codeine      Review of system  Review of Systems   Musculoskeletal: Positive for gait problem.   Neurological: Positive for weakness.   All other systems reviewed and are negative.      Vitals:    06/03/20 0742   BP:    Pulse:    Resp:    Temp: 97.1 °F (36.2 °C)   SpO2:        Physical exam  Physical Exam    Constitutional: She is oriented to person, place, and time. She appears well-developed and well-nourished.   HENT:   Head: Normocephalic and atraumatic.   Cardiovascular: Normal rate and regular rhythm.   Pulmonary/Chest: Effort normal.   Neurological: She is alert and oriented to person, place, and time. She has normal reflexes. She displays no Babinski's sign on the right side. She displays no Babinski's sign on the left side.   Speech clear, right lower facial droop, VFF, moves limbs against gravity, ROOSEVELT slower and clumsy on the right, DTRs hypoactive, decreased distal LE sensation to vibration, cold. Light touch equal.   Psychiatric: She has a normal mood and affect. Her behavior is normal. Thought content normal.         Lab Results   Component Value Date    WBC 8.72 06/03/2020    HGB 10.0 (L) 06/03/2020    HCT 30.3 (L) 06/03/2020    MCV 88.1 06/03/2020     06/03/2020     Lab Results   Component Value Date    GLUCOSE 93 06/03/2020    BUN 16 06/03/2020    CREATININE 0.96 06/03/2020    EGFRIFNONA 54 (L) 06/03/2020    EGFRIFAFRI 47 (L) 03/10/2020    BCR 16.7 06/03/2020    CO2 23.7 06/03/2020    CALCIUM 8.5 06/03/2020    PROTENTOTREF 6.2 03/10/2020    ALBUMIN 3.90 03/10/2020    LABIL2 1.7 03/10/2020    AST 20 03/10/2020    ALT 19 03/10/2020     Will review results with patient at her office visit.   Contains abnormal data Vitamin B12   Order: [id number]   Status:  Final result   Visible to patient:  No (Not Released) Next appt:  07/23/2020 at 10:00 AM in Family Medicine (Lexie Toussaint, COCO) Dx:  B12 deficiency   Specimen Information: Blood        Component  Ref Range & Units 2mo ago 1yr ago   Vitamin B-12  211 - 946 pg/mL >2000High   758 R               TSH   Order: [id number]   Status:  Final result   Visible to patient:  No (Not Released) Next appt:  07/23/2020 at 10:00 AM in Family Medicine (COCO Garcia)   Specimen Information: Blood        Component  Ref Range & Units 2d ago 2mo ago    TSH  0.270 - 4.200 uIU/mL 3.350  0.172Low               ECG 12 Lead   Order: [id number]   Status:  Final result   Visible to patient:  No (Not Released) Next appt:  2020 at 10:00 AM in Family Medicine (Lexie Toussaint, APRN)      Narrative & Impression     HEART RATE= 133  bpm  RR Interval= 449  ms  SD Interval=   ms  P Horizontal Axis=   deg  P Front Axis=   deg  QRSD Interval= 83  ms  QT Interval= 354  ms  QRS Axis= -26  deg  T Wave Axis= 68  deg  - ABNORMAL ECG -  Atrial fibrillation - new  Ventricular premature complex  LVH by voltage  Minimal ST elevation, inferior leads  Prolonged QT interval  Electronically Signed By: Shikha Diaz ([facility]) 2020 14:26:39  Date and Time of Study: 2020 13:28:05      Specimen Collected: 20 13:28           Echo Complete w/Doppler and Color Flow   Order# [id number]   Reading physician: Russ Magaña MD Ordering physician: Graeme Christiansen Jr., MD Study date: 20   Patient Information     Patient Name  Mei Crane MRN  [id number] Sex  Female  (Age)  1926 (89 y.o.)   Admission Information     Admission Date/Time Discharge Date/Time Room/Bed   20  0006  N624/1   Sedation Narrator Report     Sedation Narrator Report   Interpretation Summary     · Left ventricular systolic function is hyperdynamic (EF > 70%).  · Left ventricular diastolic dysfunction is noted (grade I a w/high LAP) consistent with impaired relaxation.  · Normal right ventricular cavity size and systolic function noted.  · Left atrial cavity size is moderate-to-severely dilated.  · There is severe nodular calcification of the posterior mitral annulus. There is moderate calcification of the anterior mitral annulus. There is calcification of several of the mitral valve chordae.  · There is at least moderate mitral stenosis (heart rate of 89).  · The mitral valve mean gradient is 6 mmHg. The mitral valve peak gradient is 15 mmHg.  · Mild tricuspid valve regurgitation is  present.  · Calculated right ventricular systolic pressure from tricuspid regurgitation is 43 mmHg.  · There is no evidence of pericardial effusion.         Radiological Studies:  Mri Brain Without Contrast    Result Date: 6/3/2020  MRI OF THE BRAIN WITHOUT CONTRAST 06/03/2020  CLINICAL HISTORY: Subacute neurodeficit bilateral lower extremity weakness, hallucinations, visual disturbance.  TECHNIQUE: Axial T1, FLAIR, fat-suppressed T2, axial diffusion and gradient echo T2 and sagittal T1-weighted images were obtained of the entire head.  COMPARISON: There are no prior head CTs or MRIs of the brain from [facility] for comparison.  FINDINGS: There are patchy nodular and confluent areas of T2 high signal in the periventricular and subcortical white matter of the cerebral hemispheres, as well as some patchy T2 high signal involving central pontine white matter, all of which is consistent with moderate small vessel disease. On the gradient echo T2 weighted images, there are 4 tiny 2-3 mm nodular foci of signal loss in the mid and inferior left cerebellum, compatible with punctate foci of hemosiderin deposition from tiny old microhemorrhages at the site and may be from hypertension or amyloid angiopathy. On the axial diffusion weighted images, there are several scattered tiny foci of diffusion hyperintensity that demonstrate some low signal on the ADC maps consistent with tiny acute to subacute infarcts. This includes a 5 x 4 mm focus in posterior inferior lateral right cerebellum and an additional 7 x 3 mm focus posterior medial right cerebellum and the right PICA territory. There is a 3 mm focus in the medial aspect of the left cerebral peduncle. There is a 6 mm rounded focus in the mid left corona radiata region and a 5 mm focus in the anterolateral left thalamus, 3 mm focus in the superior left frontal juxtacortical white matter, and a 3 mm focus in the anterior superior medial right parietal  juxtacortical white matter. Multiple different vascular territories suggests the patient may have a central or cardiac embolic source. The remainder of the brain parenchyma is normal in signal intensity. The ventricles are normal in size. I see no midline shift and no extra-axial fluid collections are identified. There is mild posterior inferior right maxillary sinus mucosal thickening. The remainder of the paranasal sinuses and mastoid air cells and middle ear cavities are clear. Good flow voids are demonstrated within the cerebral vessels and in the dural venous sinuses. Calvarium and skull base demonstrate normal marrow signal intensity. There is failure of fluid suppression within the right globe that may be an artifact versus potentially changes from a prior right ocular procedure.      1. There is moderate small vessel disease in the cerebral white matter. 2. Four separate 2-3 mm punctate foci of hemosiderin deposition mid and inferior left cerebellar white matter from tiny old microhemorrhages that may be secondary to hypertension or amyloid angiopathy. 3. Failure of fluid suppression on FLAIR images in the right globe that could be artifact or potentially from a prior right ocular procedure and correlation with clinical history suggested. 4. There are multiple tiny acute to subacute infarcts in multiple different vascular territories. This includes a 5 x 4 mm acute to subacute infarct in posterior inferior lateral right cerebellum and a 7 x 3 mm subacute infarct in posterior inferior medial right cerebellum, and these are on the right PICA territory. There is 3 mm infarct in medial aspect of the left cerebral peduncle. There is 6 mm infarct in the mid left corona radiata region and a 5 mm infarct in the anterolateral left thalamus. There is a 3 mm tiny acute to subacute infarct in the anterior superior medial right parietal lobe in either the right anterior or middle cerebral artery territory, 3 mm tiny  infarct in the superior left frontal juxtacortical white matter and the left middle cerebral artery territory. Multiple tiny infarcts in different vascular territories suggest patient may have a cardiac or central embolic source, correlate clinically. The remainder of the MRI of the head is normal. The results were communicated to Dr. Joyce Campos by telephone on 06/03/2020 at 8:45 AM.  This report was finalized on 6/3/2020 10:13 AM by Dr. Perez English M.D.      Fl Guided Pain Management Spine    Result Date: 5/20/2020  This procedure was auto-finalized with no dictation required.    Mri Lumbar Spine With & Without Contrast    Result Date: 6/2/2020  MRI LUMBAR SPINE WITH AND WITHOUT CONTRAST  HISTORY: Low back pain with bilateral lower extremity pain and weakness.  TECHNIQUE: Lumbar spine includes sagittal T1, T2 fat sat, PD as well as axial T1 and T2 weighted sequences. Gadolinium administered intravenously followed by axial T1 and sagittal T1 fat-saturated sequences.  FINDINGS: There is degenerative disc disease in lower thoracic spine and lumbar spine with disc space narrowing that appears greatest at T11-12, L3-4, L4-5. There is a scoliotic curvature that is convex to the right at L3-4. Within the posterior right lobe of the liver there is an hepatic cyst measuring 4.1 x 2.7 cm and this was also demonstrated on previous CT abdomen 07/22/2017.  At T10-11 there is a broad disc/osteophyte complex mildly effacing the anterior thecal sac.  At T11-12, there is disc space narrowing particularly on the right where there are endplate degenerative changes. There is a broad disc bulge eccentric to the right and there is mild right facet arthritis with mild narrowing of central canal.  At T12-L1, the central canal and neural foramina are patent.  At L1-2, the central canal and neural foramina are patent.  At L2-3 there is mild bilateral facet arthritis with flavum thickening and there is a broad disc bulge with mild  narrowing of the central canal. The neural foramina are patent.  At L3-4, there is disc space narrowing with Schmorl's node formation and endplate degenerative signal exhibiting T1 hypointense and T2 hyperintense signal. There is a broad posterior disc/osteophyte complex and there is bilateral facet arthritis with severe central canal and lateral recess stenosis. There is absent fluid signal surrounding the nerve roots at the level of the disc space. Disc bulge extends in the inferior aspect of the neural foramina with mild bilateral neural foraminal narrowing.  At L4-5, there is 3 mm retrolisthesis L4 with respect to L5. Broad disc bulge is present and there is bilateral facet arthritis with flavum thickening and moderate to severe central canal stenosis as well as lateral recess stenosis. There is diminished fluid signal surrounding the nerve roots at the level of the disc space. There is diminished foraminal height on the left with mild to moderate left foraminal narrowing. Disc bulge appears to contact the undersurface of the exiting left L4 nerve lateral to the left neural foramen. Mild narrowing is present of the right neural foramen.  At L5-S1, there is disc space narrowing and there is a broad disc bulge as well as facet arthritis with mild narrowing of the lateral recesses. Diminished foraminal height is present in the right and there is mild-to-moderate right and mild left neural foraminal narrowing.      1. Multilevel degenerative disc disease with degree of canal stenosis greatest at L3-4 where there is severe central canal and lateral recess stenosis associated with a broad posterior disc/osteophyte complexes and bilateral facet arthritis. 2. At L4-5, there is moderate to severe central canal and lateral recess stenosis.  There is 3 mm retrolisthesis of L4 with respect to L5 as well as uncovered disc bulge and bilateral facet arthritis. Disc bulge is eccentric to the left and there is diminished left  femoral height with moderate left foraminal narrowing and bulge appears to contact the exiting left L4 nerve lateral to the left neural foramen.  This report was finalized on 6/2/2020 12:44 PM by Dr. Maurilio Ramírez M.D.          During this visit the following were done:  Labs Reviewed [x]    Labs Ordered []    Radiology Reports Reviewed [x]    Radiology Ordered []    EKG, echo, and/or stress test reviewed [x]    EEG results reviewed  []    EEG reviewed and interpreted per myself   []    Discussed case with neurointerventionalist or neuroradiologist []    Referring Provider Records Reviewed []    ER Records Reviewed []    Hospital Records Reviewed []    History Obtained From Family []    Radiological images view and Interpreted per myself [x]    Case Discussed with referring provider []     Decision to obtain and request outside records  []        Assessment and Plan     Acute/subacute strokes in multiple arterial distributions in the setting of new onset Afib. Suspect increase in leg weakness and loss of balance at least in part 22 strokes including in the right cerebellum and left cerebrum as well as chronic lumbar stenosis. No TPA on time.   - Observation on telemetry.   - Continue tx Lovenox, start oral AC.   - Strict fall precautions.   - Postpone plans for back surgery for 2-3 months, reassess the need after rehab.   - ST, PT, OT.    Thanks,          Electronically signed by Usman Gates MD on 6/3/2020 at 12:08

## 2020-06-03 NOTE — PROGRESS NOTES
Name: Mei Crane ADMIT: 2020   : 1926  PCP: Lexie Toussaint TRUDY, COCO    MRN: [id number] LOS: 2 days   AGE/SEX: 89 y.o. female  ROOM: [facility]     Subjective   Subjective   Resting in bed. No new complaints today. No nausea or vomiting. Eating/drinking. No chest pain or dyspnea. No cough/fever/chills.     Objective   Objective   Vital Signs  Temp:  [97.1 °F (36.2 °C)-98 °F (36.7 °C)] 97.7 °F (36.5 °C)  Heart Rate:  [] 77  Resp:  [16-18] 18  BP: (112-145)/(56-78) 126/62  SpO2:  [91 %-96 %] 91 %  on  Flow (L/min):  [1-2] 2;   Device (Oxygen Therapy): room air  Body mass index is 26.59 kg/m².     Physical Exam   Constitutional: She is oriented to person, place, and time. She appears well-developed and well-nourished. No distress.   Looks younger than age. No distress. Resting in bed.   HENT:   Head: Normocephalic and atraumatic.   Nose: Nose normal.   Mouth/Throat: Oropharynx is clear and moist.   Eyes: Conjunctivae are normal. Right eye exhibits no discharge. Left eye exhibits no discharge.   Neck: Normal range of motion. Neck supple.   Cardiovascular: Normal rate, regular rhythm, normal heart sounds and intact distal pulses.   Pulmonary/Chest: Effort normal and breath sounds normal. No respiratory distress.   Abdominal: Soft. Bowel sounds are normal. She exhibits no distension. There is no tenderness.   Musculoskeletal: Normal range of motion. She exhibits no edema or tenderness.   Neurological: She is alert and oriented to person, place, and time. She exhibits normal muscle tone. Coordination normal.   Skin: Skin is warm and dry. She is not diaphoretic. No erythema.   Psychiatric: She has a normal mood and affect. Her behavior is normal.   Nursing note and vitals reviewed.    Results Review:       I reviewed the patient's new clinical results.  Results from last 7 days   Lab Units 20  0426 20  0516 20  0724 20  0128   WBC 10*3/mm3 8.72 7.91 9.34 11.40*   HEMOGLOBIN g/dL  10.0* 10.3* 10.3* 10.5*   PLATELETS 10*3/mm3 221 212 197 200     Results from last 7 days   Lab Units 06/03/20  0426 06/01/20  0128 06/01/20  0056   SODIUM mmol/L 138 139 137   POTASSIUM mmol/L 3.7 3.7 3.8   CHLORIDE mmol/L 106 103 103   CO2 mmol/L 23.7 23.6 24.6   BUN mg/dL 16 21 22   CREATININE mg/dL 0.96 1.19* 1.23*   GLUCOSE mg/dL 93 107* 114*   Estimated Creatinine Clearance: 34.5 mL/min (by C-G formula based on SCr of 0.96 mg/dL).    Results from last 7 days   Lab Units 06/03/20  0426 06/01/20  0128 06/01/20  0056   CALCIUM mg/dL 8.5 8.4 8.3       No results found for: HGBA1C, POCGLU      amLODIPine 10 mg Oral Daily   aspirin 81 mg Oral Daily   Or      aspirin 300 mg Rectal Daily   atorvastatin 80 mg Oral Nightly   enoxaparin 1 mg/kg Subcutaneous Daily   ferrous sulfate 325 mg Oral Daily With Breakfast   hydroCHLOROthiazide 12.5 mg Oral Daily   levothyroxine 50 mcg Oral Daily   metoprolol tartrate 25 mg Oral Q8H   pantoprazole 40 mg Oral Daily   potassium chloride 10 mEq Oral Daily   prednisoLONE acetate 1 drop Left Eye Q6H   sodium chloride 10 mL Intravenous Q12H   sodium chloride 10 mL Intravenous Q12H      NPO Diet NPO Except: Sips With Meds  NPO Diet       Assessment/Plan     Active Hospital Problems    Diagnosis  POA   • **Bilateral leg weakness [R29.898]  Unknown   • CVA (cerebrovascular accident) (CMS/HCC) [I63.9]  Yes   • Dizziness [R42]  Yes   • Hallucinations, visual [R44.1]  Unknown   • Elevated troponin [R79.89]  Unknown   • Weakness [R53.1]  Yes   • DDD (degenerative disc disease), lumbar [M51.36]  Yes   • CKD (chronic kidney disease), stage III (CMS/HCC) [N18.3]  Yes   • HTN (hypertension), benign [I10]  Yes   • Acquired hypothyroidism [E03.9]  Yes   • Hyperlipidemia [E78.5]  Yes   • Gastroesophageal reflux disease without esophagitis [K21.9]  Yes   • Macular degeneration [H35.30]  Yes   • Osteopenia [M85.80]  Yes      Resolved Hospital Problems   No resolved problems to display.     Bilateral  leg weakness/lumbar DDD  -Orthopedic surgery consulted. MRI lumbar spine showing severe stenosis in lumbar spine. S/P epidurals. Initially plans for L3-5 laminectomy and fusion given debility. However, now with acute/subacute strokes. Post-pone surgery for at least two months per Neurology recommendations.  -PT consulted.   -CCP helping with placement.     Elevated troponin  -Cardiology consulted. Heparin gtt initially started, but now discontinued and on Lovenox.   -EKG upon arrival here shows normal sinus rhythm, with old infarct.  -Echocardiogram with hyperdynamic left ventricular EF and moderate valvular disease. Moderate aortic stenosis and mitral stenosis with mild aortic regurgitation. Wall motion okay. Doubt ACS at this point.  -PAF during stay. Coreg switched to metoprolol. Plans for conservative management. No plans for stress testing at this time.    CVA (multiple, acute/subacute)  -Neurology consulted. Will need to transition to oral anticoagulant at discharge.  -Post-pone surgery for at least 2 months.  -PT/OT/ST.     CKD3  -Creatinine normalized.   -She is eating/drinking. Avoid nephrotoxins.     Hypertension  -BP stable. Continue same regimen.     Hyperlipidemia  -Statin continued.     Dizziness/visual hallucinations  -MRI brain confirming acute to subacute infarcts.    Macular degeneration  -Home drops continued.    Anemia  -Mild. Monitor for now.     I discussed the patient's findings and my recommendations with patient, family and Dr. Campos.     VTE Prophylaxis - Lovenox/transition to oral A/C.  Code Status - Full code.  Disposition - TBD.       COCO Yun  [facility]  06/03/20  13:48

## 2020-06-03 NOTE — PROGRESS NOTES
Orthopedic Progress Note      Patient: Mei Crane  Date of Admission: 6/1/2020  YOB: 1926  Medical Record Number: [id number]    HPI: 89 y.o. female admitted to [facility] with Weakness [R53.1].    Physical Exam:  89 y.o.  female  Body mass index is 26.59 kg/m².  Facility age limit for growth percentiles is 20 years.    Temp:  [97.1 °F (36.2 °C)-98 °F (36.7 °C)] 97.1 °F (36.2 °C)  Heart Rate:  [] 73  Resp:  [16-18] 18  BP: (109-145)/(56-78) 141/75  General: alert            Activity: Mobilizing Per P.T.   Weight Bearing: As ordered  Data Review     No results displayed because visit has over 200 results.          Imaging Results (Last 72 Hours)     Procedure Component Value Units Date/Time    MRI Brain Without Contrast [[id number]] Collected:  06/03/20 0939     Updated:  06/03/20 1016    Narrative:       MRI OF THE BRAIN WITHOUT CONTRAST 06/03/2020     CLINICAL HISTORY: Subacute neurodeficit bilateral lower extremity  weakness, hallucinations, visual disturbance.     TECHNIQUE: Axial T1, FLAIR, fat-suppressed T2, axial diffusion and  gradient echo T2 and sagittal T1-weighted images were obtained of the  entire head.     COMPARISON: There are no prior head CTs or MRIs of the brain from  [facility] for comparison.     FINDINGS: There are patchy nodular and confluent areas of T2 high signal  in the periventricular and subcortical white matter of the cerebral  hemispheres, as well as some patchy T2 high signal involving central  pontine white matter, all of which is consistent with moderate small  vessel disease. On the gradient echo T2 weighted images, there are 4  tiny 2-3 mm nodular foci of signal loss in the mid and inferior left  cerebellum, compatible with punctate foci of hemosiderin deposition from  tiny old microhemorrhages at the site and may be from hypertension or  amyloid angiopathy. On the axial diffusion weighted images, there are  several scattered tiny foci  of diffusion hyperintensity that demonstrate  some low signal on the ADC maps consistent with tiny acute to subacute  infarcts. This includes a 5 x 4 mm focus in posterior inferior lateral  right cerebellum and an additional 7 x 3 mm focus posterior medial right  cerebellum and the right PICA territory. There is a 3 mm focus in the  medial aspect of the left cerebral peduncle. There is a 6 mm rounded  focus in the mid left corona radiata region and a 5 mm focus in the  anterolateral left thalamus, 3 mm focus in the superior left frontal  juxtacortical white matter, and a 3 mm focus in the anterior superior  medial right parietal juxtacortical white matter. Multiple different  vascular territories suggests the patient may have a central or cardiac  embolic source. The remainder of the brain parenchyma is normal in  signal intensity. The ventricles are normal in size. I see no midline  shift and no extra-axial fluid collections are identified. There is mild  posterior inferior right maxillary sinus mucosal thickening. The  remainder of the paranasal sinuses and mastoid air cells and middle ear  cavities are clear. Good flow voids are demonstrated within the cerebral  vessels and in the dural venous sinuses. Calvarium and skull base  demonstrate normal marrow signal intensity. There is failure of fluid  suppression within the right globe that may be an artifact versus  potentially changes from a prior right ocular procedure.       Impression:       1. There is moderate small vessel disease in the cerebral white matter.  2. Four separate 2-3 mm punctate foci of hemosiderin deposition mid and  inferior left cerebellar white matter from tiny old microhemorrhages  that may be secondary to hypertension or amyloid angiopathy.  3. Failure of fluid suppression on FLAIR images in the right globe that  could be artifact or potentially from a prior right ocular procedure and  correlation with clinical history suggested.  4. There  are multiple tiny acute to subacute infarcts in multiple  different vascular territories. This includes a 5 x 4 mm acute to  subacute infarct in posterior inferior lateral right cerebellum and a 7  x 3 mm subacute infarct in posterior inferior medial right cerebellum,  and these are on the right PICA territory. There is 3 mm infarct in  medial aspect of the left cerebral peduncle. There is 6 mm infarct in  the mid left corona radiata region and a 5 mm infarct in the  anterolateral left thalamus. There is a 3 mm tiny acute to subacute  infarct in the anterior superior medial right parietal lobe in either  the right anterior or middle cerebral artery territory, 3 mm tiny  infarct in the superior left frontal juxtacortical white matter and the  left middle cerebral artery territory. Multiple tiny infarcts in  different vascular territories suggest patient may have a cardiac or  central embolic source, correlate clinically. The remainder of the MRI  of the head is normal. The results were communicated to Dr. Joyce Campos by telephone on 06/03/2020 at 8:45 AM.     This report was finalized on 6/3/2020 10:13 AM by Dr. Perez English M.D.       MRI Lumbar Spine With & Without Contrast [[id number]] Collected:  06/02/20 1150     Updated:  06/02/20 1247    Narrative:       MRI LUMBAR SPINE WITH AND WITHOUT CONTRAST     HISTORY: Low back pain with bilateral lower extremity pain and weakness.     TECHNIQUE: Lumbar spine includes sagittal T1, T2 fat sat, PD as well as  axial T1 and T2 weighted sequences. Gadolinium administered  intravenously followed by axial T1 and sagittal T1 fat-saturated  sequences.     FINDINGS: There is degenerative disc disease in lower thoracic spine and  lumbar spine with disc space narrowing that appears greatest at T11-12,  L3-4, L4-5. There is a scoliotic curvature that is convex to the right  at L3-4. Within the posterior right lobe of the liver there is an  hepatic cyst measuring 4.1 x 2.7 cm and  this was also demonstrated on  previous CT abdomen 07/22/2017.     At T10-11 there is a broad disc/osteophyte complex mildly effacing the  anterior thecal sac.      At T11-12, there is disc space narrowing particularly on the right where  there are endplate degenerative changes. There is a broad disc bulge  eccentric to the right and there is mild right facet arthritis with mild  narrowing of central canal.     At T12-L1, the central canal and neural foramina are patent.     At L1-2, the central canal and neural foramina are patent.     At L2-3 there is mild bilateral facet arthritis with flavum thickening  and there is a broad disc bulge with mild narrowing of the central  canal. The neural foramina are patent.     At L3-4, there is disc space narrowing with Schmorl's node formation and  endplate degenerative signal exhibiting T1 hypointense and T2  hyperintense signal. There is a broad posterior disc/osteophyte complex  and there is bilateral facet arthritis with severe central canal and  lateral recess stenosis. There is absent fluid signal surrounding the  nerve roots at the level of the disc space. Disc bulge extends in the  inferior aspect of the neural foramina with mild bilateral neural  foraminal narrowing.     At L4-5, there is 3 mm retrolisthesis L4 with respect to L5. Broad disc  bulge is present and there is bilateral facet arthritis with flavum  thickening and moderate to severe central canal stenosis as well as  lateral recess stenosis. There is diminished fluid signal surrounding  the nerve roots at the level of the disc space. There is diminished  foraminal height on the left with mild to moderate left foraminal  narrowing. Disc bulge appears to contact the undersurface of the exiting  left L4 nerve lateral to the left neural foramen. Mild narrowing is  present of the right neural foramen.     At L5-S1, there is disc space narrowing and there is a broad disc bulge  as well as facet arthritis with  mild narrowing of the lateral recesses.  Diminished foraminal height is present in the right and there is  mild-to-moderate right and mild left neural foraminal narrowing.       Impression:       1. Multilevel degenerative disc disease with degree of canal stenosis  greatest at L3-4 where there is severe central canal and lateral recess  stenosis associated with a broad posterior disc/osteophyte complexes and  bilateral facet arthritis.   2. At L4-5, there is moderate to severe central canal and lateral recess  stenosis.  There is 3 mm retrolisthesis of L4 with respect to L5 as well  as uncovered disc bulge and bilateral facet arthritis. Disc bulge is  eccentric to the left and there is diminished left femoral height with  moderate left foraminal narrowing and bulge appears to contact the  exiting left L4 nerve lateral to the left neural foramen.     This report was finalized on 6/2/2020 12:44 PM by Dr. Maurilio Ramírez M.D.             Medications:    amLODIPine 10 mg Oral Daily   aspirin 81 mg Oral Daily   atorvastatin 40 mg Oral Daily   enoxaparin 1 mg/kg Subcutaneous Daily   ferrous sulfate 325 mg Oral Daily With Breakfast   hydroCHLOROthiazide 12.5 mg Oral Daily   levothyroxine 50 mcg Oral Daily   metoprolol tartrate 25 mg Oral Q8H   pantoprazole 40 mg Oral Daily   potassium chloride 10 mEq Oral Daily   prednisoLONE acetate 1 drop Left Eye Q6H   sodium chloride 10 mL Intravenous Q12H     •  acetaminophen **OR** acetaminophen **OR** acetaminophen  •  cyclobenzaprine  •  nitroglycerin  •  ondansetron  •  sodium chloride    Assessment:    Have discussed with Neurology and Hospitalist.  MRI brain shows areas of infarct.  Recommend holding off on surgery for minimum of 2 months.  Neurology to start anticoagulation.      Plan:  Continue efforts to mobilize  Continue Pain Control Measures    Date: 6/3/2020  Tasia Recinos RN     Events noted.  Will cancel surgery  Rudy Maddox MD

## 2020-06-03 NOTE — PROGRESS NOTES
"Adult Nutrition  Assessment/PES    Patient Name:  Mei Crane  YOB: 1926  MRN: [id number]  Admit Date:  6/1/2020    Assessment Date:  6/3/2020    Comments:  Nutrition follow up.  S/p lumbar MRI yesterday.  MRI brain today.  Plans for L3-L4, L4-L5 laminectomy and fusion with local bone graft tomorrow.  Stress test cancelled.    Eating well, % at meals.    Due to the COVID pandemic, nutrition assessment completed based on review of electronic medical record.  This RD currently working remotely and can be reached at [phone], via secure chat or email.     RD will continue to monitor.     Reason for Assessment     Row Name 06/03/20 1201          Reason for Assessment    Reason For Assessment  follow-up protocol         Nutrition/Diet History     Row Name 06/03/20 1201          Nutrition/Diet History    Typical Food/Fluid Intake  eating well, % at meals         Anthropometrics     Row Name 06/03/20 1201          Anthropometrics    Height  162.6 cm (64.02\")        Admit Weight    Admit Weight  -- 155# 6/2        Ideal Body Weight (IBW)    Ideal Body Weight (IBW) (kg)  55.04        Body Mass Index (BMI)    BMI Assessment  BMI 25-29.9: overweight 26.54         Labs/Tests/Procedures/Meds     Row Name 06/03/20 1202          Labs/Procedures/Meds    Lab Results Reviewed  reviewed, pertinent     Lab Results Comments  GFR, Hgb, Hct        Diagnostic Tests/Procedures    Diagnostic Test/Procedure Reviewed  reviewed, pertinent     Diagnostic Test/Procedures Comments  s/p lumbar MRI yesterday; MRI brain today; L3-L4, L4-L5 laminectomy and fusion with local bone graft tomorrow        Medications    Pertinent Medications Reviewed  reviewed, pertinent     Pertinent Medications Comments  FeSO4, HCTZ, synthroid, protonix, KCl         Physical Findings     Row Name 06/03/20 1203          Physical Findings    Overall Physical Appearance  overweight     Skin  -- B=18, intact         Estimated/Assessed " "Needs     Row Name 06/03/20 1201          Calculation Measurements    Height  162.6 cm (64.02\")         Nutrition Prescription Ordered     Row Name 06/03/20 1203          Nutrition Prescription PO    Current PO Diet  Regular     Common Modifiers  Renal         Evaluation of Received Nutrient/Fluid Intake     Row Name 06/03/20 1204          PO Evaluation    Number of Meals  2     % PO Intake  88         Problem/Interventions:    Intervention Goal     Row Name 06/03/20 1204          Intervention Goal    General  Maintain nutrition;Reduce/improve symptoms;Disease management/therapy     PO  Maintain intake     Weight  No significant weight loss         Nutrition Intervention     Row Name 06/03/20 1205          Nutrition Intervention    RD/Tech Action  Follow Tx progress;Care plan reviewd         Education/Evaluation     Row Name 06/03/20 1205          Education    Education  Will Instruct as appropriate        Monitor/Evaluation    Monitor  Per protocol;PO intake;Pertinent labs;Symptoms           Electronically signed by:  Sheela Padilla RD  06/03/20 12:06  "

## 2020-06-03 NOTE — THERAPY EVALUATION
Acute Care - Speech Language Pathology   Swallow Initial Evaluation [facility]     Patient Name: Mei Crane  : 1926  MRN: [id number]  Today's Date: 6/3/2020               Admit Date: 2020    Visit Dx:   No diagnosis found.  Patient Active Problem List   Diagnosis   • HTN (hypertension), benign   • Acquired hypothyroidism   • Hyperlipidemia   • Gastroesophageal reflux disease without esophagitis   • Glaucoma   • Macular degeneration   • Anemia   • Osteopenia   • CKD (chronic kidney disease), stage III (CMS/HCC)   • B12 deficiency   • Calculus of gallbladder without cholecystitis   • DDD (degenerative disc disease), lumbar   • Trochanteric bursitis of right hip   • Bilateral leg weakness   • Elevated troponin   • Weakness   • Dizziness   • Hallucinations, visual   • CVA (cerebrovascular accident) (CMS/HCC)     Past Medical History:   Diagnosis Date   • Calculus of gallbladder without cholecystitis 2017   • CKD (chronic kidney disease), stage III (CMS/HCC) 2016   • DDD (degenerative disc disease), lumbar 2019   • Disease of thyroid gland    • Hyperlipidemia    • Low back pain    • Peripheral neuropathy      Past Surgical History:   Procedure Laterality Date   • CATARACT EXTRACTION Bilateral    • EPIDURAL BLOCK     • HYSTERECTOMY      age 35        SWALLOW EVALUATION (last 72 hours)      SLP Adult Swallow Evaluation     Row Name 20 1600                   Rehab Evaluation    Document Type  evaluation  -AW        Subjective Information  no complaints  -AW        Patient Observations  alert;cooperative;agree to therapy  -AW        Patient/Family Observations  Pt up in recliner.  -AW        Patient Effort  good  -AW        Symptoms Noted During/After Treatment  none  -AW           General Information    Patient Profile Reviewed  yes  -AW        Pertinent History Of Current Problem  Pt admitted with B LE weakness, planned for lumbar surgery. MRI completed revealing B small infarcts.   -AW        Current Method of Nutrition  NPO  -AW        Precautions/Limitations, Vision  WFL;for purposes of eval  -AW        Precautions/Limitations, Hearing  WFL  -AW        Prior Level of Function-Communication  WFL  -AW        Prior Level of Function-Swallowing  no diet consistency restrictions  -AW        Plans/Goals Discussed with  patient;agreed upon  -AW        Barriers to Rehab  none identified  -AW        Patient's Goals for Discharge  take car of myself at home  -AW           Pain Assessment    Additional Documentation  Pain Scale: Numbers Pre/Post-Treatment (Group)  -AW           Pain Scale: Numbers Pre/Post-Treatment    Pain Scale: Numbers, Pretreatment  0/10 - no pain  -AW        Pain Scale: Numbers, Post-Treatment  0/10 - no pain  -AW           Oral Motor and Function    Dentition Assessment  upper dentures/partial in place;lower dentures/partial in place  -AW        Secretion Management  WNL/WFL  -AW        Mucosal Quality  moist, healthy  -AW        Volitional Swallow  WFL  -AW        Volitional Cough  WFL  -AW           Oral Musculature and Cranial Nerve Assessment    Oral Labial or Buccal Impairment, Detail, Cranial Nerve VII (Facial):  left labial droop;other (see comments) slight at rest  -AW           General Eating/Swallowing Observations    Respiratory Support Currently in Use  room air  -AW        Eating/Swallowing Skills  fed by SLP;self-fed  -AW        Positioning During Eating  upright in chair  -AW        Utensils Used  spoon;cup;straw  -AW        Consistencies Trialed  regular textures;soft textures;pureed;thin liquids mixed  -AW           Clinical Swallow Eval    Oral Prep Phase  WFL  -AW        Oral Transit  WFL  -AW        Oral Residue  impaired  -AW        Pharyngeal Phase  no overt signs/symptoms of pharyngeal impairment  -AW        Clinical Swallow Evaluation Summary  Pt tolerated thins via cup and straw with no s/s. Mastication was slow with mild oral residue noted with soft and  regular textures in R buccal cavity. Pt cleared independently with tongue sweep and liquid wash. Laryngeal elevation was adequate with palpation. Pt reported no changes in speaking or thinking.  -AW           Clinical Impression    SLP Swallowing Diagnosis  functional oral phase;functional pharyngeal phase  -AW        Functional Impact  no impact on function  -AW        Swallow Criteria for Skilled Therapeutic Interventions Met  no problems identified which require skilled intervention  -AW           Recommendations    Therapy Frequency (Swallow)  evaluation only  -AW        SLP Diet Recommendation  regular textures;thin liquids  -AW        Recommended Precautions and Strategies  upright posture during/after eating;small bites of food and sips of liquid  -AW        SLP Rec. for Method of Medication Administration  meds whole;with thin liquids  -AW        Monitor for Signs of Aspiration  yes  -AW        Anticipated Dischage Disposition (SLP)  unknown  -AW          User Key  (r) = Recorded By, (t) = Taken By, (c) = Cosigned By    Initials Name Effective Dates    Tami Ramos, MS CCC-SLP 06/08/18 -           EDUCATION  The patient has been educated in the following areas:   Dysphagia (Swallowing Impairment) Oral Care/Hydration.    SLP Recommendation and Plan  SLP Swallowing Diagnosis: functional oral phase, functional pharyngeal phase  SLP Diet Recommendation: regular textures, thin liquids  Recommended Precautions and Strategies: upright posture during/after eating, small bites of food and sips of liquid  SLP Rec. for Method of Medication Administration: meds whole, with thin liquids     Monitor for Signs of Aspiration: yes     Swallow Criteria for Skilled Therapeutic Interventions Met: no problems identified which require skilled intervention  Anticipated Dischage Disposition (SLP): unknown     Therapy Frequency (Swallow): evaluation only          Plan of Care Reviewed With: patient  Outcome Summary: Bedside  Swallow Eval completed. No s/s of aspiration were noted. Recommend a regular diet with thin liquids; meds whole as tolerated; upright for meals and 30 min after; slow rate. Pt reported no changes in thinking. ST is not indicated at this time. Please reconsult if further concerns develop.         SLP Outcome Measures (last 72 hours)      SLP Outcome Measures     Row Name 06/03/20 1600             SLP Outcome Measures    Outcome Measure Used?  Adult NOMS  -AW         Adult FCM Scores    FCM Chosen  Swallowing  -AW      Swallowing FCM Score  7  -AW        User Key  (r) = Recorded By, (t) = Taken By, (c) = Cosigned By    Initials Name Effective Dates    Tami Ramos MS CCC-SLP 06/08/18 -            Time Calculation:   Time Calculation- SLP     Row Name 06/03/20 1630             Time Calculation- SLP    SLP Start Time  1500  -      SLP Received On  06/03/20  -        User Key  (r) = Recorded By, (t) = Taken By, (c) = Cosigned By    Initials Name Provider Type    Tami Ramos MS CCC-SLP Speech and Language Pathologist          Therapy Charges for Today     Code Description Service Date Service Provider Modifiers Qty    [id number]  ST EVAL ORAL PHARYNG SWALLOW 3 6/3/2020 Tami Pacheco MS CCC-SLP GN 1               Tami Pacheco MS CCC-SLP  6/3/2020

## 2020-06-03 NOTE — PROGRESS NOTES
"[facility]    Patient Name: Mei Crane  :1926  89 y.o.  LOS: 2  Encounter Provider: Graeme Christiansen Jr, MD      Patient Care Team:  Lexie Toussaint APRN as PCP - General (Family Medicine)  Lexie Toussaint APRN as PCP - Claims Attributed  Jae Bray MD Peplinski, Lee Stanley, VANITA (Optometry)  Yannick Santo MD as Consulting Physician (Ophthalmology)  Nik Rodriguez MD as Consulting Physician (Orthopedic Surgery)    Chief Complaint: Follow-up PAF, CVA, spinal fracture    Interval History: No acute issues overnight.  MRI results noted.       Objective   Vital Signs  Temp:  [97.1 °F (36.2 °C)-98 °F (36.7 °C)] 98 °F (36.7 °C)  Heart Rate:  [73-82] 73  Resp:  [16-18] 16  BP: (112-145)/(56-76) 134/72    Intake/Output Summary (Last 24 hours) at 6/3/2020 1706  Last data filed at 6/3/2020 1318  Gross per 24 hour   Intake 240 ml   Output 1250 ml   Net -1010 ml     Flowsheet Rows      First Filed Value   Admission Height  163.8 cm (64.49\") Documented at 2020 1210   Admission Weight  70.4 kg (155 lb 1.6 oz) Documented at 2020 0013            Physical Exam   Constitutional: She is oriented to person, place, and time. She appears well-developed and well-nourished.   HENT:   Head: Normocephalic and atraumatic.   Eyes: Pupils are equal, round, and reactive to light. Conjunctivae are normal.   Neck: No JVD present. No thyromegaly present.   Cardiovascular: Exam reveals no gallop and no friction rub.   No murmur heard.  Pulmonary/Chest: No respiratory distress. She exhibits no tenderness.   Abdominal: Bowel sounds are normal. She exhibits no distension.   Musculoskeletal: She exhibits no edema or tenderness.   Neurological: She is alert and oriented to person, place, and time.   Skin: No rash noted. No erythema.   Psychiatric: She has a normal mood and affect. Judgment normal.   Vitals reviewed.        Pertinent Test Results:  Results from last 7 days   Lab Units " 06/03/20  0426 06/01/20  0128 06/01/20  0056   SODIUM mmol/L 138 139 137   POTASSIUM mmol/L 3.7 3.7 3.8   CHLORIDE mmol/L 106 103 103   CO2 mmol/L 23.7 23.6 24.6   BUN mg/dL 16 21 22   CREATININE mg/dL 0.96 1.19* 1.23*   GLUCOSE mg/dL 93 107* 114*   CALCIUM mg/dL 8.5 8.4 8.3     Results from last 7 days   Lab Units 06/02/20  0517 06/01/20  0724 06/01/20  0056   TROPONIN T ng/mL 0.140* 0.173* 0.171*     Results from last 7 days   Lab Units 06/03/20  0426 06/02/20  0516 06/01/20  0724 06/01/20  0128 06/01/20  0056   WBC 10*3/mm3 8.72 7.91 9.34 11.40* 11.63*   HEMOGLOBIN g/dL 10.0* 10.3* 10.3* 10.5* 10.7*   HEMATOCRIT % 30.3* 31.7* 30.7* 31.7* 32.7*   PLATELETS 10*3/mm3 221 212 197 200 215     Results from last 7 days   Lab Units 06/02/20  0516 06/01/20  1638 06/01/20  0910 06/01/20 0128   INR   --   --   --  1.06   APTT seconds 71.6* 72.2* 101.1* 40.9*               Invalid input(s): LDLCALC      Results from last 7 days   Lab Units 06/01/20 0056   TSH uIU/mL 3.350           Medication Review:     amLODIPine 10 mg Oral Daily   [START ON 6/4/2020] aspirin 81 mg Oral Daily   Or      [START ON 6/4/2020] aspirin 300 mg Rectal Daily   [START ON 6/4/2020] atorvastatin 80 mg Oral Nightly   enoxaparin 1 mg/kg Subcutaneous Daily   ferrous sulfate 325 mg Oral Daily With Breakfast   hydroCHLOROthiazide 12.5 mg Oral Daily   levothyroxine 50 mcg Oral Daily   metoprolol tartrate 25 mg Oral Q8H   pantoprazole 40 mg Oral Daily   potassium chloride 10 mEq Oral Daily   prednisoLONE acetate 1 drop Left Eye Q6H   sodium chloride 10 mL Intravenous Q12H   sodium chloride 10 mL Intravenous Q12H             Assessment/Plan   1.  Elevated troponin -patient denies chest pain or shortness of air.  No significant bradycardia or pauses noted on telemetry.  Echocardiogram with hyperdynamic left ventricular ejection fraction and moderate valvular disease.  Moderate aortic stenosis and mitral stenosis with mild aortic regurgitation.  No regional  wall motion abnormalities were noted.  Clinical picture does not seem consistent with ischemic event although etiology is uncertain.  She went into atrial fibrillation with RVR yesterday afternoon and this may have caused her elevated biomarkers.   MRI this morning shows multiple recent infarcts in an embolic pattern.  Continue heparin.    Will need consideration for oral anticoagulation once a decision on surgery is made continue aspirin and atorvastatin.  Given all of this information I do not feel that ischemic work-up is necessary.  Continue beta-blocker  2.  Murmur -moderate valvular disease with no evidence of volume overload.  Aggressive afterload reduction.  3.  Hypertension -labile but improved.  Continue same for now.  Sodium restricted diet.  4.  Dyslipidemia -continue statin  5.  Dizziness and visual disturbance -MRI results noted.  Neurology eval pending.  6.  Abnormal urinalysis -she has no dysuria or frequency and no white cells in her urine.  This could be simply asymptomatic bacteriuria.    Graeme Christiansen Jr, MD  [facility]  06/03/20  5:06 PM

## 2020-06-03 NOTE — PLAN OF CARE
Problem: Patient Care Overview  Goal: Plan of Care Review  Outcome: Ongoing (interventions implemented as appropriate)  Flowsheets (Taken 6/3/2020 6529)  Plan of Care Reviewed With: patient  Outcome Summary: Bedside Swallow Eval completed. No s/s of aspiration were noted. Recommend a regular diet with thin liquids; meds whole as tolerated; upright for meals and 30 min after; slow rate. Pt reported no changes in thinking. ST is not indicated at this time. Please reconsult if further concerns develop.

## 2020-06-03 NOTE — PLAN OF CARE
Problem: Patient Care Overview  Goal: Plan of Care Review  Outcome: Ongoing (interventions implemented as appropriate)  Flowsheets (Taken 6/3/2020 0618)  Progress: no change  Plan of Care Reviewed With: patient  Outcome Summary: VSS; SR on monitor throughout the night; c/o pain x1; 2L NC O2 while sleeping; having some urinary retention from FC removed yest evening about 1830; pt trying to void to not have straight cath done; will be rechecking w/bladder scan -outputs charted; no acute distress noted; will cont to monitor

## 2020-06-03 NOTE — SIGNIFICANT NOTE
06/03/20 1016   Rehab Time/Intention   Evaluation Not Performed other (see comments)  (pt states she is going to have surgery tomorrow, wants to wait until after surgery to initiate PT. Will await post op orders from Dr. Maddox)   Rehab Treatment   Discipline physical therapist   Recommendation   PT - Next Appointment 06/05/20

## 2020-06-04 PROBLEM — I63.9 EMBOLIC STROKE (HCC): Status: ACTIVE | Noted: 2020-06-04

## 2020-06-04 LAB
ANION GAP SERPL CALCULATED.3IONS-SCNC: 10.6 MMOL/L (ref 5–15)
BUN BLD-MCNC: 16 MG/DL (ref 8–23)
BUN/CREAT SERPL: 18.4 (ref 7–25)
CALCIUM SPEC-SCNC: 8.9 MG/DL (ref 8.2–9.6)
CHLORIDE SERPL-SCNC: 102 MMOL/L (ref 98–107)
CHOLEST SERPL-MCNC: 161 MG/DL (ref 0–200)
CO2 SERPL-SCNC: 24.4 MMOL/L (ref 22–29)
CREAT BLD-MCNC: 0.87 MG/DL (ref 0.57–1)
DEPRECATED RDW RBC AUTO: 42.9 FL (ref 37–54)
ERYTHROCYTE [DISTWIDTH] IN BLOOD BY AUTOMATED COUNT: 13 % (ref 12.3–15.4)
GFR SERPL CREATININE-BSD FRML MDRD: 61 ML/MIN/1.73
GLUCOSE BLD-MCNC: 108 MG/DL (ref 65–99)
GLUCOSE BLDC GLUCOMTR-MCNC: 100 MG/DL (ref 70–130)
GLUCOSE BLDC GLUCOMTR-MCNC: 93 MG/DL (ref 70–130)
HBA1C MFR BLD: 5.7 % (ref 4.8–5.6)
HCT VFR BLD AUTO: 31.1 % (ref 34–46.6)
HDLC SERPL-MCNC: 47 MG/DL (ref 40–60)
HGB BLD-MCNC: 10.3 G/DL (ref 12–15.9)
LDLC SERPL CALC-MCNC: 93 MG/DL (ref 0–100)
LDLC/HDLC SERPL: 1.97 {RATIO}
MCH RBC QN AUTO: 29.7 PG (ref 26.6–33)
MCHC RBC AUTO-ENTMCNC: 33.1 G/DL (ref 31.5–35.7)
MCV RBC AUTO: 89.6 FL (ref 79–97)
PLATELET # BLD AUTO: 224 10*3/MM3 (ref 140–450)
PMV BLD AUTO: 11.1 FL (ref 6–12)
POTASSIUM BLD-SCNC: 3.3 MMOL/L (ref 3.5–5.2)
RBC # BLD AUTO: 3.47 10*6/MM3 (ref 3.77–5.28)
SODIUM BLD-SCNC: 137 MMOL/L (ref 136–145)
TRIGL SERPL-MCNC: 107 MG/DL (ref 0–150)
VLDLC SERPL-MCNC: 21.4 MG/DL (ref 5–40)
WBC NRBC COR # BLD: 9.19 10*3/MM3 (ref 3.4–10.8)

## 2020-06-04 PROCEDURE — 85027 COMPLETE CBC AUTOMATED: CPT | Performed by: NURSE PRACTITIONER

## 2020-06-04 PROCEDURE — 99232 SBSQ HOSP IP/OBS MODERATE 35: CPT | Performed by: NURSE PRACTITIONER

## 2020-06-04 PROCEDURE — 82962 GLUCOSE BLOOD TEST: CPT

## 2020-06-04 PROCEDURE — 80048 BASIC METABOLIC PNL TOTAL CA: CPT | Performed by: NURSE PRACTITIONER

## 2020-06-04 PROCEDURE — 25010000002 ENOXAPARIN PER 10 MG: Performed by: INTERNAL MEDICINE

## 2020-06-04 PROCEDURE — 97535 SELF CARE MNGMENT TRAINING: CPT

## 2020-06-04 PROCEDURE — 97530 THERAPEUTIC ACTIVITIES: CPT

## 2020-06-04 PROCEDURE — U0004 COV-19 TEST NON-CDC HGH THRU: HCPCS | Performed by: NURSE PRACTITIONER

## 2020-06-04 PROCEDURE — 80061 LIPID PANEL: CPT | Performed by: PSYCHIATRY & NEUROLOGY

## 2020-06-04 PROCEDURE — 97166 OT EVAL MOD COMPLEX 45 MIN: CPT

## 2020-06-04 PROCEDURE — 99232 SBSQ HOSP IP/OBS MODERATE 35: CPT | Performed by: INTERNAL MEDICINE

## 2020-06-04 PROCEDURE — 83036 HEMOGLOBIN GLYCOSYLATED A1C: CPT | Performed by: PSYCHIATRY & NEUROLOGY

## 2020-06-04 RX ORDER — POTASSIUM CHLORIDE 750 MG/1
20 CAPSULE, EXTENDED RELEASE ORAL ONCE
Status: COMPLETED | OUTPATIENT
Start: 2020-06-04 | End: 2020-06-04

## 2020-06-04 RX ADMIN — ATORVASTATIN CALCIUM 80 MG: 80 TABLET, FILM COATED ORAL at 20:38

## 2020-06-04 RX ADMIN — SODIUM CHLORIDE, PRESERVATIVE FREE 10 ML: 5 INJECTION INTRAVENOUS at 20:39

## 2020-06-04 RX ADMIN — ENOXAPARIN SODIUM 70 MG: 80 INJECTION SUBCUTANEOUS at 15:19

## 2020-06-04 RX ADMIN — SODIUM CHLORIDE, PRESERVATIVE FREE 10 ML: 5 INJECTION INTRAVENOUS at 08:17

## 2020-06-04 RX ADMIN — ASPIRIN 81 MG: 81 TABLET, CHEWABLE ORAL at 08:16

## 2020-06-04 RX ADMIN — METOPROLOL TARTRATE 25 MG: 25 TABLET ORAL at 15:19

## 2020-06-04 RX ADMIN — ACETAMINOPHEN 650 MG: 325 TABLET, FILM COATED ORAL at 15:16

## 2020-06-04 RX ADMIN — FERROUS SULFATE TAB 325 MG (65 MG ELEMENTAL FE) 325 MG: 325 (65 FE) TAB at 08:17

## 2020-06-04 RX ADMIN — POTASSIUM CHLORIDE 10 MEQ: 10 CAPSULE, COATED, EXTENDED RELEASE ORAL at 08:16

## 2020-06-04 RX ADMIN — METOPROLOL TARTRATE 25 MG: 25 TABLET ORAL at 08:16

## 2020-06-04 RX ADMIN — POTASSIUM CHLORIDE 20 MEQ: 10 CAPSULE, COATED, EXTENDED RELEASE ORAL at 15:19

## 2020-06-04 RX ADMIN — SODIUM CHLORIDE, PRESERVATIVE FREE 10 ML: 5 INJECTION INTRAVENOUS at 08:16

## 2020-06-04 RX ADMIN — AMLODIPINE BESYLATE 10 MG: 10 TABLET ORAL at 08:16

## 2020-06-04 RX ADMIN — LEVOTHYROXINE SODIUM 50 MCG: 50 TABLET ORAL at 05:54

## 2020-06-04 RX ADMIN — PANTOPRAZOLE SODIUM 40 MG: 40 TABLET, DELAYED RELEASE ORAL at 05:54

## 2020-06-04 RX ADMIN — HYDROCHLOROTHIAZIDE 12.5 MG: 12.5 CAPSULE ORAL at 08:16

## 2020-06-04 NOTE — PLAN OF CARE
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 Pt with moderate to severe stenosis at L4-5 which surgery is planned in two months. Pt reports independence in her home environment prior to admission. On time of evaluation, pt tolerating OOB standing x5 min at sinkside prior to moderate assistance required to maintain standing balance. Pt with significant weakness and a R lateral lean with prolonged standing. Pt unable to care for ADL's without assistance. OT recommends continued skilled inpatient OT services with a d/c to [facility] at discharge. OT wore a mask throughout our session.

## 2020-06-04 NOTE — PROGRESS NOTES
"[facility]    Patient Name: Mei Crane  :1926  89 y.o.  LOS: 3  Encounter Provider: Graeme Christiansen Jr, MD      Patient Care Team:  Lexie Toussaint APRN as PCP - General (Family Medicine)  Lexie Toussaint APRN as PCP - Claims Attributed  Jae Bray MD Peplinski, Lee Stanley, VANITA (Optometry)  Yannick Santo MD as Consulting Physician (Ophthalmology)  Nik Rodriguez MD as Consulting Physician (Orthopedic Surgery)    Chief Complaint: Follow-up A. fib RVR, syncope, bilateral CVA, vertebral fracture    Interval History: No acute events overnight.       Objective   Vital Signs  Temp:  [97.5 °F (36.4 °C)-98.4 °F (36.9 °C)] 97.5 °F (36.4 °C)  Heart Rate:  [71-86] 86  Resp:  [16-18] 16  BP: (126-150)/(62-72) 135/69    Intake/Output Summary (Last 24 hours) at 2020 1059  Last data filed at 6/3/2020 1318  Gross per 24 hour   Intake 240 ml   Output --   Net 240 ml     Flowsheet Rows      First Filed Value   Admission Height  163.8 cm (64.49\") Documented at 2020 1210   Admission Weight  70.4 kg (155 lb 1.6 oz) Documented at 2020 0013            Physical Exam   Constitutional: She is oriented to person, place, and time. She appears well-developed and well-nourished.   HENT:   Head: Normocephalic and atraumatic.   Eyes: Pupils are equal, round, and reactive to light. Conjunctivae are normal.   Neck: No JVD present. No thyromegaly present.   Cardiovascular: Exam reveals no gallop and no friction rub.   No murmur heard.  Pulmonary/Chest: No respiratory distress. She exhibits no tenderness.   Abdominal: Bowel sounds are normal. She exhibits no distension.   Musculoskeletal: She exhibits no edema or tenderness.   Neurological: She is alert and oriented to person, place, and time.   Skin: No rash noted. No erythema.   Psychiatric: She has a normal mood and affect. Judgment normal.   Vitals reviewed.        Pertinent Test Results:  Results from last 7 days   Lab Units " 06/04/20  0316 06/03/20  0426 06/01/20  0128 06/01/20  0056   SODIUM mmol/L 137 138 139 137   POTASSIUM mmol/L 3.3* 3.7 3.7 3.8   CHLORIDE mmol/L 102 106 103 103   CO2 mmol/L 24.4 23.7 23.6 24.6   BUN mg/dL 16 16 21 22   CREATININE mg/dL 0.87 0.96 1.19* 1.23*   GLUCOSE mg/dL 108* 93 107* 114*   CALCIUM mg/dL 8.9 8.5 8.4 8.3     Results from last 7 days   Lab Units 06/02/20  0517 06/01/20  0724 06/01/20  0056   TROPONIN T ng/mL 0.140* 0.173* 0.171*     Results from last 7 days   Lab Units 06/04/20  0316 06/03/20  0426 06/02/20  0516 06/01/20  0724 06/01/20  0128 06/01/20  0056   WBC 10*3/mm3 9.19 8.72 7.91 9.34 11.40* 11.63*   HEMOGLOBIN g/dL 10.3* 10.0* 10.3* 10.3* 10.5* 10.7*   HEMATOCRIT % 31.1* 30.3* 31.7* 30.7* 31.7* 32.7*   PLATELETS 10*3/mm3 224 221 212 197 200 215     Results from last 7 days   Lab Units 06/02/20  0516 06/01/20  1638 06/01/20  0910 06/01/20  0128   INR   --   --   --  1.06   APTT seconds 71.6* 72.2* 101.1* 40.9*         Results from last 7 days   Lab Units 06/04/20  0316   CHOLESTEROL mg/dL 161   TRIGLYCERIDES mg/dL 107   HDL CHOL mg/dL 47         Results from last 7 days   Lab Units 06/01/20  0056   TSH uIU/mL 3.350           Medication Review:     amLODIPine 10 mg Oral Daily   aspirin 81 mg Oral Daily   Or      aspirin 300 mg Rectal Daily   atorvastatin 80 mg Oral Nightly   enoxaparin 1 mg/kg Subcutaneous Daily   ferrous sulfate 325 mg Oral Daily With Breakfast   hydroCHLOROthiazide 12.5 mg Oral Daily   levothyroxine 50 mcg Oral Daily   metoprolol tartrate 25 mg Oral Q8H   pantoprazole 40 mg Oral Daily   potassium chloride 10 mEq Oral Daily   prednisoLONE acetate 1 drop Left Eye Q6H   sodium chloride 10 mL Intravenous Q12H   sodium chloride 10 mL Intravenous Q12H             Assessment/Plan   1.  Elevated troponin -patient denies chest pain or shortness of air.  No significant bradycardia or pauses noted on telemetry.  Echocardiogram with hyperdynamic left ventricular ejection fraction and  moderate valvular disease.  Moderate aortic stenosis and mitral stenosis with mild aortic regurgitation.  No regional wall motion abnormalities were noted.  Clinical picture does not seem consistent with ischemic event although etiology is uncertain.  She went into atrial fibrillation with RVR yesterday afternoon and this may have caused her elevated biomarkers.   MRI this morning shows multiple recent infarcts in an embolic pattern.  Continue heparin.  Recommend starting apixaban if financially feasible.  Continue aspirin and atorvastatin.  Given all of this information I do not feel that ischemic work-up is necessary.  Continue beta-blocker  2.  Murmur -moderate valvular disease with no evidence of volume overload.  Aggressive afterload reduction.  3.  Hypertension -labile but improved.  Continue same for now.  Sodium restricted diet.  4.  Dyslipidemia -continue statin  5.  Dizziness and visual disturbance -MRI results noted.  Neurology eval pending.  6.  Abnormal urinalysis -she has no dysuria or frequency and no white cells in her urine.  This could be simply asymptomatic bacteriuria.    Graeme Christiansen Jr, MD  [facility]  06/04/20  10:59 AM

## 2020-06-04 NOTE — PROGRESS NOTES
Continued Stay Note  [facility]     Patient Name: Mei Crane  MRN: [id number]  Today's Date: 6/4/2020    Admit Date: 6/1/2020    Discharge Plan     Row Name 06/04/20 1630       Plan    Plan  [facility] pending negative covid19 test- via nephew    Patient/Family in Agreement with Plan  yes    Plan Comments  Spoke with Rebecca/[facility] and updated her no planned surgery and awaiting INR to be therapeutic. She states pt will need Negative Covid19 test prior to dc. Reviewed clinicals and possible dc 1-2 days. Covid19 test ordered. Spoke with pt at bedside, introduced self and explained CCP role. Discussed dc planning and transportation options, she states her nephew will transport her. Partial packet in ccp office. Mo thorne/ccp        Discharge Codes    No documentation.             Autumn Snyder, RN

## 2020-06-04 NOTE — PROGRESS NOTES
"DOS: 2020  NAME: Mei Crane   : 1926  PCP: Lexie Toussaint APRN  CC: L weakness    Stroke    Subjective: Patient sitting in chair. C/o generalized weakness. Back pain stable. Denies h/a or change in speech. Pt seen in follow up today, however the problem is new to the examiner.      Objective:  Vital signs: /69 (BP Location: Left arm, Patient Position: Lying)   Pulse 86   Temp 97.5 °F (36.4 °C) (Oral)   Resp 16   Ht 162.6 cm (64.02\")   Wt 65.5 kg (144 lb 6.4 oz)   SpO2 96%   BMI 24.77 kg/m²      General appearance: Well developed, well nourished, well groomed, alert and cooperative. Appears younger than stated age.  HEENT: Normocephalic.   Neck and spine: Normal range of motion. Normal alignment. No mass or tenderness.    Cardiac: Regular rate and rhythm.   Peripheral Vasculature: Radial pulses are equal and symmetric.  Chest Exam: Clear to auscultation bilaterally, no wheezes, no rhonchi.  Extremities: Normal, no edema.   Skin: No rashes or birthmarks.     Higher integrative function: Oriented to time, place, person, intact recent and remote memory, attention span, concentration and language. Spontaneous speech, fund of vocabulary are normal.   CN II: Normal visual fields.   CN III IV VI: Extraocular movements are full without nystagmus. Pupils are equal, round, and reactive to light.   CN V: Normal facial sensation.  CN VII: Mild R facial weakness.  CN VIII: Auditory acuity is normal.   CN IX & X: Symmetric palatal movement.   CN XI: Sternocleidomastoid and trapezius are normal. No weakness.   CN XII: The tongue is midline.   Motor: Normal muscle strength, bulk, and tone in upper and lower extremities. No fasciculations, rigidity, spasticity or abnormal movements.   Sensation: Normal light touch intact/symmetric.  Station and gait: N/A  Muscle stretch reflexes: Reflexes are decreased. Plantar reflexes are flexor bilaterally.   Coordination: Finger to nose test showed no dysmetria. "     Scheduled Meds:  amLODIPine 10 mg Oral Daily   aspirin 81 mg Oral Daily   Or      aspirin 300 mg Rectal Daily   atorvastatin 80 mg Oral Nightly   enoxaparin 1 mg/kg Subcutaneous Daily   ferrous sulfate 325 mg Oral Daily With Breakfast   hydroCHLOROthiazide 12.5 mg Oral Daily   levothyroxine 50 mcg Oral Daily   metoprolol tartrate 25 mg Oral Q8H   pantoprazole 40 mg Oral Daily   potassium chloride 10 mEq Oral Daily   prednisoLONE acetate 1 drop Left Eye Q6H   sodium chloride 10 mL Intravenous Q12H   sodium chloride 10 mL Intravenous Q12H     Continuous Infusions:   PRN Meds:.•  acetaminophen **OR** acetaminophen **OR** acetaminophen  •  cyclobenzaprine  •  nitroglycerin  •  ondansetron  •  sodium chloride  •  sodium chloride    Laboratory results:  Lab Results   Component Value Date    GLUCOSE 108 (H) 06/04/2020    CALCIUM 8.9 06/04/2020     06/04/2020    K 3.3 (L) 06/04/2020    CO2 24.4 06/04/2020     06/04/2020    BUN 16 06/04/2020    CREATININE 0.87 06/04/2020    EGFRIFAFRI 47 (L) 03/10/2020    EGFRIFNONA 61 06/04/2020    BCR 18.4 06/04/2020    ANIONGAP 10.6 06/04/2020     Lab Results   Component Value Date    WBC 9.19 06/04/2020    HGB 10.3 (L) 06/04/2020    HCT 31.1 (L) 06/04/2020    MCV 89.6 06/04/2020     06/04/2020     Lab Results   Component Value Date    CHOL 161 06/04/2020     Lab Results   Component Value Date    HDL 47 06/04/2020    HDL 48 09/11/2019    HDL 58 04/03/2019     Lab Results   Component Value Date    LDL 93 06/04/2020    LDL 96 09/11/2019     (H) 04/03/2019     Lab Results   Component Value Date    TRIG 107 06/04/2020    TRIG 186 (H) 09/11/2019    TRIG 165 (H) 04/03/2019   ECG 6/2 tracings viewed by me, showed atrial fibrillation.    Review and interpretation of imaging: MRI brain images viewed by me, multiple punctate/small strokes in bilateral and anterior/posterior cerebral hemispheres.  MRI LUMBAR SPINE WITH AND WITHOUT CONTRAST     HISTORY: Low back pain  with bilateral lower extremity pain and weakness.     TECHNIQUE: Lumbar spine includes sagittal T1, T2 fat sat, PD as well as  axial T1 and T2 weighted sequences. Gadolinium administered  intravenously followed by axial T1 and sagittal T1 fat-saturated  sequences.     FINDINGS: There is degenerative disc disease in lower thoracic spine and  lumbar spine with disc space narrowing that appears greatest at T11-12,  L3-4, L4-5. There is a scoliotic curvature that is convex to the right  at L3-4. Within the posterior right lobe of the liver there is an  hepatic cyst measuring 4.1 x 2.7 cm and this was also demonstrated on  previous CT abdomen 07/22/2017.     At T10-11 there is a broad disc/osteophyte complex mildly effacing the  anterior thecal sac.      At T11-12, there is disc space narrowing particularly on the right where  there are endplate degenerative changes. There is a broad disc bulge  eccentric to the right and there is mild right facet arthritis with mild  narrowing of central canal.     At T12-L1, the central canal and neural foramina are patent.     At L1-2, the central canal and neural foramina are patent.     At L2-3 there is mild bilateral facet arthritis with flavum thickening  and there is a broad disc bulge with mild narrowing of the central  canal. The neural foramina are patent.     At L3-4, there is disc space narrowing with Schmorl's node formation and  endplate degenerative signal exhibiting T1 hypointense and T2  hyperintense signal. There is a broad posterior disc/osteophyte complex  and there is bilateral facet arthritis with severe central canal and  lateral recess stenosis. There is absent fluid signal surrounding the  nerve roots at the level of the disc space. Disc bulge extends in the  inferior aspect of the neural foramina with mild bilateral neural  foraminal narrowing.     At L4-5, there is 3 mm retrolisthesis L4 with respect to L5. Broad disc  bulge is present and there is bilateral  facet arthritis with flavum  thickening and moderate to severe central canal stenosis as well as  lateral recess stenosis. There is diminished fluid signal surrounding  the nerve roots at the level of the disc space. There is diminished  foraminal height on the left with mild to moderate left foraminal  narrowing. Disc bulge appears to contact the undersurface of the exiting  left L4 nerve lateral to the left neural foramen. Mild narrowing is  present of the right neural foramen.     At L5-S1, there is disc space narrowing and there is a broad disc bulge  as well as facet arthritis with mild narrowing of the lateral recesses.  Diminished foraminal height is present in the right and there is  mild-to-moderate right and mild left neural foraminal narrowing.     IMPRESSION:  1. Multilevel degenerative disc disease with degree of canal stenosis  greatest at L3-4 where there is severe central canal and lateral recess  stenosis associated with a broad posterior disc/osteophyte complexes and  bilateral facet arthritis.   2. At L4-5, there is moderate to severe central canal and lateral recess  stenosis.  There is 3 mm retrolisthesis of L4 with respect to L5 as well  as uncovered disc bulge and bilateral facet arthritis. Disc bulge is  eccentric to the left and there is diminished left femoral height with  moderate left foraminal narrowing and bulge appears to contact the  exiting left L4 nerve lateral to the left neural foramen.     This report was finalized on 6/2/2020 12:44 PM by Dr. Maurilio Ramírez M.D.     MRI OF THE BRAIN WITHOUT CONTRAST 06/03/2020     CLINICAL HISTORY: Subacute neurodeficit bilateral lower extremity  weakness, hallucinations, visual disturbance.     TECHNIQUE: Axial T1, FLAIR, fat-suppressed T2, axial diffusion and  gradient echo T2 and sagittal T1-weighted images were obtained of the  entire head.     COMPARISON: There are no prior head CTs or MRIs of the brain from  [facility] for  comparison.     FINDINGS: There are patchy nodular and confluent areas of T2 high signal  in the periventricular and subcortical white matter of the cerebral  hemispheres, as well as some patchy T2 high signal involving central  pontine white matter, all of which is consistent with moderate small  vessel disease. On the gradient echo T2 weighted images, there are 4  tiny 2-3 mm nodular foci of signal loss in the mid and inferior left  cerebellum, compatible with punctate foci of hemosiderin deposition from  tiny old microhemorrhages at the site and may be from hypertension or  amyloid angiopathy. On the axial diffusion weighted images, there are  several scattered tiny foci of diffusion hyperintensity that demonstrate  some low signal on the ADC maps consistent with tiny acute to subacute  infarcts. This includes a 5 x 4 mm focus in posterior inferior lateral  right cerebellum and an additional 7 x 3 mm focus posterior medial right  cerebellum and the right PICA territory. There is a 3 mm focus in the  medial aspect of the left cerebral peduncle. There is a 6 mm rounded  focus in the mid left corona radiata region and a 5 mm focus in the  anterolateral left thalamus, 3 mm focus in the superior left frontal  juxtacortical white matter, and a 3 mm focus in the anterior superior  medial right parietal juxtacortical white matter. Multiple different  vascular territories suggests the patient may have a central or cardiac  embolic source. The remainder of the brain parenchyma is normal in  signal intensity. The ventricles are normal in size. I see no midline  shift and no extra-axial fluid collections are identified. There is mild  posterior inferior right maxillary sinus mucosal thickening. The  remainder of the paranasal sinuses and mastoid air cells and middle ear  cavities are clear. Good flow voids are demonstrated within the cerebral  vessels and in the dural venous sinuses. Calvarium and skull base  demonstrate  normal marrow signal intensity. There is failure of fluid  suppression within the right globe that may be an artifact versus  potentially changes from a prior right ocular procedure.     IMPRESSION:  1. There is moderate small vessel disease in the cerebral white matter.  2. Four separate 2-3 mm punctate foci of hemosiderin deposition mid and  inferior left cerebellar white matter from tiny old microhemorrhages  that may be secondary to hypertension or amyloid angiopathy.  3. Failure of fluid suppression on FLAIR images in the right globe that  could be artifact or potentially from a prior right ocular procedure and  correlation with clinical history suggested.  4. There are multiple tiny acute to subacute infarcts in multiple  different vascular territories. This includes a 5 x 4 mm acute to  subacute infarct in posterior inferior lateral right cerebellum and a 7  x 3 mm subacute infarct in posterior inferior medial right cerebellum,  and these are on the right PICA territory. There is 3 mm infarct in  medial aspect of the left cerebral peduncle. There is 6 mm infarct in  the mid left corona radiata region and a 5 mm infarct in the  anterolateral left thalamus. There is a 3 mm tiny acute to subacute  infarct in the anterior superior medial right parietal lobe in either  the right anterior or middle cerebral artery territory, 3 mm tiny  infarct in the superior left frontal juxtacortical white matter and the  left middle cerebral artery territory. Multiple tiny infarcts in  different vascular territories suggest patient may have a cardiac or  central embolic source, correlate clinically. The remainder of the MRI  of the head is normal. The results were communicated to Dr. Joyce Campos by telephone on 06/03/2020 at 8:45 AM.     This report was finalized on 6/3/2020 10:13 AM by Dr. Perez English M.D.     Echocardiogram Findings     Left Ventricle Left ventricular systolic function is hyperdynamic (EF > 70%).  Calculated EF = 78.0%. Estimated EF was in agreement with the calculated EF. Estimated EF appears to be in the range of greater than 70%. Normal left ventricular cavity size and wall thickness noted. All left ventricular wall segments contract normally. Left ventricular diastolic dysfunction is noted (grade I a w/high LAP) consistent with impaired relaxation. There is no evidence of a left ventricular thrombus present.   Right Ventricle Normal right ventricular cavity size and systolic function noted.   Left Atrium Left atrial cavity size is moderate-to-severely dilated.   Right Atrium Normal right atrial size noted.   Aortic Valve The valve appears trileaflet. The aortic valve is abnormal in structure. Mild aortic valve regurgitation is present. Moderate aortic valve stenosis is present. Aortic valve maximum pressure gradient is 42.0 mmHg. Aortic valve mean pressure gradient is 23.0 mmHg. There is heavy nodular calcification of the aortic valve leaflets..   Mitral Valve The mitral valve is abnormal in structure. Trace-to-mild mitral valve regurgitation is present. The mitral valve mean gradient is 6 mmHg. The mitral valve peak gradient is 15 mmHg. There is severe nodular calcification of the posterior mitral annulus. There is moderate calcification of the anterior mitral annulus. There is calcification of several of the mitral valve chordae. There is at least moderate mitral stenosis (heart rate of 89).   Tricuspid Valve The tricuspid valve is normal. No evidence of tricuspid valve stenosis is present. Mild tricuspid valve regurgitation is present. Estimated right ventricular systolic pressure from tricuspid regurgitation is mildly elevated (35-45 mmHg). Calculated right ventricular systolic pressure from tricuspid regurgitation is 43 mmHg.   Pulmonic Valve The pulmonic valve is grossly normal in structure. There is no significant pulmonic valve stenosis present. There is trace pulmonic valve regurgitation present.    Greater Vessels No dilation of the aortic root is present.   Pericardium There is no evidence of pericardial effusion.       Impression:  Patient is a 89-year-old female with HTN, HLD, CKD, chronic low back pain, DDD, peripheral neuropathy, who presented with increasing difficulty with gait and falls, leg weakness, and dizziness for several days.  She lives at home alone and has recently started using a walker.  When she was admitted on 6/1 she had an elevated troponin and cardiology was consulted and started the patient on a heparin drip.  On 6/2 her ECG showed new onset A. fib.  MRI brain is shown multiple bilateral cerebral small acute infarcts and MRI of the L-spine shows severe stenosis at L3-4 and moderate to severe stenosis at L4-5.  She was on aspirin 81 mg and Lipitor 40 mg prior to arrival.  She has recently been followed by orthopedics and worsening weakness they were initially planning back surgery.    Diagnoses:  New onset A. Fib  Multiple strokes, cardioembolic  Lumbar stenosis  MRI lumbar spine 6/2: Multilevel degenerative disc disease with canal stenosis greatest at L3-4 with severe central canal and lateral recess stenosis.  At L4-5 there is moderate to severe central canal stenosis and lateral recess stenosis.  There is disc bulge with contact of the exiting of L4 nerve.  MRI brain 6/3: Moderate small vessel disease, multiple tiny acute subacute infarcts in multiple vascular territories involving the right PICA left cerebral peduncle, left corona radiata, left thalamus, right parietal lobe, and left frontal lobe.  4 separate 2 to 3 mm foci of hemorrhage which may be secondary to hypertension or amyloid.  2D echo: EF greater than 70%, moderate to severely dilated left atrial cavity.  Valve calcification with at least moderate mitral stenosis.  Labs: TSH 3.35, LDL 93, hemoglobin A1c 5.70%, B12 greater than 2000.  Troponin 0 0.17 on admission.      Plan:  Lipitor increased to 80 mg  Vessel imaging  would not   On therapeutic Lovenox for atrial fibrillation, ok to change to PO from neurology standpoint. Antiplt not needed, will stop asa.   Back surgery on hold for 2 to 3 months  Neurochecks  BP control  Stroke Education  JONATHAN/SCDs  PT/OT/ST  D/W Dr Gates today. No further w/u. Will sign off. Please call if questions. I will arrange f/u with me in 3 months.

## 2020-06-04 NOTE — PLAN OF CARE
Problem: Patient Care Overview  Goal: Plan of Care Review  Outcome: Ongoing (interventions implemented as appropriate)  Flowsheets (Taken 6/4/2020 3844)  Progress: improving  Plan of Care Reviewed With: patient  Outcome Summary: pt sitting in chair at beginning of shift, pt NIH 0 at this time, pt able to walk to bathroom with help, pt been sinus on monitor, will continue to monitor

## 2020-06-04 NOTE — PLAN OF CARE
Up to chair today. Room Air. NIH 0. Weakness and lack of balance while walking to bathroom. COVID test sent for placement.

## 2020-06-04 NOTE — PROGRESS NOTES
Name: Mei Crane ADMIT: 2020   : 1926  PCP: Lexie Toussaint TRUDY, COCO    MRN: [id number] LOS: 3 days   AGE/SEX: 89 y.o. female  ROOM: [facility]     Subjective   Subjective   Up in chair. No new complaints. States she wasn't aware she had a stroke (but was told yesterday). Denies any new pain. Still with leg weakness. No nausea or vomiting. No chest pain or dyspnea. No cough/fever/chills. States she is still seeing flowers in her visual fields, but reports somewhat improved.     Objective   Objective   Vital Signs  Temp:  [97.5 °F (36.4 °C)-98.4 °F (36.9 °C)] 97.7 °F (36.5 °C)  Heart Rate:  [71-86] 78  Resp:  [16] 16  BP: (132-150)/(62-72) 132/65  SpO2:  [92 %-96 %] 96 %  on   ;   Device (Oxygen Therapy): room air  Body mass index is 24.77 kg/m².     Physical Exam   Constitutional: She is oriented to person, place, and time. She appears well-developed and well-nourished. No distress.   Looks younger than her age.   HENT:   Head: Normocephalic and atraumatic.   Nose: Nose normal.   Mouth/Throat: Oropharynx is clear and moist.   Eyes: Conjunctivae are normal. Right eye exhibits no discharge. Left eye exhibits no discharge.   Neck: Normal range of motion. Neck supple.   Cardiovascular: Normal rate, regular rhythm, normal heart sounds and intact distal pulses.   Pulmonary/Chest: Effort normal and breath sounds normal. No respiratory distress.   Abdominal: Soft. Bowel sounds are normal. She exhibits no distension. There is no tenderness.   Musculoskeletal: Normal range of motion. She exhibits no edema or tenderness.   Neurological: She is alert and oriented to person, place, and time. She exhibits normal muscle tone. Coordination normal.   Skin: Skin is warm and dry. She is not diaphoretic. No erythema.   Psychiatric: She has a normal mood and affect. Her behavior is normal.   Nursing note and vitals reviewed.     Results Review:       I reviewed the patient's new clinical results.  Results from last 7 days     Lab Units 06/04/20  0316 06/03/20  0426 06/02/20  0516 06/01/20  0724   WBC 10*3/mm3 9.19 8.72 7.91 9.34   HEMOGLOBIN g/dL 10.3* 10.0* 10.3* 10.3*   PLATELETS 10*3/mm3 224 221 212 197     Results from last 7 days   Lab Units 06/04/20  0316 06/03/20  0426 06/01/20  0128 06/01/20  0056   SODIUM mmol/L 137 138 139 137   POTASSIUM mmol/L 3.3* 3.7 3.7 3.8   CHLORIDE mmol/L 102 106 103 103   CO2 mmol/L 24.4 23.7 23.6 24.6   BUN mg/dL 16 16 21 22   CREATININE mg/dL 0.87 0.96 1.19* 1.23*   GLUCOSE mg/dL 108* 93 107* 114*   Estimated Creatinine Clearance: 40.9 mL/min (by C-G formula based on SCr of 0.87 mg/dL).    Results from last 7 days   Lab Units 06/04/20 0316 06/03/20  0426 06/01/20  0128 06/01/20  0056   CALCIUM mg/dL 8.9 8.5 8.4 8.3       Hemoglobin A1C   Date/Time Value Ref Range Status   06/04/2020 0316 5.70 (H) 4.80 - 5.60 % Final     Glucose   Date/Time Value Ref Range Status   06/04/2020 1100 93 70 - 130 mg/dL Final   06/04/2020 0602 100 70 - 130 mg/dL Final   06/03/2020 1613 109 70 - 130 mg/dL Final         amLODIPine 10 mg Oral Daily   aspirin 81 mg Oral Daily   Or      aspirin 300 mg Rectal Daily   atorvastatin 80 mg Oral Nightly   enoxaparin 1 mg/kg Subcutaneous Daily   ferrous sulfate 325 mg Oral Daily With Breakfast   hydroCHLOROthiazide 12.5 mg Oral Daily   levothyroxine 50 mcg Oral Daily   metoprolol tartrate 25 mg Oral Q8H   pantoprazole 40 mg Oral Daily   potassium chloride 10 mEq Oral Daily   prednisoLONE acetate 1 drop Left Eye Q6H   sodium chloride 10 mL Intravenous Q12H   sodium chloride 10 mL Intravenous Q12H      Diet Regular       Assessment/Plan     Active Hospital Problems    Diagnosis  POA   • **Embolic stroke (CMS/HCC) [I63.9]  Unknown   • CVA (cerebrovascular accident) (CMS/HCC) [I63.9]  Yes   • Dizziness [R42]  Yes   • Hallucinations, visual [R44.1]  Unknown   • Bilateral leg weakness [R29.898]  Unknown   • Elevated troponin [R79.89]  Unknown   • Weakness [R53.1]  Yes   • DDD  (degenerative disc disease), lumbar [M51.36]  Yes   • CKD (chronic kidney disease), stage III (CMS/HCC) [N18.3]  Yes   • HTN (hypertension), benign [I10]  Yes   • Acquired hypothyroidism [E03.9]  Yes   • Hyperlipidemia [E78.5]  Yes   • Gastroesophageal reflux disease without esophagitis [K21.9]  Yes   • Macular degeneration [H35.30]  Yes   • Osteopenia [M85.80]  Yes      Resolved Hospital Problems   No resolved problems to display.     Bilateral leg weakness/lumbar DDD  -Orthopedic surgery consulted. MRI lumbar spine showing severe stenosis in lumbar spine. S/P epidurals. Initially plans for L3-5 laminectomy and fusion given debility. However, now with acute/subacute strokes. Post-pone surgery for at least two months per Neurology recommendations.  -PT consulted. awaiting their recommendations. OT recommends [facility]/SNF at discharge.  -CCP helping with placement, but patient wanting to go home.     Elevated troponin  -Cardiology consulted. Heparin gtt initially started, but now discontinued and on Lovenox.   -EKG upon arrival here shows normal sinus rhythm, with old infarct.  -Echocardiogram with hyperdynamic left ventricular EF and moderate valvular disease. Moderate aortic stenosis and mitral stenosis with mild aortic regurgitation. Wall motion okay. Doubt ACS at this point.  -PAF during stay. Coreg switched to metoprolol. Plans for conservative management. No plans for stress testing at this time or further ischemic work up.     CVA (multiple, acute/subacute)  -Neurology consulted. Will need to transition to oral anticoagulant. Stop Lovenox and start Eliquis today.  -Post-pone surgery for at least 2 months.  -PT/OT/ST. ST signed off. Likely needs rehab.     CKD3  -Creatinine normalized.   -She is eating/drinking. Avoid nephrotoxins.     Hypertension  -BP stable. Continue same regimen.     Hyperlipidemia  -Statin continued.     Dizziness/visual hallucinations  -MRI brain confirming acute to subacute  infarcts.     Macular degeneration  -Home drops continued.     Anemia  -Mild. Monitor for now. Stable today.    Hypokalemia  -Potassium 3.3. Replace x 1 today.     I discussed the patient's findings and my recommendations with patient and Dr. Campos.     VTE Prophylaxis - switch to Eliquis  Code Status - Full code.  Disposition - Home in 1-2 days pending okay with all and placement confirmed. Patient still wants to go home, but unsure if that is safe at this time given her weakness. Await PT recs.       COCO Yun  [facility]  06/04/20  13:55

## 2020-06-04 NOTE — THERAPY EVALUATION
Acute Care - Occupational Therapy Initial Evaluation  [facility]     Patient Name: Mei Crane  : 1926  MRN: [id number]  Today's Date: 2020             Admit Date: 2020     No diagnosis found.  Patient Active Problem List   Diagnosis   • HTN (hypertension), benign   • Acquired hypothyroidism   • Hyperlipidemia   • Gastroesophageal reflux disease without esophagitis   • Glaucoma   • Macular degeneration   • Anemia   • Osteopenia   • CKD (chronic kidney disease), stage III (CMS/HCC)   • B12 deficiency   • Calculus of gallbladder without cholecystitis   • DDD (degenerative disc disease), lumbar   • Trochanteric bursitis of right hip   • Bilateral leg weakness   • Elevated troponin   • Weakness   • Dizziness   • Hallucinations, visual   • CVA (cerebrovascular accident) (CMS/[facility])   • Embolic stroke (CMS/[facility])     Past Medical History:   Diagnosis Date   • Calculus of gallbladder without cholecystitis 2017   • CKD (chronic kidney disease), stage III (CMS/HCC) 2016   • DDD (degenerative disc disease), lumbar 2019   • Disease of thyroid gland    • Hyperlipidemia    • Low back pain    • Peripheral neuropathy      Past Surgical History:   Procedure Laterality Date   • CATARACT EXTRACTION Bilateral    • EPIDURAL BLOCK     • HYSTERECTOMY      age 35          OT ASSESSMENT FLOWSHEET (last 12 hours)      Occupational Therapy Evaluation     Row Name 20 1000                   OT Evaluation Time/Intention    Subjective Information  complains of;weakness;fatigue  -RB        Document Type  evaluation  -RB        Mode of Treatment  individual therapy;occupational therapy  -RB        Patient Effort  good  -RB           General Information    Patient Profile Reviewed?  yes  -RB        Patient Observations  alert;cooperative;agree to therapy  -RB        Prior Level of Function  independent:  -RB        Equipment Currently Used at Home  rollator;shower chair  -RB        Existing  Precautions/Restrictions  no known precautions/restrictions  -RB        Limitations/Impairments  safety/cognitive  -RB        Risks Reviewed  patient:  -RB           Relationship/Environment    Primary Source of Support/Comfort  extended family  -RB        Lives With  alone  -RB        Primary Roles/Responsibilities  retired  -RB           Resource/Environmental Concerns    Current Living Arrangements  home/apartment/condo  -RB           Cognitive Assessment/Intervention- PT/OT    Orientation Status (Cognition)  oriented x 4  -RB        Follows Commands (Cognition)  WNL  -RB        Safety Deficit (Cognitive)  mild deficit;awareness of need for assistance;insight into deficits/self awareness;judgment;problem solving;safety precautions awareness;safety precautions follow-through/compliance  -RB           Safety Issues, Functional Mobility    Safety Issues Affecting Function (Mobility)  safety precautions follow-through/compliance;sequencing abilities;safety precaution awareness;problem solving;positioning of assistive device;judgment;insight into deficits/self awareness;awareness of need for assistance  -RB        Impairments Affecting Function (Mobility)  balance;coordination;endurance/activity tolerance;postural/trunk control;strength;motor control;motor planning  -RB           Bed Mobility Assessment/Treatment    Bed Mobility Assessment/Treatment  bed mobility (all) activities  -RB        [place] Level (Bed Mobility)  supervision  -RB        Assistive Device (Bed Mobility)  bed rails  -RB           Functional Mobility    Functional Mobility- Ind. Level  minimum assist (75% patient effort);moderate assist (50% patient effort);1 person  -RB        Functional Mobility- Device  rolling walker  -RB        Functional Mobility- Safety Issues  balance decreased during turns;sequencing ability decreased;step length decreased;loses balance backward;steps too close front assistive device  -RB        Functional Mobility-  Comment  -- Mod A at times due to LOB. Consistent cues to weight shift.  -RB           Transfer Assessment/Treatment    Transfer Assessment/Treatment  bed-chair transfer  -RB           Bed-Chair Transfer    Bed-Chair [place] (Transfers)  minimum assist (75% patient effort)  -RB        Assistive Device (Bed-Chair Transfers)  walker, front-wheeled  -RB           ADL Assessment/Intervention    BADL Assessment/Intervention  grooming;lower body dressing  -RB           Lower Body Dressing Assessment/Training    Lower Body Dressing [place] Level  lower body dressing skills;socks;minimum assist (75% patient effort);moderate assist (50% patient effort)  -RB        Lower Body Dressing Position  edge of bed sitting  -RB        Comment (Lower Body Dressing)  LOB when standing - safety concern with pants  -RB           Grooming Assessment/Training    [place] Level (Grooming)  set up  -RB        Grooming Position  supported standing  -RB           BADL Safety/Performance    Impairments, BADL Safety/Performance  balance;endurance/activity tolerance;coordination;strength;motor control;motor planning;cognition  -RB        Cognitive Impairments, BADL Safety/Performance  awareness, need for assistance;insight into deficits/self awareness;safety precaution follow-through;problem solving/reasoning;safety precaution awareness  -RB        Skilled BADL Treatment/Intervention  adaptive equipment training;BADL process/adaptation training;cognitive/safety deficit modifications;energy conservation;environmental modifications  -RB        Progress in BADL Status  level of supervision required  -RB           General ROM    GENERAL ROM COMMENTS  BUE/BLE WFL  -RB           MMT (Manual Muscle Testing)    General MMT Comments  BUE/BLE WFL  -RB           Sensory Assessment/Intervention    Sensory General Assessment  no sensation deficits identified  -RB           Positioning and Restraints    Pre-Treatment Position  in bed  -RB         Post Treatment Position  chair  -RB        In Chair  notified nsg;reclined;call light within reach;encouraged to call for assist  -RB           Pain Scale: Numbers Pre/Post-Treatment    Pain Scale: Numbers, Pretreatment  0/10 - no pain  -RB        Pain Scale: Numbers, Post-Treatment  0/10 - no pain  -RB           Respiratory WDL    Respiratory WDL  WDL  -RB           Coping    Observed Emotional State  accepting;calm  -RB        Verbalized Emotional State  acceptance  -RB           Plan of Care Review    Plan of Care Reviewed With  patient  -RB           Clinical Impression (OT)    Criteria for Skilled Therapeutic Interventions Met (OT Eval)  yes;treatment indicated  -RB        Rehab Potential (OT Eval)  good, to achieve stated therapy goals  -RB        Care Plan Review (OT)  patient/other agree to care plan  -RB        Anticipated Discharge Disposition (OT)  skilled nursing facility  -RB           Planned OT Interventions    Planned Therapy Interventions (OT Eval)  activity tolerance training;adaptive equipment training;BADL retraining;cognitive/visual perception retraining;transfer/mobility retraining;strengthening exercise;patient/caregiver education/training;occupation/activity based interventions;neuromuscular control/coordination retraining;functional balance retraining  -RB           OT Goals    Bed Mobility Goal Selection (OT)  bed mobility, OT goal 1  -RB        Transfer Goal Selection (OT)  transfer, OT goal 1  -RB        Dressing Goal Selection (OT)  dressing, OT goal 1  -RB        Toileting Goal Selection (OT)  toileting, OT goal 1  -RB        Grooming Goal Selection (OT)  grooming, OT goal 1  -RB        Additional Documentation  Grooming Goal Selection (OT) (Row)  -RB           Bed Mobility Goal 1 (OT)    Activity/Assistive Device (Bed Mobility Goal 1, OT)  bed mobility activities, all  -RB        [place] Level/Cues Needed (Bed Mobility Goal 1, OT)  supervision required  -RB        Time Frame (Bed  Mobility Goal 1, OT)  short term goal (STG);2 weeks  -RB        Progress/Outcomes (Bed Mobility Goal 1, OT)  continuing progress toward goal  -RB           Transfer Goal 1 (OT)    Activity/Assistive Device (Transfer Goal 1, OT)  transfers, all  -RB        [place] Level/Cues Needed (Transfer Goal 1, OT)  supervision required  -RB        Time Frame (Transfer Goal 1, OT)  short term goal (STG);2 weeks  -RB        Progress/Outcome (Transfer Goal 1, OT)  continuing progress toward goal  -RB           Dressing Goal 1 (OT)    Activity/Assistive Device (Dressing Goal 1, OT)  dressing skills, all  -RB        [place]/Cues Needed (Dressing Goal 1, OT)  supervision required  -RB        Time Frame (Dressing Goal 1, OT)  short term goal (STG);2 weeks  -RB        Progress/Outcome (Dressing Goal 1, OT)  continuing progress toward goal  -RB           Toileting Goal 1 (OT)    Activity/Device (Toileting Goal 1, OT)  toileting skills, all  -RB        [place] Level/Cues Needed (Toileting Goal 1, OT)  supervision required  -RB        Time Frame (Toileting Goal 1, OT)  short term goal (STG);2 weeks  -RB        Progress/Outcome (Toileting Goal 1, OT)  continuing progress toward goal  -RB           Grooming Goal 1 (OT)    Activity/Device (Grooming Goal 1, OT)  grooming skills, all  -RB        [place] (Grooming Goal 1, OT)  supervision required  -RB        Time Frame (Grooming Goal 1, OT)  short term goal (STG);2 weeks  -RB        Progress/Outcome (Grooming Goal 1, OT)  continuing progress toward goal  -RB           Living Environment    Home Accessibility  wheelchair accessible  -RB          User Key  (r) = Recorded By, (t) = Taken By, (c) = Cosigned By    Initials Name Effective Dates    RB nUa Gross, OT 04/02/20 -                OT Recommendation and Plan  Outcome Summary/Treatment Plan (OT)  Anticipated Discharge Disposition (OT): skilled nursing facility  Planned Therapy Interventions (OT Eval): activity  tolerance training, adaptive equipment training, BADL retraining, cognitive/visual perception retraining, transfer/mobility retraining, strengthening exercise, patient/caregiver education/training, occupation/activity based interventions, neuromuscular control/coordination retraining, functional balance retraining  Plan of Care Review  Plan of Care Reviewed With: patient  Plan of Care Reviewed With: patient    Outcome Measures     Row Name 06/04/20 1000             How much help from another is currently needed...    Putting on and taking off regular lower body clothing?  2  -RB      Bathing (including washing, rinsing, and drying)  2  -RB      Toileting (which includes using toilet bed pan or urinal)  2  -RB      Putting on and taking off regular upper body clothing  3  -RB      Taking care of personal grooming (such as brushing teeth)  2  -RB      Eating meals  3  -RB      AM-PAC 6 Clicks Score (OT)  14  -RB         Modified Matt Scale    Pre-Stroke Modified Matt Scale  1 - No significant disability despite symptoms.  Able to carry out all usual duties and activities.  -RB      Modified Matt Scale  4 - Moderately severe disability.  Unable to walk without assistance, and unable to attend to own bodily needs without assistance.  -RB         Functional Assessment    Outcome Measure Options  AM-PAC 6 Clicks Daily Activity (OT);Modified Matt  -RB        User Key  (r) = Recorded By, (t) = Taken By, (c) = Cosigned By    Initials Name Provider Type    Una Tirado OT Occupational Therapist          Time Calculation:   Time Calculation- OT     Row Name 06/04/20 1024             Time Calculation- OT    OT Start Time  0954  -RB      OT Stop Time  1020  -RB      OT Time Calculation (min)  26 min  -RB      Total Timed Code Minutes- OT  18 minute(s)  -RB      OT Received On  06/04/20  -RB      OT - Next Appointment  06/05/20  -RB      OT Goal Re-Cert Due Date  06/18/20  -RB        User Key  (r) = Recorded By,  (t) = Taken By, (c) = Cosigned By    Initials Name Provider Type    RB Una Gross OT Occupational Therapist        Therapy Charges for Today     Code Description Service Date Service Provider Modifiers Qty    03860930515 HC OT EVAL MOD COMPLEXITY 2 6/4/2020 Una Gross OT GO 1    [id number]  OT SELF CARE/MGMT/TRAIN EA 15 MIN 6/4/2020 Una Gross OT GO 1    [id number]  OT THERAPEUTIC ACT EA 15 MIN 6/4/2020 Una Gross OT GO 1               Una Gross OT  6/4/2020

## 2020-06-05 VITALS
WEIGHT: 141.98 LBS | SYSTOLIC BLOOD PRESSURE: 140 MMHG | HEART RATE: 71 BPM | DIASTOLIC BLOOD PRESSURE: 60 MMHG | TEMPERATURE: 98.2 F | OXYGEN SATURATION: 92 % | HEIGHT: 64 IN | RESPIRATION RATE: 18 BRPM | BODY MASS INDEX: 24.24 KG/M2

## 2020-06-05 LAB
ANION GAP SERPL CALCULATED.3IONS-SCNC: 9.9 MMOL/L (ref 5–15)
BUN BLD-MCNC: 19 MG/DL (ref 8–23)
BUN/CREAT SERPL: 17.6 (ref 7–25)
CALCIUM SPEC-SCNC: 8.9 MG/DL (ref 8.2–9.6)
CHLORIDE SERPL-SCNC: 102 MMOL/L (ref 98–107)
CO2 SERPL-SCNC: 23.1 MMOL/L (ref 22–29)
CREAT BLD-MCNC: 1.08 MG/DL (ref 0.57–1)
DEPRECATED RDW RBC AUTO: 40.5 FL (ref 37–54)
ERYTHROCYTE [DISTWIDTH] IN BLOOD BY AUTOMATED COUNT: 12.7 % (ref 12.3–15.4)
GFR SERPL CREATININE-BSD FRML MDRD: 47 ML/MIN/1.73
GLUCOSE BLD-MCNC: 97 MG/DL (ref 65–99)
HCT VFR BLD AUTO: 29.9 % (ref 34–46.6)
HGB BLD-MCNC: 9.8 G/DL (ref 12–15.9)
MCH RBC QN AUTO: 28.8 PG (ref 26.6–33)
MCHC RBC AUTO-ENTMCNC: 32.8 G/DL (ref 31.5–35.7)
MCV RBC AUTO: 87.9 FL (ref 79–97)
PLATELET # BLD AUTO: 240 10*3/MM3 (ref 140–450)
PMV BLD AUTO: 10.9 FL (ref 6–12)
POTASSIUM BLD-SCNC: 3.7 MMOL/L (ref 3.5–5.2)
RBC # BLD AUTO: 3.4 10*6/MM3 (ref 3.77–5.28)
REF LAB TEST METHOD: NORMAL
SARS-COV-2 RNA RESP QL NAA+PROBE: NOT DETECTED
SODIUM BLD-SCNC: 135 MMOL/L (ref 136–145)
WBC NRBC COR # BLD: 7.43 10*3/MM3 (ref 3.4–10.8)

## 2020-06-05 PROCEDURE — 80048 BASIC METABOLIC PNL TOTAL CA: CPT | Performed by: NURSE PRACTITIONER

## 2020-06-05 PROCEDURE — 99231 SBSQ HOSP IP/OBS SF/LOW 25: CPT | Performed by: ORTHOPAEDIC SURGERY

## 2020-06-05 PROCEDURE — 85027 COMPLETE CBC AUTOMATED: CPT | Performed by: NURSE PRACTITIONER

## 2020-06-05 RX ORDER — ATORVASTATIN CALCIUM 80 MG/1
80 TABLET, FILM COATED ORAL NIGHTLY
Start: 2020-06-05 | End: 2020-06-17 | Stop reason: SDUPTHER

## 2020-06-05 RX ORDER — ACETAMINOPHEN 325 MG/1
650 TABLET ORAL EVERY 4 HOURS PRN
Start: 2020-06-05

## 2020-06-05 RX ADMIN — AMLODIPINE BESYLATE 10 MG: 10 TABLET ORAL at 09:13

## 2020-06-05 RX ADMIN — FERROUS SULFATE TAB 325 MG (65 MG ELEMENTAL FE) 325 MG: 325 (65 FE) TAB at 09:13

## 2020-06-05 RX ADMIN — POTASSIUM CHLORIDE 10 MEQ: 10 CAPSULE, COATED, EXTENDED RELEASE ORAL at 09:12

## 2020-06-05 RX ADMIN — METOPROLOL TARTRATE 25 MG: 25 TABLET ORAL at 00:29

## 2020-06-05 RX ADMIN — SODIUM CHLORIDE, PRESERVATIVE FREE 10 ML: 5 INJECTION INTRAVENOUS at 09:12

## 2020-06-05 RX ADMIN — APIXABAN 5 MG: 5 TABLET, FILM COATED ORAL at 09:12

## 2020-06-05 RX ADMIN — METOPROLOL TARTRATE 25 MG: 25 TABLET ORAL at 09:15

## 2020-06-05 RX ADMIN — SODIUM CHLORIDE, PRESERVATIVE FREE 10 ML: 5 INJECTION INTRAVENOUS at 09:13

## 2020-06-05 RX ADMIN — LEVOTHYROXINE SODIUM 50 MCG: 50 TABLET ORAL at 05:34

## 2020-06-05 RX ADMIN — PANTOPRAZOLE SODIUM 40 MG: 40 TABLET, DELAYED RELEASE ORAL at 05:34

## 2020-06-05 RX ADMIN — HYDROCHLOROTHIAZIDE 12.5 MG: 12.5 CAPSULE ORAL at 09:12

## 2020-06-05 NOTE — PROGRESS NOTES
Name: Mei Crane ADMIT: 2020   : 1926  PCP: Lexie Toussaint, COCO    MRN: [id number] LOS: 4 days   AGE/SEX: 89 y.o. female  ROOM: [facility]     Subjective   Subjective   Did not sleep well; feels tired and weak. Still w/some dizziness and feeling off balance. Appetite ok.     Review of Systems   Constitutional: Positive for fatigue.   HENT: Negative.    Respiratory: Negative.    Cardiovascular: Negative.    Gastrointestinal: Negative.    Genitourinary: Negative.    Musculoskeletal: Negative.    Skin: Negative.    Neurological: Positive for dizziness and weakness.   Psychiatric/Behavioral: Positive for sleep disturbance.        Objective   Objective   Vital Signs  Temp:  [97.5 °F (36.4 °C)-98.4 °F (36.9 °C)] 98 °F (36.7 °C)  Heart Rate:  [69-83] 83  Resp:  [16-18] 18  BP: (131-138)/(62-77) 138/75  SpO2:  [97 %] 97 %  on   ;   Device (Oxygen Therapy): room air  Body mass index is 24.36 kg/m².  Physical Exam   Constitutional: She is oriented to person, place, and time. No distress.   HENT:   Head: Normocephalic.   Eyes: Conjunctivae are normal.   Neck: No JVD present.   Cardiovascular: Normal rate and regular rhythm.   Pulmonary/Chest: Effort normal and breath sounds normal. No respiratory distress.   Abdominal: Soft. Bowel sounds are normal.   Musculoskeletal: She exhibits no edema.   Neurological: She is alert and oriented to person, place, and time.   Skin: Skin is warm and dry.   Psychiatric: She has a normal mood and affect.   Vitals reviewed.      Results Review:       I reviewed the patient's new clinical results.  Results from last 7 days   Lab Units 20  0432 20  0316 20  0426 20  0516   WBC 10*3/mm3 7.43 9.19 8.72 7.91   HEMOGLOBIN g/dL 9.8* 10.3* 10.0* 10.3*   PLATELETS 10*3/mm3 240 224 221 212     Results from last 7 days   Lab Units 20  0432 20  0316 20  0426 20  0128   SODIUM mmol/L 135* 137 138 139   POTASSIUM mmol/L 3.7 3.3* 3.7 3.7      CHLORIDE mmol/L 102 102 106 103   CO2 mmol/L 23.1 24.4 23.7 23.6   BUN mg/dL 19 16 16 21   CREATININE mg/dL 1.08* 0.87 0.96 1.19*   GLUCOSE mg/dL 97 108* 93 107*   Estimated Creatinine Clearance: 32.4 mL/min (A) (by C-G formula based on SCr of 1.08 mg/dL (H)).    Results from last 7 days   Lab Units 06/05/20  0432 06/04/20  0316 06/03/20  0426 06/01/20  0128   CALCIUM mg/dL 8.9 8.9 8.5 8.4       Hemoglobin A1C   Date/Time Value Ref Range Status   06/04/2020 0316 5.70 (H) 4.80 - 5.60 % Final     Glucose   Date/Time Value Ref Range Status   06/04/2020 1100 93 70 - 130 mg/dL Final   06/04/2020 0602 100 70 - 130 mg/dL Final   06/03/2020 1613 109 70 - 130 mg/dL Final       MRI Brain Without Contrast  Narrative: MRI OF THE BRAIN WITHOUT CONTRAST 06/03/2020     CLINICAL HISTORY: Subacute neurodeficit bilateral lower extremity  weakness, hallucinations, visual disturbance.     TECHNIQUE: Axial T1, FLAIR, fat-suppressed T2, axial diffusion and  gradient echo T2 and sagittal T1-weighted images were obtained of the  entire head.     COMPARISON: There are no prior head CTs or MRIs of the brain from  [facility] for comparison.     FINDINGS: There are patchy nodular and confluent areas of T2 high signal  in the periventricular and subcortical white matter of the cerebral  hemispheres, as well as some patchy T2 high signal involving central  pontine white matter, all of which is consistent with moderate small  vessel disease. On the gradient echo T2 weighted images, there are 4  tiny 2-3 mm nodular foci of signal loss in the mid and inferior left  cerebellum, compatible with punctate foci of hemosiderin deposition from  tiny old microhemorrhages at the site and may be from hypertension or  amyloid angiopathy. On the axial diffusion weighted images, there are  several scattered tiny foci of diffusion hyperintensity that demonstrate  some low signal on the ADC maps consistent with tiny acute to subacute  infarcts.  This includes a 5 x 4 mm focus in posterior inferior lateral  right cerebellum and an additional 7 x 3 mm focus posterior medial right  cerebellum and the right PICA territory. There is a 3 mm focus in the  medial aspect of the left cerebral peduncle. There is a 6 mm rounded  focus in the mid left corona radiata region and a 5 mm focus in the  anterolateral left thalamus, 3 mm focus in the superior left frontal  juxtacortical white matter, and a 3 mm focus in the anterior superior  medial right parietal juxtacortical white matter. Multiple different  vascular territories suggests the patient may have a central or cardiac  embolic source. The remainder of the brain parenchyma is normal in  signal intensity. The ventricles are normal in size. I see no midline  shift and no extra-axial fluid collections are identified. There is mild  posterior inferior right maxillary sinus mucosal thickening. The  remainder of the paranasal sinuses and mastoid air cells and middle ear  cavities are clear. Good flow voids are demonstrated within the cerebral  vessels and in the dural venous sinuses. Calvarium and skull base  demonstrate normal marrow signal intensity. There is failure of fluid  suppression within the right globe that may be an artifact versus  potentially changes from a prior right ocular procedure.     Impression: 1. There is moderate small vessel disease in the cerebral white matter.  2. Four separate 2-3 mm punctate foci of hemosiderin deposition mid and  inferior left cerebellar white matter from tiny old microhemorrhages  that may be secondary to hypertension or amyloid angiopathy.  3. Failure of fluid suppression on FLAIR images in the right globe that  could be artifact or potentially from a prior right ocular procedure and  correlation with clinical history suggested.  4. There are multiple tiny acute to subacute infarcts in multiple  different vascular territories. This includes a 5 x 4 mm acute to  subacute  infarct in posterior inferior lateral right cerebellum and a 7  x 3 mm subacute infarct in posterior inferior medial right cerebellum,  and these are on the right PICA territory. There is 3 mm infarct in  medial aspect of the left cerebral peduncle. There is 6 mm infarct in  the mid left corona radiata region and a 5 mm infarct in the  anterolateral left thalamus. There is a 3 mm tiny acute to subacute  infarct in the anterior superior medial right parietal lobe in either  the right anterior or middle cerebral artery territory, 3 mm tiny  infarct in the superior left frontal juxtacortical white matter and the  left middle cerebral artery territory. Multiple tiny infarcts in  different vascular territories suggest patient may have a cardiac or  central embolic source, correlate clinically. The remainder of the MRI  of the head is normal. The results were communicated to Dr. Joyce Campos by telephone on 06/03/2020 at 8:45 AM.     This report was finalized on 6/3/2020 10:13 AM by Dr. Perez English M.D.           amLODIPine 10 mg Oral Daily   apixaban 5 mg Oral Q12H   atorvastatin 80 mg Oral Nightly   ferrous sulfate 325 mg Oral Daily With Breakfast   hydroCHLOROthiazide 12.5 mg Oral Daily   levothyroxine 50 mcg Oral Daily   metoprolol tartrate 25 mg Oral Q8H   pantoprazole 40 mg Oral Daily   potassium chloride 10 mEq Oral Daily   prednisoLONE acetate 1 drop Left Eye Q6H   sodium chloride 10 mL Intravenous Q12H   sodium chloride 10 mL Intravenous Q12H      Diet Regular       Assessment/Plan     Active Hospital Problems    Diagnosis  POA   • **Embolic stroke (CMS/HCC) [I63.9]  Unknown   • Dizziness [R42]  Yes   • Hallucinations, visual [R44.1]  Unknown   • Bilateral leg weakness [R29.898]  Unknown   • Elevated troponin [R79.89]  Unknown   • DDD (degenerative disc disease), lumbar [M51.36]  Yes   • CKD (chronic kidney disease), stage III (CMS/HCC) [N18.3]  Yes   • HTN (hypertension), benign [I10]  Yes   • Acquired  hypothyroidism [E03.9]  Yes   • Hyperlipidemia [E78.5]  Yes   • Gastroesophageal reflux disease without esophagitis [K21.9]  Yes   • Macular degeneration [H35.30]  Yes   • Osteopenia [M85.80]  Yes      Resolved Hospital Problems   No resolved problems to display.       89 y.o. female admitted with Embolic stroke (CMS/[facility]).    Bilateral leg weakness/lumbar DDD  -Orthopedic surgery consulted. MRI lumbar spine showing severe stenosis in lumbar spine. S/P epidurals. Initially plans for L3-5 laminectomy and fusion given debility. However, now with acute/subacute strokes. Post-pone surgery for at least two months per Neurology recommendations.  -PT consulted. awaiting their recommendations. OT recommends [facility]/SNF at discharge.  -CCP helping with placement; plan SNF once Covid screen is completed     Elevated troponin  -Cardiology consulted. Heparin gtt initially started, then transitioned to lovenox.    -EKG upon arrival here shows normal sinus rhythm, with old infarct.  -Echocardiogram with hyperdynamic left ventricular EF and moderate valvular disease. Moderate aortic stenosis and mitral stenosis with mild aortic regurgitation. Wall motion okay. Doubt ACS at this point.  -PAF during stay. Coreg switched to metoprolol. Plans for conservative management. No plans for stress testing at this time or further ischemic work up. On Eliquis     CVA (multiple, acute/subacute)  -Neurology consulted. Eliquis started today.  -Post-pone surgery for at least 2 months.  -PT/OT. ST signed off. SNF at discharge  -No need for antiplatelet; ASA stopped  -Continue statin     CKD3  -Creatinine a little higher today 1.08; encourage po fluids   - Avoid nephrotoxins.     Hypertension  -BP stable. Continue same regimen.     Hyperlipidemia  -Statin continued.     Dizziness/visual hallucinations  -MRI brain confirming acute to subacute infarcts.     Macular degeneration  -Home drops continued.     Anemia  -Mild. Monitor for now. Stable  today.     Hypokalemia  -S/P repletion; K 3.7     I discussed the patient's findings and my recommendations with patient, family, and Dr. Campos.     VTE Prophylaxis -  Eliquis  Code Status - Full code.  Disposition - SNF likely tomorrow; Covid screen pending     [facility]OCO Cason  [facility]  06/05/20  12:31

## 2020-06-05 NOTE — DISCHARGE SUMMARY
Patient Name: Mei Crane  : 1926  MRN: [id number]    Date of Admission: 2020  Date of Discharge:  2020  Primary Care Physician: Lexie Toussaint APRN      Chief Complaint:   No chief complaint on file.      Discharge Diagnoses     Active Hospital Problems    Diagnosis  POA   • **Embolic stroke (CMS/HCC) [I63.9]  Unknown   • Dizziness [R42]  Yes   • Hallucinations, visual [R44.1]  Unknown   • Bilateral leg weakness [R29.898]  Unknown   • Elevated troponin [R79.89]  Unknown   • DDD (degenerative disc disease), lumbar [M51.36]  Yes   • CKD (chronic kidney disease), stage III (CMS/HCC) [N18.3]  Yes   • HTN (hypertension), benign [I10]  Yes   • Acquired hypothyroidism [E03.9]  Yes   • Hyperlipidemia [E78.5]  Yes   • Gastroesophageal reflux disease without esophagitis [K21.9]  Yes   • Macular degeneration [H35.30]  Yes   • Osteopenia [M85.80]  Yes      Resolved Hospital Problems   No resolved problems to display.        Hospital Course     Ms. Crane is a 89 y.o. female with a history of hypertension, hypothyroidism, hyperlipidemia, GERD, osteopenia, CKD, degenerative disc disease who presented to [facility] initially complaining of BLE weakness, dizziness, falls at home. She had recently received epidural injection x 3 but had never experienced ble weakness before. Please see the admitting history and physical for further details.  She was admitted for evaluation of ble weakness and for elevated troponin. Ortho-spine checked MRI L spine which showed severe stenosis. Plan would be for L3-L5 laminectomy & fusion once medical issues addressed. Cardiology has followed. She was initially on heparin gtt for elevated troponin. Clinical picture did not seem consistent with ischemic event. Echocardiogram was obtained showing hyperdynamic left ventricular ejection fraction and moderate valvular disease.  Moderate aortic stenosis and mitral stenosis with mild aortic regurgitation.  No  regional wall motion abnormalities were noted . She went into afib w/RVR on 6/2/20; she was continued on AC, ASA, & statin; beta blocker was switched from carvedilol to metoprolol. She reported continued dizziness & visual hallucinations. MRI was obtained showing multiple subacute strokes. Neurology evaluated. Statin was increased to 80 mg. Vessel imaging was not completed as this would not . AC was switched to po apixaban; ASA was dc'd. Laminectomy & fusion will have to be postponed for 2-3 months; Dr. Maddox is aware and will follow up with patient in 2 months. OT/PT/ST has evaluated. Pt is stable for discharge to SNF for continued therapy.           Day of Discharge     Did not sleep well; feels tired and weak. Still w/some dizziness and feeling off balance. Appetite ok. Afebrile; denies dyspnea, chest pain, palpitations, dysuria. Agrees with discharge.    Physical Exam:  Temp:  [97.5 °F (36.4 °C)-98.4 °F (36.9 °C)] 98.2 °F (36.8 °C)  Heart Rate:  [69-83] 71  Resp:  [16-18] 18  BP: (131-140)/(60-77) 140/60  Body mass index is 24.36 kg/m².  Physical Exam  Constitutional: She is oriented to person, place, and time. No distress.   HENT:   Head: Normocephalic.   Eyes: Conjunctivae are normal.   Neck: No JVD present.   Cardiovascular: Normal rate and regular rhythm.   Pulmonary/Chest: Effort normal and breath sounds normal. No respiratory distress.   Abdominal: Soft. Bowel sounds are normal.   Musculoskeletal: She exhibits no edema.   Neurological: She is alert and oriented to person, place, and time.   Skin: Skin is warm and dry.   Psychiatric: She has a normal mood and affect.   Vitals reviewed  Consultants     Consult Orders (all) (From admission, onward)     Start     Ordered    06/03/20 1318  Notify Stroke Coordinator  Once     Provider:  (Not yet assigned)    06/03/20 1317    06/03/20 1318  Inpatient Rehab Admission Consult  Once     Provider:  (Not yet assigned)    06/03/20 1317    06/03/20  1318  Inpatient Case Management  Consult  Once     Provider:  (Not yet assigned)    06/03/20 1317    06/03/20 1318  Inpatient Diabetes Educator Consult  Once,   Status:  Canceled     Provider:  (Not yet assigned)    06/03/20 1317    06/03/20 0851  Inpatient Neurology Consult Stroke  Once     Specialty:  Neurology  Provider:  Sixto Grewal MD    06/03/20 0855    06/01/20 0115  Inpatient Cardiology Consult  Once     Specialty:  Cardiology  Provider:  Ag Diaz MD    06/01/20 0114    06/01/20 0114  Inpatient Orthopedic Surgery Consult  Once     Specialty:  Orthopedic Surgery  Provider:  Rudy Maddox MD    06/01/20 0114              Procedures     Imaging Results (All)     Procedure Component Value Units Date/Time    MRI Brain Without Contrast [[id number]] Collected:  06/03/20 0939     Updated:  06/03/20 1016    Narrative:       MRI OF THE BRAIN WITHOUT CONTRAST 06/03/2020     CLINICAL HISTORY: Subacute neurodeficit bilateral lower extremity  weakness, hallucinations, visual disturbance.     TECHNIQUE: Axial T1, FLAIR, fat-suppressed T2, axial diffusion and  gradient echo T2 and sagittal T1-weighted images were obtained of the  entire head.     COMPARISON: There are no prior head CTs or MRIs of the brain from  [facility] for comparison.     FINDINGS: There are patchy nodular and confluent areas of T2 high signal  in the periventricular and subcortical white matter of the cerebral  hemispheres, as well as some patchy T2 high signal involving central  pontine white matter, all of which is consistent with moderate small  vessel disease. On the gradient echo T2 weighted images, there are 4  tiny 2-3 mm nodular foci of signal loss in the mid and inferior left  cerebellum, compatible with punctate foci of hemosiderin deposition from  tiny old microhemorrhages at the site and may be from hypertension or  amyloid angiopathy. On the axial diffusion weighted images, there  are  several scattered tiny foci of diffusion hyperintensity that demonstrate  some low signal on the ADC maps consistent with tiny acute to subacute  infarcts. This includes a 5 x 4 mm focus in posterior inferior lateral  right cerebellum and an additional 7 x 3 mm focus posterior medial right  cerebellum and the right PICA territory. There is a 3 mm focus in the  medial aspect of the left cerebral peduncle. There is a 6 mm rounded  focus in the mid left corona radiata region and a 5 mm focus in the  anterolateral left thalamus, 3 mm focus in the superior left frontal  juxtacortical white matter, and a 3 mm focus in the anterior superior  medial right parietal juxtacortical white matter. Multiple different  vascular territories suggests the patient may have a central or cardiac  embolic source. The remainder of the brain parenchyma is normal in  signal intensity. The ventricles are normal in size. I see no midline  shift and no extra-axial fluid collections are identified. There is mild  posterior inferior right maxillary sinus mucosal thickening. The  remainder of the paranasal sinuses and mastoid air cells and middle ear  cavities are clear. Good flow voids are demonstrated within the cerebral  vessels and in the dural venous sinuses. Calvarium and skull base  demonstrate normal marrow signal intensity. There is failure of fluid  suppression within the right globe that may be an artifact versus  potentially changes from a prior right ocular procedure.       Impression:       1. There is moderate small vessel disease in the cerebral white matter.  2. Four separate 2-3 mm punctate foci of hemosiderin deposition mid and  inferior left cerebellar white matter from tiny old microhemorrhages  that may be secondary to hypertension or amyloid angiopathy.  3. Failure of fluid suppression on FLAIR images in the right globe that  could be artifact or potentially from a prior right ocular procedure and  correlation with  clinical history suggested.  4. There are multiple tiny acute to subacute infarcts in multiple  different vascular territories. This includes a 5 x 4 mm acute to  subacute infarct in posterior inferior lateral right cerebellum and a 7  x 3 mm subacute infarct in posterior inferior medial right cerebellum,  and these are on the right PICA territory. There is 3 mm infarct in  medial aspect of the left cerebral peduncle. There is 6 mm infarct in  the mid left corona radiata region and a 5 mm infarct in the  anterolateral left thalamus. There is a 3 mm tiny acute to subacute  infarct in the anterior superior medial right parietal lobe in either  the right anterior or middle cerebral artery territory, 3 mm tiny  infarct in the superior left frontal juxtacortical white matter and the  left middle cerebral artery territory. Multiple tiny infarcts in  different vascular territories suggest patient may have a cardiac or  central embolic source, correlate clinically. The remainder of the MRI  of the head is normal. The results were communicated to Dr. Joyce Campos by telephone on 06/03/2020 at 8:45 AM.     This report was finalized on 6/3/2020 10:13 AM by Dr. Perez English M.D.       MRI Lumbar Spine With & Without Contrast [[id number]] Collected:  06/02/20 1150     Updated:  06/02/20 1247    Narrative:       MRI LUMBAR SPINE WITH AND WITHOUT CONTRAST     HISTORY: Low back pain with bilateral lower extremity pain and weakness.     TECHNIQUE: Lumbar spine includes sagittal T1, T2 fat sat, PD as well as  axial T1 and T2 weighted sequences. Gadolinium administered  intravenously followed by axial T1 and sagittal T1 fat-saturated  sequences.     FINDINGS: There is degenerative disc disease in lower thoracic spine and  lumbar spine with disc space narrowing that appears greatest at T11-12,  L3-4, L4-5. There is a scoliotic curvature that is convex to the right  at L3-4. Within the posterior right lobe of the liver there is  an  hepatic cyst measuring 4.1 x 2.7 cm and this was also demonstrated on  previous CT abdomen 07/22/2017.     At T10-11 there is a broad disc/osteophyte complex mildly effacing the  anterior thecal sac.      At T11-12, there is disc space narrowing particularly on the right where  there are endplate degenerative changes. There is a broad disc bulge  eccentric to the right and there is mild right facet arthritis with mild  narrowing of central canal.     At T12-L1, the central canal and neural foramina are patent.     At L1-2, the central canal and neural foramina are patent.     At L2-3 there is mild bilateral facet arthritis with flavum thickening  and there is a broad disc bulge with mild narrowing of the central  canal. The neural foramina are patent.     At L3-4, there is disc space narrowing with Schmorl's node formation and  endplate degenerative signal exhibiting T1 hypointense and T2  hyperintense signal. There is a broad posterior disc/osteophyte complex  and there is bilateral facet arthritis with severe central canal and  lateral recess stenosis. There is absent fluid signal surrounding the  nerve roots at the level of the disc space. Disc bulge extends in the  inferior aspect of the neural foramina with mild bilateral neural  foraminal narrowing.     At L4-5, there is 3 mm retrolisthesis L4 with respect to L5. Broad disc  bulge is present and there is bilateral facet arthritis with flavum  thickening and moderate to severe central canal stenosis as well as  lateral recess stenosis. There is diminished fluid signal surrounding  the nerve roots at the level of the disc space. There is diminished  foraminal height on the left with mild to moderate left foraminal  narrowing. Disc bulge appears to contact the undersurface of the exiting  left L4 nerve lateral to the left neural foramen. Mild narrowing is  present of the right neural foramen.     At L5-S1, there is disc space narrowing and there is a broad  disc bulge  as well as facet arthritis with mild narrowing of the lateral recesses.  Diminished foraminal height is present in the right and there is  mild-to-moderate right and mild left neural foraminal narrowing.       Impression:       1. Multilevel degenerative disc disease with degree of canal stenosis  greatest at L3-4 where there is severe central canal and lateral recess  stenosis associated with a broad posterior disc/osteophyte complexes and  bilateral facet arthritis.   2. At L4-5, there is moderate to severe central canal and lateral recess  stenosis.  There is 3 mm retrolisthesis of L4 with respect to L5 as well  as uncovered disc bulge and bilateral facet arthritis. Disc bulge is  eccentric to the left and there is diminished left femoral height with  moderate left foraminal narrowing and bulge appears to contact the  exiting left L4 nerve lateral to the left neural foramen.     This report was finalized on 6/2/2020 12:44 PM by Dr. Maurilio Ramírez M.D.             Pertinent Labs     Results from last 7 days   Lab Units 06/05/20 0432 06/04/20 0316 06/03/20 0426 06/02/20  0516   WBC 10*3/mm3 7.43 9.19 8.72 7.91   HEMOGLOBIN g/dL 9.8* 10.3* 10.0* 10.3*   PLATELETS 10*3/mm3 240 224 221 212     Results from last 7 days   Lab Units 06/05/20 0432 06/04/20 0316 06/03/20 0426 06/01/20  0128   SODIUM mmol/L 135* 137 138 139   POTASSIUM mmol/L 3.7 3.3* 3.7 3.7   CHLORIDE mmol/L 102 102 106 103   CO2 mmol/L 23.1 24.4 23.7 23.6   BUN mg/dL 19 16 16 21   CREATININE mg/dL 1.08* 0.87 0.96 1.19*   GLUCOSE mg/dL 97 108* 93 107*   Estimated Creatinine Clearance: 32.4 mL/min (A) (by C-G formula based on SCr of 1.08 mg/dL (H)).    Results from last 7 days   Lab Units 06/05/20 0432 06/04/20 0316 06/03/20 0426 06/01/20  0128   CALCIUM mg/dL 8.9 8.9 8.5 8.4       Results from last 7 days   Lab Units 06/02/20  0517 06/01/20  0724 06/01/20  0056   TROPONIN T ng/mL 0.140* 0.173* 0.171*       Results from last 7 days    Lab Units 06/04/20  0316   CHOLESTEROL mg/dL 161   TRIGLYCERIDES mg/dL 107   HDL CHOL mg/dL 47   LDL CHOL mg/dL 93           Test Results Pending at Discharge       Discharge Details        Discharge Medications      New Medications      Instructions Start Date   acetaminophen 325 MG tablet  Commonly known as:  TYLENOL   650 mg, Oral, Every 4 Hours PRN      apixaban 5 MG tablet tablet  Commonly known as:  ELIQUIS   5 mg, Oral, Every 12 Hours Scheduled      metoprolol tartrate 25 MG tablet  Commonly known as:  LOPRESSOR   25 mg, Oral, Every 8 Hours         Changes to Medications      Instructions Start Date   atorvastatin 80 MG tablet  Commonly known as:  LIPITOR  What changed:    · medication strength  · how much to take  · when to take this   80 mg, Oral, Nightly         Continue These Medications      Instructions Start Date   amLODIPine 10 MG tablet  Commonly known as:  NORVASC   10 mg, Oral, Daily      ascorbic acid 1000 MG tablet  Commonly known as:  VITAMIN C   1,000 mg, Oral, Daily      cyclobenzaprine 5 MG tablet  Commonly known as:  FLEXERIL   5 mg, Oral, 3 Times Daily PRN      ferrous gluconate 324 (37.5 Fe) MG tablet tablet   324 mg, Oral, Daily With Breakfast      hydroCHLOROthiazide 12.5 MG capsule  Commonly known as:  MICROZIDE   12.5 mg, Oral, Daily      ibuprofen 400 MG tablet  Commonly known as:  ADVIL,MOTRIN   400 mg, Oral, Every 6 Hours PRN      levothyroxine 50 MCG tablet  Commonly known as:  Synthroid   50 mcg, Oral, Daily      pantoprazole 40 MG EC tablet  Commonly known as:  PROTONIX   40 mg, Oral, Daily      potassium chloride 10 MEQ CR tablet  Commonly known as:  K-DUR,KLOR-CON   10 mEq, Oral, Daily      prednisoLONE acetate 1 % ophthalmic suspension  Commonly known as:  PRED FORTE   INSTILL ONE DROP IN THE LEFT EYE FOUR TIMES DAILY      VITAMIN D PO   2,000 Units, Oral, Daily         Stop These Medications    aspirin 81 MG EC tablet            Allergies   Allergen Reactions   •  Barbiturates Hives   • Codeine Hives         Discharge Disposition:  Skilled Nursing Facility (DC - External)    Discharge Diet:  Diet Order   Procedures   • Diet Regular       Discharge Activity:   Activity Instructions     Activity as Tolerated            CODE STATUS:    Code Status and Medical Interventions:   Ordered at: 06/01/20 0041     Code Status:    CPR     Medical Interventions (Level of Support Prior to Arrest):    Full       Future Appointments   Date Time Provider Department Center   7/23/2020 10:00 AM Lexie Toussaint APRN [facility] None   9/29/2020  1:00 PM [facility]     Follow-up Information     Lexie Toussaint APRN Follow up in 2 week(s).    Specialty:  Family Medicine  Contact information:  [address]  [facility] [address] [phone]             Rudy Maddox MD Follow up in 2 month(s).    Specialty:  Orthopedic Surgery  Contact information:  [address]  [facility] [address] [phone]             Lisa Dudley APRN Follow up in 3 month(s).    Specialty:  Neurology  Contact information:  [address]  [facility] [address] [phone]                   Time Spent on Discharge:  Greater than 30 minutes      COCO Cason  [facility]  06/05/20  1:24 PM

## 2020-06-05 NOTE — NURSING NOTE
Referral received through stroke screening order set.  Noted plans for transfer to [facility] today.  Will sign off

## 2020-06-05 NOTE — PLAN OF CARE
Problem: Patient Care Overview  Goal: Plan of Care Review  Outcome: Ongoing (interventions implemented as appropriate)  Flowsheets (Taken 6/5/2020 2186)  Progress: improving  Plan of Care Reviewed With: patient  Outcome Summary: pt had a much better day, pt able to ambulate easier tonight with a walker, pt had a bowel movement today, pt NIH 0, pt had covid test waiting on results, pt heart rhythm sinus with an occasional bundle branch bloack, awaitng to go to NH for rehab, will continue to monitor

## 2020-06-05 NOTE — PROGRESS NOTES
I did not see patient today.  Events noted- she is thought to have had multiple cerebral emboli.  It is possible that some of her recent worsened balance and weakness is related to this as opposed to her longstanding severe spinal stenosis.  So far as spinal surgery is concerned the point is moot as she will require anticoagulation and as noted per neurology no surgery is to be considered for 2 months.  That being the case I do not have much else to recommend other than that she have full assistance when she is ambulatory, and use a wheelchair when she cannot be fully assisted to lower the risk of falling.  I will see her back in the office in a couple of months.

## 2020-06-05 NOTE — PROGRESS NOTES
Continued Stay Note  [facility]     Patient Name: Mei Crane  MRN: [id number]  Today's Date: 6/5/2020    Admit Date: 6/1/2020    Discharge Plan     Row Name 06/05/20 1310       Plan    Plan  [facility] today via nephew    Patient/Family in Agreement with Plan  yes    Plan Comments  covid19 test negative. DC orders noted. Notified [facility]. Packet in cubby. romulo thorne/cpc        Discharge Codes    No documentation.       Expected Discharge Date and Time     Expected Discharge Date Expected Discharge Time    Jun 5, 2020             Autumn Snyder, RN

## 2020-06-08 ENCOUNTER — EPISODE CHANGES (OUTPATIENT)
Dept: CASE MANAGEMENT | Facility: OTHER | Age: 85
End: 2020-06-08

## 2020-06-08 NOTE — PROGRESS NOTES
Case Management Discharge Note      Final Note: [facility]. romulo rn/ccp         Destination - Selection Complete      Service Provider Request Status Selected Services Address Phone Number Fax Number    [facility] Selected Skilled Nursing [address], [facility] [address] [phone]      Durable Medical Equipment      No service has been selected for the patient.      Dialysis/Infusion      No service has been selected for the patient.      Home Medical Care      No service has been selected for the patient.      Therapy      No service has been selected for the patient.      Community Resources      No service has been selected for the patient.        Transportation Services  Private: Car    Final Discharge Disposition Code: 03 - skilled nursing facility (SNF)

## 2020-06-17 RX ORDER — HYDROCHLOROTHIAZIDE 12.5 MG/1
12.5 CAPSULE, GELATIN COATED ORAL DAILY
Qty: 30 CAPSULE | Refills: 2 | Status: SHIPPED | OUTPATIENT
Start: 2020-06-17 | End: 2020-07-22 | Stop reason: HOSPADM

## 2020-06-17 RX ORDER — ATORVASTATIN CALCIUM 80 MG/1
80 TABLET, FILM COATED ORAL NIGHTLY
Qty: 30 TABLET | Refills: 2 | Status: SHIPPED | OUTPATIENT
Start: 2020-06-17 | End: 2020-11-03

## 2020-06-17 RX ORDER — ATORVASTATIN CALCIUM 80 MG/1
80 TABLET, FILM COATED ORAL NIGHTLY
Qty: 30 TABLET | Refills: 2 | Status: SHIPPED | OUTPATIENT
Start: 2020-06-17 | End: 2020-06-17 | Stop reason: SDUPTHER

## 2020-07-01 ENCOUNTER — TELEPHONE (OUTPATIENT)
Dept: FAMILY MEDICINE CLINIC | Facility: CLINIC | Age: 85
End: 2020-07-01

## 2020-07-01 NOTE — TELEPHONE ENCOUNTER
DOLORES FROM [employer] NEEDS A VERBAL ORDER FOR START OF CARE ORDERS FOR HOME HEALTH; DOLORES SPOKE TO PATIENT'S SISTER AND SHE IS AGREEABLE WITH THE HOME HEALTH; PLEASE CALL TO ADVISE [phone]

## 2020-07-01 NOTE — TELEPHONE ENCOUNTER
Ok to set up appointment. I have called home health and left a message for verbal consent to start home health.

## 2020-07-03 ENCOUNTER — READMISSION MANAGEMENT (OUTPATIENT)
Dept: CALL CENTER | Facility: HOSPITAL | Age: 85
End: 2020-07-03

## 2020-07-03 ENCOUNTER — TRANSITIONAL CARE MANAGEMENT TELEPHONE ENCOUNTER (OUTPATIENT)
Dept: CALL CENTER | Facility: HOSPITAL | Age: 85
End: 2020-07-03

## 2020-07-03 NOTE — OUTREACH NOTE
Call Center TCM Note      Responses   [facility] patient discharged from?  Non-   COVID-19 Test Status  Not tested   Does the patient have one of the following disease processes/diagnoses(primary or secondary)?  Non- Discharge   TCM attempt successful?  Yes   Call start time  1458   Call end time  1504   Discharge diagnosis  Other sequelae of cerebral infarction   Meds reviewed with patient/caregiver?  Yes   Is the patient having any side effects they believe may be caused by any medication additions or changes?  No   Does the patient have all medications ordered at discharge?  No   What is keeping the patient from filling the prescriptions?  Patient desires to consult PCP   Nursing Interventions  Nurse provided patient education, Nurse advised patient to call provider   Is the patient taking all medications as directed (includes completed medication regime)?  No   What is preventing the patient from taking all medications as directed?  Desires to consult PCP first   Nursing Interventions  Advised patient to call provider   Medication comments  Patient states she is not taking the Metoprolol and her bp has been fine.     Does the patient have a primary care provider?   Yes   Does the patient have an appointment with their PCP within 7 days of discharge?  Greater than 7 days   What is preventing the patient from scheduling follow up appointments within 7 days of discharge?  Earlier appointment not available   Has the patient kept scheduled appointments due by today?  N/A   Has home health visited the patient within 72 hours of discharge?  Yes   Pulse Ox monitoring  None   Did the patient receive a copy of their discharge instructions?  Yes   Nursing interventions  Reviewed instructions with patient   What is the patient's perception of their health status since discharge?  Improving   Is the patient/caregiver able to teach back signs and symptoms related to disease process for when to call PCP?  Yes   Is  "the patient/caregiver able to teach back signs and symptoms related to disease process for when to call 911?  Yes   Is the patient/caregiver able to teach back the hierarchy of who to call/visit for symptoms/problems? PCP, Specialist, Home health nurse, Urgent Care, ED, 911  Yes   Additional teach back comments  States she is doing \"fine\".  She didn't get the metoprolol filled.  Desires to talk with her PCP regarding this.  She states my bp has been \"fine\".  Advised to write down so she could give to PCP.   TCM call completed?  Yes          Coretta Chandler LPN    7/3/2020, 15:08      "

## 2020-07-03 NOTE — OUTREACH NOTE
Prep Survey      Responses   [facility] facility patient discharged from?  Non-BH   Is LACE score < 7 ?  Non-BH Discharge   Eligibility  [facility]   Date of Admission  06/05/20   Date of Discharge  07/02/20   Discharge Disposition  Home-Health Care Sv   Discharge diagnosis  Other sequelae of cerebral infarction   Does the patient have one of the following disease processes/diagnoses(primary or secondary)?  Non-BH Discharge   Does the patient have Home health ordered?  No   Is there a DME ordered?  No   Prep survey completed?  Yes          Yara Millan RN

## 2020-07-06 ENCOUNTER — EPISODE CHANGES (OUTPATIENT)
Dept: CASE MANAGEMENT | Facility: OTHER | Age: 85
End: 2020-07-06

## 2020-07-06 ENCOUNTER — TELEPHONE (OUTPATIENT)
Dept: FAMILY MEDICINE CLINIC | Facility: CLINIC | Age: 85
End: 2020-07-06

## 2020-07-06 NOTE — TELEPHONE ENCOUNTER
S/W patient- let her know that she needs to take her metoprolol for her heart. She was agreeable. I called [employer] to ask that they deliver it. Scheduled her for a B12 injection at the end of this month and let her know that we will call her on Thursday at 2.    I also asked that she call cardiology regarding her metoprolol if she continued to have questions regarding taking it. But once we talked about her AFIB and that it keeps her heart from beating fast she was ok with starting it at home. She is compliant with eliquis.    No other issues currently. Will f/u on Thursday

## 2020-07-06 NOTE — TELEPHONE ENCOUNTER
Tami from [facility] needs orders for skilled nursing visit for PT, OT, and speech; please call Tami to confirm needs at [phone]

## 2020-07-07 ENCOUNTER — TELEPHONE (OUTPATIENT)
Dept: FAMILY MEDICINE CLINIC | Facility: CLINIC | Age: 85
End: 2020-07-07

## 2020-07-07 NOTE — TELEPHONE ENCOUNTER
ANGELINA FROM occupational therapy STATE: THAT Patient has completed her sessions.      PATIENT CAN BE REACHED ON:[phone]

## 2020-07-09 ENCOUNTER — OFFICE VISIT (OUTPATIENT)
Dept: FAMILY MEDICINE CLINIC | Facility: CLINIC | Age: 85
End: 2020-07-09

## 2020-07-09 DIAGNOSIS — Z09 HOSPITAL DISCHARGE FOLLOW-UP: ICD-10-CM

## 2020-07-09 DIAGNOSIS — I63.413 CEREBROVASCULAR ACCIDENT (CVA) DUE TO BILATERAL EMBOLISM OF MIDDLE CEREBRAL ARTERIES (HCC): Primary | ICD-10-CM

## 2020-07-09 PROCEDURE — 99442 PR PHYS/QHP TELEPHONE EVALUATION 11-20 MIN: CPT | Performed by: NURSE PRACTITIONER

## 2020-07-09 NOTE — PROGRESS NOTES
Subjective   Mei Crane is a 89 y.o. female.     Chief Complaint   Patient presents with   • Transitional Care Management     You have chosen to receive care through a telephone visit. Do you consent to use a telephone visit for your medical care today? Yes    This visit has been rescheduled as a phone visit to comply with patient safety concerns in accordance with [facility] recommendations. Total time of discussion was 18 minutes.    Ms. Crane presents today to follow up on a recent admission to both the hospital and rehab after having a stroke. She reports she is doing better and does not report having any deficits from the stroke.       I have reviewed the patient's medical history in detail and updated the computerized patient record.    The following portions of the patient's history were reviewed and updated as appropriate: allergies, current medications, past family history, past medical history, past social history, past surgical history and problem list.      Current Outpatient Medications:   •  acetaminophen (TYLENOL) 325 MG tablet, Take 2 tablets by mouth Every 4 (Four) Hours As Needed for Mild Pain ., Disp: , Rfl:   •  amLODIPine (NORVASC) 10 MG tablet, Take 1 tablet by mouth Daily., Disp: 30 tablet, Rfl: 5  •  apixaban (ELIQUIS) 5 MG tablet tablet, Take 1 tablet by mouth Every 12 (Twelve) Hours. Indications: Atrial Fibrillation, Disp: 60 tablet, Rfl:   •  ascorbic acid (VITAMIN C) 1000 MG tablet, Take 1 tablet by mouth Daily., Disp: 180 tablet, Rfl: 1  •  atorvastatin (LIPITOR) 80 MG tablet, Take 1 tablet by mouth Every Night., Disp: 30 tablet, Rfl: 2  •  Cholecalciferol (VITAMIN D PO), Take 2,000 Units by mouth Daily., Disp: , Rfl:   •  ferrous gluconate 324 (37.5 Fe) MG tablet tablet, Take 1 tablet by mouth Daily With Breakfast., Disp: 30 tablet, Rfl: 6  •  hydroCHLOROthiazide (MICROZIDE) 12.5 MG capsule, Take 1 capsule by mouth Daily., Disp: 30 capsule, Rfl: 2  •  levothyroxine (Synthroid) 50 MCG  tablet, Take 1 tablet by mouth Daily., Disp: 90 tablet, Rfl: 1  •  metoprolol tartrate (LOPRESSOR) 25 MG tablet, Take 1 tablet by mouth Every 8 (Eight) Hours., Disp: , Rfl:   •  pantoprazole (PROTONIX) 40 MG EC tablet, Take 1 tablet by mouth Daily., Disp: 90 tablet, Rfl: 3  •  potassium chloride (K-DUR,KLOR-CON) 10 MEQ CR tablet, Take 1 tablet by mouth Daily., Disp: 30 tablet, Rfl: 5  •  prednisoLONE acetate (PRED FORTE) 1 % ophthalmic suspension, INSTILL ONE DROP IN THE LEFT EYE FOUR TIMES DAILY, Disp: , Rfl: 3  •  cyclobenzaprine (FLEXERIL) 5 MG tablet, Take 1 tablet by mouth 3 (Three) Times a Day As Needed for Muscle Spasms., Disp: 15 tablet, Rfl: 0    Current Facility-Administered Medications:   •  cyanocobalamin injection 1,000 mcg, 1,000 mcg, Intramuscular, Q28 Days, Jae Bray MD, 1,000 mcg at 03/10/20 1014     Review of Systems   Respiratory: Negative.    Cardiovascular: Negative.    Neurological: Positive for weakness. Negative for dizziness, speech difficulty, numbness, headache and confusion.   Psychiatric/Behavioral: Negative.        Objective    There were no vitals filed for this visit.     Physical Exam - deferred, telephone visit      Assessment/Plan   Mei was seen today for transitional care management.    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 Crane presents per telephone visit today.   I have reviewed and reconciled her medications with her today. She is to continue all medications as prescribed.   She is to follow up with me as needed for now and she is to follow up with neurology as scheduled in September.

## 2020-07-13 ENCOUNTER — TELEPHONE (OUTPATIENT)
Dept: NEUROLOGY | Facility: CLINIC | Age: 85
End: 2020-07-13

## 2020-07-13 DIAGNOSIS — I63.9 CARDIOEMBOLIC STROKE (HCC): Primary | ICD-10-CM

## 2020-07-13 DIAGNOSIS — I48.91 ATRIAL FIBRILLATION, NEW ONSET (HCC): Primary | ICD-10-CM

## 2020-07-13 DIAGNOSIS — I48.91 NEW ONSET ATRIAL FIBRILLATION (HCC): ICD-10-CM

## 2020-07-13 NOTE — TELEPHONE ENCOUNTER
Provider: COCO ALVARADO  Caller: ANUEL ARENAS  Relationship to Patient: PT'S SISTER    Reason for Call: PT'S SISTER IS ASKING WHY PT IS ON  apixaban (ELIQUIS) DUE TO THE PT DOESN'T REMEMBER WHY. PLEASE CALL HER BACK AT  [phone]

## 2020-07-13 NOTE — TELEPHONE ENCOUNTER
"Called pt and received verbal permission to speak with her sister (Emily Jean-Baptiste) regarding treatment, medications and appointments.     Pt reports she only has one week left of Eliquis and will need a new rx soon.     Pt reports she believes her metoprolol is making her feel \"antsy\" after taking. Pt takes 25 mg tid.     Will place referral for cardiology, as pt is not established with one.   "

## 2020-07-15 ENCOUNTER — OFFICE VISIT (OUTPATIENT)
Dept: FAMILY MEDICINE CLINIC | Facility: CLINIC | Age: 85
End: 2020-07-15

## 2020-07-15 DIAGNOSIS — I48.91 ATRIAL FIBRILLATION, NEW ONSET (HCC): ICD-10-CM

## 2020-07-15 DIAGNOSIS — F41.9 ANXIETY: Primary | ICD-10-CM

## 2020-07-15 PROCEDURE — 99213 OFFICE O/P EST LOW 20 MIN: CPT | Performed by: NURSE PRACTITIONER

## 2020-07-15 RX ORDER — APIXABAN 5 MG/1
TABLET, FILM COATED ORAL
Qty: 28 TABLET | Refills: 0 | Status: SHIPPED | OUTPATIENT
Start: 2020-07-15 | End: 2020-08-03 | Stop reason: SDUPTHER

## 2020-07-15 RX ORDER — DULOXETIN HYDROCHLORIDE 20 MG/1
20 CAPSULE, DELAYED RELEASE ORAL DAILY
Qty: 30 CAPSULE | Refills: 5 | Status: SHIPPED | OUTPATIENT
Start: 2020-07-15 | End: 2020-12-29

## 2020-07-15 NOTE — PROGRESS NOTES
Subjective   Mei Crane is a 89 y.o. female.     Chief Complaint   Patient presents with   • Shortness of Breath     discuss metoprolol     You have chosen to receive care through a telephone visit. Do you consent to use a telephone visit for your medical care today? Yes    This visit has been rescheduled as a phone visit to comply with patient safety concerns in accordance with [facility] recommendations. Total time of discussion was 12 minutes.    Ms. Crane presents today with complaints of just not feeling well. She reports that sometimes she feels like she can't catch her breath unless she takes a deep breath and then sighs. She also states she feels anxious at time.         The following portions of the patient's history were reviewed and updated as appropriate: allergies, current medications, past family history, past medical history, past social history, past surgical history and problem list.    Current Outpatient Medications on File Prior to Visit   Medication Sig   • acetaminophen (TYLENOL) 325 MG tablet Take 2 tablets by mouth Every 4 (Four) Hours As Needed for Mild Pain .   • amLODIPine (NORVASC) 10 MG tablet Take 1 tablet by mouth Daily.   • ascorbic acid (VITAMIN C) 1000 MG tablet Take 1 tablet by mouth Daily.   • atorvastatin (LIPITOR) 80 MG tablet Take 1 tablet by mouth Every Night.   • Cholecalciferol (VITAMIN D PO) Take 2,000 Units by mouth Daily.   • cyclobenzaprine (FLEXERIL) 5 MG tablet Take 1 tablet by mouth 3 (Three) Times a Day As Needed for Muscle Spasms.   • ferrous gluconate 324 (37.5 Fe) MG tablet tablet Take 1 tablet by mouth Daily With Breakfast.   • hydroCHLOROthiazide (MICROZIDE) 12.5 MG capsule Take 1 capsule by mouth Daily.   • levothyroxine (Synthroid) 50 MCG tablet Take 1 tablet by mouth Daily.   • metoprolol tartrate (LOPRESSOR) 25 MG tablet Take 1 tablet by mouth Every 8 (Eight) Hours. (Patient taking differently: Take 25 mg by mouth 2 (Two) Times a Day.)   • pantoprazole  (PROTONIX) 40 MG EC tablet Take 1 tablet by mouth Daily.   • potassium chloride (K-DUR,KLOR-CON) 10 MEQ CR tablet Take 1 tablet by mouth Daily.   • prednisoLONE acetate (PRED FORTE) 1 % ophthalmic suspension INSTILL ONE DROP IN THE LEFT EYE FOUR TIMES DAILY   • ELIQUIS 5 MG tablet tablet TAKE ONE TABLET BY MOUTH TWICE DAILY     Current Facility-Administered Medications on File Prior to Visit   Medication   • cyanocobalamin injection 1,000 mcg     Review of Systems   Constitutional: Negative for chills and fever.   Respiratory: Negative for cough, chest tightness, shortness of breath and wheezing.         Can't catch her breath at times,    Cardiovascular: Negative.  Negative for chest pain, palpitations and leg swelling.   Neurological: Positive for weakness. Negative for speech difficulty, light-headedness, numbness and headaches.   Psychiatric/Behavioral: The patient is nervous/anxious.        Objective    There were no vitals filed for this visit.     Physical Exam deferred telephone visit    Assessment/Plan   Mei was seen today for shortness of breath.    Diagnoses and all orders for this visit:    Anxiety    Other orders  -     DULoxetine (CYMBALTA) 20 MG capsule; Take 1 capsule by mouth Daily.       Ms. Crane presents today per telephone visit with anxiety/panic symptoms.  She has been anxious about her declining ability to be as active as she used to be for some time now. She recently had a stroke which has decreased her activity levels even more. I will start her on Duloxetine 20 mg once a day to help with her anxiety.  She is to follow up as needed and at her next scheduled appointment.

## 2020-07-19 ENCOUNTER — HOSPITAL ENCOUNTER (INPATIENT)
Facility: HOSPITAL | Age: 85
LOS: 3 days | Discharge: HOME HEALTH | End: 2020-07-22
Attending: EMERGENCY MEDICINE | Admitting: HOSPITALIST

## 2020-07-19 ENCOUNTER — APPOINTMENT (OUTPATIENT)
Dept: GENERAL RADIOLOGY | Facility: HOSPITAL | Age: 85
End: 2020-07-19

## 2020-07-19 DIAGNOSIS — E87.1 HYPONATREMIA: ICD-10-CM

## 2020-07-19 DIAGNOSIS — E87.6 HYPOKALEMIA: ICD-10-CM

## 2020-07-19 DIAGNOSIS — R07.89 CHEST PRESSURE: Primary | ICD-10-CM

## 2020-07-19 PROBLEM — Z86.73 HISTORY OF EMBOLIC STROKE: Status: ACTIVE | Noted: 2020-07-19

## 2020-07-19 PROBLEM — R07.9 CHEST PAIN: Status: ACTIVE | Noted: 2020-07-19

## 2020-07-19 LAB
ALBUMIN SERPL-MCNC: 4.4 G/DL (ref 3.5–5.2)
ALBUMIN/GLOB SERPL: 1.8 G/DL
ALP SERPL-CCNC: 85 U/L (ref 39–117)
ALT SERPL W P-5'-P-CCNC: 14 U/L (ref 1–33)
ANION GAP SERPL CALCULATED.3IONS-SCNC: 11.9 MMOL/L (ref 5–15)
AST SERPL-CCNC: 23 U/L (ref 1–32)
BASOPHILS # BLD AUTO: 0.04 10*3/MM3 (ref 0–0.2)
BASOPHILS NFR BLD AUTO: 0.4 % (ref 0–1.5)
BILIRUB SERPL-MCNC: 0.5 MG/DL (ref 0–1.2)
BUN SERPL-MCNC: 14 MG/DL (ref 8–23)
BUN/CREAT SERPL: 10.5 (ref 7–25)
CALCIUM SPEC-SCNC: 9.1 MG/DL (ref 8.2–9.6)
CHLORIDE SERPL-SCNC: 87 MMOL/L (ref 98–107)
CO2 SERPL-SCNC: 24.1 MMOL/L (ref 22–29)
CREAT SERPL-MCNC: 1.33 MG/DL (ref 0.57–1)
DEPRECATED RDW RBC AUTO: 41.8 FL (ref 37–54)
EOSINOPHIL # BLD AUTO: 0.07 10*3/MM3 (ref 0–0.4)
EOSINOPHIL NFR BLD AUTO: 0.7 % (ref 0.3–6.2)
ERYTHROCYTE [DISTWIDTH] IN BLOOD BY AUTOMATED COUNT: 13.4 % (ref 12.3–15.4)
GFR SERPL CREATININE-BSD FRML MDRD: 37 ML/MIN/1.73
GLOBULIN UR ELPH-MCNC: 2.5 GM/DL
GLUCOSE SERPL-MCNC: 118 MG/DL (ref 65–99)
HCT VFR BLD AUTO: 37.9 % (ref 34–46.6)
HGB BLD-MCNC: 12.8 G/DL (ref 12–15.9)
HOLD SPECIMEN: NORMAL
HOLD SPECIMEN: NORMAL
IMM GRANULOCYTES # BLD AUTO: 0.02 10*3/MM3 (ref 0–0.05)
IMM GRANULOCYTES NFR BLD AUTO: 0.2 % (ref 0–0.5)
LYMPHOCYTES # BLD AUTO: 2.61 10*3/MM3 (ref 0.7–3.1)
LYMPHOCYTES NFR BLD AUTO: 27.3 % (ref 19.6–45.3)
MCH RBC QN AUTO: 28.8 PG (ref 26.6–33)
MCHC RBC AUTO-ENTMCNC: 33.8 G/DL (ref 31.5–35.7)
MCV RBC AUTO: 85.2 FL (ref 79–97)
MONOCYTES # BLD AUTO: 0.77 10*3/MM3 (ref 0.1–0.9)
MONOCYTES NFR BLD AUTO: 8 % (ref 5–12)
NEUTROPHILS NFR BLD AUTO: 6.06 10*3/MM3 (ref 1.7–7)
NEUTROPHILS NFR BLD AUTO: 63.4 % (ref 42.7–76)
NRBC BLD AUTO-RTO: 0 /100 WBC (ref 0–0.2)
PLATELET # BLD AUTO: 325 10*3/MM3 (ref 140–450)
PMV BLD AUTO: 9.6 FL (ref 6–12)
POTASSIUM SERPL-SCNC: 2.5 MMOL/L (ref 3.5–5.2)
PROT SERPL-MCNC: 6.9 G/DL (ref 6–8.5)
RBC # BLD AUTO: 4.45 10*6/MM3 (ref 3.77–5.28)
SODIUM SERPL-SCNC: 123 MMOL/L (ref 136–145)
TROPONIN T SERPL-MCNC: 0.06 NG/ML (ref 0–0.03)
WBC # BLD AUTO: 9.57 10*3/MM3 (ref 3.4–10.8)
WHOLE BLOOD HOLD SPECIMEN: NORMAL
WHOLE BLOOD HOLD SPECIMEN: NORMAL

## 2020-07-19 PROCEDURE — 93010 ELECTROCARDIOGRAM REPORT: CPT | Performed by: INTERNAL MEDICINE

## 2020-07-19 PROCEDURE — 99284 EMERGENCY DEPT VISIT MOD MDM: CPT

## 2020-07-19 PROCEDURE — 84484 ASSAY OF TROPONIN QUANT: CPT | Performed by: EMERGENCY MEDICINE

## 2020-07-19 PROCEDURE — 93005 ELECTROCARDIOGRAM TRACING: CPT | Performed by: EMERGENCY MEDICINE

## 2020-07-19 PROCEDURE — 93005 ELECTROCARDIOGRAM TRACING: CPT

## 2020-07-19 PROCEDURE — 80053 COMPREHEN METABOLIC PANEL: CPT | Performed by: EMERGENCY MEDICINE

## 2020-07-19 PROCEDURE — 85025 COMPLETE CBC W/AUTO DIFF WBC: CPT

## 2020-07-19 PROCEDURE — 71046 X-RAY EXAM CHEST 2 VIEWS: CPT

## 2020-07-19 RX ORDER — ACETAMINOPHEN 160 MG/5ML
650 SOLUTION ORAL EVERY 4 HOURS PRN
Status: DISCONTINUED | OUTPATIENT
Start: 2020-07-19 | End: 2020-07-20

## 2020-07-19 RX ORDER — SODIUM CHLORIDE 0.9 % (FLUSH) 0.9 %
10 SYRINGE (ML) INJECTION EVERY 12 HOURS SCHEDULED
Status: DISCONTINUED | OUTPATIENT
Start: 2020-07-19 | End: 2020-07-22 | Stop reason: HOSPADM

## 2020-07-19 RX ORDER — ACETAMINOPHEN 325 MG/1
650 TABLET ORAL EVERY 4 HOURS PRN
Status: DISCONTINUED | OUTPATIENT
Start: 2020-07-19 | End: 2020-07-22 | Stop reason: HOSPADM

## 2020-07-19 RX ORDER — SODIUM CHLORIDE 0.9 % (FLUSH) 0.9 %
10 SYRINGE (ML) INJECTION AS NEEDED
Status: DISCONTINUED | OUTPATIENT
Start: 2020-07-19 | End: 2020-07-22 | Stop reason: HOSPADM

## 2020-07-19 RX ORDER — ACETAMINOPHEN 650 MG/1
650 SUPPOSITORY RECTAL EVERY 4 HOURS PRN
Status: DISCONTINUED | OUTPATIENT
Start: 2020-07-19 | End: 2020-07-20

## 2020-07-19 RX ORDER — BISACODYL 5 MG/1
5 TABLET, DELAYED RELEASE ORAL DAILY PRN
Status: DISCONTINUED | OUTPATIENT
Start: 2020-07-19 | End: 2020-07-22 | Stop reason: HOSPADM

## 2020-07-19 RX ORDER — POTASSIUM CHLORIDE 750 MG/1
40 CAPSULE, EXTENDED RELEASE ORAL ONCE
Status: COMPLETED | OUTPATIENT
Start: 2020-07-19 | End: 2020-07-19

## 2020-07-19 RX ORDER — SODIUM CHLORIDE 9 MG/ML
100 INJECTION, SOLUTION INTRAVENOUS CONTINUOUS
Status: DISCONTINUED | OUTPATIENT
Start: 2020-07-19 | End: 2020-07-21

## 2020-07-19 RX ORDER — NITROGLYCERIN 0.4 MG/1
0.4 TABLET SUBLINGUAL
Status: DISCONTINUED | OUTPATIENT
Start: 2020-07-19 | End: 2020-07-22 | Stop reason: HOSPADM

## 2020-07-19 RX ORDER — CALCIUM CARBONATE 200(500)MG
2 TABLET,CHEWABLE ORAL 2 TIMES DAILY PRN
Status: DISCONTINUED | OUTPATIENT
Start: 2020-07-19 | End: 2020-07-22 | Stop reason: HOSPADM

## 2020-07-19 RX ADMIN — POTASSIUM CHLORIDE 40 MEQ: 10 CAPSULE, COATED, EXTENDED RELEASE ORAL at 21:21

## 2020-07-19 NOTE — ED TRIAGE NOTES
"Pt in waiting blood pressure taken. At this time pt states\" that I'm not having actual pain I'm more short of air than I am anything when I take a deep breath in I feel a lot better, I don't know if I'm anxious or what but I just don't feel good\"  Pt awake alert and oriented at this time. Skin dry warm to touch  Skin color is WNL   "

## 2020-07-19 NOTE — ED TRIAGE NOTES
Pt reports chest tightness started 3 days ago states thought would go away and getting worse. Pt SOA, denies cough or congestion. Mask placed on patient in first look triage. Triage staff wearing masks.

## 2020-07-20 PROBLEM — I48.91 ATRIAL FIBRILLATION: Status: ACTIVE | Noted: 2020-07-20

## 2020-07-20 PROBLEM — T50.2X5A THIAZIDE DIURETICS CAUSING ADVERSE EFFECT IN THERAPEUTIC USE: Status: ACTIVE | Noted: 2020-07-20

## 2020-07-20 LAB
ANION GAP SERPL CALCULATED.3IONS-SCNC: 12.4 MMOL/L (ref 5–15)
BUN SERPL-MCNC: 13 MG/DL (ref 8–23)
BUN/CREAT SERPL: 10.6 (ref 7–25)
C DIFF TOX GENS STL QL NAA+PROBE: NEGATIVE
CALCIUM SPEC-SCNC: 8.8 MG/DL (ref 8.2–9.6)
CHLORIDE SERPL-SCNC: 92 MMOL/L (ref 98–107)
CHLORIDE UR-SCNC: 63 MMOL/L
CO2 SERPL-SCNC: 23.6 MMOL/L (ref 22–29)
CREAT SERPL-MCNC: 1.23 MG/DL (ref 0.57–1)
DEPRECATED RDW RBC AUTO: 39 FL (ref 37–54)
ERYTHROCYTE [DISTWIDTH] IN BLOOD BY AUTOMATED COUNT: 13 % (ref 12.3–15.4)
GFR SERPL CREATININE-BSD FRML MDRD: 41 ML/MIN/1.73
GLUCOSE SERPL-MCNC: 117 MG/DL (ref 65–99)
HCT VFR BLD AUTO: 36.3 % (ref 34–46.6)
HGB BLD-MCNC: 12.6 G/DL (ref 12–15.9)
MAGNESIUM SERPL-MCNC: 1.5 MG/DL (ref 1.7–2.3)
MCH RBC QN AUTO: 29 PG (ref 26.6–33)
MCHC RBC AUTO-ENTMCNC: 34.7 G/DL (ref 31.5–35.7)
MCV RBC AUTO: 83.4 FL (ref 79–97)
OSMOLALITY UR: 424 MOSM/KG
PLATELET # BLD AUTO: 303 10*3/MM3 (ref 140–450)
PMV BLD AUTO: 9.7 FL (ref 6–12)
POTASSIUM SERPL-SCNC: 2.9 MMOL/L (ref 3.5–5.2)
POTASSIUM SERPL-SCNC: 4.1 MMOL/L (ref 3.5–5.2)
RBC # BLD AUTO: 4.35 10*6/MM3 (ref 3.77–5.28)
SODIUM SERPL-SCNC: 128 MMOL/L (ref 136–145)
SODIUM UR-SCNC: 67 MMOL/L
TROPONIN T SERPL-MCNC: 0.05 NG/ML (ref 0–0.03)
TROPONIN T SERPL-MCNC: 0.06 NG/ML (ref 0–0.03)
WBC # BLD AUTO: 11 10*3/MM3 (ref 3.4–10.8)

## 2020-07-20 PROCEDURE — 83735 ASSAY OF MAGNESIUM: CPT | Performed by: HOSPITALIST

## 2020-07-20 PROCEDURE — 84132 ASSAY OF SERUM POTASSIUM: CPT | Performed by: HOSPITALIST

## 2020-07-20 PROCEDURE — 99222 1ST HOSP IP/OBS MODERATE 55: CPT | Performed by: NURSE PRACTITIONER

## 2020-07-20 PROCEDURE — 85027 COMPLETE CBC AUTOMATED: CPT | Performed by: NURSE PRACTITIONER

## 2020-07-20 PROCEDURE — 84484 ASSAY OF TROPONIN QUANT: CPT | Performed by: NURSE PRACTITIONER

## 2020-07-20 PROCEDURE — 97166 OT EVAL MOD COMPLEX 45 MIN: CPT

## 2020-07-20 PROCEDURE — 36415 COLL VENOUS BLD VENIPUNCTURE: CPT | Performed by: NURSE PRACTITIONER

## 2020-07-20 PROCEDURE — 25010000002 MAGNESIUM SULFATE 2 GM/50ML SOLUTION: Performed by: HOSPITALIST

## 2020-07-20 PROCEDURE — 80048 BASIC METABOLIC PNL TOTAL CA: CPT | Performed by: NURSE PRACTITIONER

## 2020-07-20 PROCEDURE — 82436 ASSAY OF URINE CHLORIDE: CPT | Performed by: NURSE PRACTITIONER

## 2020-07-20 PROCEDURE — 97535 SELF CARE MNGMENT TRAINING: CPT

## 2020-07-20 PROCEDURE — 84300 ASSAY OF URINE SODIUM: CPT | Performed by: NURSE PRACTITIONER

## 2020-07-20 PROCEDURE — 87493 C DIFF AMPLIFIED PROBE: CPT | Performed by: NURSE PRACTITIONER

## 2020-07-20 PROCEDURE — 83935 ASSAY OF URINE OSMOLALITY: CPT | Performed by: NURSE PRACTITIONER

## 2020-07-20 RX ORDER — MAGNESIUM SULFATE HEPTAHYDRATE 40 MG/ML
2 INJECTION, SOLUTION INTRAVENOUS AS NEEDED
Status: DISCONTINUED | OUTPATIENT
Start: 2020-07-20 | End: 2020-07-22 | Stop reason: HOSPADM

## 2020-07-20 RX ORDER — PREDNISOLONE ACETATE 10 MG/ML
1 SUSPENSION/ DROPS OPHTHALMIC DAILY
Status: DISCONTINUED | OUTPATIENT
Start: 2020-07-20 | End: 2020-07-22 | Stop reason: HOSPADM

## 2020-07-20 RX ORDER — POTASSIUM CHLORIDE 1.5 G/1.77G
40 POWDER, FOR SOLUTION ORAL AS NEEDED
Status: DISCONTINUED | OUTPATIENT
Start: 2020-07-20 | End: 2020-07-22 | Stop reason: HOSPADM

## 2020-07-20 RX ORDER — LEVOTHYROXINE SODIUM 0.05 MG/1
50 TABLET ORAL DAILY
Status: DISCONTINUED | OUTPATIENT
Start: 2020-07-20 | End: 2020-07-22 | Stop reason: HOSPADM

## 2020-07-20 RX ORDER — METOPROLOL TARTRATE 50 MG/1
50 TABLET, FILM COATED ORAL 2 TIMES DAILY
Status: DISCONTINUED | OUTPATIENT
Start: 2020-07-20 | End: 2020-07-22 | Stop reason: HOSPADM

## 2020-07-20 RX ORDER — LOPERAMIDE HYDROCHLORIDE 2 MG/1
2 CAPSULE ORAL ONCE
Status: COMPLETED | OUTPATIENT
Start: 2020-07-20 | End: 2020-07-21

## 2020-07-20 RX ORDER — PANTOPRAZOLE SODIUM 40 MG/1
40 TABLET, DELAYED RELEASE ORAL DAILY
Status: DISCONTINUED | OUTPATIENT
Start: 2020-07-20 | End: 2020-07-22 | Stop reason: HOSPADM

## 2020-07-20 RX ORDER — DULOXETIN HYDROCHLORIDE 20 MG/1
20 CAPSULE, DELAYED RELEASE ORAL DAILY
Status: DISCONTINUED | OUTPATIENT
Start: 2020-07-20 | End: 2020-07-22 | Stop reason: HOSPADM

## 2020-07-20 RX ORDER — POTASSIUM CHLORIDE 7.45 MG/ML
10 INJECTION INTRAVENOUS
Status: DISCONTINUED | OUTPATIENT
Start: 2020-07-20 | End: 2020-07-22 | Stop reason: HOSPADM

## 2020-07-20 RX ORDER — ATORVASTATIN CALCIUM 80 MG/1
80 TABLET, FILM COATED ORAL NIGHTLY
Status: DISCONTINUED | OUTPATIENT
Start: 2020-07-20 | End: 2020-07-22 | Stop reason: HOSPADM

## 2020-07-20 RX ORDER — MAGNESIUM SULFATE 1 G/100ML
1 INJECTION INTRAVENOUS AS NEEDED
Status: DISCONTINUED | OUTPATIENT
Start: 2020-07-20 | End: 2020-07-22 | Stop reason: HOSPADM

## 2020-07-20 RX ORDER — POTASSIUM CHLORIDE 750 MG/1
10 CAPSULE, EXTENDED RELEASE ORAL DAILY
Status: DISCONTINUED | OUTPATIENT
Start: 2020-07-20 | End: 2020-07-22 | Stop reason: HOSPADM

## 2020-07-20 RX ORDER — AMLODIPINE BESYLATE 10 MG/1
10 TABLET ORAL DAILY
Status: DISCONTINUED | OUTPATIENT
Start: 2020-07-20 | End: 2020-07-22 | Stop reason: HOSPADM

## 2020-07-20 RX ORDER — POTASSIUM CHLORIDE 750 MG/1
40 CAPSULE, EXTENDED RELEASE ORAL AS NEEDED
Status: DISCONTINUED | OUTPATIENT
Start: 2020-07-20 | End: 2020-07-22 | Stop reason: HOSPADM

## 2020-07-20 RX ADMIN — ACETAMINOPHEN 650 MG: 325 TABLET, FILM COATED ORAL at 22:16

## 2020-07-20 RX ADMIN — POTASSIUM CHLORIDE 10 MEQ: 10 CAPSULE, COATED, EXTENDED RELEASE ORAL at 11:48

## 2020-07-20 RX ADMIN — DULOXETINE HYDROCHLORIDE 20 MG: 20 CAPSULE, DELAYED RELEASE ORAL at 10:27

## 2020-07-20 RX ADMIN — SODIUM CHLORIDE, PRESERVATIVE FREE 10 ML: 5 INJECTION INTRAVENOUS at 00:37

## 2020-07-20 RX ADMIN — SODIUM CHLORIDE 100 ML/HR: 9 INJECTION, SOLUTION INTRAVENOUS at 00:37

## 2020-07-20 RX ADMIN — APIXABAN 5 MG: 5 TABLET, FILM COATED ORAL at 20:25

## 2020-07-20 RX ADMIN — POTASSIUM CHLORIDE 40 MEQ: 10 CAPSULE, COATED, EXTENDED RELEASE ORAL at 06:28

## 2020-07-20 RX ADMIN — METOPROLOL TARTRATE 25 MG: 25 TABLET, FILM COATED ORAL at 11:47

## 2020-07-20 RX ADMIN — PREDNISOLONE ACETATE 1 DROP: 10 SUSPENSION/ DROPS OPHTHALMIC at 10:28

## 2020-07-20 RX ADMIN — POTASSIUM CHLORIDE 40 MEQ: 10 CAPSULE, COATED, EXTENDED RELEASE ORAL at 15:32

## 2020-07-20 RX ADMIN — MAGNESIUM SULFATE HEPTAHYDRATE 2 G: 40 INJECTION, SOLUTION INTRAVENOUS at 22:16

## 2020-07-20 RX ADMIN — APIXABAN 5 MG: 5 TABLET, FILM COATED ORAL at 10:27

## 2020-07-20 RX ADMIN — LEVOTHYROXINE SODIUM 50 MCG: 50 TABLET ORAL at 10:27

## 2020-07-20 RX ADMIN — ACETAMINOPHEN 650 MG: 325 TABLET, FILM COATED ORAL at 00:47

## 2020-07-20 RX ADMIN — AMLODIPINE BESYLATE 10 MG: 10 TABLET ORAL at 10:27

## 2020-07-20 RX ADMIN — ATORVASTATIN CALCIUM 80 MG: 80 TABLET, FILM COATED ORAL at 20:25

## 2020-07-20 RX ADMIN — SODIUM CHLORIDE 100 ML/HR: 9 INJECTION, SOLUTION INTRAVENOUS at 11:48

## 2020-07-20 RX ADMIN — METOPROLOL TARTRATE 50 MG: 50 TABLET, FILM COATED ORAL at 20:25

## 2020-07-20 RX ADMIN — ACETAMINOPHEN 650 MG: 325 TABLET, FILM COATED ORAL at 06:32

## 2020-07-20 RX ADMIN — POTASSIUM CHLORIDE 40 MEQ: 10 CAPSULE, COATED, EXTENDED RELEASE ORAL at 10:28

## 2020-07-20 NOTE — SIGNIFICANT NOTE
07/20/20 1136   Rehab Time/Intention   Evaluation Not Performed other (see comments)  (seen by OT earlier today with limited need for therapy.  Will attempt to alternate therapy days with OT to increase overall patient activity.  PT F/u planned 7/21)   Rehab Treatment   Discipline physical therapist   Recommendation   PT - Next Appointment 07/21/20

## 2020-07-20 NOTE — ED PROVIDER NOTES
EMERGENCY DEPARTMENT ENCOUNTER    Room Number:  S501/1  Date of encounter:  7/20/2020  PCP: Lexie Toussaint APRN  Historian: Patient      HPI:  Chief Complaint: Chest discomfort  A complete HPI/ROS/PMH/PSH/SH/FH are unobtainable due to: Nothing    Context: Mei Crane is a 89 y.o. female who presents to the ED c/o vague substernal chest discomfort for the last 3 to 4 days.  Patient says she feels like some pressure and like she cannot catch a deep breath.  She said it is constant nothing makes it better or worse.  The discomfort does not radiate.  It does not appear exertional.    Patient has not had a fever, no cough, no nausea vomiting or diarrhea and some mild swelling in her legs.    From review of the history in epic, see the patient has history of atrial fibrillation, anxiety, and was actually just seen by her PCP earlier this week and started on an SSRI.  She said that is not yet helped.      PAST MEDICAL HISTORY  Active Ambulatory Problems     Diagnosis Date Noted   • HTN (hypertension), benign 03/23/2016   • Acquired hypothyroidism 03/23/2016   • Hyperlipidemia 03/23/2016   • Gastroesophageal reflux disease without esophagitis 03/23/2016   • Glaucoma 03/23/2016   • Macular degeneration 03/23/2016   • Anemia 03/23/2016   • Osteopenia 03/23/2016   • CKD (chronic kidney disease), stage III (CMS/[facility]) 04/20/2016   • B12 deficiency 10/04/2016   • Calculus of gallbladder without cholecystitis 08/07/2017   • DDD (degenerative disc disease), lumbar 02/19/2019   • Trochanteric bursitis of right hip 07/23/2019   • Bilateral leg weakness 06/01/2020   • Elevated troponin 06/01/2020   • Weakness 06/01/2020   • Dizziness 06/02/2020   • Hallucinations, visual 06/02/2020   • CVA (cerebrovascular accident) (CMS/HCC) 06/03/2020   • Embolic stroke (CMS/[facility]) 06/04/2020   • Chest pain 07/19/2020     Resolved Ambulatory Problems     Diagnosis Date Noted   • CAP (community acquired pneumonia) 11/07/2017     Past Medical  History:   Diagnosis Date   • Disease of thyroid gland    • Low back pain    • Peripheral neuropathy          PAST SURGICAL HISTORY  Past Surgical History:   Procedure Laterality Date   • CATARACT EXTRACTION Bilateral    • EPIDURAL BLOCK     • HYSTERECTOMY      age 35         FAMILY HISTORY  Family History   Problem Relation Age of Onset   • Aneurysm Mother         AAA   • Deep vein thrombosis Father          SOCIAL HISTORY  Social History     Socioeconomic History   • Marital status:      Spouse name: Not on file   • Number of children: Not on file   • Years of education: Not on file   • Highest education level: Not on file   Tobacco Use   • Smoking status: Never Smoker   • Smokeless tobacco: Never Used   Substance and Sexual Activity   • Alcohol use: Yes     Comment: occasionally   • Drug use: No   • Sexual activity: Defer         ALLERGIES  Barbiturates and Codeine        REVIEW OF SYSTEMS  Review of Systems     All systems reviewed and negative except for those discussed in HPI.       PHYSICAL EXAM    I have reviewed the triage vital signs and nursing notes.    ED Triage Vitals   Temp Heart Rate Resp BP SpO2   07/19/20 1812 07/19/20 1812 07/19/20 1812 07/19/20 1941 07/19/20 1812   97.7 °F (36.5 °C) 97 18 152/77 97 %      Temp src Heart Rate Source Patient Position BP Location FiO2 (%)   07/19/20 1812 -- 07/19/20 1941 07/19/20 1941 --   Tympanic  Sitting Left arm        Physical Exam  GENERAL: Anxious appearing  HENT: nares patent  EYES: no scleral icterus  CV: Rate controlled A. fib  RESPIRATORY: normal effort  ABDOMEN: soft  MUSCULOSKELETAL: no deformity, trace pedal edema  NEURO: alert, moves all extremities, follows commands  SKIN: warm, dry        LAB RESULTS  Recent Results (from the past 24 hour(s))   Comprehensive Metabolic Panel    Collection Time: 07/19/20  8:08 PM   Result Value Ref Range    Glucose 118 (H) 65 - 99 mg/dL    BUN 14 8 - 23 mg/dL    Creatinine 1.33 (H) 0.57 - 1.00 mg/dL    Sodium  123 (L) 136 - 145 mmol/L    Potassium 2.5 (C) 3.5 - 5.2 mmol/L    Chloride 87 (L) 98 - 107 mmol/L    CO2 24.1 22.0 - 29.0 mmol/L    Calcium 9.1 8.2 - 9.6 mg/dL    Total Protein 6.9 6.0 - 8.5 g/dL    Albumin 4.40 3.50 - 5.20 g/dL    ALT (SGPT) 14 1 - 33 U/L    AST (SGOT) 23 1 - 32 U/L    Alkaline Phosphatase 85 39 - 117 U/L    Total Bilirubin 0.5 0.0 - 1.2 mg/dL    eGFR Non African Amer 37 (L) >60 mL/min/1.73    Globulin 2.5 gm/dL    A/G Ratio 1.8 g/dL    BUN/Creatinine Ratio 10.5 7.0 - 25.0    Anion Gap 11.9 5.0 - 15.0 mmol/L   Troponin    Collection Time: 07/19/20  8:08 PM   Result Value Ref Range    Troponin T 0.058 (C) 0.000 - 0.030 ng/mL   Light Blue Top    Collection Time: 07/19/20  8:08 PM   Result Value Ref Range    Extra Tube hold for add-on    Green Top (Gel)    Collection Time: 07/19/20  8:08 PM   Result Value Ref Range    Extra Tube Hold for add-ons.    Lavender Top    Collection Time: 07/19/20  8:08 PM   Result Value Ref Range    Extra Tube hold for add-on    Gold Top - SST    Collection Time: 07/19/20  8:08 PM   Result Value Ref Range    Extra Tube Hold for add-ons.    CBC Auto Differential    Collection Time: 07/19/20  8:08 PM   Result Value Ref Range    WBC 9.57 3.40 - 10.80 10*3/mm3    RBC 4.45 3.77 - 5.28 10*6/mm3    Hemoglobin 12.8 12.0 - 15.9 g/dL    Hematocrit 37.9 34.0 - 46.6 %    MCV 85.2 79.0 - 97.0 fL    MCH 28.8 26.6 - 33.0 pg    MCHC 33.8 31.5 - 35.7 g/dL    RDW 13.4 12.3 - 15.4 %    RDW-SD 41.8 37.0 - 54.0 fl    MPV 9.6 6.0 - 12.0 fL    Platelets 325 140 - 450 10*3/mm3    Neutrophil % 63.4 42.7 - 76.0 %    Lymphocyte % 27.3 19.6 - 45.3 %    Monocyte % 8.0 5.0 - 12.0 %    Eosinophil % 0.7 0.3 - 6.2 %    Basophil % 0.4 0.0 - 1.5 %    Immature Grans % 0.2 0.0 - 0.5 %    Neutrophils, Absolute 6.06 1.70 - 7.00 10*3/mm3    Lymphocytes, Absolute 2.61 0.70 - 3.10 10*3/mm3    Monocytes, Absolute 0.77 0.10 - 0.90 10*3/mm3    Eosinophils, Absolute 0.07 0.00 - 0.40 10*3/mm3    Basophils, Absolute 0.04  0.00 - 0.20 10*3/mm3    Immature Grans, Absolute 0.02 0.00 - 0.05 10*3/mm3    nRBC 0.0 0.0 - 0.2 /100 WBC   Troponin    Collection Time: 07/19/20 11:39 PM   Result Value Ref Range    Troponin T 0.059 (C) 0.000 - 0.030 ng/mL   CBC (No Diff)    Collection Time: 07/20/20  2:52 AM   Result Value Ref Range    WBC 11.00 (H) 3.40 - 10.80 10*3/mm3    RBC 4.35 3.77 - 5.28 10*6/mm3    Hemoglobin 12.6 12.0 - 15.9 g/dL    Hematocrit 36.3 34.0 - 46.6 %    MCV 83.4 79.0 - 97.0 fL    MCH 29.0 26.6 - 33.0 pg    MCHC 34.7 31.5 - 35.7 g/dL    RDW 13.0 12.3 - 15.4 %    RDW-SD 39.0 37.0 - 54.0 fl    MPV 9.7 6.0 - 12.0 fL    Platelets 303 140 - 450 10*3/mm3       Ordered the above labs and independently reviewed the results.        RADIOLOGY  Xr Chest 2 View    Result Date: 7/19/2020  PA AND LATERAL CHEST  HISTORY: Shortness of air for 4 days. History of hypertension and atrial fibrillation.  COMPARISON: Chest CT 07/20/2017.  FINDINGS: Heart size is within normal limits. Thoracic aorta is tortuous and aortic vascular calcifications are present. There is mild eventration of the right hemidiaphragm. Lungs appear clear and there is no evidence of pulmonary edema or pleural effusion. There is mild endplate spurring within the thoracic spine.      Chronic changes without evidence for active disease in the chest.  This report was finalized on 7/19/2020 7:24 PM by Dr. Maurilio Ramírez M.D.        I ordered the above noted radiological studies. Reviewed by me and discussed with radiologist.  See dictation for official radiology interpretation.      PROCEDURES    Procedures      MEDICATIONS GIVEN IN ER    Medications   sodium chloride 0.9 % flush 10 mL (has no administration in time range)   sodium chloride 0.9 % flush 10 mL (10 mL Intravenous Given 7/20/20 0037)   sodium chloride 0.9 % flush 10 mL (has no administration in time range)   nitroglycerin (NITROSTAT) SL tablet 0.4 mg (has no administration in time range)   sodium chloride 0.9 %  infusion (100 mL/hr Intravenous New Bag 7/20/20 0037)   acetaminophen (TYLENOL) tablet 650 mg (650 mg Oral Given 7/20/20 0047)     Or   acetaminophen (TYLENOL) 160 MG/5ML solution 650 mg ( Oral Not Given:  See Alt 7/20/20 0047)     Or   acetaminophen (TYLENOL) suppository 650 mg ( Rectal Not Given:  See Alt 7/20/20 0047)   bisacodyl (DULCOLAX) EC tablet 5 mg (has no administration in time range)   calcium carbonate (TUMS) chewable tablet 500 mg (200 mg elemental) (has no administration in time range)   potassium chloride (MICRO-K) CR capsule 40 mEq (has no administration in time range)     Or   potassium chloride (KLOR-CON) packet 40 mEq (has no administration in time range)     Or   potassium chloride 10 mEq in 100 mL IVPB (has no administration in time range)   potassium chloride (MICRO-K) CR capsule 40 mEq (40 mEq Oral Given 7/19/20 2121)         PROGRESS, DATA ANALYSIS, CONSULTS, AND MEDICAL DECISION MAKING    All labs have been independently reviewed by me.  All radiology studies have been reviewed by me and discussed with radiologist dictating the report.   EKG's independently viewed and interpreted by me.  Discussion below represents my analysis of pertinent findings related to patient's condition, differential diagnosis, treatment plan and final disposition.      ED Course as of Jul 20 0312   Mon Jul 20, 2020   0240 EKG at 1938  Predominantly sinus but paroxysmal A. fib with rate in the 80s  Normal intervals and no acute ST segment abnormalities to suggest ischemia and this is essentially unchanged when compared to June 2, 2020    [DP]   0241 Chest x-ray shows cardiomegaly but no leyda pulmonary edema    [DP]   0311 Case discussed with Dr. Mcgill who agrees to admit the patient to a telemetry bed for further evaluation treatment.  She was given p.o. potassium    [DP]      ED Course User Index  [DP] Antelmo Walker MD               AS OF 03:12 VITALS:    BP - 148/83  HR - 73  TEMP - 97.7 °F (36.5 °C)  (Tympanic)  O2 SATS - 96%        DIAGNOSIS  Final diagnoses:   Chest pressure   Hypokalemia   Hyponatremia         DISPOSITION  Admit to telemetry           Antelmo Walker MD  07/22/20 3212

## 2020-07-20 NOTE — PLAN OF CARE
Problem: Patient Care Overview  Goal: Plan of Care Review  Outcome: Ongoing (interventions implemented as appropriate)  Flowsheets (Taken 7/20/2020 1102)  Progress: improving  Plan of Care Reviewed With: patient  Note:   Pt is a 89 year old female admitted d/t chest pressure and generalized weakness. Pt lives alone and was indep PTA w/ use of RW. Pt presents to OT eval w/ generalized  weakness, decreased endurance, balance, B LE pain, and overall decreased safety/indep w/ ADLs. Pt able to complete STS transfer and ambulate to/from bathroom using RW w/ CGA- no LOB noted but pt mildly unsteady and c/o B LE pain w/ ambulation. Pt able to complete LB dressing w/ s/u. OT edud on B UE thera ex for improved UE strength and endurance. Pt may benefit from skilled OT services to address deficits and maximize Indep/safety w/ ADLs and transfers. Pt and therapist wore standard mask during session and therapist also wore goggles and gloves and performed thorough hand hygiene before and after session.

## 2020-07-20 NOTE — THERAPY EVALUATION
Acute Care - Occupational Therapy Initial Evaluation  [facility]     Patient Name: Mei Crane  : 1926  MRN: [id number]  Today's Date: 2020             Admit Date: 2020       ICD-10-CM ICD-9-CM   1. Chest pressure R07.89 786.59   2. Hypokalemia E87.6 276.8   3. Hyponatremia E87.1 276.1     Patient Active Problem List   Diagnosis   • HTN (hypertension), benign   • Acquired hypothyroidism   • Hyperlipidemia   • Gastroesophageal reflux disease without esophagitis   • Glaucoma   • Macular degeneration   • Anemia   • Osteopenia   • CKD (chronic kidney disease), stage III (CMS/HCC)   • B12 deficiency   • Calculus of gallbladder without cholecystitis   • DDD (degenerative disc disease), lumbar   • Trochanteric bursitis of right hip   • Bilateral leg weakness   • Elevated troponin   • Weakness   • Dizziness   • Hallucinations, visual   • CVA (cerebrovascular accident) (CMS/HCC)   • Embolic stroke (CMS/HCC)   • Hypokalemia   • Hyponatremia   • History of embolic stroke   • Chest pain   • Atrial fibrillation (CMS/HCC)     Past Medical History:   Diagnosis Date   • Calculus of gallbladder without cholecystitis 2017   • CKD (chronic kidney disease), stage III (CMS/HCC) 2016   • DDD (degenerative disc disease), lumbar 2019   • Disease of thyroid gland    • Hyperlipidemia    • Low back pain    • Peripheral neuropathy      Past Surgical History:   Procedure Laterality Date   • CATARACT EXTRACTION Bilateral    • EPIDURAL BLOCK     • HYSTERECTOMY      age 35          OT ASSESSMENT FLOWSHEET (last 12 hours)      Occupational Therapy Evaluation     Row Name 20 0851                   OT Evaluation Time/Intention    Subjective Information  complains of;fatigue  -RD        Document Type  evaluation  -RD        Mode of Treatment  occupational therapy  -RD        Patient Effort  good  -RD        Symptoms Noted During/After Treatment  none  -RD           General Information    Patient Profile  Reviewed?  yes  -RD        Patient Observations  alert;cooperative;agree to therapy  -RD        Patient/Family Observations  pt seated in chair w/ no signs of acute distress  -RD        Prior Level of Function  independent:;ADL's  -RD        Equipment Currently Used at Home  walker, rolling;shower chair;grab bar  -RD        Pertinent History of Current Functional Problem  pt admitted d/t chest pressure and generalized weakness   -RD        Existing Precautions/Restrictions  fall  -RD        Risks Reviewed  patient:  -RD        Benefits Reviewed  patient:  -RD           Relationship/Environment    Lives With  alone  -RD           Resource/Environmental Concerns    Current Living Arrangements  home/apartment/condo  -RD           Cognitive Assessment/Intervention- PT/OT    Orientation Status (Cognition)  oriented x 3  -RD        Follows Commands (Cognition)  follows one step commands;verbal cues/prompting required  -RD           Bed Mobility Assessment/Treatment    Comment (Bed Mobility)  NT- up in chair  -RD           Functional Mobility    Functional Mobility- Ind. Level  contact guard assist  -RD        Functional Mobility- Device  rolling walker  -RD        Functional Mobility- Comment  pt ambulates from chair <> bathroom using RW w/ CGA- no LOB noted but unsteady and pt c/o B LE pain w/ ambulation  -RD           Transfer Assessment/Treatment    Transfer Assessment/Treatment  sit-stand transfer;stand-sit transfer  -RD           Sit-Stand Transfer    Sit-Stand [place] (Transfers)  contact guard;verbal cues  -RD        Assistive Device (Sit-Stand Transfers)  walker, front-wheeled  -RD           Stand-Sit Transfer    Stand-Sit [place] (Transfers)  contact guard;verbal cues  -RD        Assistive Device (Stand-Sit Transfers)  walker, front-wheeled  -RD           ADL Assessment/Intervention    BADL Assessment/Intervention  lower body dressing  -RD           Lower Body Dressing Assessment/Training    Lower Body  Dressing [place] Level  lower body dressing skills;doff;don;socks;set up;supervision  -RD        Lower Body Dressing Position  supported sitting  -RD           General ROM    GENERAL ROM COMMENTS  B UE AROM WFL  -RD           MMT (Manual Muscle Testing)    General MMT Comments  B UE MMT: grossly approx. 3+/5  -RD           Motor Assessment/Interventions    Additional Documentation  Balance (Group)  -RD           Balance    Balance  static standing balance  -RD           Static Standing Balance    Level of [place] (Supported Standing, Static Balance)  contact guard assist  -RD        Time Able to Maintain Position (Supported Standing, Static Balance)  1 to 2 minutes  -RD        Assistive Device Utilized (Supported Standing, Static Balance)  walker, rolling  -RD           Sensory Assessment/Intervention    Sensory General Assessment  no sensation deficits identified  -RD           Positioning and Restraints    Pre-Treatment Position  sitting in chair/recliner  -RD        Post Treatment Position  chair  -RD        In Chair  reclined;call light within reach;encouraged to call for assist;exit alarm on  -RD           Pain Assessment    Additional Documentation  Pain Scale: Word Pre/Post-Treatment (Group)  -RD           Pain Scale: Word Pre/Post-Treatment    Pain: Word Scale, Pretreatment  0 - no pain  -RD        Pain: Word Scale, Post-Treatment  4 - moderate pain  -RD        Pre/Post Treatment Pain Comment  pt reports B LE pain w/ ambulation; repositioned, increased activity/rest; tolerated eval/tx  -RD           Coping    Observed Emotional State  accepting;calm;cooperative  -RD        Verbalized Emotional State  acceptance  -RD           Plan of Care Review    Plan of Care Reviewed With  patient  -RD           Clinical Impression (OT)    OT Diagnosis  Pt presents to OT eval w/ generalized weakness, impaired activity tolerance and balance, B LE pain, and overall decreased indep/safety w/ ADLs and transfers  -RD         Criteria for Skilled Therapeutic Interventions Met (OT Eval)  yes;treatment indicated  -RD        Rehab Potential (OT Eval)  good, to achieve stated therapy goals  -RD        Therapy Frequency (OT Eval)  3 times/wk  -RD        Care Plan Review (OT)  evaluation/treatment results reviewed;care plan/treatment goals reviewed;patient/other agree to care plan  -RD        Anticipated Discharge Disposition (OT)  home with assist;home with home health  -RD           Planned OT Interventions    Planned Therapy Interventions (OT Eval)  activity tolerance training;BADL retraining;functional balance retraining;transfer/mobility retraining;strengthening exercise;ROM/therapeutic exercise;patient/caregiver education/training  -RD           OT Goals    Transfer Goal Selection (OT)  transfer, OT goal 1  -RD        Toileting Goal Selection (OT)  toileting, OT goal 1  -RD        Endurance Goal Selection (OT)  endurance, OT goal 1  -RD        Additional Documentation  Endurance Goal Selection (OT) (Row)  -RD           Transfer Goal 1 (OT)    Activity/Assistive Device (Transfer Goal 1, OT)  toilet  -RD        [place] Level/Cues Needed (Transfer Goal 1, OT)  conditional independence  -RD        Time Frame (Transfer Goal 1, OT)  long term goal (LTG)  -RD        Progress/Outcome (Transfer Goal 1, OT)  goal ongoing  -RD           Toileting Goal 1 (OT)    Activity/Device (Toileting Goal 1, OT)  toileting skills, all  -RD        [place] Level/Cues Needed (Toileting Goal 1, OT)  conditional independence  -RD        Time Frame (Toileting Goal 1, OT)  long term goal (LTG)  -RD        Progress/Outcome (Toileting Goal 1, OT)  goal ongoing  -RD            Endurance Goal 1 (OT)    Endurance Goal 1 (OT)  Pt will increase functional endurance to assist w/ safety during ADLs and transfers  -RD        Activity Level (Endurance Goal 1, OT)  to increase;endurance 2 good -  -RD        Time Frame (Endurance Goal 1, OT)  long term goal (LTG)   -RD        Progress/Outcome (Endurance Goal 1, OT)  goal ongoing  -RD          User Key  (r) = Recorded By, (t) = Taken By, (c) = Cosigned By    Initials Name Effective Dates    BABAK Gallegos Denise Kaiser, OT 10/14/19 -          Occupational Therapy Education                 Title: PT OT SLP Therapies (Done)     Topic: Occupational Therapy (Done)     Point: ADL training (Done)     Description:   Instruct learner(s) on proper safety adaptation and remediation techniques during self care or transfers.   Instruct in proper use of assistive devices.              Learning Progress Summary           Patient Acceptance, E,D, VU by RD at 7/20/2020 1101    Comment:  OT educ on OT role in therapeutic process and pt's POC. OT also educ/demo on B UE thera ex for improved UE strength and enduranace.                   Point: Home exercise program (Done)     Description:   Instruct learner(s) on appropriate technique for monitoring, assisting and/or progressing therapeutic exercises/activities.              Learning Progress Summary           Patient Acceptance, E,D, VU by RD at 7/20/2020 1101    Comment:  OT educ on OT role in therapeutic process and pt's POC. OT also educ/demo on B UE thera ex for improved UE strength and enduranace.                   Point: Precautions (Done)     Description:   Instruct learner(s) on prescribed precautions during self-care and functional transfers.              Learning Progress Summary           Patient Acceptance, E,D, VU by RD at 7/20/2020 1101    Comment:  OT educ on OT role in therapeutic process and pt's POC. OT also educ/demo on B UE thera ex for improved UE strength and enduranace.                   Point: Body mechanics (Done)     Description:   Instruct learner(s) on proper positioning and spine alignment during self-care, functional mobility activities and/or exercises.              Learning Progress Summary           Patient Acceptance, E,D, VU by RD at 7/20/2020 1101    Comment:  OT  educ on OT role in therapeutic process and pt's POC. OT also educ/demo on B UE thera ex for improved UE strength and enduranace.                               User Key     Initials Effective Dates Name Provider Type Discipline    RD 10/14/19 -  Denise Gallegos OT Occupational Therapist OT                  OT Recommendation and Plan  Outcome Summary/Treatment Plan (OT)  Anticipated Discharge Disposition (OT): home with assist, home with home health  Planned Therapy Interventions (OT Eval): activity tolerance training, BADL retraining, functional balance retraining, transfer/mobility retraining, strengthening exercise, ROM/therapeutic exercise, patient/caregiver education/training  Therapy Frequency (OT Eval): 3 times/wk  Plan of Care Review  Plan of Care Reviewed With: patient  Plan of Care Reviewed With: patient    Outcome Measures     Row Name 07/20/20 1100             How much help from another is currently needed...    Putting on and taking off regular lower body clothing?  3  -RD      Bathing (including washing, rinsing, and drying)  3  -RD      Toileting (which includes using toilet bed pan or urinal)  3  -RD      Putting on and taking off regular upper body clothing  3  -RD      Taking care of personal grooming (such as brushing teeth)  3  -RD      Eating meals  4  -RD      AM-PAC 6 Clicks Score (OT)  19  -RD         Functional Assessment    Outcome Measure Options  AM-PAC 6 Clicks Daily Activity (OT)  -RD        User Key  (r) = Recorded By, (t) = Taken By, (c) = Cosigned By    Initials Name Provider Type    RD Denise Gallegos, OT Occupational Therapist          Time Calculation:   Time Calculation- OT     Row Name 07/20/20 1106             Time Calculation- OT    OT Start Time  0836  -RD      OT Stop Time  0851  -RD      OT Time Calculation (min)  15 min  -RD      Total Timed Code Minutes- OT  10 minute(s)  -RD      OT Received On  07/20/20  -RD      OT - Next Appointment  07/22/20  -RD      OT Goal  Re-Cert Due Date  08/03/20  -BABAK        User Key  (r) = Recorded By, (t) = Taken By, (c) = Cosigned By    Initials Name Provider Type    Denise Sauer, OT Occupational Therapist        Therapy Charges for Today     Code Description Service Date Service Provider Modifiers Qty    93746182312  OT EVAL MOD COMPLEXITY 2 7/20/2020 Denise Gallegos, OT GO 1    [id number]  OT SELF CARE/MGMT/TRAIN EA 15 MIN 7/20/2020 Denise Gallegos OT GO 1               Denise Gallegos OT  7/20/2020

## 2020-07-20 NOTE — PLAN OF CARE
Problem: Patient Care Overview  Goal: Plan of Care Review  Outcome: Ongoing (interventions implemented as appropriate)  Flowsheets (Taken 7/20/2020 3110)  Progress: improving  Plan of Care Reviewed With: patient  Note:   Admitted for hypokalemia/hyponatremia. IVF continuous at 100/hr. Aox4. Assist of 1. Uses walker at home, has bursitis in rt hip so does have some difficulty ambulating without assistance. Sinus rhythm to ST. Also appears to have some afib intermittently on monitor. Had tylenol x1 for hip pain. VSS. Troponins elevated, cardiology consult called. Will continue to monitor.

## 2020-07-20 NOTE — H&P
"      Patient Name:  Mei Crane  YOB: 1926  MRN:  [id number]  Admit Date:  7/19/2020  Patient Care Team:  Lexie Toussaint APRN as PCP - General (Family Medicine)  Lexie Toussaint APRN as PCP - Claims Attributed  Jae Bray MD Peplinski, Lee Stanley, OD (Optometry)  Yannick Santo MD as Consulting Physician (Ophthalmology)  Nik Rodriguez MD as Consulting Physician (Orthopedic Surgery)  Nithya Gomez RN as Ambulatory  ([facility])      Subjective   History Present Illness     Chief Complaint   Patient presents with   • Chest Pain       Ms. Crane is a 89 y.o. non-smoker with a history of atrial fibrillation, embolic stroke, chronic kidney disease stage 3, hypothyroidism, hyperlipidemia, and hypertension that presents to [facility] complaining of palpitations and generalized weakness.  She reports the symptoms have been ongoing for the past 4-5 days.  She denies chest pain, states \"it just feels like fluttering, like anxiety.\"  She denies shortness of breath, orthopnea, fever, and chills.  She reports a headache, denies visual changes, facial asymmetry, and speech difficulty.  Work up in the ED revealed troponin 0.058, Na 123, K+ 2.5, Cl 87, creat 1.33, and GFR 37.  Chest x-ray was negative for an acute process.  EKG showed sinus rhythm.  She received PO potassium in the ED.          History of Present Illness  Review of Systems   Constitutional: Positive for appetite change. Negative for chills and fever.   HENT: Negative for congestion and sore throat.    Eyes: Negative.  Negative for photophobia and visual disturbance.   Respiratory: Negative.  Negative for cough and shortness of breath.    Cardiovascular: Positive for palpitations. Negative for chest pain and leg swelling.   Gastrointestinal: Negative.  Negative for abdominal pain, constipation, diarrhea, nausea and vomiting.   Genitourinary: Negative.  Negative for decreased urine " volume, dysuria, flank pain and urgency.   Musculoskeletal: Negative.  Negative for arthralgias and back pain.   Skin: Negative.  Negative for color change and pallor.   Neurological: Positive for weakness and headaches. Negative for dizziness, speech difficulty, light-headedness and numbness.   Psychiatric/Behavioral: Negative.         Personal History     Past Medical History:   Diagnosis Date   • Calculus of gallbladder without cholecystitis 8/7/2017   • CKD (chronic kidney disease), stage III (CMS/HCC) 4/20/2016   • DDD (degenerative disc disease), lumbar 2/19/2019   • Disease of thyroid gland    • Hyperlipidemia    • Low back pain    • Peripheral neuropathy      Past Surgical History:   Procedure Laterality Date   • CATARACT EXTRACTION Bilateral    • EPIDURAL BLOCK     • HYSTERECTOMY      age 35     Family History   Problem Relation Age of Onset   • Aneurysm Mother         AAA   • Deep vein thrombosis Father      Social History     Tobacco Use   • Smoking status: Never Smoker   • Smokeless tobacco: Never Used   Substance Use Topics   • Alcohol use: Yes     Comment: occasionally   • Drug use: No     Facility-Administered Medications Prior to Admission   Medication Dose Route Frequency Provider Last Rate Last Dose   • cyanocobalamin injection 1,000 mcg  1,000 mcg Intramuscular Q28 Days Jae Bray MD   1,000 mcg at 03/10/20 1014     Medications Prior to Admission   Medication Sig Dispense Refill Last Dose   • acetaminophen (TYLENOL) 325 MG tablet Take 2 tablets by mouth Every 4 (Four) Hours As Needed for Mild Pain .   7/19/2020 at Unknown time   • amLODIPine (NORVASC) 10 MG tablet Take 1 tablet by mouth Daily. 30 tablet 5 7/19/2020 at 0900   • atorvastatin (LIPITOR) 80 MG tablet Take 1 tablet by mouth Every Night. 30 tablet 2 7/19/2020 at Unknown time   • DULoxetine (CYMBALTA) 20 MG capsule Take 1 capsule by mouth Daily. 30 capsule 5 7/19/2020 at 2100   • ELIQUIS 5 MG tablet tablet TAKE ONE TABLET BY  MOUTH TWICE DAILY 28 tablet 0 7/19/2020 at 0900   • ferrous gluconate 324 (37.5 Fe) MG tablet tablet Take 1 tablet by mouth Daily With Breakfast. 30 tablet 6 7/19/2020 at 0900   • hydroCHLOROthiazide (MICROZIDE) 12.5 MG capsule Take 1 capsule by mouth Daily. 30 capsule 2 7/19/2020 at 0900   • levothyroxine (Synthroid) 50 MCG tablet Take 1 tablet by mouth Daily. 90 tablet 1 7/19/2020 at 0700   • metoprolol tartrate (LOPRESSOR) 25 MG tablet Take 1 tablet by mouth Every 8 (Eight) Hours. (Patient taking differently: Take 25 mg by mouth 2 (Two) Times a Day.)   7/19/2020 at 0900   • potassium chloride (K-DUR,KLOR-CON) 10 MEQ CR tablet Take 1 tablet by mouth Daily. 30 tablet 5 Past Month at Unknown time   • prednisoLONE acetate (PRED FORTE) 1 % ophthalmic suspension Administer 1 drop into the left eye Daily.  3 Patient Taking Differently at 0900   • ascorbic acid (VITAMIN C) 1000 MG tablet Take 1 tablet by mouth Daily. 180 tablet 1 Unknown at Unknown time   • Cholecalciferol (VITAMIN D PO) Take 2,000 Units by mouth Daily.   Unknown at Unknown time   • cyclobenzaprine (FLEXERIL) 5 MG tablet Take 1 tablet by mouth 3 (Three) Times a Day As Needed for Muscle Spasms. 15 tablet 0 More than a month at Unknown time   • pantoprazole (PROTONIX) 40 MG EC tablet Take 1 tablet by mouth Daily. 90 tablet 3 More than a month at Unknown time     Allergies:    Allergies   Allergen Reactions   • Barbiturates Hives   • Codeine Hives       Objective    Objective     Vital Signs  Temp:  [97.7 °F (36.5 °C)] 97.7 °F (36.5 °C)  Heart Rate:  [75-97] 78  Resp:  [18] 18  BP: (152-165)/(76-87) 165/76  SpO2:  [96 %-97 %] 96 %  on   ;   Device (Oxygen Therapy): room air  There is no height or weight on file to calculate BMI.    Physical Exam   Constitutional: She is oriented to person, place, and time. She appears well-developed and well-nourished.   HENT:   Head: Normocephalic and atraumatic.   Eyes: Conjunctivae and EOM are normal.   Neck: Normal  range of motion. Neck supple.   Cardiovascular: Normal rate, regular rhythm and normal heart sounds.   No murmur heard.  Pulmonary/Chest: Effort normal and breath sounds normal.   Abdominal: Soft. Bowel sounds are normal.   Musculoskeletal: Normal range of motion. She exhibits no edema.   Neurological: She is alert and oriented to person, place, and time.   Skin: Skin is warm and dry.   Psychiatric: She has a normal mood and affect. Her behavior is normal.   Nursing note and vitals reviewed.      Results Review:  I reviewed the patient's new clinical results.  I reviewed the patient's new imaging results and agree with the interpretation.  I reviewed the patient's other test results and agree with the interpretation  I personally viewed and interpreted the patient's EKG/Telemetry data  Discussed with ED provider.    Lab Results (last 24 hours)     Procedure Component Value Units Date/Time    CBC & Differential [[id number]] Collected:  07/19/20 2008    Specimen:  Blood Updated:  07/19/20 2017    Narrative:       The following orders were created for panel order CBC & Differential.  Procedure                               Abnormality         Status                     ---------                               -----------         ------                     CBC Auto Differential[[id number]]        Normal              Final result                 Please view results for these tests on the individual orders.    Comprehensive Metabolic Panel [[id number]]  (Abnormal) Collected:  07/19/20 2008    Specimen:  Blood Updated:  07/19/20 2050     Glucose 118 mg/dL      BUN 14 mg/dL      Creatinine 1.33 mg/dL      Sodium 123 mmol/L      Potassium 2.5 mmol/L      Chloride 87 mmol/L      CO2 24.1 mmol/L      Calcium 9.1 mg/dL      Total Protein 6.9 g/dL      Albumin 4.40 g/dL      ALT (SGPT) 14 U/L      AST (SGOT) 23 U/L      Alkaline Phosphatase 85 U/L      Total Bilirubin 0.5 mg/dL      eGFR Non African Amer 37 mL/min/1.73      Globulin  2.5 gm/dL      A/G Ratio 1.8 g/dL      BUN/Creatinine Ratio 10.5     Anion Gap 11.9 mmol/L     Narrative:       GFR Normal >60  Chronic Kidney Disease <60  Kidney Failure <15      Troponin [[id number]]  (Abnormal) Collected:  07/19/20 2008    Specimen:  Blood Updated:  07/19/20 2050     Troponin T 0.058 ng/mL     Narrative:       Troponin T Reference Range:  <= 0.03 ng/mL-   Negative for AMI  >0.03 ng/mL-     Abnormal for myocardial necrosis.  Clinicians would have to utilize clinical acumen, EKG, Troponin and serial changes to determine if it is an Acute Myocardial Infarction or myocardial injury due to an underlying chronic condition.       Results may be falsely decreased if patient taking Biotin.      CBC Auto Differential [[id number]]  (Normal) Collected:  07/19/20 2008    Specimen:  Blood Updated:  07/19/20 2017     WBC 9.57 10*3/mm3      RBC 4.45 10*6/mm3      Hemoglobin 12.8 g/dL      Hematocrit 37.9 %      MCV 85.2 fL      MCH 28.8 pg      MCHC 33.8 g/dL      RDW 13.4 %      RDW-SD 41.8 fl      MPV 9.6 fL      Platelets 325 10*3/mm3      Neutrophil % 63.4 %      Lymphocyte % 27.3 %      Monocyte % 8.0 %      Eosinophil % 0.7 %      Basophil % 0.4 %      Immature Grans % 0.2 %      Neutrophils, Absolute 6.06 10*3/mm3      Lymphocytes, Absolute 2.61 10*3/mm3      Monocytes, Absolute 0.77 10*3/mm3      Eosinophils, Absolute 0.07 10*3/mm3      Basophils, Absolute 0.04 10*3/mm3      Immature Grans, Absolute 0.02 10*3/mm3      nRBC 0.0 /100 WBC     Troponin [[id number]]  (Abnormal) Collected:  07/19/20 2339    Specimen:  Blood Updated:  07/20/20 0005     Troponin T 0.059 ng/mL     Narrative:       Troponin T Reference Range:  <= 0.03 ng/mL-   Negative for AMI  >0.03 ng/mL-     Abnormal for myocardial necrosis.  Clinicians would have to utilize clinical acumen, EKG, Troponin and serial changes to determine if it is an Acute Myocardial Infarction or myocardial injury due to an underlying chronic condition.              Results may be falsely decreased if patient taking Biotin.            Imaging Results (Last 24 Hours)     Procedure Component Value Units Date/Time    XR Chest 2 View [[id number]] Collected:  07/19/20 1920     Updated:  07/19/20 1928    Narrative:       PA AND LATERAL CHEST     HISTORY: Shortness of air for 4 days. History of hypertension and atrial  fibrillation.     COMPARISON: Chest CT 07/20/2017.     FINDINGS: Heart size is within normal limits. Thoracic aorta is tortuous  and aortic vascular calcifications are present. There is mild  eventration of the right hemidiaphragm. Lungs appear clear and there is  no evidence of pulmonary edema or pleural effusion. There is mild  endplate spurring within the thoracic spine.       Impression:       Chronic changes without evidence for active disease in the  chest.     This report was finalized on 7/19/2020 7:24 PM by Dr. Maurilio Ramírez M.D.             Results for orders placed during the hospital encounter of 06/01/20   Adult Transthoracic Echo Complete W/ Cont if Necessary Per Protocol    Narrative · Left ventricular systolic function is hyperdynamic (EF > 70%).  · Left ventricular diastolic dysfunction is noted (grade I a w/high LAP)   consistent with impaired relaxation.  · Normal right ventricular cavity size and systolic function noted.  · Left atrial cavity size is moderate-to-severely dilated.  · There is severe nodular calcification of the posterior mitral annulus.   There is moderate calcification of the anterior mitral annulus. There is   calcification of several of the mitral valve chordae.  · There is at least moderate mitral stenosis (heart rate of 89).  · The mitral valve mean gradient is 6 mmHg. The mitral valve peak gradient   is 15 mmHg.  · Mild tricuspid valve regurgitation is present.  · Calculated right ventricular systolic pressure from tricuspid   regurgitation is 43 mmHg.  · There is no evidence of pericardial effusion.          ECG 12  Lead   Final Result   HEART RATE= 84  bpm   RR Interval= 716  ms   LA Interval= 129  ms   P Horizontal Axis= -59  deg   P Front Axis= 44  deg   QRSD Interval= 95  ms   QT Interval= 408  ms   QRS Axis= -32  deg   T Wave Axis= 58  deg   - BORDERLINE ECG -   Sinus rhythm   Left ventricular hypertrophy   Atrial fibrillation and PVCs have resolved   Electronically Signed By: Elenita Perez ([facility]) 19-Jul-2020 19:38:30   Date and Time of Study: 2020-07-19 18:17:18           Assessment/Plan     Active Hospital Problems    Diagnosis POA   • **Hypokalemia [E87.6] Yes   • Atrial fibrillation (CMS/HCC) [I48.91] Unknown   • Hyponatremia [E87.1] Unknown   • History of embolic stroke [Z86.73] Not Applicable   • Elevated troponin [R79.89] Yes   • CKD (chronic kidney disease), stage III (CMS/HCC) [N18.3] Yes   • HTN (hypertension), benign [I10] Yes   • Acquired hypothyroidism [E03.9] Yes   • Hyperlipidemia [E78.5] Yes   • Gastroesophageal reflux disease without esophagitis [K21.9] Yes     Hypokalemia/Hyponatremia  -Most likely due to HCTZ, will hold  -IVF  -Check urine studies  -Replace potassium per protocol  -Repeat BMP in AM  -PT/OT consults    Chronic Kidney Disease Stage 3  -Creat 1.33, baseline 0.87-1.19  -GFR 37, baseline 42-61  -IVF  -Avoid nephrotoxins    Elevated Troponin  -She denies chest pain, no ischemic changes on EKG  -Repeat troponin went up, will consult cardiology  -Trend troponins    Hypertension/Atrial Fibrillation  -Sinus rhythm on EKG, blood pressures stable  -Continue metoprolol for rate control, Eliquis for clot prophylaxis  -Continue home blood pressure regimen    Hypothyroidism  -TSH 3.350 on 6/1/20  -Continue Levothyroxine    Hyperlipidemia/History of Embolic Stroke  -Continue statin, Eliquis    GERD  -Continue home PPI    -I discussed the patients findings and my recommendations with patient.    VTE Prophylaxis - Eliquis (home med).  Code Status - Full code.       [facility] Escobar  [facility]  07/20/20  23:59

## 2020-07-20 NOTE — PROGRESS NOTES
Name: Mei Crane ADMIT: 2020   : 1926  PCP: Lexie Toussaint, COCO    MRN: [id number] LOS: 1 days   AGE/SEX: 89 y.o. female  ROOM: [facility]     Subjective   Subjective   Tired.  Otherwise feels better.  Some weakness.    Review of Systems   Respiratory: Negative for shortness of breath.    Cardiovascular: Negative for chest pain.   Gastrointestinal: Negative for nausea and vomiting.   Genitourinary: Negative for difficulty urinating.   Neurological: Positive for weakness.        Objective   Objective   Vital Signs  Temp:  [97.7 °F (36.5 °C)] 97.7 °F (36.5 °C)  Heart Rate:  [73-97] 81  Resp:  [16-18] 16  BP: (148-165)/(76-87) 149/76  SpO2:  [96 %-97 %] 96 %  on   ;   Device (Oxygen Therapy): room air  Body mass index is 26.78 kg/m².  Physical Exam   Constitutional: She is oriented to person, place, and time. She appears well-developed. She is cooperative. No distress.   Neck: No JVD present. No tracheal deviation present.   Cardiovascular: Normal rate and regular rhythm.   Murmur heard.  Pulmonary/Chest: Effort normal and breath sounds normal. No respiratory distress.   Abdominal: Soft. Normal appearance and bowel sounds are normal. She exhibits no distension. There is no tenderness.   Musculoskeletal: She exhibits no edema.   Neurological: She is alert and oriented to person, place, and time.   Skin: Skin is warm and dry.   Psychiatric: She has a normal mood and affect. Her behavior is normal.   Nursing note and vitals reviewed.      Results Review:       I reviewed the patient's new clinical results.  Results from last 7 days   Lab Units 20  0252 20   WBC 10*3/mm3 11.00* 9.57   HEMOGLOBIN g/dL 12.6 12.8   PLATELETS 10*3/mm3 303 325     Results from last 7 days   Lab Units 20  0252 20   SODIUM mmol/L 128* 123*   POTASSIUM mmol/L 2.9* 2.5*   CHLORIDE mmol/L 92* 87*   CO2 mmol/L 23.6 24.1   BUN mg/dL 13 14   CREATININE mg/dL 1.23* 1.33*   GLUCOSE mg/dL 117* 118*      Estimated Creatinine Clearance: 27 mL/min (A) (by C-G formula based on SCr of 1.23 mg/dL (H)).  Results from last 7 days   Lab Units 07/19/20 2008   ALBUMIN g/dL 4.40   BILIRUBIN mg/dL 0.5   ALK PHOS U/L 85   AST (SGOT) U/L 23   ALT (SGPT) U/L 14     Results from last 7 days   Lab Units 07/20/20  0252 07/19/20 2008   CALCIUM mg/dL 8.8 9.1   ALBUMIN g/dL  --  4.40         Results from last 7 days   Lab Units 07/20/20  0541 07/20/20  0252 07/19/20  2339 07/19/20 2008   TROPONIN T ng/mL 0.059* 0.063* 0.059* 0.058*     Results from last 7 days   Lab Units 07/20/20  0336   SODIUM UR mmol/L 67   CHLORIDE UR mmol/L 63   OSMOLALITY UR mOsm/kg 424       XR Chest 2 View  Impression: Chronic changes without evidence for active disease in the  chest.        sodium chloride 10 mL Intravenous Q12H       sodium chloride 100 mL/hr Last Rate: 100 mL/hr (07/20/20 0037)   Diet Regular; Cardiac       Assessment/Plan     Active Hospital Problems    Diagnosis  POA   • **Hypokalemia [E87.6]  Yes   • Atrial fibrillation (CMS/HCC) [I48.91]  Yes   • Thiazide diuretics causing adverse effect in therapeutic use [T50.2X5A]  Yes   • Hyponatremia [E87.1]  Yes   • History of embolic stroke [Z86.73]  Not Applicable   • Elevated troponin [R79.89]  Yes   • CKD (chronic kidney disease), stage III (CMS/HCC) [N18.3]  Yes   • HTN (hypertension), benign [I10]  Yes   • Acquired hypothyroidism [E03.9]  Yes   • Hyperlipidemia [E78.5]  Yes   • Gastroesophageal reflux disease without esophagitis [K21.9]  Yes      Resolved Hospital Problems   No resolved problems to display.       89 y.o. female admitted with palpitations and elevated Tn.  She was also found to have low Na and K.    · Cardiology consulted regarding palpitations and elevated Tn.  Currently w/o chest pain.   · Low Na likely r/t use of HCTZ although urine studies could be suggestive of SIADH as well (less likely).  Will remain off thiazides (including at discharge) and monitor for now.   Improved this am.   · Continue K+ protocol.  Check magnesium (on PPI).  · PT/OT eval.  CCP to see.    · SCDs for DVT prophylaxis.  · Full code.  · Discussed with patient and care team on multidisciplinary rounds.  · Anticipate discharge home in 1-2 days.      Tray Shaikh MD  [facility]  07/20/20  08:29    Patient was placed in face mask on first look.  I wore protective equipment throughout this patient encounter including a face mask, gloves and protective eyewear.  Hand hygiene was performed before donning protective equipment and after removal when leaving the room.

## 2020-07-20 NOTE — CONSULTS
"Adult Nutrition  Assessment/PES    Patient Name:  Mei Crane  YOB: 1926  MRN: [id number]  Admit Date:  7/19/2020    Assessment Date:  7/20/2020    Comments:  Consult per nurse admission screen.  Patient admitted with palpitations, weakness.  Hypokalemia.      Weight appears fairly stable per chart weight history.    Due to the COVID pandemic, nutrition assessment completed based on review of electronic medical record.  This RD currently working remotely and can be reached at [phone], via secure chat or email.     RD will continue to monitor.     Reason for Assessment     Row Name 07/20/20 0902          Reason for Assessment    Reason For Assessment  nurse/nurse practitioner consult     Diagnosis  cardiac disease;neurologic conditions;renal disease;endocrine conditions;gastrointestinal disease;other (see comments);metabolic state Afib, CVA, CKD3, hypothyroid, HLD, HTN, DDD, GERD; adm with hypokalemia         Nutrition/Diet History     Row Name 07/20/20 0902          Nutrition/Diet History    Typical Food/Fluid Intake  no PO intake available at this time; weight stable per chart weight history         Anthropometrics     Row Name 07/20/20 0903 07/20/20 0333       Anthropometrics    Height  162.6 cm (64.02\")  --    Weight  --  70.8 kg (156 lb 1.6 oz)       Admit Weight    Admit Weight  -- 156# 7/20  --       Ideal Body Weight (IBW)    Ideal Body Weight (IBW) (kg)  55.04  --       Usual Body Weight (UBW)    Weight Loss Time Frame  154/160# 2 months ago; 155/144/141# 1 month ago  --       Body Mass Index (BMI)    BMI Assessment  BMI 25-29.9: overweight 26.73  --        Labs/Tests/Procedures/Meds     Row Name 07/20/20 0904          Labs/Procedures/Meds    Lab Results Reviewed  reviewed, pertinent     Lab Results Comments  Gluc, Na, K, Creat, GFR, WBC        Diagnostic Tests/Procedures    Diagnostic Test/Procedure Reviewed  reviewed, pertinent        Medications    Pertinent Medications Reviewed  " "reviewed, pertinent     Pertinent Medications Comments  IVFs         Physical Findings     Row Name 07/20/20 0904          Physical Findings    Overall Physical Appearance  overweight     Skin  -- B=18, intact         Estimated/Assessed Needs     Row Name 07/20/20 0905 07/20/20 0903       Calculation Measurements    Weight Used For Calculations  70.8 kg (156 lb 1.4 oz)  --    Height  --  162.6 cm (64.02\")       Estimated/Assessed Needs       KCAL/KG    KCAL/KG  20 Kcal/Kg (kcal);25 Kcal/Kg (kcal)  --    20 Kcal/Kg (kcal)  1416  --    25 Kcal/Kg (kcal)  1770  --       Protein Requirements    Weight Used For Protein Calculations  70.8 kg (156 lb 1.4 oz)  --    Est Protein Requirement Amount (gms/kg)  0.8 gm protein  --    Estimated Protein Requirements (gms/day)  56.64  --       Fluid Requirements    Estimated Fluid Requirements (mL/day)  1770  --        Nutrition Prescription Ordered     Row Name 07/20/20 0905          Nutrition Prescription PO    Current PO Diet  Regular     Common Modifiers  Cardiac         Evaluation of Received Nutrient/Fluid Intake     Row Name 07/20/20 0906          PO Evaluation    Number of Days PO Intake Evaluated  Insufficient Data         Problem/Interventions:  Problem 1     Row Name 07/20/20 0906          Nutrition Diagnoses Problem 1    Problem 1  Nutrition Appropriate for Condition at this Time         Intervention Goal     Row Name 07/20/20 0907          Intervention Goal    General  Maintain nutrition;Reduce/improve symptoms;Disease management/therapy     PO  Establish PO;Tolerate PO;PO intake (%)     PO Intake %  75 %     Weight  No significant weight loss         Nutrition Intervention     Row Name 07/20/20 0907          Nutrition Intervention    RD/Tech Action  Follow Tx progress;Care plan reviewd         Education/Evaluation     Row Name 07/20/20 0907          Education    Education  Will Instruct as appropriate        Monitor/Evaluation    Monitor  Per protocol;PO " intake;Pertinent labs;Weight;Symptoms           Electronically signed by:  Sheela Padilla RD  07/20/20 09:07

## 2020-07-21 ENCOUNTER — APPOINTMENT (OUTPATIENT)
Dept: MRI IMAGING | Facility: HOSPITAL | Age: 85
End: 2020-07-21

## 2020-07-21 LAB
ALBUMIN SERPL-MCNC: 3.5 G/DL (ref 3.5–5.2)
ANION GAP SERPL CALCULATED.3IONS-SCNC: 8.8 MMOL/L (ref 5–15)
BUN SERPL-MCNC: 10 MG/DL (ref 8–23)
BUN/CREAT SERPL: 10.4 (ref 7–25)
CALCIUM SPEC-SCNC: 8.3 MG/DL (ref 8.2–9.6)
CHLORIDE SERPL-SCNC: 98 MMOL/L (ref 98–107)
CO2 SERPL-SCNC: 21.2 MMOL/L (ref 22–29)
CREAT SERPL-MCNC: 0.96 MG/DL (ref 0.57–1)
GFR SERPL CREATININE-BSD FRML MDRD: 54 ML/MIN/1.73
GLUCOSE SERPL-MCNC: 83 MG/DL (ref 65–99)
MAGNESIUM SERPL-MCNC: 2.2 MG/DL (ref 1.7–2.3)
PHOSPHATE SERPL-MCNC: 2 MG/DL (ref 2.5–4.5)
POTASSIUM SERPL-SCNC: 3.7 MMOL/L (ref 3.5–5.2)
SODIUM SERPL-SCNC: 128 MMOL/L (ref 136–145)

## 2020-07-21 PROCEDURE — 70553 MRI BRAIN STEM W/O & W/DYE: CPT

## 2020-07-21 PROCEDURE — 97161 PT EVAL LOW COMPLEX 20 MIN: CPT

## 2020-07-21 PROCEDURE — 97116 GAIT TRAINING THERAPY: CPT

## 2020-07-21 PROCEDURE — A9575 INJ GADOTERATE MEGLUMI 0.1ML: HCPCS | Performed by: HOSPITALIST

## 2020-07-21 PROCEDURE — 80069 RENAL FUNCTION PANEL: CPT | Performed by: HOSPITALIST

## 2020-07-21 PROCEDURE — 83735 ASSAY OF MAGNESIUM: CPT | Performed by: HOSPITALIST

## 2020-07-21 PROCEDURE — 25010000002 GADOTERATE MEGLUMINE 7.5 MMOL/15ML SOLUTION: Performed by: HOSPITALIST

## 2020-07-21 PROCEDURE — 99232 SBSQ HOSP IP/OBS MODERATE 35: CPT | Performed by: INTERNAL MEDICINE

## 2020-07-21 RX ORDER — GADOTERATE MEGLUMINE 376.9 MG/ML
15 INJECTION INTRAVENOUS
Status: COMPLETED | OUTPATIENT
Start: 2020-07-21 | End: 2020-07-21

## 2020-07-21 RX ADMIN — ACETAMINOPHEN 650 MG: 325 TABLET, FILM COATED ORAL at 22:17

## 2020-07-21 RX ADMIN — LEVOTHYROXINE SODIUM 50 MCG: 50 TABLET ORAL at 09:14

## 2020-07-21 RX ADMIN — PANTOPRAZOLE SODIUM 40 MG: 40 TABLET, DELAYED RELEASE ORAL at 09:16

## 2020-07-21 RX ADMIN — PREDNISOLONE ACETATE 1 DROP: 10 SUSPENSION/ DROPS OPHTHALMIC at 09:14

## 2020-07-21 RX ADMIN — LOPERAMIDE HYDROCHLORIDE 2 MG: 2 CAPSULE ORAL at 00:07

## 2020-07-21 RX ADMIN — METOPROLOL TARTRATE 50 MG: 50 TABLET, FILM COATED ORAL at 09:14

## 2020-07-21 RX ADMIN — ATORVASTATIN CALCIUM 80 MG: 80 TABLET, FILM COATED ORAL at 20:17

## 2020-07-21 RX ADMIN — SODIUM CHLORIDE, PRESERVATIVE FREE 10 ML: 5 INJECTION INTRAVENOUS at 09:15

## 2020-07-21 RX ADMIN — DULOXETINE HYDROCHLORIDE 20 MG: 20 CAPSULE, DELAYED RELEASE ORAL at 09:14

## 2020-07-21 RX ADMIN — POTASSIUM CHLORIDE 10 MEQ: 10 CAPSULE, COATED, EXTENDED RELEASE ORAL at 09:14

## 2020-07-21 RX ADMIN — AMLODIPINE BESYLATE 10 MG: 10 TABLET ORAL at 09:14

## 2020-07-21 RX ADMIN — ACETAMINOPHEN 650 MG: 325 TABLET, FILM COATED ORAL at 13:08

## 2020-07-21 RX ADMIN — METOPROLOL TARTRATE 50 MG: 50 TABLET, FILM COATED ORAL at 20:18

## 2020-07-21 RX ADMIN — SODIUM CHLORIDE, PRESERVATIVE FREE 10 ML: 5 INJECTION INTRAVENOUS at 20:18

## 2020-07-21 RX ADMIN — GADOTERATE MEGLUMINE 15 ML: 376.9 INJECTION, SOLUTION INTRAVENOUS at 18:15

## 2020-07-21 RX ADMIN — APIXABAN 5 MG: 5 TABLET, FILM COATED ORAL at 09:14

## 2020-07-21 RX ADMIN — APIXABAN 5 MG: 5 TABLET, FILM COATED ORAL at 20:17

## 2020-07-21 NOTE — DISCHARGE PLACEMENT REQUEST
"Mei Crane (89 y.o. Female)     Date of Birth Social Security Number Address Home Phone MRN    02/14/1926  [address] [facility] [address] [phone] [id number]    Zoroastrian Marital Status          Roman Catholic        Admission Date Admission Type Admitting Provider Attending Provider Department, Room/Bed    7/19/20 Emergency Tray Shaikh MD Edling, Stephen A, MD [address], [address]1    Discharge Date Discharge Disposition Discharge Destination                       Attending Provider:  Nik Schuster MD    Allergies:  Barbiturates, Codeine    Isolation:  None   Infection:  None   Code Status:  CPR    Ht:  162.6 cm (64.02\")   Wt:  69.9 kg (154 lb 1.6 oz)    Admission Cmt:  None   Principal Problem:  Hypokalemia [E87.6]                 Active Insurance as of 7/19/2020     Primary Coverage     Payor Plan Insurance Group Employer/Plan Group    MEDICARE MEDICARE A & B      Payor Plan Address Payor Plan Phone Number Payor Plan Fax Number Effective Dates    [address] [phone]  2/1/1991 - None Entered    [facility] [id number]       Subscriber Name Subscriber Birth Date Member ID       MEI CRANE 2/14/1926 [id number]           Secondary Coverage     Payor Plan Insurance Group Employer/Plan Group    [facility] SUPP [id number]     Payor Plan Address Payor Plan Phone Number Payor Plan Fax Number Effective Dates    [address]   12/1/2016 - None Entered    [facility] [id number]       Subscriber Name Subscriber Birth Date Member ID       MEI CRANE 2/14/1926 [id number]                 Emergency Contacts      (Rel.) Home Phone Work Phone Mobile Phone    Emily Jean-Baptiste (Sister) -- -- [phone]    SATISH JEAN-BAPTISTE (NEPHEW) (Relative) [phone] -- --              "

## 2020-07-21 NOTE — PROGRESS NOTES
Continued Stay Note  [facility]     Patient Name: Mei Crane  MRN: [id number]  Today's Date: 7/21/2020    Admit Date: 7/19/2020    Discharge Plan     Row Name 07/21/20 1312       Plan    Plan  Home with [facility]    Patient/Family in Agreement with Plan  yes    Plan Comments  Per jacob Mckeon is current with [facility] who will follow to resume services at d/c. Mallory Zepeda LCSW                                   Discharge Codes    No documentation.             Tasneem Zepeda LCSW

## 2020-07-21 NOTE — THERAPY EVALUATION
Patient Name: Mei Crane  : 1926    MRN: [id number]                              Today's Date: 2020       Admit Date: 2020    Visit Dx:     ICD-10-CM ICD-9-CM   1. Chest pressure R07.89 786.59   2. Hypokalemia E87.6 276.8   3. Hyponatremia E87.1 276.1     Patient Active Problem List   Diagnosis   • HTN (hypertension), benign   • Acquired hypothyroidism   • Hyperlipidemia   • Gastroesophageal reflux disease without esophagitis   • Glaucoma   • Macular degeneration   • Anemia   • Osteopenia   • CKD (chronic kidney disease), stage III (CMS/[facility])   • B12 deficiency   • Calculus of gallbladder without cholecystitis   • DDD (degenerative disc disease), lumbar   • Trochanteric bursitis of right hip   • Bilateral leg weakness   • Elevated troponin   • Weakness   • Dizziness   • Hallucinations, visual   • CVA (cerebrovascular accident) (CMS/[facility])   • Embolic stroke (CMS/[facility])   • Hypokalemia   • Hyponatremia   • History of embolic stroke   • Chest pain   • Atrial fibrillation (CMS/[facility])   • Thiazide diuretics causing adverse effect in therapeutic use     Past Medical History:   Diagnosis Date   • Calculus of gallbladder without cholecystitis 2017   • CKD (chronic kidney disease), stage III (CMS/HCC) 2016   • DDD (degenerative disc disease), lumbar 2019   • Disease of thyroid gland    • Hyperlipidemia    • Low back pain    • Peripheral neuropathy      Past Surgical History:   Procedure Laterality Date   • CATARACT EXTRACTION Bilateral    • EPIDURAL BLOCK     • HYSTERECTOMY      age 35     General Information     Row Name 20 0932          PT Evaluation Time/Intention    Document Type  evaluation  -AE     Mode of Treatment  physical therapy  -AE     Row Name 20 0932          General Information    Patient Profile Reviewed?  yes  -AE     Prior Level of Function  independent:  -AE     Existing Precautions/Restrictions  fall  -AE     Row Name 20 0932           Relationship/Environment    Lives With  alone  -AE     Row Name 07/21/20 0932          Home Main Entrance    Number of Stairs, Main Entrance  none  -AE     Row Name 07/21/20 0932          Stairs Within Home, Primary    Number of Stairs, Within Home, Primary  none  -AE     Row Name 07/21/20 0932          Cognitive Assessment/Intervention- PT/OT    Orientation Status (Cognition)  oriented x 3  -AE     Row Name 07/21/20 0932          Safety Issues, Functional Mobility    Impairments Affecting Function (Mobility)  endurance/activity tolerance;strength  -AE       User Key  (r) = Recorded By, (t) = Taken By, (c) = Cosigned By    Initials Name Provider Type    AE Leola Pichardo, PT Physical Therapist        Mobility     Row Name 07/21/20 0933          Bed Mobility Assessment/Treatment    Bed Mobility Assessment/Treatment  bed mobility (all) activities  -AE     [place] Level (Bed Mobility)  conditional independence  -AE     Row Name 07/21/20 0933          Transfer Assessment/Treatment    Comment (Transfers)  [place]/supervision for all transfers with FWW  -AE     Row Name 07/21/20 0933          Bed-Chair Transfer    Bed-Chair [place] (Transfers)  conditional independence  -AE     Assistive Device (Bed-Chair Transfers)  walker, front-wheeled  -AE     Row Name 07/21/20 0933          Sit-Stand Transfer    Sit-Stand [place] (Transfers)  conditional independence  -AE     Assistive Device (Sit-Stand Transfers)  walker, front-wheeled  -AE     Row Name 07/21/20 0933          Gait/Stairs Assessment/Training    Gait/Stairs Assessment/Training  gait/ambulation independence;gait/ambulation assistive device  -AE     [place] Level (Gait)  conditional independence  -AE     Assistive Device (Gait)  walker, front-wheeled  -AE     Distance in Feet (Gait)  150ft  -AE     Pattern (Gait)  step-through  -AE     Deviations/Abnormal Patterns (Gait)  garcía decreased;stride length decreased  -AE     Bilateral Gait Deviations   forward flexed posture;heel strike decreased  -AE       User Key  (r) = Recorded By, (t) = Taken By, (c) = Cosigned By    Initials Name Provider Type    AE Leola Pichardo PT Physical Therapist        Obj/Interventions     Row Name 07/21/20 0933          General ROM    GENERAL ROM COMMENTS  BLE WFL  -AE     Row Name 07/21/20 0933          MMT (Manual Muscle Testing)    General MMT Comments  generalized weakness  -AE       User Key  (r) = Recorded By, (t) = Taken By, (c) = Cosigned By    Initials Name Provider Type    AE Leola Pichardo, LUISA Physical Therapist        Goals/Plan    No documentation.       Clinical Impression     Row Name 07/21/20 0934          Pain Assessment    Additional Documentation  Pain Scale: Numbers Pre/Post-Treatment (Group)  -AE     Row Name 07/21/20 0934          Pain Scale: Numbers Pre/Post-Treatment    Pain Scale: Numbers, Pretreatment  0/10 - no pain  -AE     Pain Scale: Numbers, Post-Treatment  0/10 - no pain  -AE     Row Name 07/21/20 0934          Plan of Care Review    Plan of Care Reviewed With  patient  -AE     Row Name 07/21/20 0934          Physical Therapy Clinical Impression    Patient/Family Goals Statement (PT Clinical Impression)  Pt wants to DC home with HHPT to follow  -AE     Criteria for Skilled Interventions Met (PT Clinical Impression)  no;current level of function same as previous level of function  -AE     Row Name 07/21/20 0934          Positioning and Restraints    Pre-Treatment Position  in bed  -AE     Post Treatment Position  bed  -AE     In Bed  supine;call light within reach;encouraged to call for assist  -AE       User Key  (r) = Recorded By, (t) = Taken By, (c) = Cosigned By    Initials Name Provider Type    Leola Trejo PT Physical Therapist        Outcome Measures     Row Name 07/21/20 0934          How much help from another person do you currently need...    Turning from your back to your side while in flat bed without using bedrails?  4   -AE     Moving from lying on back to sitting on the side of a flat bed without bedrails?  4  -AE     Moving to and from a bed to a chair (including a wheelchair)?  4  -AE     Standing up from a chair using your arms (e.g., wheelchair, bedside chair)?  4  -AE     Climbing 3-5 steps with a railing?  3  -AE     To walk in hospital room?  4  -AE     AM-PAC 6 Clicks Score (PT)  23  -AE     Row Name 07/21/20 0934          Functional Assessment    Outcome Measure Options  AM-PAC 6 Clicks Basic Mobility (PT)  -AE       User Key  (r) = Recorded By, (t) = Taken By, (c) = Cosigned By    Initials Name Provider Type    AE Leola Pichardo PT Physical Therapist        Physical Therapy Education                 Title: PT OT SLP Therapies (Done)     Topic: Physical Therapy (Done)     Point: Mobility training (Done)     Description:   Instruct learner(s) on safety and technique for assisting patient out of bed, chair or wheelchair.  Instruct in the proper use of assistive devices, such as walker, crutches, cane or brace.              Patient Friendly Description:   It's important to get you on your feet again, but we need to do so in a way that is safe for you. Falling has serious consequences, and your personal safety is the most important thing of all.        When it's time to get out of bed, one of us or a family member will sit next to you on the bed to give you support.     If your doctor or nurse tells you to use a walker, crutches, a cane, or a brace, be sure you use it every time you get out of bed, even if you think you don't need it.    Learning Progress Summary           Patient Acceptance, E,TB, SARAI,DU by AE at 7/21/2020 0935                   Point: Home exercise program (Done)     Description:   Instruct learner(s) on appropriate technique for monitoring, assisting and/or progressing patient with therapeutic exercises and activities.              Learning Progress Summary           Patient Acceptance, E,TB, VU,DU by  AE at 7/21/2020 0935                   Point: Body mechanics (Done)     Description:   Instruct learner(s) on proper positioning and spine alignment for patient and/or caregiver during mobility tasks and/or exercises.              Learning Progress Summary           Patient Acceptance, E,TB, VU,DU by AE at 7/21/2020 0935                   Point: Precautions (Done)     Description:   Instruct learner(s) on prescribed precautions during mobility and gait tasks              Learning Progress Summary           Patient Acceptance, E,TB, VU,DU by AE at 7/21/2020 0935                               User Key     Initials Effective Dates Name Provider Type Discipline    AE 09/04/19 -  Leola Pichardo PT Physical Therapist PT              PT Recommendation and Plan     Outcome Summary/Treatment Plan (PT)  Anticipated Discharge Disposition (PT): home, home with home health  Plan of Care Reviewed With: patient     Time Calculation:   PT Charges     Row Name 07/21/20 0937             Time Calculation    Start Time  0810  -AE      Stop Time  0837  -AE      Time Calculation (min)  27 min  -AE      PT Received On  07/21/20  -AE         Time Calculation- PT    Total Timed Code Minutes- PT  20 minute(s)  -AE        User Key  (r) = Recorded By, (t) = Taken By, (c) = Cosigned By    Initials Name Provider Type    AE Leola Pichardo, LUISA Physical Therapist        Therapy Charges for Today     Code Description Service Date Service Provider Modifiers Qty    38806884229 HC PT EVAL LOW COMPLEXITY 2 7/21/2020 Leola Pichardo, PT GP 1    51794818304 HC GAIT TRAINING EA 15 MIN 7/21/2020 Leola Pichardo, PT GP 1          PT G-Codes  Outcome Measure Options: AM-PAC 6 Clicks Basic Mobility (PT)  AM-PAC 6 Clicks Score (PT): 23  AM-PAC 6 Clicks Score (OT): 19    Leola Pichardo PT  7/21/2020

## 2020-07-21 NOTE — PLAN OF CARE
Problem: Patient Care Overview  Goal: Individualization and Mutuality  Outcome: Ongoing (interventions implemented as appropriate)       Pt VSS. IV fluids have been stopped. Cardiology has signed off. MRI of the brain was ordered to make sure pt. Didn't have another stroke. If the MRI is negative pt should go home tomorrow. Will continue to monitor.

## 2020-07-21 NOTE — PROGRESS NOTES
[facility]     LOS: 2 days     Subjective:    CC:Chest Pain    DIET:  Diet Order   Procedures   • Diet Regular; Cardiac     No chest pain, no shortness of air, no nausea vomiting or diarrhea overall patient is feeling better.  Still not 100%      Objective:    Vital Signs:  Temp:  [97.8 °F (36.6 °C)-98.1 °F (36.7 °C)] 97.8 °F (36.6 °C)  Heart Rate:  [69-76] 72  Resp:  [18] 18  BP: (118-135)/(59-64) 118/59    SpO2:  [92 %-97 %] 96 %  on  Flow (L/min):  [1.5] 1.5;   Device (Oxygen Therapy): room air  Body mass index is 26.44 kg/m².    Physical Exam   Constitutional: She appears well-developed and well-nourished.   HENT:   Head: Normocephalic and atraumatic.   Cardiovascular: Exam reveals no friction rub.   No murmur heard.  Pulmonary/Chest: Effort normal and breath sounds normal.   Abdominal: Soft. Bowel sounds are normal. She exhibits no distension. There is no tenderness.   Neurological: She is alert.   Skin: Skin is warm and dry.       Results Review:    Glucose   Date Value Ref Range Status   07/21/2020 83 65 - 99 mg/dL Final   07/20/2020 117 (H) 65 - 99 mg/dL Final   07/19/2020 118 (H) 65 - 99 mg/dL Final     Results from last 7 days   Lab Units 07/20/20  0252   WBC 10*3/mm3 11.00*   HEMOGLOBIN g/dL 12.6   HEMATOCRIT % 36.3   PLATELETS 10*3/mm3 303     Results from last 7 days   Lab Units 07/21/20  0634  07/19/20 2008   SODIUM mmol/L 128*   < > 123*   POTASSIUM mmol/L 3.7   < > 2.5*   CHLORIDE mmol/L 98   < > 87*   CO2 mmol/L 21.2*   < > 24.1   BUN mg/dL 10   < > 14   CREATININE mg/dL 0.96   < > 1.33*   CALCIUM mg/dL 8.3   < > 9.1   BILIRUBIN mg/dL  --   --  0.5   ALK PHOS U/L  --   --  85   ALT (SGPT) U/L  --   --  14   AST (SGOT) U/L  --   --  23   GLUCOSE mg/dL 83   < > 118*    < > = values in this interval not displayed.         Results from last 7 days   Lab Units 07/21/20  0634   MAGNESIUM mg/dL 2.2     Results from last 7 days   Lab Units 07/20/20  1132 07/20/20  0541  07/20/20  0252   TROPONIN T ng/mL 0.048* 0.059* 0.063*     Cultures:  No results found for: BLOODCX, URINECX, WOUNDCX, MRSACX, RESPCX, STOOLCX    I have reviewed daily medications and changes in CPOE    Scheduled meds    amLODIPine 10 mg Oral Daily   apixaban 5 mg Oral BID   atorvastatin 80 mg Oral Nightly   DULoxetine 20 mg Oral Daily   levothyroxine 50 mcg Oral Daily   metoprolol tartrate 50 mg Oral BID   pantoprazole 40 mg Oral Daily   potassium chloride 10 mEq Oral Daily   prednisoLONE acetate 1 drop Left Eye Daily   sodium chloride 10 mL Intravenous Q12H          PRN meds  •  acetaminophen **OR** [DISCONTINUED] acetaminophen **OR** [DISCONTINUED] acetaminophen  •  bisacodyl  •  calcium carbonate  •  magnesium sulfate **OR** magnesium sulfate in D5W 1g/100mL (PREMIX)  •  nitroglycerin  •  potassium chloride **OR** potassium chloride **OR** potassium chloride  •  sodium chloride  •  sodium chloride        Hypokalemia    HTN (hypertension), benign    Acquired hypothyroidism    Hyperlipidemia    Gastroesophageal reflux disease without esophagitis    CKD (chronic kidney disease), stage III (CMS/HCC)    Elevated troponin    Hyponatremia    History of embolic stroke    Atrial fibrillation (CMS/HCC)    Thiazide diuretics causing adverse effect in therapeutic use        Assessment/Plan:    Generalized weakness-we will get an MRI of the brain to further evaluate, and recent multiple CVAs last hospitalization.  Patient remains on Eliquis.    Elevated troponin and palpitations-cardiology has seen the patient no further work-up at this time    Hypokalemia and hyponatremia-this has improved, the patient is to stay off of HCTZ on discharge    Hypothyroidism-TSH 3.3 last check, continue with levothyroxine    Given the generalized weakness recent multiple CVAs we will recheck MRI    Likely discharge tomorrow assuming MRI is unchanged      Nik Schuster MD  07/21/20  14:37

## 2020-07-21 NOTE — PROGRESS NOTES
Discharge Planning Assessment  [facility]     Patient Name: Mei Crane  MRN: [id number]  Today's Date: 7/21/2020    Admit Date: 7/19/2020    Discharge Needs Assessment     Row Name 07/21/20 1046       Living Environment    Lives With  alone    Current Living Arrangements  home/apartment/condo    Primary Care Provided by  self    Provides Primary Care For  no one    Family Caregiver if Needed  sibling(s)    Family Caregiver Names  sister, Emily Jean-Baptiste, [phone]    Quality of Family Relationships  helpful;involved;supportive    Able to Return to Prior Arrangements  yes       Resource/Environmental Concerns    Resource/Environmental Concerns  none       Transition Planning    Patient/Family Anticipates Transition to  home with help/services    Patient/Family Anticipated Services at Transition  home health care    Transportation Anticipated  family or friend will provide       Discharge Needs Assessment    Concerns to be Addressed  discharge planning    Equipment Currently Used at Home  walker, rolling    Outpatient/Agency/Support Group Needs  homecare agency    Discharge Facility/Level of Care Needs  home with home health    Discharge Coordination/Progress  Home Health        Discharge Plan     Row Name 07/21/20 1047       Plan    Plan  Home with home health, awaiting agency    Patient/Family in Agreement with Plan  yes    Plan Comments  Entered on behalf of CCP RN (Coretta Bennett). CCP met with pt to verify information and discuss d/c planning. Pt resides alone in a single level patio home, and uses a walker for mobility. [facility]. Pt unable to recall past home health agency used but would prefer to use them again if possible (awaiting to return contact from local agencies to determine provider). Pt has been to [facility] for rehab in the past. CCP to follow to arrange HH and for any additional needs. Mallory Zepeda LCSW        Destination      Coordination has not been started for this encounter.       Durable Medical Equipment      Coordination has not been started for this encounter.      Dialysis/Infusion      Coordination has not been started for this encounter.      Home Medical Care      Coordination has not been started for this encounter.      Therapy      Coordination has not been started for this encounter.      Community Resources      Coordination has not been started for this encounter.          Demographic Summary     Row Name 07/21/20 1046       General Information    Admission Type  inpatient    Arrived From  home    Required Notices Provided  Important Message from Medicare    Referral Source  admission list    Reason for Consult  discharge planning    Preferred Language  English        Functional Status     Row Name 07/21/20 1046       Functional Status    Usual Activity Tolerance  good    Current Activity Tolerance  good       Functional Status, IADL    Medications  independent    Meal Preparation  independent    Housekeeping  independent    Laundry  independent    Shopping  independent       Mental Status Summary    Recent Changes in Mental Status/Cognitive Functioning  no changes        Psychosocial    No documentation.       Abuse/Neglect    No documentation.       Legal    No documentation.       Substance Abuse    No documentation.       Patient Forms    No documentation.           Tasneem Zepeda LCSW

## 2020-07-21 NOTE — PLAN OF CARE
Problem: Patient Care Overview  Goal: Plan of Care Review  Outcome: Ongoing (interventions implemented as appropriate)  Flowsheets (Taken 7/21/2020 0075)  Progress: improving  Plan of Care Reviewed With: patient  Note:   Multiple episodes of green liquid stool yesterday on day shift. Was still having problems with this at start of this shift, stool specimen sent, Cdiff negative. Imodium x1 given, no further loose stools tonight. Replaced mag tonight as well, redraw scheduled for 0600. Tylenol x1 for rt hip pain. VSS. Sinus rhythm. Did have to place on 1 liter 02 via NC tonight while sleeping as saturation was maintaining around 86%. Continuing to monitor.

## 2020-07-21 NOTE — PLAN OF CARE
Problem: Patient Care Overview  Goal: Plan of Care Review  Outcome: Outcome(s) achieved  Flowsheets (Taken 7/21/2020 8526)  Plan of Care Reviewed With: patient  Note:   Pt is a 89 yo F admitted for management of hypokalemia. Pt lives alone in [facility] with no steps to enter. Pt reports PLOF as independent and uses rolling walker at baseline for community distances. Pt is currently [place] for bed mobility, transfers, and gait 150ft with FWW. No acute PT needs at this time. DC recs include home with HHPT to follow, no DME needs at this time.    .Patient was wearing a face mask during this therapy encounter. Therapist used appropriate personal protective equipment including eye protection, mask, and gloves.  Mask used was standard procedure mask. Appropriate PPE was worn during the entire therapy session. Hand hygiene was completed before and after therapy session. Patient is not in enhanced droplet precautions.

## 2020-07-21 NOTE — PROGRESS NOTES
"CC: Elevated troponin, paroxysmal atrial fibrillation    Interval History: No acute events overnight and patient specifically denied any chest pain or shortness of breath      Vital Signs  Temp:  [97 °F (36.1 °C)-98.1 °F (36.7 °C)] 97.9 °F (36.6 °C)  Heart Rate:  [69-90] 71  Resp:  [18] 18  BP: (123-137)/(59-71) 135/64    Intake/Output Summary (Last 24 hours) at 7/21/2020 1158  Last data filed at 7/21/2020 0900  Gross per 24 hour   Intake 240 ml   Output 300 ml   Net -60 ml     Flowsheet Rows      First Filed Value   Admission Height  162.6 cm (64.02\") Documented at 07/20/2020 0020   Admission Weight  70.8 kg (156 lb 1.6 oz) Documented at 07/20/2020 0333          PHYSICAL EXAM:  General: No acute distress  Resp:NL Rate, symmetric chest expansion,unlabored, clear  CV:NL rate and rhythm, NL PMI, NL S1 and S2, systolic murmur in the second intercostal space radiating to the carotids plus and a systolic murmur in the apical area, no gallop, no rub, No JVD.   ABD:Nl sounds, no masses or tenderness, nondistended, no guarding or rebound  Neuro: alert,cooperative and oriented  Extr:Normal pedal pulses, No edema or cyanosis, moves all extremities      Results Review:    Results from last 7 days   Lab Units 07/21/20  0634   SODIUM mmol/L 128*   POTASSIUM mmol/L 3.7   CHLORIDE mmol/L 98   CO2 mmol/L 21.2*   BUN mg/dL 10   CREATININE mg/dL 0.96   GLUCOSE mg/dL 83   CALCIUM mg/dL 8.3     Results from last 7 days   Lab Units 07/20/20  1132 07/20/20  0541 07/20/20  0252   TROPONIN T ng/mL 0.048* 0.059* 0.063*     Results from last 7 days   Lab Units 07/20/20  0252   WBC 10*3/mm3 11.00*   HEMOGLOBIN g/dL 12.6   HEMATOCRIT % 36.3   PLATELETS 10*3/mm3 303             Results from last 7 days   Lab Units 07/21/20  0634   MAGNESIUM mg/dL 2.2         I reviewed the patient's new clinical results.  I personally viewed and interpreted the patient's EKG/Telemetry data, reviewed lab        Medication Review:   Meds reviewed     "     Assessment/Plan    1.  Elevated troponin without any significant delta and no EKG changes or chest pain  2.  Paroxysmal atrial fibrillation  3.  Moderate aortic valve stenosis with mild aortic regurgitation  4.  Mild to moderate mitral valve stenosis  5.  Chronic kidney disease    Patient hemodynamically stable and asymptomatic at this point.  We will continue to follow patient in clinic and no further cardiac testing or intervention recommended at this point.  Continue Eliquis, atorvastatin, metoprolol.   Cardiology will sign off for now please call with any questions.  Thank you for consulting with cardiology.      David Freitas MD  07/21/20  11:58

## 2020-07-22 VITALS
OXYGEN SATURATION: 95 % | SYSTOLIC BLOOD PRESSURE: 130 MMHG | HEART RATE: 74 BPM | TEMPERATURE: 98.1 F | DIASTOLIC BLOOD PRESSURE: 71 MMHG | HEIGHT: 64 IN | BODY MASS INDEX: 26.31 KG/M2 | WEIGHT: 154.1 LBS | RESPIRATION RATE: 16 BRPM

## 2020-07-22 LAB
ANION GAP SERPL CALCULATED.3IONS-SCNC: 9.6 MMOL/L (ref 5–15)
BASOPHILS # BLD AUTO: 0.05 10*3/MM3 (ref 0–0.2)
BASOPHILS NFR BLD AUTO: 0.5 % (ref 0–1.5)
BUN SERPL-MCNC: 9 MG/DL (ref 8–23)
BUN/CREAT SERPL: 8.7 (ref 7–25)
CALCIUM SPEC-SCNC: 8.3 MG/DL (ref 8.2–9.6)
CHLORIDE SERPL-SCNC: 101 MMOL/L (ref 98–107)
CO2 SERPL-SCNC: 22.4 MMOL/L (ref 22–29)
CREAT SERPL-MCNC: 1.04 MG/DL (ref 0.57–1)
DEPRECATED RDW RBC AUTO: 43.2 FL (ref 37–54)
EOSINOPHIL # BLD AUTO: 0.35 10*3/MM3 (ref 0–0.4)
EOSINOPHIL NFR BLD AUTO: 3.7 % (ref 0.3–6.2)
ERYTHROCYTE [DISTWIDTH] IN BLOOD BY AUTOMATED COUNT: 13.2 % (ref 12.3–15.4)
GFR SERPL CREATININE-BSD FRML MDRD: 49 ML/MIN/1.73
GLUCOSE SERPL-MCNC: 85 MG/DL (ref 65–99)
HCT VFR BLD AUTO: 34.8 % (ref 34–46.6)
HGB BLD-MCNC: 11.2 G/DL (ref 12–15.9)
IMM GRANULOCYTES # BLD AUTO: 0.03 10*3/MM3 (ref 0–0.05)
IMM GRANULOCYTES NFR BLD AUTO: 0.3 % (ref 0–0.5)
LYMPHOCYTES # BLD AUTO: 2.27 10*3/MM3 (ref 0.7–3.1)
LYMPHOCYTES NFR BLD AUTO: 23.7 % (ref 19.6–45.3)
MCH RBC QN AUTO: 28.4 PG (ref 26.6–33)
MCHC RBC AUTO-ENTMCNC: 32.2 G/DL (ref 31.5–35.7)
MCV RBC AUTO: 88.3 FL (ref 79–97)
MONOCYTES # BLD AUTO: 0.69 10*3/MM3 (ref 0.1–0.9)
MONOCYTES NFR BLD AUTO: 7.2 % (ref 5–12)
NEUTROPHILS NFR BLD AUTO: 6.17 10*3/MM3 (ref 1.7–7)
NEUTROPHILS NFR BLD AUTO: 64.6 % (ref 42.7–76)
NRBC BLD AUTO-RTO: 0 /100 WBC (ref 0–0.2)
PLATELET # BLD AUTO: 273 10*3/MM3 (ref 140–450)
PMV BLD AUTO: 9.9 FL (ref 6–12)
POTASSIUM SERPL-SCNC: 4.1 MMOL/L (ref 3.5–5.2)
RBC # BLD AUTO: 3.94 10*6/MM3 (ref 3.77–5.28)
SODIUM SERPL-SCNC: 133 MMOL/L (ref 136–145)
WBC # BLD AUTO: 9.56 10*3/MM3 (ref 3.4–10.8)

## 2020-07-22 PROCEDURE — 85025 COMPLETE CBC W/AUTO DIFF WBC: CPT | Performed by: HOSPITALIST

## 2020-07-22 PROCEDURE — 80048 BASIC METABOLIC PNL TOTAL CA: CPT | Performed by: HOSPITALIST

## 2020-07-22 RX ORDER — METOPROLOL TARTRATE 50 MG/1
50 TABLET, FILM COATED ORAL 2 TIMES DAILY
Qty: 60 TABLET | Refills: 0 | Status: SHIPPED | OUTPATIENT
Start: 2020-07-22 | End: 2020-08-21

## 2020-07-22 RX ADMIN — APIXABAN 5 MG: 5 TABLET, FILM COATED ORAL at 09:39

## 2020-07-22 RX ADMIN — PREDNISOLONE ACETATE 1 DROP: 10 SUSPENSION/ DROPS OPHTHALMIC at 09:38

## 2020-07-22 RX ADMIN — SODIUM CHLORIDE, PRESERVATIVE FREE 10 ML: 5 INJECTION INTRAVENOUS at 09:39

## 2020-07-22 RX ADMIN — DULOXETINE HYDROCHLORIDE 20 MG: 20 CAPSULE, DELAYED RELEASE ORAL at 09:38

## 2020-07-22 RX ADMIN — PANTOPRAZOLE SODIUM 40 MG: 40 TABLET, DELAYED RELEASE ORAL at 09:39

## 2020-07-22 RX ADMIN — METOPROLOL TARTRATE 50 MG: 50 TABLET, FILM COATED ORAL at 09:39

## 2020-07-22 RX ADMIN — POTASSIUM CHLORIDE 10 MEQ: 10 CAPSULE, COATED, EXTENDED RELEASE ORAL at 09:39

## 2020-07-22 RX ADMIN — AMLODIPINE BESYLATE 10 MG: 10 TABLET ORAL at 09:38

## 2020-07-22 RX ADMIN — LEVOTHYROXINE SODIUM 50 MCG: 50 TABLET ORAL at 09:39

## 2020-07-22 NOTE — PLAN OF CARE
MRI negative for acute stroke. Possible d/c today. C/o right hip pain, tylenol x1 given. Up with minimal assist and walker to [facility]. K+ stable. Vss. Will continue to monitor.

## 2020-07-22 NOTE — PLAN OF CARE
VSS, RA. No c/o pain or weakness. Up with minimal assist and walker. Potassium 4.1 this morning. Na improved to 133 this morning. A&O x4. D/c home with HH today. Will ctm and provide care.       Problem: Patient Care Overview  Goal: Individualization and Mutuality  Outcome: Ongoing (interventions implemented as appropriate)  Goal: Discharge Needs Assessment  Outcome: Ongoing (interventions implemented as appropriate)  Goal: Interprofessional Rounds/Family Conf  Outcome: Ongoing (interventions implemented as appropriate)     Problem: Fall Risk (Adult)  Goal: Identify Related Risk Factors and Signs and Symptoms  Outcome: Ongoing (interventions implemented as appropriate)  Goal: Absence of Fall  Outcome: Ongoing (interventions implemented as appropriate)     Problem: Skin Injury Risk (Adult)  Goal: Identify Related Risk Factors and Signs and Symptoms  Outcome: Ongoing (interventions implemented as appropriate)  Goal: Skin Health and Integrity  Outcome: Ongoing (interventions implemented as appropriate)     Problem: Pain, Chronic (Adult)  Goal: Identify Related Risk Factors and Signs and Symptoms  Outcome: Ongoing (interventions implemented as appropriate)  Goal: Acceptable Pain/Comfort Level and Functional Ability  Outcome: Ongoing (interventions implemented as appropriate)

## 2020-07-22 NOTE — PROGRESS NOTES
Continued Stay Note  [facility]     Patient Name: Mei Crane  MRN: [id number]  Today's Date: 7/22/2020    Admit Date: 7/19/2020    Discharge Plan     Row Name 07/22/20 1125       Plan    Plan Comments  DC orders noted.  [facility]/ [facility] notified of DC and they will followup.    Final Note  DC home with [facility]  for PT and nursing.         Discharge Codes    No documentation.       Expected Discharge Date and Time     Expected Discharge Date Expected Discharge Time    Jul 22, 2020             Micki Farnsworth RN

## 2020-07-22 NOTE — DISCHARGE SUMMARY
"[facility]    ASSOCIATES  [phone]    DISCHARGE SUMMARY  [facility]    Patient Identification:  Name: Mei Crane  Age: 89 y.o.  Sex: female  :  1926  MRN: [id number]  Primary Care Physician: Lexie Toussaint APRN    Admit date: 2020  Discharge date and time:      Discharge Diagnoses:  Hypokalemia    HTN (hypertension), benign    Acquired hypothyroidism    Hyperlipidemia    Gastroesophageal reflux disease without esophagitis    CKD (chronic kidney disease), stage III (CMS/HCC)    Elevated troponin    Hyponatremia    History of embolic stroke    Atrial fibrillation (CMS/HCC)    Thiazide diuretics causing adverse effect in therapeutic use       History of present illness from H&P:    Ms. Crane is a 89 y.o. non-smoker with a history of atrial fibrillation, embolic stroke, chronic kidney disease stage 3, hypothyroidism, hyperlipidemia, and hypertension that presents to [facility] complaining of palpitations and generalized weakness.  She reports the symptoms have been ongoing for the past 4-5 days.  She denies chest pain, states \"it just feels like fluttering, like anxiety.\"  She denies shortness of breath, orthopnea, fever, and chills.  She reports a headache, denies visual changes, facial asymmetry, and speech difficulty.  Work up in the ED revealed troponin 0.058, Na 123, K+ 2.5, Cl 87, creat 1.33, and GFR 37.  Chest x-ray was negative for an acute process.  EKG showed sinus rhythm.  She received PO potassium in the ED.     Hospital Course:     Patient was admitted to the hospital because of anxiety and palpitations.  Patient's sodium was 123 and potassium was 2.5.  This was thought to be likely source for her anxiety and palpitations.  These have been corrected and the patient is to stay off of HCTZ on discharge.  She will need to have her electrolytes rechecked outpatient.    Patient had a elevated troponin which is actually better than " her prior hospitalization.  She will need to follow-up with cardiology outpatient.  Echocardiogram here reveals mitral stenosis and aortic stenosis which are moderate in severity but at this time do not require any further intervention.    The patient during her prior hospitalization had acute CVAs and was started on Eliquis.  The patient because of her vague symptoms I repeated a MRI of the brain which was unrevealing for new CVAs.  Patient will continue with Eliquis on discharge.      Radiology does note slight abnormality in left occipital lobe and recommends follow-up MRI in 2 to 3 months.  The report is as follows:Subtle focus of apparent enhancement within the left occipital lobe adjacent to the left lateral ventricle without identifiable abnormality on other sequences. Findings are indeterminate and while they may be related to vascular blush, follow-up with brain MRI with and without  contrast in 2-3 months is recommended to ensure stability and exclude  the small possibility of underlying neoplasm.  This report was discussed with the patient and the patient can follow-up with primary care doctor.      The patient was seen and examined on the day of discharge.  Patient is awake and alert answering questions appropriately, heart with murmur, lungs are clear to auscultation bilaterally, no wheezes or rhonchi, abdomen is soft nontender nondistended    Consults:   Consults     Date and Time Order Name Status Description    7/20/2020 0028 Inpatient Cardiology Consult Completed     7/19/2020 2113 [facility] (on-call MD unless specified) Details Completed           Results from last 7 days   Lab Units 07/20/20  0252   WBC 10*3/mm3 11.00*   HEMOGLOBIN g/dL 12.6   HEMATOCRIT % 36.3   PLATELETS 10*3/mm3 303       Results from last 7 days   Lab Units 07/21/20  0634   SODIUM mmol/L 128*   POTASSIUM mmol/L 3.7   CHLORIDE mmol/L 98   CO2 mmol/L 21.2*   BUN mg/dL 10   CREATININE mg/dL 0.96   GLUCOSE mg/dL 83   CALCIUM mg/dL 8.3          Significant Diagnostic Studies:   WBC   Date Value Ref Range Status   07/20/2020 11.00 (H) 3.40 - 10.80 10*3/mm3 Final     Hemoglobin   Date Value Ref Range Status   07/20/2020 12.6 12.0 - 15.9 g/dL Final     Hematocrit   Date Value Ref Range Status   07/20/2020 36.3 34.0 - 46.6 % Final     Platelets   Date Value Ref Range Status   07/20/2020 303 140 - 450 10*3/mm3 Final     Sodium   Date Value Ref Range Status   07/21/2020 128 (L) 136 - 145 mmol/L Final     Potassium   Date Value Ref Range Status   07/21/2020 3.7 3.5 - 5.2 mmol/L Final     Chloride   Date Value Ref Range Status   07/21/2020 98 98 - 107 mmol/L Final     CO2   Date Value Ref Range Status   07/21/2020 21.2 (L) 22.0 - 29.0 mmol/L Final     BUN   Date Value Ref Range Status   07/21/2020 10 8 - 23 mg/dL Final     Creatinine   Date Value Ref Range Status   07/21/2020 0.96 0.57 - 1.00 mg/dL Final     Glucose   Date Value Ref Range Status   07/21/2020 83 65 - 99 mg/dL Final     Calcium   Date Value Ref Range Status   07/21/2020 8.3 8.2 - 9.6 mg/dL Final     Magnesium   Date Value Ref Range Status   07/21/2020 2.2 1.7 - 2.3 mg/dL Final     Phosphorus   Date Value Ref Range Status   07/21/2020 2.0 (L) 2.5 - 4.5 mg/dL Final     AST (SGOT)   Date Value Ref Range Status   07/19/2020 23 1 - 32 U/L Final     ALT (SGPT)   Date Value Ref Range Status   07/19/2020 14 1 - 33 U/L Final     Alkaline Phosphatase   Date Value Ref Range Status   07/19/2020 85 39 - 117 U/L Final     No results found for: APTT, INR  No results found for: COLORU, CLARITYU, SPECGRAV, PHUR, PROTEINUR, GLUCOSEU, KETONESU, BLOODU, NITRITE, LEUKOCYTESUR, BILIRUBINUR, UROBILINOGEN, RBCUA, WBCUA, BACTERIA, UACOMMENT  Troponin T   Date Value Ref Range Status   07/20/2020 0.048 (C) 0.000 - 0.030 ng/mL Final     No components found for: HGBA1C;2  No components found for: TSH;2    Imaging Results (All)     Procedure Component Value Units Date/Time    MRI Brain With & Without Contrast [[id number]]  Collected:  07/21/20 1924     Updated:  07/21/20 2153    Narrative:       MRI BRAIN WITH AND WITHOUT CONTRAST     HISTORY:    Stroke     COMPARISON: Brain MRI 06/03/2020     TECHNIQUE: Multiplanar, multisequence MR imaging of the brain was  performed with and without intravenous contrast.     FINDINGS:     There is no restricted diffusion. Previously seen areas of restricted  diffusion scattered throughout multiple vascular territories have  evolved. Subtle area of hyperintensity on diffusion-weighted imaging  within the left centrum semiovale is hyperintense on ADC and represents  an area of T2 shine through. T2 hyperintensity within the  periventricular and subcortical white matter likely represents moderate  chronic ischemic small vessel degenerative changes. Old lacunar infarcts  are present within the right thalamus and left centrum semiovale, as  before. There is no mass effect, midline shift, hydrocephalus, or  abnormal extra-axial fluid collection. The major T2 intracranial  arterial flow voids appear grossly normal. Midline structures are  unremarkable.     A few foci of presumed chronic microhemorrhage are present within the  left cerebellum, best seen on recent SWI imaging.     Focus of apparent enhancement overlying the right aspect of the medulla  likely artifactual from pulsation artifact from the bilateral venous  sinuses which are located at this level, especially given the lack of  abnormality seen in this area on other sequences. Subtle focus of  enhancement is present within the left occipital lobe adjacent to left  lateral ventricle without a definite abnormality visualized in this  location on other sequences.       Impression:          1.  No findings of acute infarct. Appropriate evolution of multiple  previously seen foci of acute to subacute infarction as detailed above.  2.  Subtle focus of apparent enhancement within the left occipital lobe  adjacent to the left lateral ventricle without  identifiable abnormality  on other sequences. Findings are indeterminate and while they may be  related to vascular blush, follow-up with brain MRI with and without  contrast in 2-3 months is recommended to ensure stability and exclude  the small possibility of underlying neoplasm.  3.  Foci of presumed chronic microhemorrhage in the left cerebellum,  best seen on recent MRI.  4.  Moderate chronic ischemic white matter changes, as before. Old right  thalamic lacunar infarct.  5.  Other findings as above.     This report was finalized on 7/21/2020 9:50 PM by Dr. Davis Elizalde M.D.       XR Chest 2 View [[id number]] Collected:  07/19/20 1920     Updated:  07/19/20 1928    Narrative:       PA AND LATERAL CHEST     HISTORY: Shortness of air for 4 days. History of hypertension and atrial  fibrillation.     COMPARISON: Chest CT 07/20/2017.     FINDINGS: Heart size is within normal limits. Thoracic aorta is tortuous  and aortic vascular calcifications are present. There is mild  eventration of the right hemidiaphragm. Lungs appear clear and there is  no evidence of pulmonary edema or pleural effusion. There is mild  endplate spurring within the thoracic spine.       Impression:       Chronic changes without evidence for active disease in the  chest.     This report was finalized on 7/19/2020 7:24 PM by Dr. Maurilio Ramírez M.D.         No results found for: SITE, ALLENTEST, PHART, TIC2GCS, PO2ART, PDE5CPX, BASEEXCESS, Y1BNGHPV, HGBBG, HCTABG, OXYHEMOGLOBI, METHHGBN, CARBOXYHGB, CO2CT, BAROMETRIC, MODALITY, FIO2       Discharge Medications      Changes to Medications      Instructions Start Date   metoprolol tartrate 50 MG tablet  Commonly known as:  LOPRESSOR  What changed:    · medication strength  · how much to take  · when to take this   50 mg, Oral, 2 Times Daily         Continue These Medications      Instructions Start Date   acetaminophen 325 MG tablet  Commonly known as:  TYLENOL   650 mg, Oral, Every 4 Hours  PRN      amLODIPine 10 MG tablet  Commonly known as:  NORVASC   10 mg, Oral, Daily      atorvastatin 80 MG tablet  Commonly known as:  LIPITOR   80 mg, Oral, Nightly      cyclobenzaprine 5 MG tablet  Commonly known as:  FLEXERIL   5 mg, Oral, 3 Times Daily PRN      DULoxetine 20 MG capsule  Commonly known as:  CYMBALTA   20 mg, Oral, Daily      Eliquis 5 MG tablet tablet  Generic drug:  apixaban   TAKE ONE TABLET BY MOUTH TWICE DAILY      ferrous gluconate 324 (37.5 Fe) MG tablet tablet   324 mg, Oral, Daily With Breakfast      levothyroxine 50 MCG tablet  Commonly known as:  Synthroid   50 mcg, Oral, Daily      pantoprazole 40 MG EC tablet  Commonly known as:  PROTONIX   40 mg, Oral, Daily      prednisoLONE acetate 1 % ophthalmic suspension  Commonly known as:  PRED FORTE   1 drop, Left Eye, Daily         Stop These Medications    hydroCHLOROthiazide 12.5 MG capsule  Commonly known as:  MICROZIDE     potassium chloride 10 MEQ CR tablet  Commonly known as:  K-DUR,KLOR-CON              Patient Instructions:       Future Appointments   Date Time Provider Department Center   7/31/2020 10:00 AM NURSE/[facility] None   9/29/2020  1:00 PM Lisa Dudley, [facility]        Contact information for after-discharge care     Home Medical [address] .    Service:  Home Health Services  Contact information:  [address] [place] [id number] [phone]                       Discharge Order (From admission, onward)    None          Diet Order   Procedures   • Diet Regular; Cardiac     Outpatient sleep study mild night time hypoxemia, discussed with patient    Follow-up MRI in 2 to 3 months.  Follow-up on left occipital lobe abnormality.    Discharge instructions:  Follow up with your primary care provider in 1-2 weeks with a cbc and cmp     Discharge with home health ([facility])      Total time spent discharging patient including  evaluation, post hospitalization follow up,  medication and post hospitalization instructions and education, total time exceeds 30 minutes.    Signed:  Nik Schuster MD  7/22/2020  09:18

## 2020-07-23 ENCOUNTER — TRANSITIONAL CARE MANAGEMENT TELEPHONE ENCOUNTER (OUTPATIENT)
Dept: CALL CENTER | Facility: HOSPITAL | Age: 85
End: 2020-07-23

## 2020-07-23 ENCOUNTER — READMISSION MANAGEMENT (OUTPATIENT)
Dept: CALL CENTER | Facility: HOSPITAL | Age: 85
End: 2020-07-23

## 2020-07-23 NOTE — OUTREACH NOTE
Call Center TCM Note      Responses   [facility] patient discharged from?  [place]   COVID-19 Test Status  Not tested   Does the patient have one of the following disease processes/diagnoses(primary or secondary)?  Other   TCM attempt successful?  Yes   Call start time  1035   Call end time  1037   Discharge diagnosis  Hypokalemia   Meds reviewed with patient/caregiver?  Yes   Does the patient have all medications ordered at discharge?  Yes   Is the patient taking all medications as directed (includes completed medication regime)?  Yes   Does the patient have a primary care provider?   Yes   Does the patient have an appointment with their PCP within 7 days of discharge?  Yes   Comments regarding PCP  f/u on 7/31   Has the patient kept scheduled appointments due by today?  N/A   What is the Home health agency?   [facility]    Psychosocial issues?  No   Did the patient receive a copy of their discharge instructions?  Yes   Nursing interventions  Reviewed instructions with patient   What is the patient's perception of their health status since discharge?  Improving   Is the patient/caregiver able to teach back signs and symptoms related to disease process for when to call PCP?  Yes   Is the patient/caregiver able to teach back signs and symptoms related to disease process for when to call 911?  Yes   Is the patient/caregiver able to teach back the hierarchy of who to call/visit for symptoms/problems? PCP, Specialist, Home health nurse, Urgent Care, ED, 911  Yes   TCM call completed?  Yes   Wrap up additional comments  Patient says she is doing well, no questions or concerns at this time.          Airam Graff RN    7/23/2020, 10:37

## 2020-07-23 NOTE — OUTREACH NOTE
Prep Survey      Responses   [facility] patient discharged from?  [place]   Is LACE score < 7 ?  No   Eligibility  [facility]   Date of Admission  07/19/20   Date of Discharge  07/22/20   Discharge diagnosis  Hypokalemia   Does the patient have one of the following disease processes/diagnoses(primary or secondary)?  Other   Does the patient have Home health ordered?  Yes   What is the Home health agency?   [facility]    Is there a DME ordered?  No   Prep survey completed?  Yes          Una Wilkerson RN

## 2020-07-23 NOTE — PROGRESS NOTES
Case Management Discharge Note      Final Note: DC home with [facility] for PT and nursing.          Destination      No service has been selected for the patient.      Durable Medical Equipment      No service has been selected for the patient.      Dialysis/Infusion      No service has been selected for the patient.      Home Medical Care - Selection Complete      Service Provider Request Status Selected Services Address Phone Number Fax Number    [facility] [address] [place] [address] Selected Home Health Services [address] [place] [phone]      Therapy      No service has been selected for the patient.      Community Resources      No service has been selected for the patient.             Final Discharge Disposition Code: 06 - home [facility])

## 2020-07-31 ENCOUNTER — CLINICAL SUPPORT (OUTPATIENT)
Dept: FAMILY MEDICINE CLINIC | Facility: CLINIC | Age: 85
End: 2020-07-31

## 2020-07-31 DIAGNOSIS — E53.8 B12 DEFICIENCY: ICD-10-CM

## 2020-07-31 PROCEDURE — 96372 THER/PROPH/DIAG INJ SC/IM: CPT | Performed by: NURSE PRACTITIONER

## 2020-07-31 RX ADMIN — CYANOCOBALAMIN 1000 MCG: 1000 INJECTION, SOLUTION INTRAMUSCULAR; SUBCUTANEOUS at 10:18

## 2020-08-03 DIAGNOSIS — I48.91 ATRIAL FIBRILLATION, NEW ONSET (HCC): ICD-10-CM

## 2020-08-04 ENCOUNTER — TELEPHONE (OUTPATIENT)
Dept: NEUROLOGY | Facility: CLINIC | Age: 85
End: 2020-08-04

## 2020-08-04 ENCOUNTER — READMISSION MANAGEMENT (OUTPATIENT)
Dept: CALL CENTER | Facility: HOSPITAL | Age: 85
End: 2020-08-04

## 2020-08-04 NOTE — TELEPHONE ENCOUNTER
Called pharmacy and rx is filled and out for delivery.       Left message for patient, rx is out for delivery.

## 2020-08-04 NOTE — OUTREACH NOTE
Medical Week 2 Survey      Responses   [facility] patient discharged from?  [place]   COVID-19 Test Status  Not tested   Does the patient have one of the following disease processes/diagnoses(primary or secondary)?  Other   Week 2 attempt successful?  Yes   Call start time  1632   Discharge diagnosis  Hypokalemia   Call end time  1638   Meds reviewed with patient/caregiver?  Yes   Is the patient having any side effects they believe may be caused by any medication additions or changes?  No   Does the patient have all medications ordered at discharge?  Yes   Is the patient taking all medications as directed (includes completed medication regime)?  Yes   Does the patient have a primary care provider?   Yes   Does the patient have an appointment with their PCP within 7 days of discharge?  Yes   Has the patient kept scheduled appointments due by today?  Yes   What is the Home health agency?   [facility]    Has home health visited the patient within 72 hours of discharge?  Yes   Pulse Ox monitoring  None   Psychosocial issues?  No   Did the patient receive a copy of their discharge instructions?  Yes   Nursing interventions  Reviewed instructions with patient   What is the patient's perception of their health status since discharge?  Improving   Is the patient/caregiver able to teach back signs and symptoms related to disease process for when to call PCP?  Yes   Is the patient/caregiver able to teach back signs and symptoms related to disease process for when to call 911?  Yes   Is the patient/caregiver able to teach back the hierarchy of who to call/visit for symptoms/problems? PCP, Specialist, Home health nurse, Urgent Care, ED, 911  Yes   Week 2 Call Completed?  Yes          Adriano Kiran RN

## 2020-08-11 ENCOUNTER — READMISSION MANAGEMENT (OUTPATIENT)
Dept: CALL CENTER | Facility: HOSPITAL | Age: 85
End: 2020-08-11

## 2020-08-11 NOTE — OUTREACH NOTE
Medical Week 3 Survey      Responses   [facility] patient discharged from?  [place]   COVID-19 Test Status  Not tested   Does the patient have one of the following disease processes/diagnoses(primary or secondary)?  Other   Week 3 attempt successful?  Yes   Call start time  1209   Call end time  1216   Medication alerts for this patient  Pt states she will see neurology in September.   Meds reviewed with patient/caregiver?  Yes   Is the patient taking all medications as directed (includes completed medication regime)?  Yes   What is the Home health agency?   PT continues to visit.   What is the patient's perception of their health status since discharge?  Improving   Additional teach back comments  Pt reports she is doing pretty well. She does have a caretaker who takes her to the grocery and to her appointments. She tries to walk daily.   Week 3 Call Completed?  Yes          Niyah James RN

## 2020-08-18 ENCOUNTER — READMISSION MANAGEMENT (OUTPATIENT)
Dept: CALL CENTER | Facility: HOSPITAL | Age: 85
End: 2020-08-18

## 2020-08-18 NOTE — OUTREACH NOTE
Medical Week 4 Survey      Responses   [facility] patient discharged from?  [place]   COVID-19 Test Status  Not tested   Does the patient have one of the following disease processes/diagnoses(primary or secondary)?  Other   Week 4 attempt successful?  Yes   Call start time  1335   Call end time  1336   Discharge diagnosis  Hypokalemia   Meds reviewed with patient/caregiver?  Yes   Is the patient having any side effects they believe may be caused by any medication additions or changes?  No   Is the patient taking all medications as directed (includes completed medication regime)?  Yes   Has the patient kept scheduled appointments due by today?  Yes   Pulse Ox monitoring  None   Psychosocial issues?  No   What is the patient's perception of their health status since discharge?  Improving   Is the patient/caregiver able to teach back signs and symptoms related to disease process for when to call PCP?  Yes   Is the patient/caregiver able to teach back signs and symptoms related to disease process for when to call 911?  Yes   Is the patient/caregiver able to teach back the hierarchy of who to call/visit for symptoms/problems? PCP, Specialist, Home health nurse, Urgent Care, ED, 911  Yes   Week 4 Call Completed?  Yes   Would the patient like one additional call?  No   Graduated  Yes   Did the patient feel the follow up calls were helpful during their recovery period?  Yes   Was the number of calls appropriate?  Yes   Wrap up additional comments  Patient says she is doing well, no questions or concerns at this time.          Chris Keller RN

## 2020-08-27 ENCOUNTER — OFFICE VISIT (OUTPATIENT)
Dept: FAMILY MEDICINE CLINIC | Facility: CLINIC | Age: 85
End: 2020-08-27

## 2020-08-27 VITALS
OXYGEN SATURATION: 94 % | SYSTOLIC BLOOD PRESSURE: 110 MMHG | WEIGHT: 153 LBS | DIASTOLIC BLOOD PRESSURE: 62 MMHG | HEIGHT: 64 IN | BODY MASS INDEX: 26.12 KG/M2 | TEMPERATURE: 98 F | HEART RATE: 67 BPM

## 2020-08-27 DIAGNOSIS — E53.8 B12 DEFICIENCY: ICD-10-CM

## 2020-08-27 DIAGNOSIS — G89.29 CHRONIC RIGHT HIP PAIN: ICD-10-CM

## 2020-08-27 DIAGNOSIS — M25.551 CHRONIC RIGHT HIP PAIN: ICD-10-CM

## 2020-08-27 DIAGNOSIS — M51.36 DDD (DEGENERATIVE DISC DISEASE), LUMBAR: Primary | ICD-10-CM

## 2020-08-27 PROCEDURE — 96372 THER/PROPH/DIAG INJ SC/IM: CPT | Performed by: NURSE PRACTITIONER

## 2020-08-27 PROCEDURE — 99213 OFFICE O/P EST LOW 20 MIN: CPT | Performed by: NURSE PRACTITIONER

## 2020-08-27 RX ORDER — GABAPENTIN 300 MG/1
300 CAPSULE ORAL NIGHTLY
COMMUNITY
Start: 2020-08-04 | End: 2020-09-22 | Stop reason: SDUPTHER

## 2020-08-27 RX ADMIN — CYANOCOBALAMIN 1000 MCG: 1000 INJECTION, SOLUTION INTRAMUSCULAR; SUBCUTANEOUS at 12:33

## 2020-08-27 NOTE — PROGRESS NOTES
Subjective   Mei Crane is a 89 y.o. female.     Chief Complaint   Patient presents with   • Hip Pain   • Back Pain     Ms. Crane presents today to discuss getting something for her chronic hip pain and back pain. This is not a new problem for her and she has seen orthopedics and pain management for this. She states that the back injections do not work for her. I have tried her on Gabapentin in the past and she goes back and forth on weather she is going to take the medication. She currently takes one Gabapentin 300 mg at night only, because she states she is loopy if she takes it during the day. She came in with a paper with Tramadol written on it and states her home health nurse suggested she may want to take this.      I have reviewed the patient's medical history in detail and updated the computerized patient record.    The following portions of the patient's history were reviewed and updated as appropriate: allergies, current medications, past family history, past medical history, past social history, past surgical history and problem list.      Current Outpatient Medications:   •  acetaminophen (TYLENOL) 325 MG tablet, Take 2 tablets by mouth Every 4 (Four) Hours As Needed for Mild Pain ., Disp: , Rfl:   •  amLODIPine (NORVASC) 10 MG tablet, Take 1 tablet by mouth Daily., Disp: 30 tablet, Rfl: 5  •  apixaban (Eliquis) 5 MG tablet tablet, Take 1 tablet by mouth 2 (Two) Times a Day., Disp: 60 tablet, Rfl: 0  •  atorvastatin (LIPITOR) 80 MG tablet, Take 1 tablet by mouth Every Night., Disp: 30 tablet, Rfl: 2  •  DULoxetine (CYMBALTA) 20 MG capsule, Take 1 capsule by mouth Daily., Disp: 30 capsule, Rfl: 5  •  ferrous gluconate 324 (37.5 Fe) MG tablet tablet, Take 1 tablet by mouth Daily With Breakfast., Disp: 30 tablet, Rfl: 6  •  gabapentin (NEURONTIN) 300 MG capsule, Take 300 mg by mouth 2 (Two) Times a Day., Disp: , Rfl:   •  levothyroxine (Synthroid) 50 MCG tablet, Take 1 tablet by mouth Daily., Disp:  90 tablet, Rfl: 1  •  pantoprazole (PROTONIX) 40 MG EC tablet, Take 1 tablet by mouth Daily., Disp: 90 tablet, Rfl: 3  •  prednisoLONE acetate (PRED FORTE) 1 % ophthalmic suspension, Administer 1 drop into the left eye Daily., Disp: , Rfl: 3    Current Facility-Administered Medications:   •  cyanocobalamin injection 1,000 mcg, 1,000 mcg, Intramuscular, Q28 Days, Jae Bray MD, 1,000 mcg at 08/27/20 1233     Review of Systems   Constitutional: Negative.    Respiratory: Negative.    Cardiovascular: Negative.    Musculoskeletal: Positive for arthralgias and back pain.   Neurological: Positive for memory problem (intermittent).       Objective    Vitals:    08/27/20 1229   BP: 110/62   Pulse: 67   Temp: 98 °F (36.7 °C)   SpO2: 94%     Physical Exam   Constitutional: She is oriented to person, place, and time. She appears well-developed and well-nourished.   Cardiovascular: Normal rate, regular rhythm, normal heart sounds and intact distal pulses.   Pulmonary/Chest: Effort normal and breath sounds normal.   Musculoskeletal: Normal range of motion. She exhibits no edema.   Neurological: She is alert and oriented to person, place, and time.   Skin: Skin is warm and dry.   Psychiatric:   No acute distress   Vitals reviewed.        Assessment/Plan   Mei was seen today for hip pain and back pain.    Diagnoses and all orders for this visit:    DDD (degenerative disc disease), lumbar  -     traMADol-acetaminophen (ULTRACET) 37.5-325 MG per tablet; Take 1 tablet by mouth 2 (two) times a day.    B12 deficiency    Chronic right hip pain  -     traMADol-acetaminophen (ULTRACET) 37.5-325 MG per tablet; Take 1 tablet by mouth 2 (two) times a day.      I have discussed with Ms. Crane that Tramadol has opiate effects on the body and that it can also make her feel loopy as the Gabapentin does. I have explained to her that because of her age it may stay in her system for long periods of time. I have also counseled her that  Tramadol is in the NSAID family and that she cannot be on Tramadol because she is taking Eliquis and also because of her renal function.   I told her that I will try her on tramadol-acetaminophen (ultracet) 37.5-325 mg twice a day. For the next 30 days. I have asked her not to take both the Ultracet and the Gabapentin at the same time that it would over sedated her.   She is to follow up in 4 weeks about the medication change.

## 2020-09-03 DIAGNOSIS — I48.91 ATRIAL FIBRILLATION, NEW ONSET (HCC): ICD-10-CM

## 2020-09-04 RX ORDER — APIXABAN 5 MG/1
TABLET, FILM COATED ORAL
Qty: 60 TABLET | Refills: 2 | Status: SHIPPED | OUTPATIENT
Start: 2020-09-04 | End: 2020-11-03 | Stop reason: SDUPTHER

## 2020-09-10 DIAGNOSIS — N18.30 CKD (CHRONIC KIDNEY DISEASE), STAGE III (HCC): ICD-10-CM

## 2020-09-10 DIAGNOSIS — I10 HTN (HYPERTENSION), BENIGN: Primary | ICD-10-CM

## 2020-09-10 DIAGNOSIS — E03.9 ACQUIRED HYPOTHYROIDISM: ICD-10-CM

## 2020-09-11 RX ORDER — METOPROLOL TARTRATE 50 MG/1
50 TABLET, FILM COATED ORAL 2 TIMES DAILY
Qty: 60 TABLET | Refills: 3 | Status: SHIPPED | OUTPATIENT
Start: 2020-09-11 | End: 2020-12-29

## 2020-09-13 ENCOUNTER — RESULTS ENCOUNTER (OUTPATIENT)
Dept: FAMILY MEDICINE CLINIC | Facility: CLINIC | Age: 85
End: 2020-09-13

## 2020-09-13 DIAGNOSIS — I10 HTN (HYPERTENSION), BENIGN: ICD-10-CM

## 2020-09-13 DIAGNOSIS — N18.30 CKD (CHRONIC KIDNEY DISEASE), STAGE III (HCC): ICD-10-CM

## 2020-09-13 DIAGNOSIS — E03.9 ACQUIRED HYPOTHYROIDISM: ICD-10-CM

## 2020-09-15 ENCOUNTER — LAB (OUTPATIENT)
Dept: FAMILY MEDICINE CLINIC | Facility: CLINIC | Age: 85
End: 2020-09-15

## 2020-09-16 LAB
BUN SERPL-MCNC: 18 MG/DL (ref 8–23)
BUN/CREAT SERPL: 15.8 (ref 7–25)
CALCIUM SERPL-MCNC: 9 MG/DL (ref 8.2–9.6)
CHLORIDE SERPL-SCNC: 100 MMOL/L (ref 98–107)
CO2 SERPL-SCNC: 26.3 MMOL/L (ref 22–29)
CREAT SERPL-MCNC: 1.14 MG/DL (ref 0.57–1)
GLUCOSE SERPL-MCNC: 118 MG/DL (ref 65–99)
POTASSIUM SERPL-SCNC: 3.6 MMOL/L (ref 3.5–5.2)
SODIUM SERPL-SCNC: 140 MMOL/L (ref 136–145)
T3FREE SERPL-MCNC: 2.2 PG/ML (ref 2–4.4)
T4 FREE SERPL-MCNC: 1.14 NG/DL (ref 0.93–1.7)
TSH SERPL DL<=0.005 MIU/L-ACNC: 11.3 UIU/ML (ref 0.27–4.2)

## 2020-09-22 ENCOUNTER — OFFICE VISIT (OUTPATIENT)
Dept: FAMILY MEDICINE CLINIC | Facility: CLINIC | Age: 85
End: 2020-09-22

## 2020-09-22 VITALS
HEIGHT: 64 IN | OXYGEN SATURATION: 98 % | DIASTOLIC BLOOD PRESSURE: 80 MMHG | SYSTOLIC BLOOD PRESSURE: 142 MMHG | TEMPERATURE: 99.1 F | BODY MASS INDEX: 26.26 KG/M2 | HEART RATE: 64 BPM

## 2020-09-22 DIAGNOSIS — E03.9 ACQUIRED HYPOTHYROIDISM: ICD-10-CM

## 2020-09-22 DIAGNOSIS — Z79.899 ENCOUNTER FOR LONG-TERM (CURRENT) USE OF HIGH-RISK MEDICATION: Primary | ICD-10-CM

## 2020-09-22 DIAGNOSIS — G25.81 RLS (RESTLESS LEGS SYNDROME): ICD-10-CM

## 2020-09-22 DIAGNOSIS — D64.9 ANEMIA, UNSPECIFIED TYPE: ICD-10-CM

## 2020-09-22 PROCEDURE — 99214 OFFICE O/P EST MOD 30 MIN: CPT | Performed by: NURSE PRACTITIONER

## 2020-09-22 RX ORDER — LEVOTHYROXINE SODIUM 0.07 MG/1
75 TABLET ORAL DAILY
Qty: 90 TABLET | Refills: 1 | Status: SHIPPED | OUTPATIENT
Start: 2020-09-22 | End: 2021-02-09 | Stop reason: DRUGHIGH

## 2020-09-22 RX ORDER — AMLODIPINE BESYLATE 10 MG/1
10 TABLET ORAL DAILY
Qty: 30 TABLET | Refills: 5 | Status: SHIPPED | OUTPATIENT
Start: 2020-09-22 | End: 2021-05-05

## 2020-09-22 RX ORDER — GABAPENTIN 300 MG/1
300 CAPSULE ORAL NIGHTLY
Qty: 28 CAPSULE | Refills: 5 | Status: SHIPPED | OUTPATIENT
Start: 2020-09-22 | End: 2021-04-14 | Stop reason: SDUPTHER

## 2020-09-22 RX ORDER — FERROUS GLUCONATE 324(37.5)
324 TABLET ORAL
Qty: 30 TABLET | Refills: 6 | Status: SHIPPED | OUTPATIENT
Start: 2020-09-22 | End: 2021-05-05

## 2020-09-22 RX ORDER — FERROUS GLUCONATE 324(37.5)
324 TABLET ORAL
Qty: 30 TABLET | Refills: 6 | Status: SHIPPED | OUTPATIENT
Start: 2020-09-22 | End: 2020-09-22 | Stop reason: SDUPTHER

## 2020-09-22 NOTE — PROGRESS NOTES
Subjective   Mei Crane is a 89 y.o. female.     Chief Complaint   Patient presents with   • Back Pain     CSE   • Hypothyroidism     Ms. Crane presents today for a CSE/CSA to get refills on Gabapentin for her RLS. She was started on Tramadol for leg pain. She stopped taking the medication because she states it is not helping her.   She also feels that her thyroid level is off. She is having worsening fatigue and lack of energy.      I have reviewed the patient's medical history in detail and updated the computerized patient record.    The following portions of the patient's history were reviewed and updated as appropriate: allergies, current medications, past family history, past medical history, past social history, past surgical history and problem list.      Current Outpatient Medications:   •  acetaminophen (TYLENOL) 325 MG tablet, Take 2 tablets by mouth Every 4 (Four) Hours As Needed for Mild Pain ., Disp: , Rfl:   •  amLODIPine (NORVASC) 10 MG tablet, Take 1 tablet by mouth Daily., Disp: 30 tablet, Rfl: 5  •  atorvastatin (LIPITOR) 80 MG tablet, Take 1 tablet by mouth Every Night., Disp: 30 tablet, Rfl: 2  •  DULoxetine (CYMBALTA) 20 MG capsule, Take 1 capsule by mouth Daily., Disp: 30 capsule, Rfl: 5  •  ELIQUIS 5 MG tablet tablet, TAKE ONE TABLET BY MOUTH TWICE DAILY, Disp: 60 tablet, Rfl: 2  •  ferrous gluconate 324 (37.5 Fe) MG tablet tablet, Take 1 tablet by mouth Daily With Breakfast., Disp: 30 tablet, Rfl: 6  •  gabapentin (NEURONTIN) 300 MG capsule, Take 300 mg by mouth Every Night., Disp: , Rfl:   •  metoprolol tartrate (LOPRESSOR) 50 MG tablet, Take 1 tablet by mouth 2 (Two) Times a Day., Disp: 60 tablet, Rfl: 3  •  pantoprazole (PROTONIX) 40 MG EC tablet, Take 1 tablet by mouth Daily., Disp: 90 tablet, Rfl: 3  •  prednisoLONE acetate (PRED FORTE) 1 % ophthalmic suspension, Administer 1 drop into the left eye Daily., Disp: , Rfl: 3  •  traMADol-acetaminophen (ULTRACET) 37.5-325 MG per  tablet, Take 1 tablet by mouth 2 (two) times a day., Disp: 60 tablet, Rfl: 0    Current Facility-Administered Medications:   •  cyanocobalamin injection 1,000 mcg, 1,000 mcg, Intramuscular, Q28 Days, Jae Bray MD, 1,000 mcg at 08/27/20 1233     Review of Systems   Constitutional: Positive for fatigue (no energy).   Respiratory: Negative.    Cardiovascular: Negative.  Negative for leg swelling.   Gastrointestinal: Positive for nausea.   Endocrine: Positive for cold intolerance.   Musculoskeletal: Positive for arthralgias.   Skin: Negative.    Neurological: Negative.        Objective    Vitals:    09/22/20 1331   BP: 142/80   Pulse: 64   Temp: 99.1 °F (37.3 °C)   SpO2: 98%     Body mass index is 26.26 kg/m².     Physical Exam  Cardiovascular:      Rate and Rhythm: Normal rate and regular rhythm.      Pulses: Normal pulses.      Heart sounds: Normal heart sounds.   Pulmonary:      Effort: Pulmonary effort is normal.      Breath sounds: Normal breath sounds.   Musculoskeletal: Normal range of motion.         General: No swelling.   Skin:     General: Skin is warm and dry.   Neurological:      Mental Status: She is alert and oriented to person, place, and time.           Assessment/Plan   Mei was seen today for back pain and hypothyroidism.    Diagnoses and all orders for this visit:    Encounter for long-term (current) use of high-risk medication  -     Gabapentin Level; Future    Acquired hypothyroidism  -     TSH; Future    RLS (restless legs syndrome)  -     gabapentin (NEURONTIN) 300 MG capsule; Take 1 capsule by mouth Every Night.    Anemia, unspecified type  -     Discontinue: ferrous gluconate 324 (37.5 Fe) MG tablet tablet; Take 1 tablet by mouth Daily With Breakfast.  -     ferrous gluconate 324 (37.5 Fe) MG tablet tablet; Take 1 tablet by mouth Daily With Breakfast.    Other orders  -     levothyroxine (Synthroid) 75 MCG tablet; Take 1 tablet by mouth Daily.  -     amLODIPine (NORVASC) 10 MG  tablet; Take 1 tablet by mouth Daily.      I have reviewed her labs with her today. Her TSH is 11.300. She is to increase her levothyroxine from 50 mcg to 75 mcg daily.   I have reviewed her PANCHO report which is consistent with the medications being prescribed for her today.   She is to return in 6 weeks for TSH and gabapentin level. Next CSE in 6 months.           CSA date: 9/22/2020    Last UDT:none    Next UDT: in 6 weeks she is to labs with gabapentin level    Last CSE: none    Next CSE: 3/22/2021    PANCHO report reviewed and verified:     Refills allowed on: 5 refills prior to 3/22/2021

## 2020-09-25 NOTE — PROGRESS NOTES
DOS: 2020  NAME: Mei Crane   : 1926  PCP: Lexie Toussaint APRN       CC: stroke follow up    Neurological Problem and Interval History:  89 y.o. right-handed female with HTN, HLD, CKD, left corneal implant, chronic low back pain secondary to lumbar stenosis, DDD, peripheral neuropathy, and A. fib who is being seen in follow-up today for stroke. She is accompanied by a caretaker. The patient presented to [facility] in 2020 with dizziness, falls, and leg weakness.  She was found to be in A. fib which was a new diagnosis for her.  MRI brain was completed and showed bilateral small acute infarcts and MRI of the L-spine showed severe stenosis at L3-4 and moderate to severe stenosis at L4-5.  She was on aspirin 81 mg and Lipitor 40 mg prior to her stroke.  She had been seeing orthopedics, Dr Maddox, as outpatient for worsening weakness and they were planning a back surgery.  She was started on Eliquis 5 mg twice daily, antiplatelet was stopped, and Lipitor was increased to 80 mg.  Vessel imaging was not completed as it was not felt likely to .  2D echo showed EF of greater than 70% with moderate to severely dilated left atrial cavity.  Hemoglobin A1c was 5.7%, LDL 93, and B12 was greater than 2000.  She was recommended to hold off on back surgery for at least 2 to 3 months.  She was readmitted in 2028 with anxiety and palpitations.  ECG showed normal sinus rhythm however sodium level was 123 and this was felt to be the etiology of her symptoms.  MRI brain with contrast was completed and did not show new stroke however it did show abnormal enhancement in the L occipital lobe and additional 2 punctate hemorrhages seen in the left cerebellum which was seen on previous MRI.  FOllow up MRI brain with contrast was recommended to ensure stability of left occipital lesion.     She went to [facility] after d/c in  and is back home (alone) now. She was using a walker and  continues to use it at home, however she is now using a w/c outside of the home. No recent falls. She has a [employer] caretaker who is with her 4 hours 2 days a week.     No further TIA/stroke symptoms. No bleeding issues on Eliquis although she does note she bruises easily.     Neuropathy is stable. She states she has burning in her feet at night and is on gabapentin 300 mg at bedtime per her PCP.     She is complaining of worsening right keg/hip and back pain. She sees Dr Maddox 10/6.  She would like to try PT prior to considering surgery.    There is no family history of stroke. Family history significant for CAD.   She previously worked as an  at [email or web address]. She does not drink or smoke.       Review of Systems:        Review of Systems   Constitutional: Negative for activity change, appetite change and unexpected weight change.   HENT: Negative for facial swelling, trouble swallowing and voice change.    Eyes: Negative for photophobia, pain and visual disturbance.   Respiratory: Negative for chest tightness, shortness of breath and wheezing.    Cardiovascular: Negative for chest pain, palpitations and leg swelling.   Gastrointestinal: Negative for abdominal pain, nausea and vomiting.   Endocrine: Negative for cold intolerance and heat intolerance.   Musculoskeletal: Negative for arthralgias, back pain, gait problem, joint swelling, myalgias, neck pain and neck stiffness.   Neurological: Negative for dizziness, tremors, seizures, syncope, facial asymmetry, speech difficulty, weakness, light-headedness, numbness and headaches.   Hematological: Does not bruise/bleed easily.   Psychiatric/Behavioral: Negative for agitation, behavioral problems, confusion, decreased concentration, dysphoric mood, hallucinations, self-injury, sleep disturbance and suicidal ideas. The patient is not nervous/anxious and is not hyperactive.          Current Outpatient Medications:   •  acetaminophen (TYLENOL) 325 MG  tablet, Take 2 tablets by mouth Every 4 (Four) Hours As Needed for Mild Pain ., Disp: , Rfl:   •  amLODIPine (NORVASC) 10 MG tablet, Take 1 tablet by mouth Daily., Disp: 30 tablet, Rfl: 5  •  atorvastatin (LIPITOR) 80 MG tablet, Take 1 tablet by mouth Every Night., Disp: 30 tablet, Rfl: 2  •  DULoxetine (CYMBALTA) 20 MG capsule, Take 1 capsule by mouth Daily., Disp: 30 capsule, Rfl: 5  •  ELIQUIS 5 MG tablet tablet, TAKE ONE TABLET BY MOUTH TWICE DAILY, Disp: 60 tablet, Rfl: 2  •  ferrous gluconate 324 (37.5 Fe) MG tablet tablet, Take 1 tablet by mouth Daily With Breakfast., Disp: 30 tablet, Rfl: 6  •  gabapentin (NEURONTIN) 300 MG capsule, Take 1 capsule by mouth Every Night., Disp: 28 capsule, Rfl: 5  •  levothyroxine (Synthroid) 75 MCG tablet, Take 1 tablet by mouth Daily., Disp: 90 tablet, Rfl: 1  •  metoprolol tartrate (LOPRESSOR) 50 MG tablet, Take 1 tablet by mouth 2 (Two) Times a Day., Disp: 60 tablet, Rfl: 3  •  pantoprazole (PROTONIX) 40 MG EC tablet, Take 1 tablet by mouth Daily., Disp: 90 tablet, Rfl: 3  •  prednisoLONE acetate (PRED FORTE) 1 % ophthalmic suspension, Administer 1 drop into the left eye Daily., Disp: , Rfl: 3  •  traMADol-acetaminophen (ULTRACET) 37.5-325 MG per tablet, Take 1 tablet by mouth 2 (two) times a day., Disp: 60 tablet, Rfl: 0    Current Facility-Administered Medications:   •  cyanocobalamin injection 1,000 mcg, 1,000 mcg, Intramuscular, Q28 Days, Jae Bray MD, 1,000 mcg at 08/27/20 1233    Review and Interpretation of Imaging: MRI brain images viewed by me, shows late subacute L periventricular stroke, no recent stroke. Focal area of L occipital contrast enhancement seen.   MRI BRAIN WITH AND WITHOUT CONTRAST     HISTORY:    Stroke     COMPARISON: Brain MRI 06/03/2020     TECHNIQUE: Multiplanar, multisequence MR imaging of the brain was  performed with and without intravenous contrast.     FINDINGS:     There is no restricted diffusion. Previously seen areas of  restricted  diffusion scattered throughout multiple vascular territories have  evolved. Subtle area of hyperintensity on diffusion-weighted imaging  within the left centrum semiovale is hyperintense on ADC and represents  an area of T2 shine through. T2 hyperintensity within the  periventricular and subcortical white matter likely represents moderate  chronic ischemic small vessel degenerative changes. Old lacunar infarcts  are present within the right thalamus and left centrum semiovale, as  before. There is no mass effect, midline shift, hydrocephalus, or  abnormal extra-axial fluid collection. The major T2 intracranial  arterial flow voids appear grossly normal. Midline structures are  unremarkable.     A few foci of presumed chronic microhemorrhage are present within the  left cerebellum, best seen on recent SW[facility] imaging.     Focus of apparent enhancement overlying the right aspect of the medulla  likely artifactual from pulsation artifact from the bilateral venous  sinuses which are located at this level, especially given the lack of  abnormality seen in this area on other sequences. Subtle focus of  enhancement is present within the left occipital lobe adjacent to left  lateral ventricle without a definite abnormality visualized in this  location on other sequences.     IMPRESSION:     1.  No findings of acute infarct. Appropriate evolution of multiple  previously seen foci of acute to subacute infarction as detailed above.  2.  Subtle focus of apparent enhancement within the left occipital lobe  adjacent to the left lateral ventricle without identifiable abnormality  on other sequences. Findings are indeterminate and while they may be  related to vascular blush, follow-up with brain MRI with and without  contrast in 2-3 months is recommended to ensure stability and exclude  the small possibility of underlying neoplasm.  3.  Foci of presumed chronic microhemorrhage in the left cerebellum,  best seen on recent  "MRI.  4.  Moderate chronic ischemic white matter changes, as before. Old right  thalamic lacunar infarct.  5.  Other findings as above.     This report was finalized on 7/21/2020 9:50 PM by Dr. Davis Elizalde M.D.  Laboratory Results:             Lab Results   Component Value Date    HGBA1C 5.70 (H) 06/04/2020         Lab Results   Component Value Date    CHOL 161 06/04/2020         Lab Results   Component Value Date    HDL 47 06/04/2020    HDL 48 09/11/2019    HDL 58 04/03/2019         Lab Results   Component Value Date    LDL 93 06/04/2020    LDL 96 09/11/2019     (H) 04/03/2019         Lab Results   Component Value Date    TRIG 107 06/04/2020    TRIG 186 (H) 09/11/2019    TRIG 165 (H) 04/03/2019     No results found for: RPR  Lab Results   Component Value Date    TSH 11.300 (H) 09/15/2020     Lab Results   Component Value Date    PBCDXTTQ60 >2000 (H) 03/10/2020     Vitals:    09/29/20 1301   BP: 120/66   BP Location: Left arm   Patient Position: Sitting   Cuff Size: Adult   Pulse: 66   SpO2: 96%   Height: 162.6 cm (64\")       Physical Examination: Stop bang score was 3 for tiredness, hypertension, and age over 50.  Neck circumference is 34 cm.  CNS-LS for PBA score was 7.  She denies depression since her stroke.  General Appearance:   Well developed, well nourished, well groomed, alert, and cooperative.  HEENT: Normocephalic.    Neck and Spine: Normal range of motion.  Normal alignment. No mass or tenderness.   Cardiac: Regular rate and rhythm. + murmur.  Peripheral Vasculature: Radial pulses are equal and symmetric.  Extremities:    No edema or deformities.   Skin:    No rashes or birth marks.    Neurological examination:  Higher Integrative  Function: Oriented to time, place and person. Normal registration, recall, attention span and concentration. Normal language including comprehension, spontaneous speech, naming and vocabulary. No neglect. Normal fund of knowledge and higher integrative function.  CN " II: Bilateral pupils are round, right pupil slightly larger than left pupil, reactive bilaterally.  Normal visual fields.    CN III IV VI: Extraocular movements are full without nystagmus.   CN V: Normal facial sensation and strength of muscles of mastication.  CN VII: Facial movements are symmetric. No weakness.  CN VIII:   Auditory acuity is normal.  CN IX & X:   Symmetric palatal movement.  CN XI: Sternocleidomastoid and trapezius are normal.  No weakness.  CN XII:   The tongue is midline.  No atrophy or fasciculations.  Motor: Normal muscle strength in bilateral upper extremities and left lower extremities however right lower extremity is 4 out of 5 and antalgic.  No fasciculations, rigidity, spasticity, or abnormal movements.  Reflexes: Reflexes are decreased.  Sensation: Normal to light touch, pinprick.  Vibratory sensation is absent in the left ankle and left knee.  Station and Gait: Evaluation deferred, patient is in a wheelchair.  Coordination: Finger to nose test shows no dysmetria.  Rapid alternating movements are normal.  Heel to shin normal aside from decreased range of motion in the right lower extremity.    Diagnoses / Discussion:    Mrs. Crane was seen in follow-up today for bilateral embolic strokes felt secondary to atrial fibrillation.  She has chronic right leg and back pain.  MRI completed in July 2020 showed abnormal enhancement in the left occipital lobe.  Plan:   Continue Eliquis 5 mg twice daily and Lipitor 80 mg daily for stroke prevention.   Repeat MRI brain with and without contrast to follow-up left occipital lesion if stable no further follow-up with me is needed   Referral to PT for back pain   Blood pressure control to <130/80   Goal LDL <70-recommend high dose statins-    Serum glucose < 140   Call 911 for stroke any stroke symptoms   Follow-up as needed depending on result of repeat MRI brain

## 2020-09-29 ENCOUNTER — OFFICE VISIT (OUTPATIENT)
Dept: NEUROLOGY | Facility: CLINIC | Age: 85
End: 2020-09-29

## 2020-09-29 VITALS
BODY MASS INDEX: 26.26 KG/M2 | OXYGEN SATURATION: 96 % | HEART RATE: 66 BPM | DIASTOLIC BLOOD PRESSURE: 66 MMHG | HEIGHT: 64 IN | SYSTOLIC BLOOD PRESSURE: 120 MMHG

## 2020-09-29 DIAGNOSIS — I63.413 CEREBROVASCULAR ACCIDENT (CVA) DUE TO BILATERAL EMBOLISM OF MIDDLE CEREBRAL ARTERIES (HCC): ICD-10-CM

## 2020-09-29 DIAGNOSIS — G93.9 BRAIN LESION: Primary | ICD-10-CM

## 2020-09-29 DIAGNOSIS — M51.36 DDD (DEGENERATIVE DISC DISEASE), LUMBAR: ICD-10-CM

## 2020-09-29 DIAGNOSIS — I48.0 PAROXYSMAL ATRIAL FIBRILLATION (HCC): ICD-10-CM

## 2020-09-29 PROCEDURE — 99215 OFFICE O/P EST HI 40 MIN: CPT | Performed by: NURSE PRACTITIONER

## 2020-09-29 NOTE — PATIENT INSTRUCTIONS
Someone will call to schedule MRI brain and physical therapy appointment. Let me know if you don't hear from someone.     Follow up with me as needed

## 2020-10-01 ENCOUNTER — FLU SHOT (OUTPATIENT)
Dept: FAMILY MEDICINE CLINIC | Facility: CLINIC | Age: 85
End: 2020-10-01

## 2020-10-01 DIAGNOSIS — Z23 NEED FOR INFLUENZA VACCINATION: ICD-10-CM

## 2020-10-01 PROCEDURE — 90694 VACC AIIV4 NO PRSRV 0.5ML IM: CPT | Performed by: NURSE PRACTITIONER

## 2020-10-01 PROCEDURE — G0008 ADMIN INFLUENZA VIRUS VAC: HCPCS | Performed by: NURSE PRACTITIONER

## 2020-10-06 ENCOUNTER — OFFICE VISIT (OUTPATIENT)
Dept: ORTHOPEDIC SURGERY | Facility: CLINIC | Age: 85
End: 2020-10-06

## 2020-10-06 VITALS — WEIGHT: 152 LBS | BODY MASS INDEX: 25.95 KG/M2 | HEIGHT: 64 IN | TEMPERATURE: 98 F

## 2020-10-06 DIAGNOSIS — M54.50 LUMBAR PAIN: Primary | ICD-10-CM

## 2020-10-06 DIAGNOSIS — S32.591A PUBIC RAMUS FRACTURE, RIGHT, CLOSED, INITIAL ENCOUNTER (HCC): ICD-10-CM

## 2020-10-06 PROCEDURE — 99213 OFFICE O/P EST LOW 20 MIN: CPT | Performed by: ORTHOPAEDIC SURGERY

## 2020-10-06 PROCEDURE — 72100 X-RAY EXAM L-S SPINE 2/3 VWS: CPT | Performed by: ORTHOPAEDIC SURGERY

## 2020-10-06 PROCEDURE — 73502 X-RAY EXAM HIP UNI 2-3 VIEWS: CPT | Performed by: ORTHOPAEDIC SURGERY

## 2020-10-06 RX ORDER — DOXYCYCLINE HYCLATE 50 MG/1
CAPSULE, GELATIN COATED ORAL DAILY
Status: ON HOLD | COMMUNITY
Start: 2020-09-22 | End: 2020-12-18

## 2020-10-06 RX ORDER — LEVOTHYROXINE SODIUM 0.05 MG/1
50 TABLET ORAL DAILY
Qty: 90 TABLET | Refills: 1 | OUTPATIENT
Start: 2020-10-06

## 2020-10-06 NOTE — PROGRESS NOTES
We are in the verge of doing laminectomy and fusion and she had some strokelike symptoms and was in the hospital and we signed off she is following up complaining of the some imbalance but mostly right hip pain since a fall in the last couple weeks.  She also bruised her right wrist.  Of course she does have known spinal stenosis at 3 4 and 4 5 with degenerative changes and may be a hint of listhesis at 4 5.  Good strength in the legs on exam but tenderness in the right groin to internal and external rotation although Stinchfield test is equivocal.  She has some ecchymosis over the right lower thorax where she is mildly tender she does appear to be breathing comfortably however.  Lumbar x-rays show degenerative changes no fracture slight scoliosis.  Her MRI previously demonstrated stenosis 3 4 and 4 5.  Right hip x-rays obtained today show superior and inferior ramus fractures which are nondisplaced.  No comparison views are available for either of these x-rays.  I recommended that she bear more weight on the right and she can go to therapy for the back but needs to be partial weightbearing on the right to protect the rami fractures.  I will see her back in a month with AP pelvic x-ray.  She really wants to avoid surgery which is fine however I think if she was safe for general anesthetic we could probably in an hour decompress and may be place an in situ fusion at 4 5 and do a lot of good.

## 2020-10-20 ENCOUNTER — HOSPITAL ENCOUNTER (OUTPATIENT)
Dept: MRI IMAGING | Facility: HOSPITAL | Age: 85
Discharge: HOME | End: 2020-10-20
Admitting: NURSE PRACTITIONER

## 2020-10-20 DIAGNOSIS — G93.9 BRAIN LESION: ICD-10-CM

## 2020-10-20 PROCEDURE — A9577 INJ MULTIHANCE: HCPCS | Performed by: NURSE PRACTITIONER

## 2020-10-20 PROCEDURE — 70553 MRI BRAIN STEM W/O & W/DYE: CPT

## 2020-10-20 PROCEDURE — 0 GADOBENATE DIMEGLUMINE 529 MG/ML SOLUTION: Performed by: NURSE PRACTITIONER

## 2020-10-20 RX ADMIN — GADOBENATE DIMEGLUMINE 14 ML: 529 INJECTION, SOLUTION INTRAVENOUS at 13:11

## 2020-10-23 ENCOUNTER — TELEPHONE (OUTPATIENT)
Dept: NEUROLOGY | Facility: CLINIC | Age: 85
End: 2020-10-23

## 2020-10-23 NOTE — TELEPHONE ENCOUNTER
----- Message from COCO Mojica sent at 10/23/2020  3:48 PM EDT -----  Ebonie, Please let patient know that there were no new findings on her repeat MRI brain. There was an area we were concerned about but it is no longer there, there radiologist thinks it was artifact, no further work up needed for this. She does not need to f/u with me unless there is a new problem.

## 2020-10-26 NOTE — TELEPHONE ENCOUNTER
Pt returned call. Informed pt of MRI results, no new findings. No work up needed at this time, follow if any new concerns. Pt verbalized understanding.

## 2020-11-01 ENCOUNTER — RESULTS ENCOUNTER (OUTPATIENT)
Dept: FAMILY MEDICINE CLINIC | Facility: CLINIC | Age: 85
End: 2020-11-01

## 2020-11-01 DIAGNOSIS — E03.9 ACQUIRED HYPOTHYROIDISM: ICD-10-CM

## 2020-11-01 DIAGNOSIS — Z79.899 ENCOUNTER FOR LONG-TERM (CURRENT) USE OF HIGH-RISK MEDICATION: ICD-10-CM

## 2020-11-03 DIAGNOSIS — I48.91 ATRIAL FIBRILLATION, NEW ONSET (HCC): ICD-10-CM

## 2020-11-03 RX ORDER — ATORVASTATIN CALCIUM 80 MG/1
TABLET, FILM COATED ORAL
Qty: 30 TABLET | Refills: 1 | Status: SHIPPED | OUTPATIENT
Start: 2020-11-03 | End: 2021-02-26 | Stop reason: SDUPTHER

## 2020-11-05 ENCOUNTER — OFFICE VISIT (OUTPATIENT)
Dept: FAMILY MEDICINE CLINIC | Facility: CLINIC | Age: 85
End: 2020-11-05

## 2020-11-05 VITALS
TEMPERATURE: 97.3 F | HEIGHT: 64 IN | WEIGHT: 149 LBS | DIASTOLIC BLOOD PRESSURE: 62 MMHG | OXYGEN SATURATION: 93 % | HEART RATE: 77 BPM | SYSTOLIC BLOOD PRESSURE: 130 MMHG | BODY MASS INDEX: 25.44 KG/M2

## 2020-11-05 DIAGNOSIS — R42 DIZZINESS: Primary | ICD-10-CM

## 2020-11-05 PROCEDURE — 96372 THER/PROPH/DIAG INJ SC/IM: CPT | Performed by: NURSE PRACTITIONER

## 2020-11-05 PROCEDURE — 99213 OFFICE O/P EST LOW 20 MIN: CPT | Performed by: NURSE PRACTITIONER

## 2020-11-05 RX ADMIN — CYANOCOBALAMIN 1000 MCG: 1000 INJECTION, SOLUTION INTRAMUSCULAR; SUBCUTANEOUS at 10:52

## 2020-11-05 NOTE — PROGRESS NOTES
Subjective   Mei Crane is a 89 y.o. female.     Chief Complaint   Patient presents with   • Dizziness     Need PT referral     Dizziness  This is a recurrent problem. The current episode started more than 1 month ago. The problem occurs intermittently. The problem has been waxing and waning. Associated symptoms include myalgias and vertigo. Pertinent negatives include no diaphoresis, headaches, visual change or vomiting. Nothing aggravates the symptoms. She has tried nothing for the symptoms.     I have reviewed the patient's medical history in detail and updated the computerized patient record.    The following portions of the patient's history were reviewed and updated as appropriate: allergies, current medications, past family history, past medical history, past social history, past surgical history and problem list.      Current Outpatient Medications:   •  acetaminophen (TYLENOL) 325 MG tablet, Take 2 tablets by mouth Every 4 (Four) Hours As Needed for Mild Pain ., Disp: , Rfl:   •  amLODIPine (NORVASC) 10 MG tablet, Take 1 tablet by mouth Daily., Disp: 30 tablet, Rfl: 5  •  apixaban (Eliquis) 5 MG tablet tablet, Take 1 tablet by mouth 2 (Two) Times a Day., Disp: 60 tablet, Rfl: 0  •  atorvastatin (LIPITOR) 80 MG tablet, TAKE ONE TABLET BY MOUTH AT BEDTIME, Disp: 30 tablet, Rfl: 1  •  DULoxetine (CYMBALTA) 20 MG capsule, Take 1 capsule by mouth Daily., Disp: 30 capsule, Rfl: 5  •  ferrous gluconate (FERGON) 324 MG tablet, Take  by mouth Daily., Disp: , Rfl:   •  ferrous gluconate 324 (37.5 Fe) MG tablet tablet, Take 1 tablet by mouth Daily With Breakfast., Disp: 30 tablet, Rfl: 6  •  gabapentin (NEURONTIN) 300 MG capsule, Take 1 capsule by mouth Every Night., Disp: 28 capsule, Rfl: 5  •  levothyroxine (Synthroid) 75 MCG tablet, Take 1 tablet by mouth Daily., Disp: 90 tablet, Rfl: 1  •  metoprolol tartrate (LOPRESSOR) 50 MG tablet, Take 1 tablet by mouth 2 (Two) Times a Day., Disp: 60 tablet, Rfl: 3  •   pantoprazole (PROTONIX) 40 MG EC tablet, Take 1 tablet by mouth Daily., Disp: 90 tablet, Rfl: 3  •  prednisoLONE acetate (PRED FORTE) 1 % ophthalmic suspension, Administer 1 drop into the left eye Daily., Disp: , Rfl: 3  •  traMADol-acetaminophen (ULTRACET) 37.5-325 MG per tablet, Take 1 tablet by mouth 2 (two) times a day., Disp: 60 tablet, Rfl: 0    Current Facility-Administered Medications:   •  cyanocobalamin injection 1,000 mcg, 1,000 mcg, Intramuscular, Q28 Days, Jae Bray MD, 1,000 mcg at 11/05/20 1052     Review of Systems   Constitutional: Negative.  Negative for diaphoresis.   Respiratory: Negative.    Cardiovascular: Negative.    Gastrointestinal: Negative for vomiting.   Musculoskeletal: Positive for myalgias.   Neurological: Positive for dizziness, vertigo and light-headedness. Negative for syncope and headaches.   Psychiatric/Behavioral: Negative.        Objective    Vitals:    11/05/20 1028   BP: 130/62   Pulse: 77   Temp: 97.3 °F (36.3 °C)   SpO2: 93%     Physical Exam  Vitals signs reviewed.   Constitutional:       Appearance: Normal appearance.   Cardiovascular:      Rate and Rhythm: Normal rate and regular rhythm.      Pulses: Normal pulses.      Heart sounds: Normal heart sounds.   Pulmonary:      Effort: Pulmonary effort is normal.      Breath sounds: Normal breath sounds.   Skin:     General: Skin is warm and dry.   Neurological:      Mental Status: She is alert and oriented to person, place, and time.   Psychiatric:         Mood and Affect: Mood normal.         Behavior: Behavior normal.         Assessment/Plan   Diagnoses and all orders for this visit:    1. Dizziness (Primary)  -     Ambulatory Referral to Physical Therapy Vestibular     Ms. Crane presents today with complaints of recurring intermittent dizziness. She is currently going to PT for her leg and back pain. She was told by PT that they could help with her dizziness, so she is here to get an order for vestibular  manipulation per PT. I have provided her with that order today.  She is to follow up as needed and in 3 months for her annual medicare wellness exam.

## 2020-11-10 LAB
GABAPENTIN SERPLBLD-MCNC: 4.2 UG/ML (ref 4–16)
TSH SERPL DL<=0.005 MIU/L-ACNC: 4.99 UIU/ML (ref 0.27–4.2)

## 2020-11-10 NOTE — PROGRESS NOTES
She is to continue the same does of Levothyroxine for now and I will check her TSH at her next visit.

## 2020-12-01 RX ORDER — PANTOPRAZOLE SODIUM 40 MG/1
TABLET, DELAYED RELEASE ORAL
Qty: 90 TABLET | Refills: 1 | Status: SHIPPED | OUTPATIENT
Start: 2020-12-01 | End: 2021-05-10

## 2020-12-09 DIAGNOSIS — I48.91 ATRIAL FIBRILLATION, NEW ONSET (HCC): ICD-10-CM

## 2020-12-17 ENCOUNTER — APPOINTMENT (OUTPATIENT)
Dept: GENERAL RADIOLOGY | Facility: HOSPITAL | Age: 85
End: 2020-12-17

## 2020-12-17 ENCOUNTER — HOSPITAL ENCOUNTER (INPATIENT)
Facility: HOSPITAL | Age: 85
LOS: 4 days | Discharge: NURSING, REHAB OR LONG-TERM CARE | End: 2020-12-21
Attending: EMERGENCY MEDICINE | Admitting: ORTHOPAEDIC SURGERY

## 2020-12-17 ENCOUNTER — APPOINTMENT (OUTPATIENT)
Dept: CT IMAGING | Facility: HOSPITAL | Age: 85
End: 2020-12-17

## 2020-12-17 DIAGNOSIS — S72.141A CLOSED INTERTROCHANTERIC FRACTURE OF HIP, RIGHT, INITIAL ENCOUNTER (HCC): Primary | ICD-10-CM

## 2020-12-17 DIAGNOSIS — Z79.01 CHRONIC ANTICOAGULATION: ICD-10-CM

## 2020-12-17 PROBLEM — D72.829 LEUKOCYTOSIS: Status: ACTIVE | Noted: 2020-12-17

## 2020-12-17 PROBLEM — S22.31XA CLOSED FRACTURE OF RIB OF RIGHT SIDE: Status: ACTIVE | Noted: 2020-12-17

## 2020-12-17 PROBLEM — N17.9 AKI (ACUTE KIDNEY INJURY) (HCC): Status: ACTIVE | Noted: 2020-12-17

## 2020-12-17 LAB
ALBUMIN SERPL-MCNC: 4.3 G/DL (ref 3.5–5.2)
ALBUMIN/GLOB SERPL: 1.3 G/DL
ALP SERPL-CCNC: 151 U/L (ref 39–117)
ALT SERPL W P-5'-P-CCNC: 12 U/L (ref 1–33)
ANION GAP SERPL CALCULATED.3IONS-SCNC: 12.8 MMOL/L (ref 5–15)
AST SERPL-CCNC: 19 U/L (ref 1–32)
BASOPHILS # BLD AUTO: 0.07 10*3/MM3 (ref 0–0.2)
BASOPHILS NFR BLD AUTO: 0.4 % (ref 0–1.5)
BILIRUB SERPL-MCNC: 0.3 MG/DL (ref 0–1.2)
BUN SERPL-MCNC: 18 MG/DL (ref 8–23)
BUN/CREAT SERPL: 11.7 (ref 7–25)
CALCIUM SPEC-SCNC: 9.2 MG/DL (ref 8.2–9.6)
CHLORIDE SERPL-SCNC: 101 MMOL/L (ref 98–107)
CO2 SERPL-SCNC: 25.2 MMOL/L (ref 22–29)
CREAT SERPL-MCNC: 1.54 MG/DL (ref 0.57–1)
DEPRECATED RDW RBC AUTO: 39.6 FL (ref 37–54)
EOSINOPHIL # BLD AUTO: 0.1 10*3/MM3 (ref 0–0.4)
EOSINOPHIL NFR BLD AUTO: 0.5 % (ref 0.3–6.2)
ERYTHROCYTE [DISTWIDTH] IN BLOOD BY AUTOMATED COUNT: 13 % (ref 12.3–15.4)
GFR SERPL CREATININE-BSD FRML MDRD: 31 ML/MIN/1.73
GLOBULIN UR ELPH-MCNC: 3.3 GM/DL
GLUCOSE SERPL-MCNC: 180 MG/DL (ref 65–99)
HCT VFR BLD AUTO: 38.4 % (ref 34–46.6)
HGB BLD-MCNC: 12.7 G/DL (ref 12–15.9)
IMM GRANULOCYTES # BLD AUTO: 0.23 10*3/MM3 (ref 0–0.05)
IMM GRANULOCYTES NFR BLD AUTO: 1.2 % (ref 0–0.5)
INR PPP: 1.21 (ref 0.9–1.1)
LYMPHOCYTES # BLD AUTO: 2.58 10*3/MM3 (ref 0.7–3.1)
LYMPHOCYTES NFR BLD AUTO: 13.2 % (ref 19.6–45.3)
MCH RBC QN AUTO: 28.5 PG (ref 26.6–33)
MCHC RBC AUTO-ENTMCNC: 33.1 G/DL (ref 31.5–35.7)
MCV RBC AUTO: 86.3 FL (ref 79–97)
MONOCYTES # BLD AUTO: 0.9 10*3/MM3 (ref 0.1–0.9)
MONOCYTES NFR BLD AUTO: 4.6 % (ref 5–12)
NEUTROPHILS NFR BLD AUTO: 15.67 10*3/MM3 (ref 1.7–7)
NEUTROPHILS NFR BLD AUTO: 80.1 % (ref 42.7–76)
NRBC BLD AUTO-RTO: 0 /100 WBC (ref 0–0.2)
PLATELET # BLD AUTO: 331 10*3/MM3 (ref 140–450)
PMV BLD AUTO: 10.2 FL (ref 6–12)
POTASSIUM SERPL-SCNC: 3.4 MMOL/L (ref 3.5–5.2)
PROT SERPL-MCNC: 7.6 G/DL (ref 6–8.5)
PROTHROMBIN TIME: 15.1 SECONDS (ref 11.7–14.2)
RBC # BLD AUTO: 4.45 10*6/MM3 (ref 3.77–5.28)
SARS-COV-2 RNA RESP QL NAA+PROBE: NOT DETECTED
SODIUM SERPL-SCNC: 139 MMOL/L (ref 136–145)
WBC # BLD AUTO: 19.55 10*3/MM3 (ref 3.4–10.8)

## 2020-12-17 PROCEDURE — 73552 X-RAY EXAM OF FEMUR 2/>: CPT

## 2020-12-17 PROCEDURE — 85025 COMPLETE CBC W/AUTO DIFF WBC: CPT | Performed by: EMERGENCY MEDICINE

## 2020-12-17 PROCEDURE — 70450 CT HEAD/BRAIN W/O DYE: CPT

## 2020-12-17 PROCEDURE — 71045 X-RAY EXAM CHEST 1 VIEW: CPT

## 2020-12-17 PROCEDURE — 85610 PROTHROMBIN TIME: CPT | Performed by: NURSE PRACTITIONER

## 2020-12-17 PROCEDURE — 99284 EMERGENCY DEPT VISIT MOD MDM: CPT

## 2020-12-17 PROCEDURE — 73502 X-RAY EXAM HIP UNI 2-3 VIEWS: CPT

## 2020-12-17 PROCEDURE — 80053 COMPREHEN METABOLIC PANEL: CPT | Performed by: EMERGENCY MEDICINE

## 2020-12-17 PROCEDURE — U0003 INFECTIOUS AGENT DETECTION BY NUCLEIC ACID (DNA OR RNA); SEVERE ACUTE RESPIRATORY SYNDROME CORONAVIRUS 2 (SARS-COV-2) (CORONAVIRUS DISEASE [COVID-19]), AMPLIFIED PROBE TECHNIQUE, MAKING USE OF HIGH THROUGHPUT TECHNOLOGIES AS DESCRIBED BY CMS-2020-01-R: HCPCS | Performed by: EMERGENCY MEDICINE

## 2020-12-17 RX ORDER — LEVOTHYROXINE SODIUM 0.07 MG/1
75 TABLET ORAL
Status: DISCONTINUED | OUTPATIENT
Start: 2020-12-18 | End: 2020-12-21 | Stop reason: HOSPADM

## 2020-12-17 RX ORDER — SODIUM CHLORIDE 9 MG/ML
100 INJECTION, SOLUTION INTRAVENOUS CONTINUOUS
Status: DISCONTINUED | OUTPATIENT
Start: 2020-12-17 | End: 2020-12-18

## 2020-12-17 RX ORDER — ACETAMINOPHEN 500 MG
1000 TABLET ORAL ONCE
Status: COMPLETED | OUTPATIENT
Start: 2020-12-17 | End: 2020-12-17

## 2020-12-17 RX ORDER — BISACODYL 10 MG
10 SUPPOSITORY, RECTAL RECTAL DAILY PRN
Status: DISCONTINUED | OUTPATIENT
Start: 2020-12-17 | End: 2020-12-21 | Stop reason: HOSPADM

## 2020-12-17 RX ORDER — ALUMINA, MAGNESIA, AND SIMETHICONE 2400; 2400; 240 MG/30ML; MG/30ML; MG/30ML
15 SUSPENSION ORAL EVERY 6 HOURS PRN
Status: DISCONTINUED | OUTPATIENT
Start: 2020-12-17 | End: 2020-12-21 | Stop reason: HOSPADM

## 2020-12-17 RX ORDER — PANTOPRAZOLE SODIUM 40 MG/1
40 TABLET, DELAYED RELEASE ORAL
Status: DISCONTINUED | OUTPATIENT
Start: 2020-12-18 | End: 2020-12-21 | Stop reason: HOSPADM

## 2020-12-17 RX ORDER — ATORVASTATIN CALCIUM 20 MG/1
80 TABLET, FILM COATED ORAL DAILY
Status: DISCONTINUED | OUTPATIENT
Start: 2020-12-18 | End: 2020-12-21 | Stop reason: HOSPADM

## 2020-12-17 RX ORDER — ONDANSETRON 2 MG/ML
4 INJECTION INTRAMUSCULAR; INTRAVENOUS EVERY 6 HOURS PRN
Status: DISCONTINUED | OUTPATIENT
Start: 2020-12-17 | End: 2020-12-21 | Stop reason: HOSPADM

## 2020-12-17 RX ORDER — AMLODIPINE BESYLATE 10 MG/1
10 TABLET ORAL DAILY
Status: DISCONTINUED | OUTPATIENT
Start: 2020-12-18 | End: 2020-12-21 | Stop reason: HOSPADM

## 2020-12-17 RX ORDER — BISACODYL 5 MG/1
5 TABLET, DELAYED RELEASE ORAL DAILY PRN
Status: DISCONTINUED | OUTPATIENT
Start: 2020-12-17 | End: 2020-12-21 | Stop reason: HOSPADM

## 2020-12-17 RX ORDER — DULOXETIN HYDROCHLORIDE 20 MG/1
20 CAPSULE, DELAYED RELEASE ORAL DAILY
Status: DISCONTINUED | OUTPATIENT
Start: 2020-12-18 | End: 2020-12-21 | Stop reason: HOSPADM

## 2020-12-17 RX ORDER — SODIUM CHLORIDE 0.9 % (FLUSH) 0.9 %
10 SYRINGE (ML) INJECTION AS NEEDED
Status: DISCONTINUED | OUTPATIENT
Start: 2020-12-17 | End: 2020-12-21 | Stop reason: HOSPADM

## 2020-12-17 RX ORDER — GABAPENTIN 300 MG/1
300 CAPSULE ORAL NIGHTLY
Status: DISCONTINUED | OUTPATIENT
Start: 2020-12-18 | End: 2020-12-21 | Stop reason: HOSPADM

## 2020-12-17 RX ORDER — ONDANSETRON 4 MG/1
4 TABLET, FILM COATED ORAL EVERY 6 HOURS PRN
Status: DISCONTINUED | OUTPATIENT
Start: 2020-12-17 | End: 2020-12-21 | Stop reason: HOSPADM

## 2020-12-17 RX ORDER — CEFAZOLIN SODIUM 2 G/100ML
2 INJECTION, SOLUTION INTRAVENOUS
Status: COMPLETED | OUTPATIENT
Start: 2020-12-18 | End: 2020-12-18

## 2020-12-17 RX ORDER — PREDNISOLONE ACETATE 10 MG/ML
1 SUSPENSION/ DROPS OPHTHALMIC DAILY
Status: DISCONTINUED | OUTPATIENT
Start: 2020-12-18 | End: 2020-12-21 | Stop reason: HOSPADM

## 2020-12-17 RX ORDER — ACETAMINOPHEN 325 MG/1
650 TABLET ORAL EVERY 4 HOURS PRN
Status: DISCONTINUED | OUTPATIENT
Start: 2020-12-17 | End: 2020-12-21 | Stop reason: HOSPADM

## 2020-12-17 RX ORDER — METOPROLOL TARTRATE 50 MG/1
50 TABLET, FILM COATED ORAL 2 TIMES DAILY
Status: DISCONTINUED | OUTPATIENT
Start: 2020-12-17 | End: 2020-12-21 | Stop reason: HOSPADM

## 2020-12-17 RX ORDER — MORPHINE SULFATE 2 MG/ML
2 INJECTION, SOLUTION INTRAMUSCULAR; INTRAVENOUS
Status: DISCONTINUED | OUTPATIENT
Start: 2020-12-17 | End: 2020-12-21 | Stop reason: HOSPADM

## 2020-12-17 RX ADMIN — METOPROLOL TARTRATE 50 MG: 50 TABLET, FILM COATED ORAL at 22:42

## 2020-12-17 RX ADMIN — SODIUM CHLORIDE 500 ML: 9 INJECTION, SOLUTION INTRAVENOUS at 20:31

## 2020-12-17 RX ADMIN — SODIUM CHLORIDE 100 ML/HR: 9 INJECTION, SOLUTION INTRAVENOUS at 22:43

## 2020-12-17 RX ADMIN — ACETAMINOPHEN 1000 MG: 500 TABLET ORAL at 18:53

## 2020-12-17 NOTE — ED TRIAGE NOTES
.Pt masked on arrival, staff masked    Pt reports fall from standing, denies known cause for fall, rt hip/thigh pain, unable to bear weight

## 2020-12-17 NOTE — ED PROVIDER NOTES
EMERGENCY DEPARTMENT ENCOUNTER    Room Number:  39/39  Date of encounter:  12/17/2020  PCP: Lexie Toussaint APRN  Historian: Patient      HPI:  Chief Complaint: Right hip pain  A complete HPI/ROS/PMH/PSH/SH/FH are unobtainable due to: None    Context: Mei Crane is a 89 y.o. female who presents to the ED via private vehicle after falling from standing at home, reports landing on her right hip which has significant pain.  Unable to bear weight on it.  States that she did hit her head but denies loss of consciousness, denies any significant bump or bruise to the head.  Denies any dizziness, visual changes, neck pain, numbness or weakness of the arms or legs other than the right leg due to pain.  Denies any chest pain, shortness breath, back pain, abdominal pain, nausea, vomiting, diarrhea.  Worsening on Eliquis, though she is not entirely sure why.      MEDICAL RECORD REVIEW    Discharge summary reviewed from July 22, 2021 patient was started on Eliquis for CVA from a hospitalization in June    PAST MEDICAL HISTORY  Active Ambulatory Problems     Diagnosis Date Noted   • HTN (hypertension), benign 03/23/2016   • Acquired hypothyroidism 03/23/2016   • Hyperlipidemia 03/23/2016   • Gastroesophageal reflux disease without esophagitis 03/23/2016   • Glaucoma 03/23/2016   • Macular degeneration 03/23/2016   • Anemia 03/23/2016   • Osteopenia 03/23/2016   • CKD (chronic kidney disease), stage III (CMS/[facility]) 04/20/2016   • B12 deficiency 10/04/2016   • Calculus of gallbladder without cholecystitis 08/07/2017   • DDD (degenerative disc disease), lumbar 02/19/2019   • Trochanteric bursitis of right hip 07/23/2019   • Bilateral leg weakness 06/01/2020   • Elevated troponin 06/01/2020   • Weakness 06/01/2020   • Dizziness 06/02/2020   • Hallucinations, visual 06/02/2020   • CVA (cerebrovascular accident) (CMS/[facility]) 06/03/2020   • Embolic stroke (CMS/[facility]) 06/04/2020   • Hypokalemia 07/19/2020   • Hyponatremia 07/19/2020   •  History of embolic stroke 07/19/2020   • Chest pain 07/19/2020   • Atrial fibrillation (CMS/HCC) 07/20/2020   • Thiazide diuretics causing adverse effect in therapeutic use 07/20/2020     Resolved Ambulatory Problems     Diagnosis Date Noted   • CAP (community acquired pneumonia) 11/07/2017     Past Medical History:   Diagnosis Date   • Disease of thyroid gland    • Low back pain    • Peripheral neuropathy    • Stroke (CMS/HCC)          PAST SURGICAL HISTORY  Past Surgical History:   Procedure Laterality Date   • CATARACT EXTRACTION Bilateral    • EPIDURAL BLOCK     • HYSTERECTOMY      age 35         FAMILY HISTORY  Family History   Problem Relation Age of Onset   • Aneurysm Mother         AAA   • Deep vein thrombosis Father          SOCIAL HISTORY  Social History     Socioeconomic History   • Marital status:      Spouse name: Not on file   • Number of children: Not on file   • Years of education: Not on file   • Highest education level: Not on file   Tobacco Use   • Smoking status: Never Smoker   • Smokeless tobacco: Never Used   Substance and Sexual Activity   • Alcohol use: Yes     Comment: occasionally   • Drug use: No   • Sexual activity: Defer         ALLERGIES  Barbiturates and Codeine        REVIEW OF SYSTEMS  Review of Systems     All systems reviewed and negative except for those discussed in HPI.       PHYSICAL EXAM    I have reviewed the triage vital signs and nursing notes.    ED Triage Vitals [12/17/20 1733]   Temp Heart Rate Resp BP SpO2   96 °F (35.6 °C) 86 18 141/77 93 %      Temp src Heart Rate Source Patient Position BP Location FiO2 (%)   -- -- -- -- --       Physical Exam  General: Awake, alert, no acute distress  HEENT: Mucous membranes moist, atraumatic without obvious scalp hematoma or laceration, EOMI  Neck: Full ROM, nontender  Pulm: Symmetric chest rise, nonlabored, lungs CTAB  Cardiovascular: Regular rate and rhythm, intact distal pulses  GI: Soft, nontender, nondistended, no  rebound, no guarding, bowel sounds present  MSK: Right leg is shortened and slightly externally rotated with tenderness in the right hip and proximal thigh region  Skin: Warm, dry  Neuro: Alert and oriented x 3, GCS 15, moving all extremities, no focal deficits  Psych: Calm, cooperative      Surgical mask, protective eye goggles, and gloves used during this encounter. Patient in surgical mask.      LAB RESULTS  Recent Results (from the past 24 hour(s))   Comprehensive Metabolic Panel    Collection Time: 12/17/20  6:33 PM    Specimen: Blood   Result Value Ref Range    Glucose 180 (H) 65 - 99 mg/dL    BUN 18 8 - 23 mg/dL    Creatinine 1.54 (H) 0.57 - 1.00 mg/dL    Sodium 139 136 - 145 mmol/L    Potassium 3.4 (L) 3.5 - 5.2 mmol/L    Chloride 101 98 - 107 mmol/L    CO2 25.2 22.0 - 29.0 mmol/L    Calcium 9.2 8.2 - 9.6 mg/dL    Total Protein 7.6 6.0 - 8.5 g/dL    Albumin 4.30 3.50 - 5.20 g/dL    ALT (SGPT) 12 1 - 33 U/L    AST (SGOT) 19 1 - 32 U/L    Alkaline Phosphatase 151 (H) 39 - 117 U/L    Total Bilirubin 0.3 0.0 - 1.2 mg/dL    eGFR Non African Amer 31 (L) >60 mL/min/1.73    Globulin 3.3 gm/dL    A/G Ratio 1.3 g/dL    BUN/Creatinine Ratio 11.7 7.0 - 25.0    Anion Gap 12.8 5.0 - 15.0 mmol/L   CBC Auto Differential    Collection Time: 12/17/20  6:33 PM    Specimen: Blood   Result Value Ref Range    WBC 19.55 (H) 3.40 - 10.80 10*3/mm3    RBC 4.45 3.77 - 5.28 10*6/mm3    Hemoglobin 12.7 12.0 - 15.9 g/dL    Hematocrit 38.4 34.0 - 46.6 %    MCV 86.3 79.0 - 97.0 fL    MCH 28.5 26.6 - 33.0 pg    MCHC 33.1 31.5 - 35.7 g/dL    RDW 13.0 12.3 - 15.4 %    RDW-SD 39.6 37.0 - 54.0 fl    MPV 10.2 6.0 - 12.0 fL    Platelets 331 140 - 450 10*3/mm3    Neutrophil % 80.1 (H) 42.7 - 76.0 %    Lymphocyte % 13.2 (L) 19.6 - 45.3 %    Monocyte % 4.6 (L) 5.0 - 12.0 %    Eosinophil % 0.5 0.3 - 6.2 %    Basophil % 0.4 0.0 - 1.5 %    Immature Grans % 1.2 (H) 0.0 - 0.5 %    Neutrophils, Absolute 15.67 (H) 1.70 - 7.00 10*3/mm3    Lymphocytes,  Absolute 2.58 0.70 - 3.10 10*3/mm3    Monocytes, Absolute 0.90 0.10 - 0.90 10*3/mm3    Eosinophils, Absolute 0.10 0.00 - 0.40 10*3/mm3    Basophils, Absolute 0.07 0.00 - 0.20 10*3/mm3    Immature Grans, Absolute 0.23 (H) 0.00 - 0.05 10*3/mm3    nRBC 0.0 0.0 - 0.2 /100 WBC       Ordered the above labs and independently reviewed the results.        RADIOLOGY  Xr Femur 2 View Right    Result Date: 12/17/2020  XR HIP W OR WO PELVIS 2-3 VIEW RIGHT-, XR FEMUR 2 VW RIGHT-  INDICATIONS: Trauma  TECHNIQUE: Frontal view the pelvis, 2 views of the right hip, 3 views of the right femur  COMPARISON: None available  FINDINGS:  Right intertrochanteric fracture is noted. No other fractures are identified. Degenerative changes seen at the symphysis pubis and visualized lower lumbar spine. No dislocation. Arterial calcifications are conspicuous.       Right intertrochanteric fracture.  This report was finalized on 12/17/2020 6:45 PM by Dr. Julio Cesar Trujillo M.D.      Ct Head Without Contrast    Result Date: 12/17/2020  CT OF THE BRAIN WITHOUT CONTRAST 12/17/2020  HISTORY: Fell, head injury. Patient on blood thinners.  TECHNIQUE: Axial images were obtained through the brain without intravenous contrast.  FINDINGS: There is moderate diffuse atrophy. There is decreased attenuation of the periventricular white matter bilaterally consistent with small vessel white matter ischemic disease.  There is no evidence of acute infarction, hemorrhage, midline shift or mass effect.  No bony abnormalities are seen. Very minimal mucosal thickening in the posterior right maxillary sinus is seen.      No acute process.    Radiation dose reduction techniques were utilized, including automated exposure control and exposure modulation based on body size.  This report was finalized on 12/17/2020 8:44 PM by Dr. Basilio Ramsey M.D.      Xr Chest 1 View    Result Date: 12/17/2020  XR CHEST 1 VW-  HISTORY: Female who is 89 years-old,  preoperative  evaluation  TECHNIQUE: Frontal views of the chest  COMPARISON: 07/19/2020  FINDINGS: The heart size is normal. Aorta is tortuous, calcified. Pulmonary vasculature is unremarkable. No focal pulmonary consolidation, pleural effusion, or pneumothorax. There appears to be posterior right eighth rib fracture, age indeterminate, new from the prior exam, correlate clinically.      No focal pulmonary consolidation. Tortuous aorta. Follow-up as clinically indicated.  This report was finalized on 12/17/2020 6:49 PM by Dr. Julio Cesar Trujillo M.D.      Xr Hip With Or Without Pelvis 2 - 3 View Right    Result Date: 12/17/2020  XR HIP W OR WO PELVIS 2-3 VIEW RIGHT-, XR FEMUR 2 VW RIGHT-  INDICATIONS: Trauma  TECHNIQUE: Frontal view the pelvis, 2 views of the right hip, 3 views of the right femur  COMPARISON: None available  FINDINGS:  Right intertrochanteric fracture is noted. No other fractures are identified. Degenerative changes seen at the symphysis pubis and visualized lower lumbar spine. No dislocation. Arterial calcifications are conspicuous.       Right intertrochanteric fracture.  This report was finalized on 12/17/2020 6:45 PM by Dr. Julio Cesar Trujillo M.D.        I ordered the above noted radiological studies. Reviewed by me.  See dictation for official radiology interpretation.      PROCEDURES    Procedures      MEDICATIONS GIVEN IN ER    Medications   sodium chloride 0.9 % flush 10 mL (has no administration in time range)   acetaminophen (TYLENOL) tablet 650 mg (has no administration in time range)   bisacodyl (DULCOLAX) suppository 10 mg (has no administration in time range)   bisacodyl (DULCOLAX) EC tablet 5 mg (has no administration in time range)   ondansetron (ZOFRAN) tablet 4 mg (has no administration in time range)     Or   ondansetron (ZOFRAN) injection 4 mg (has no administration in time range)   aluminum-magnesium hydroxide-simethicone (MAALOX MAX) 400-400-40 MG/5ML suspension 15 mL (has no administration  in time range)   sodium chloride 0.9 % infusion (has no administration in time range)   amLODIPine (NORVASC) tablet 10 mg (has no administration in time range)   atorvastatin (LIPITOR) tablet 80 mg (has no administration in time range)   levothyroxine (SYNTHROID, LEVOTHROID) tablet 75 mcg (has no administration in time range)   metoprolol tartrate (LOPRESSOR) tablet 50 mg (has no administration in time range)   pantoprazole (PROTONIX) EC tablet 40 mg (has no administration in time range)   prednisoLONE acetate (PRED FORTE) 1 % ophthalmic suspension 1 drop (has no administration in time range)   morphine injection 2 mg (has no administration in time range)   acetaminophen (TYLENOL) tablet 1,000 mg (1,000 mg Oral Given 12/17/20 1853)   sodium chloride 0.9 % bolus 500 mL (500 mL Intravenous New Bag 12/17/20 2031)         PROGRESS, DATA ANALYSIS, CONSULTS, AND MEDICAL DECISION MAKING    All labs have been independently reviewed by me.  All radiology studies have been reviewed by me and discussed with radiologist dictating the report.   EKG's independently viewed and interpreted by me.  Discussion below represents my analysis of pertinent findings related to patient's condition, differential diagnosis, treatment plan and final disposition.    Patient with a likely hip fracture based on exam, will get labs and imaging studies with plans for hospitalization should we see a fracture.    Patient does have a leukocytosis though I think this is likely reactive to the recent hip fracture, patient denies any infectious type symptoms.    ED Course as of Dec 17 2121   Thu Dec 17, 2020   1932 Discussed with Dr. Mtz, orthopedics, he is aware of the patient and will consult.  Recommends keeping her n.p.o. at midnight as he may operate tomorrow potentially. Will hold Eliquis moving forward.    [DC]   1954 Discussed case with COCO Mora, A, discussed patient clinical course and plan today, treatment modalities, and need  for hospitalization.  Discussed orthopedic consult and need to hold Eliquis for possible surgery tomorrow.    [DC]      ED Course User Index  [DC] Sukh Landry MD       AS OF 21:21 EST VITALS:    BP - 158/79  HR - 79  TEMP - 96 °F (35.6 °C)  02 SATS - 99%        DIAGNOSIS  Final diagnoses:   Closed intertrochanteric fracture of hip, right, initial encounter (CMS/[facility])   Chronic anticoagulation         DISPOSITION  HOSPITALIZATION    Discussed treatment plan and reason for hospitalization with pt/family and hospitalizing physician.  Pt/family voiced understanding of the plan for hospitalization for further testing/treatment as needed.                  Sukh Landry MD  12/17/20 5916

## 2020-12-18 ENCOUNTER — ANESTHESIA (OUTPATIENT)
Dept: PERIOP | Facility: HOSPITAL | Age: 85
End: 2020-12-18

## 2020-12-18 ENCOUNTER — APPOINTMENT (OUTPATIENT)
Dept: GENERAL RADIOLOGY | Facility: HOSPITAL | Age: 85
End: 2020-12-18

## 2020-12-18 ENCOUNTER — ANESTHESIA EVENT (OUTPATIENT)
Dept: PERIOP | Facility: HOSPITAL | Age: 85
End: 2020-12-18

## 2020-12-18 PROBLEM — N17.9 AKI (ACUTE KIDNEY INJURY): Status: RESOLVED | Noted: 2020-12-17 | Resolved: 2020-12-18

## 2020-12-18 PROBLEM — D72.829 LEUKOCYTOSIS: Status: RESOLVED | Noted: 2020-12-17 | Resolved: 2020-12-18

## 2020-12-18 LAB
ALBUMIN SERPL-MCNC: 3.2 G/DL (ref 3.5–5.2)
ALBUMIN/GLOB SERPL: 1.5 G/DL
ALP SERPL-CCNC: 101 U/L (ref 39–117)
ALT SERPL W P-5'-P-CCNC: 10 U/L (ref 1–33)
ANION GAP SERPL CALCULATED.3IONS-SCNC: 8.8 MMOL/L (ref 5–15)
AST SERPL-CCNC: 16 U/L (ref 1–32)
BASOPHILS # BLD AUTO: 0.04 10*3/MM3 (ref 0–0.2)
BASOPHILS NFR BLD AUTO: 0.4 % (ref 0–1.5)
BILIRUB SERPL-MCNC: 0.3 MG/DL (ref 0–1.2)
BUN SERPL-MCNC: 13 MG/DL (ref 8–23)
BUN/CREAT SERPL: 12.9 (ref 7–25)
CALCIUM SPEC-SCNC: 8.1 MG/DL (ref 8.2–9.6)
CHLORIDE SERPL-SCNC: 106 MMOL/L (ref 98–107)
CO2 SERPL-SCNC: 24.2 MMOL/L (ref 22–29)
CREAT SERPL-MCNC: 1.01 MG/DL (ref 0.57–1)
DEPRECATED RDW RBC AUTO: 42.5 FL (ref 37–54)
EOSINOPHIL # BLD AUTO: 0.06 10*3/MM3 (ref 0–0.4)
EOSINOPHIL NFR BLD AUTO: 0.7 % (ref 0.3–6.2)
ERYTHROCYTE [DISTWIDTH] IN BLOOD BY AUTOMATED COUNT: 13.1 % (ref 12.3–15.4)
GFR SERPL CREATININE-BSD FRML MDRD: 51 ML/MIN/1.73
GLOBULIN UR ELPH-MCNC: 2.1 GM/DL
GLUCOSE SERPL-MCNC: 109 MG/DL (ref 65–99)
HCT VFR BLD AUTO: 28.9 % (ref 34–46.6)
HGB BLD-MCNC: 9.2 G/DL (ref 12–15.9)
IMM GRANULOCYTES # BLD AUTO: 0.04 10*3/MM3 (ref 0–0.05)
IMM GRANULOCYTES NFR BLD AUTO: 0.4 % (ref 0–0.5)
LYMPHOCYTES # BLD AUTO: 1.78 10*3/MM3 (ref 0.7–3.1)
LYMPHOCYTES NFR BLD AUTO: 20 % (ref 19.6–45.3)
MCH RBC QN AUTO: 28.3 PG (ref 26.6–33)
MCHC RBC AUTO-ENTMCNC: 31.8 G/DL (ref 31.5–35.7)
MCV RBC AUTO: 88.9 FL (ref 79–97)
MONOCYTES # BLD AUTO: 0.75 10*3/MM3 (ref 0.1–0.9)
MONOCYTES NFR BLD AUTO: 8.4 % (ref 5–12)
NEUTROPHILS NFR BLD AUTO: 6.25 10*3/MM3 (ref 1.7–7)
NEUTROPHILS NFR BLD AUTO: 70.1 % (ref 42.7–76)
NRBC BLD AUTO-RTO: 0 /100 WBC (ref 0–0.2)
PLATELET # BLD AUTO: 200 10*3/MM3 (ref 140–450)
PMV BLD AUTO: 10.4 FL (ref 6–12)
POTASSIUM SERPL-SCNC: 3.4 MMOL/L (ref 3.5–5.2)
PROT SERPL-MCNC: 5.3 G/DL (ref 6–8.5)
QT INTERVAL: 384 MS
RBC # BLD AUTO: 3.25 10*6/MM3 (ref 3.77–5.28)
SODIUM SERPL-SCNC: 139 MMOL/L (ref 136–145)
WBC # BLD AUTO: 8.92 10*3/MM3 (ref 3.4–10.8)

## 2020-12-18 PROCEDURE — 73502 X-RAY EXAM HIP UNI 2-3 VIEWS: CPT

## 2020-12-18 PROCEDURE — 73501 X-RAY EXAM HIP UNI 1 VIEW: CPT

## 2020-12-18 PROCEDURE — 25010000002 FENTANYL CITRATE (PF) 100 MCG/2ML SOLUTION: Performed by: ANESTHESIOLOGY

## 2020-12-18 PROCEDURE — 93005 ELECTROCARDIOGRAM TRACING: CPT | Performed by: INTERNAL MEDICINE

## 2020-12-18 PROCEDURE — 76000 FLUOROSCOPY <1 HR PHYS/QHP: CPT

## 2020-12-18 PROCEDURE — 80053 COMPREHEN METABOLIC PANEL: CPT | Performed by: NURSE PRACTITIONER

## 2020-12-18 PROCEDURE — 0QS6XZZ REPOSITION RIGHT UPPER FEMUR, EXTERNAL APPROACH: ICD-10-PCS | Performed by: ORTHOPAEDIC SURGERY

## 2020-12-18 PROCEDURE — C1713 ANCHOR/SCREW BN/BN,TIS/BN: HCPCS | Performed by: ORTHOPAEDIC SURGERY

## 2020-12-18 PROCEDURE — 36415 COLL VENOUS BLD VENIPUNCTURE: CPT | Performed by: NURSE PRACTITIONER

## 2020-12-18 PROCEDURE — 25010000002 FENTANYL CITRATE (PF) 100 MCG/2ML SOLUTION: Performed by: NURSE ANESTHETIST, CERTIFIED REGISTERED

## 2020-12-18 PROCEDURE — 25010000002 HYDROMORPHONE PER 4 MG: Performed by: NURSE ANESTHETIST, CERTIFIED REGISTERED

## 2020-12-18 PROCEDURE — 25010000002 PROPOFOL 10 MG/ML EMULSION: Performed by: NURSE ANESTHETIST, CERTIFIED REGISTERED

## 2020-12-18 PROCEDURE — 85025 COMPLETE CBC W/AUTO DIFF WBC: CPT | Performed by: NURSE PRACTITIONER

## 2020-12-18 PROCEDURE — 93010 ELECTROCARDIOGRAM REPORT: CPT | Performed by: INTERNAL MEDICINE

## 2020-12-18 PROCEDURE — 2W6NXZZ TRACTION OF RIGHT UPPER LEG: ICD-10-PCS | Performed by: ORTHOPAEDIC SURGERY

## 2020-12-18 PROCEDURE — 0QH606Z INSERTION OF INTRAMEDULLARY INTERNAL FIXATION DEVICE INTO RIGHT UPPER FEMUR, OPEN APPROACH: ICD-10-PCS | Performed by: ORTHOPAEDIC SURGERY

## 2020-12-18 PROCEDURE — 25010000002 NEOSTIGMINE PER 0.5 MG: Performed by: NURSE ANESTHETIST, CERTIFIED REGISTERED

## 2020-12-18 PROCEDURE — 25810000003 SODIUM CHLORIDE 0.9 % WITH KCL 20 MEQ 20-0.9 MEQ/L-% SOLUTION: Performed by: HOSPITALIST

## 2020-12-18 PROCEDURE — 25010000003 CEFAZOLIN IN DEXTROSE 2-4 GM/100ML-% SOLUTION: Performed by: ORTHOPAEDIC SURGERY

## 2020-12-18 DEVICE — TRIGEN LOW PROFILE SCREW 5.0MM X 27.5MM
Type: IMPLANTABLE DEVICE | Site: FEMUR | Status: FUNCTIONAL
Brand: TRIGEN

## 2020-12-18 DEVICE — TRIGEN INTERTAN 10S 10MM X 18CM 125DEGREE
Type: IMPLANTABLE DEVICE | Site: FEMUR | Status: FUNCTIONAL
Brand: TRIGEN

## 2020-12-18 DEVICE — INTERTAN LAG/COMPRESSION SCREW KIT                                    85MM / 80MM
Type: IMPLANTABLE DEVICE | Site: FEMUR | Status: FUNCTIONAL
Brand: TRIGEN

## 2020-12-18 RX ORDER — FLUMAZENIL 0.1 MG/ML
0.2 INJECTION INTRAVENOUS AS NEEDED
Status: DISCONTINUED | OUTPATIENT
Start: 2020-12-18 | End: 2020-12-18 | Stop reason: HOSPADM

## 2020-12-18 RX ORDER — LIDOCAINE HYDROCHLORIDE 20 MG/ML
INJECTION, SOLUTION INFILTRATION; PERINEURAL AS NEEDED
Status: DISCONTINUED | OUTPATIENT
Start: 2020-12-18 | End: 2020-12-18 | Stop reason: SURG

## 2020-12-18 RX ORDER — PROMETHAZINE HYDROCHLORIDE 25 MG/1
25 SUPPOSITORY RECTAL ONCE AS NEEDED
Status: DISCONTINUED | OUTPATIENT
Start: 2020-12-18 | End: 2020-12-18 | Stop reason: HOSPADM

## 2020-12-18 RX ORDER — ROCURONIUM BROMIDE 10 MG/ML
INJECTION, SOLUTION INTRAVENOUS AS NEEDED
Status: DISCONTINUED | OUTPATIENT
Start: 2020-12-18 | End: 2020-12-18 | Stop reason: SURG

## 2020-12-18 RX ORDER — SODIUM CHLORIDE, SODIUM LACTATE, POTASSIUM CHLORIDE, CALCIUM CHLORIDE 600; 310; 30; 20 MG/100ML; MG/100ML; MG/100ML; MG/100ML
9 INJECTION, SOLUTION INTRAVENOUS CONTINUOUS
Status: DISCONTINUED | OUTPATIENT
Start: 2020-12-18 | End: 2020-12-21 | Stop reason: HOSPADM

## 2020-12-18 RX ORDER — SODIUM CHLORIDE AND POTASSIUM CHLORIDE 150; 900 MG/100ML; MG/100ML
75 INJECTION, SOLUTION INTRAVENOUS CONTINUOUS
Status: DISCONTINUED | OUTPATIENT
Start: 2020-12-18 | End: 2020-12-21 | Stop reason: HOSPADM

## 2020-12-18 RX ORDER — GLYCOPYRROLATE 0.2 MG/ML
INJECTION INTRAMUSCULAR; INTRAVENOUS AS NEEDED
Status: DISCONTINUED | OUTPATIENT
Start: 2020-12-18 | End: 2020-12-18 | Stop reason: SURG

## 2020-12-18 RX ORDER — HYDRALAZINE HYDROCHLORIDE 20 MG/ML
5 INJECTION INTRAMUSCULAR; INTRAVENOUS
Status: DISCONTINUED | OUTPATIENT
Start: 2020-12-18 | End: 2020-12-18 | Stop reason: HOSPADM

## 2020-12-18 RX ORDER — LABETALOL HYDROCHLORIDE 5 MG/ML
5 INJECTION, SOLUTION INTRAVENOUS
Status: DISCONTINUED | OUTPATIENT
Start: 2020-12-18 | End: 2020-12-18 | Stop reason: HOSPADM

## 2020-12-18 RX ORDER — MAGNESIUM HYDROXIDE 1200 MG/15ML
LIQUID ORAL AS NEEDED
Status: DISCONTINUED | OUTPATIENT
Start: 2020-12-18 | End: 2020-12-19 | Stop reason: HOSPADM

## 2020-12-18 RX ORDER — FENTANYL CITRATE 50 UG/ML
50 INJECTION, SOLUTION INTRAMUSCULAR; INTRAVENOUS
Status: DISCONTINUED | OUTPATIENT
Start: 2020-12-18 | End: 2020-12-18 | Stop reason: HOSPADM

## 2020-12-18 RX ORDER — BUPIVACAINE HYDROCHLORIDE AND EPINEPHRINE 5; 5 MG/ML; UG/ML
INJECTION, SOLUTION PERINEURAL AS NEEDED
Status: DISCONTINUED | OUTPATIENT
Start: 2020-12-18 | End: 2020-12-19 | Stop reason: HOSPADM

## 2020-12-18 RX ORDER — ONDANSETRON 2 MG/ML
4 INJECTION INTRAMUSCULAR; INTRAVENOUS ONCE AS NEEDED
Status: DISCONTINUED | OUTPATIENT
Start: 2020-12-18 | End: 2020-12-18 | Stop reason: HOSPADM

## 2020-12-18 RX ORDER — LIDOCAINE HYDROCHLORIDE 10 MG/ML
0.5 INJECTION, SOLUTION EPIDURAL; INFILTRATION; INTRACAUDAL; PERINEURAL ONCE AS NEEDED
Status: DISCONTINUED | OUTPATIENT
Start: 2020-12-18 | End: 2020-12-18 | Stop reason: HOSPADM

## 2020-12-18 RX ORDER — HYDROMORPHONE HYDROCHLORIDE 1 MG/ML
0.25 INJECTION, SOLUTION INTRAMUSCULAR; INTRAVENOUS; SUBCUTANEOUS
Status: DISCONTINUED | OUTPATIENT
Start: 2020-12-18 | End: 2020-12-18 | Stop reason: HOSPADM

## 2020-12-18 RX ORDER — SODIUM CHLORIDE 0.9 % (FLUSH) 0.9 %
3 SYRINGE (ML) INJECTION EVERY 12 HOURS SCHEDULED
Status: DISCONTINUED | OUTPATIENT
Start: 2020-12-18 | End: 2020-12-18 | Stop reason: HOSPADM

## 2020-12-18 RX ORDER — MIDAZOLAM HYDROCHLORIDE 1 MG/ML
1 INJECTION INTRAMUSCULAR; INTRAVENOUS
Status: DISCONTINUED | OUTPATIENT
Start: 2020-12-18 | End: 2020-12-18 | Stop reason: HOSPADM

## 2020-12-18 RX ORDER — SODIUM CHLORIDE 0.9 % (FLUSH) 0.9 %
3-10 SYRINGE (ML) INJECTION AS NEEDED
Status: DISCONTINUED | OUTPATIENT
Start: 2020-12-18 | End: 2020-12-18 | Stop reason: HOSPADM

## 2020-12-18 RX ORDER — FAMOTIDINE 10 MG/ML
20 INJECTION, SOLUTION INTRAVENOUS ONCE
Status: COMPLETED | OUTPATIENT
Start: 2020-12-18 | End: 2020-12-18

## 2020-12-18 RX ORDER — DIPHENHYDRAMINE HYDROCHLORIDE 50 MG/ML
12.5 INJECTION INTRAMUSCULAR; INTRAVENOUS
Status: DISCONTINUED | OUTPATIENT
Start: 2020-12-18 | End: 2020-12-18 | Stop reason: HOSPADM

## 2020-12-18 RX ORDER — CEFAZOLIN SODIUM 2 G/100ML
2 INJECTION, SOLUTION INTRAVENOUS EVERY 8 HOURS
Status: COMPLETED | OUTPATIENT
Start: 2020-12-19 | End: 2020-12-19

## 2020-12-18 RX ORDER — PROMETHAZINE HYDROCHLORIDE 25 MG/1
25 TABLET ORAL ONCE AS NEEDED
Status: DISCONTINUED | OUTPATIENT
Start: 2020-12-18 | End: 2020-12-18 | Stop reason: HOSPADM

## 2020-12-18 RX ORDER — EPHEDRINE SULFATE 50 MG/ML
5 INJECTION, SOLUTION INTRAVENOUS ONCE AS NEEDED
Status: DISCONTINUED | OUTPATIENT
Start: 2020-12-18 | End: 2020-12-18 | Stop reason: HOSPADM

## 2020-12-18 RX ORDER — DIPHENHYDRAMINE HCL 25 MG
25 CAPSULE ORAL
Status: DISCONTINUED | OUTPATIENT
Start: 2020-12-18 | End: 2020-12-18 | Stop reason: HOSPADM

## 2020-12-18 RX ORDER — PROPOFOL 10 MG/ML
VIAL (ML) INTRAVENOUS AS NEEDED
Status: DISCONTINUED | OUTPATIENT
Start: 2020-12-18 | End: 2020-12-18 | Stop reason: SURG

## 2020-12-18 RX ORDER — POTASSIUM CHLORIDE 1.5 G/1.77G
40 POWDER, FOR SOLUTION ORAL ONCE
Status: DISCONTINUED | OUTPATIENT
Start: 2020-12-18 | End: 2020-12-18

## 2020-12-18 RX ORDER — NALOXONE HCL 0.4 MG/ML
0.2 VIAL (ML) INJECTION AS NEEDED
Status: DISCONTINUED | OUTPATIENT
Start: 2020-12-18 | End: 2020-12-18 | Stop reason: HOSPADM

## 2020-12-18 RX ADMIN — ACETAMINOPHEN 650 MG: 325 TABLET, FILM COATED ORAL at 01:26

## 2020-12-18 RX ADMIN — SODIUM CHLORIDE, POTASSIUM CHLORIDE, SODIUM LACTATE AND CALCIUM CHLORIDE 9 ML/HR: 600; 310; 30; 20 INJECTION, SOLUTION INTRAVENOUS at 17:00

## 2020-12-18 RX ADMIN — FAMOTIDINE 20 MG: 10 INJECTION INTRAVENOUS at 17:00

## 2020-12-18 RX ADMIN — TRAMADOL HYDROCHLORIDE: 50 TABLET, FILM COATED ORAL at 11:18

## 2020-12-18 RX ADMIN — HYDROMORPHONE HYDROCHLORIDE 0.25 MG: 1 INJECTION, SOLUTION INTRAMUSCULAR; INTRAVENOUS; SUBCUTANEOUS at 19:31

## 2020-12-18 RX ADMIN — FENTANYL CITRATE 50 MCG: 50 INJECTION INTRAMUSCULAR; INTRAVENOUS at 17:32

## 2020-12-18 RX ADMIN — ROCURONIUM BROMIDE 30 MG: 10 INJECTION INTRAVENOUS at 17:09

## 2020-12-18 RX ADMIN — POTASSIUM CHLORIDE AND SODIUM CHLORIDE 75 ML/HR: 900; 150 INJECTION, SOLUTION INTRAVENOUS at 10:04

## 2020-12-18 RX ADMIN — NEOSTIGMINE METHYLSULFATE 2 MG: 1 INJECTION INTRAMUSCULAR; INTRAVENOUS; SUBCUTANEOUS at 17:41

## 2020-12-18 RX ADMIN — FENTANYL CITRATE 50 MCG: 50 INJECTION INTRAMUSCULAR; INTRAVENOUS at 17:25

## 2020-12-18 RX ADMIN — LIDOCAINE HYDROCHLORIDE 25 MG: 20 INJECTION, SOLUTION INFILTRATION; PERINEURAL at 17:25

## 2020-12-18 RX ADMIN — ACETAMINOPHEN 650 MG: 325 TABLET, FILM COATED ORAL at 20:26

## 2020-12-18 RX ADMIN — CEFAZOLIN SODIUM 2 G: 2 INJECTION, SOLUTION INTRAVENOUS at 17:15

## 2020-12-18 RX ADMIN — GLYCOPYRROLATE 0.4 MG: 0.2 INJECTION INTRAMUSCULAR; INTRAVENOUS at 17:38

## 2020-12-18 RX ADMIN — GABAPENTIN 300 MG: 300 CAPSULE ORAL at 21:53

## 2020-12-18 RX ADMIN — METOPROLOL TARTRATE 50 MG: 50 TABLET, FILM COATED ORAL at 20:26

## 2020-12-18 RX ADMIN — FENTANYL CITRATE 50 MCG: 50 INJECTION, SOLUTION INTRAMUSCULAR; INTRAVENOUS at 18:30

## 2020-12-18 RX ADMIN — GABAPENTIN 300 MG: 300 CAPSULE ORAL at 01:26

## 2020-12-18 RX ADMIN — FENTANYL CITRATE 50 MCG: 50 INJECTION, SOLUTION INTRAMUSCULAR; INTRAVENOUS at 18:18

## 2020-12-18 RX ADMIN — METOPROLOL TARTRATE 50 MG: 50 TABLET, FILM COATED ORAL at 07:49

## 2020-12-18 RX ADMIN — PROPOFOL 70 MG: 10 INJECTION, EMULSION INTRAVENOUS at 17:09

## 2020-12-18 NOTE — ANESTHESIA PROCEDURE NOTES
Airway  Urgency: elective    Date/Time: 12/18/2020 5:12 PM  Airway not difficult    General Information and Staff    Patient location during procedure: OR  Anesthesiologist: Niyah Patel MD  CRNA: Sulema Weston CRNA    Indications and Patient Condition  Indications for airway management: airway protection    Preoxygenated: yes  Mask difficulty assessment: 1 - vent by mask    Final Airway Details  Final airway type: endotracheal airway      Successful airway: ETT  Cuffed: yes   Successful intubation technique: direct laryngoscopy  Facilitating devices/methods: intubating stylet  Endotracheal tube insertion site: oral  Blade: Diane  Blade size: 2  ETT size (mm): 7.0  Cormack-Lehane Classification: grade I - full view of glottis  Placement verified by: chest auscultation and capnometry   Cuff volume (mL): 7  Measured from: gums  ETT/EBT to gums (cm): 20  Number of attempts at approach: 1  Assessment: lips, teeth, and gum same as pre-op and atraumatic intubation

## 2020-12-18 NOTE — H&P
Patient Name:  Mei Crane  YOB: 1926  MRN:  [id number]  Admit Date:  12/17/2020  Patient Care Team:  Lexie Toussaint APRN as PCP - General (Family Medicine)  Cecil Jacob OD (Optometry)  Yannick Santo MD as Consulting Physician (Ophthalmology)  Nik Rodriguez MD as Consulting Physician (Orthopedic Surgery)  Lisa Dudley APRN as Nurse Practitioner (Neurology)      Subjective   History Present Illness     Chief Complaint   Patient presents with   • Hip Injury     History of Present Illness   Ms. Crane is a 89 y.o. non-smoker with a history of hyperlipidemia, hypertension, embolic stroke, GERD, CKD stage III, atrial fibrillation on Eliquis, anemia, and hypothyroidism that presents to [facility] complaining of right hip pain status post mechanical fall.  Patient reports she was in her kitchen today when she fell landing on her right hip.  She was able to drag herself to a phone where she contacted her nephew who came over and called EMS.  Work-up in the emergency department revealed a right intertrochanteric fracture.  Chest x-ray shows an right rib fracture, age indeterminate but new from prior exams.  Labs obtained in the emergency department with a glucose of 180, potassium 3.4, creat 1.54, EGFR 31, alkaline phosphatase of 151, and white blood cell count 19.55.  She denies chest pain, fever, chills, loss of taste or smell, and N/V/D/C.    Review of Systems   Constitutional: Negative for chills and fever.   HENT: Negative for congestion and rhinorrhea.    Eyes: Negative for photophobia and visual disturbance.   Respiratory: Negative for cough and shortness of breath.    Cardiovascular: Negative for chest pain and palpitations.   Gastrointestinal: Negative for constipation, diarrhea, nausea and vomiting.   Endocrine: Negative for cold intolerance and heat intolerance.   Genitourinary: Negative for difficulty urinating and dysuria.   Musculoskeletal:  Positive for gait problem ( Right hip pain). Negative for joint swelling.   Skin: Negative for rash and wound.   Neurological: Negative for dizziness, light-headedness and headaches.   Psychiatric/Behavioral: Negative for sleep disturbance and suicidal ideas.        Personal History     Past Medical History:   Diagnosis Date   • Calculus of gallbladder without cholecystitis 8/7/2017   • CKD (chronic kidney disease), stage III 4/20/2016   • DDD (degenerative disc disease), lumbar 2/19/2019   • Disease of thyroid gland    • Hyperlipidemia    • Low back pain    • Peripheral neuropathy    • Stroke (CMS/HCC)      Past Surgical History:   Procedure Laterality Date   • CATARACT EXTRACTION Bilateral    • EPIDURAL BLOCK     • HYSTERECTOMY      age 35     Family History   Problem Relation Age of Onset   • Aneurysm Mother         AAA   • Deep vein thrombosis Father      Social History     Tobacco Use   • Smoking status: Never Smoker   • Smokeless tobacco: Never Used   Substance Use Topics   • Alcohol use: Yes     Comment: occasionally   • Drug use: No     Current Facility-Administered Medications on File Prior to Encounter   Medication Dose Route Frequency Provider Last Rate Last Admin   • cyanocobalamin injection 1,000 mcg  1,000 mcg Intramuscular Q28 Days Jae Bray MD   1,000 mcg at 11/05/20 1052     Current Outpatient Medications on File Prior to Encounter   Medication Sig Dispense Refill   • acetaminophen (TYLENOL) 325 MG tablet Take 2 tablets by mouth Every 4 (Four) Hours As Needed for Mild Pain .     • amLODIPine (NORVASC) 10 MG tablet Take 1 tablet by mouth Daily. 30 tablet 5   • apixaban (Eliquis) 5 MG tablet tablet Take 1 tablet by mouth 2 (Two) Times a Day. 60 tablet 0   • atorvastatin (LIPITOR) 80 MG tablet TAKE ONE TABLET BY MOUTH AT BEDTIME 30 tablet 1   • DULoxetine (CYMBALTA) 20 MG capsule Take 1 capsule by mouth Daily. 30 capsule 5   • ferrous gluconate (FERGON) 324 MG tablet Take  by mouth Daily.      • ferrous gluconate 324 (37.5 Fe) MG tablet tablet Take 1 tablet by mouth Daily With Breakfast. 30 tablet 6   • gabapentin (NEURONTIN) 300 MG capsule Take 1 capsule by mouth Every Night. 28 capsule 5   • levothyroxine (Synthroid) 75 MCG tablet Take 1 tablet by mouth Daily. 90 tablet 1   • metoprolol tartrate (LOPRESSOR) 50 MG tablet Take 1 tablet by mouth 2 (Two) Times a Day. 60 tablet 3   • pantoprazole (PROTONIX) 40 MG EC tablet TAKE ONE TABLET BY MOUTH EVERY DAY 90 tablet 1   • prednisoLONE acetate (PRED FORTE) 1 % ophthalmic suspension Administer 1 drop into the left eye Daily.  3   • traMADol-acetaminophen (ULTRACET) 37.5-325 MG per tablet Take 1 tablet by mouth 2 (two) times a day. 60 tablet 0     Allergies   Allergen Reactions   • Barbiturates Hives   • Codeine Hives       Objective    Objective     Vital Signs  Temp:  [96 °F (35.6 °C)] 96 °F (35.6 °C)  Heart Rate:  [75-86] 79  Resp:  [16-18] 16  BP: (141-162)/(75-79) 158/79  SpO2:  [89 %-99 %] 99 %  on  Flow (L/min):  [2] 2;   Device (Oxygen Therapy): nasal cannula  Body mass index is 25.75 kg/m².    Physical Exam  Constitutional:       General: She is not in acute distress.     Appearance: She is well-developed. She is not toxic-appearing.   HENT:      Head: Normocephalic and atraumatic.   Eyes:      General: No scleral icterus.        Right eye: No discharge.         Left eye: No discharge.      Conjunctiva/sclera: Conjunctivae normal.   Neck:      Musculoskeletal: Normal range of motion and neck supple.      Vascular: No JVD.   Cardiovascular:      Rate and Rhythm: Normal rate and regular rhythm.      Heart sounds: Murmur present. No friction rub. No gallop.    Pulmonary:      Effort: Pulmonary effort is normal. No respiratory distress.      Breath sounds: Normal breath sounds. No wheezing or rales.   Abdominal:      General: Bowel sounds are normal. There is no distension.      Palpations: Abdomen is soft.      Tenderness: There is no abdominal  tenderness. There is no guarding.   Musculoskeletal:         General: Tenderness and deformity ( Right leg shortened and slightly externally rotated) present.   Skin:     General: Skin is warm and dry.      Capillary Refill: Capillary refill takes less than 2 seconds.   Neurological:      Mental Status: She is alert and oriented to person, place, and time.   Psychiatric:         Behavior: Behavior normal.       Results Review:  I reviewed the patient's new clinical results.  Discussed with ED provider.    Lab Results (last 24 hours)     Procedure Component Value Units Date/Time    CBC & Differential [[id number]]  (Abnormal) Collected: 12/17/20 1833    Specimen: Blood Updated: 12/17/20 1850    Narrative:      The following orders were created for panel order CBC & Differential.  Procedure                               Abnormality         Status                     ---------                               -----------         ------                     CBC Auto Differential[[id number]]        Abnormal            Final result                 Please view results for these tests on the individual orders.    Comprehensive Metabolic Panel [[id number]]  (Abnormal) Collected: 12/17/20 1833    Specimen: Blood Updated: 12/17/20 1929     Glucose 180 mg/dL      BUN 18 mg/dL      Creatinine 1.54 mg/dL      Sodium 139 mmol/L      Potassium 3.4 mmol/L      Chloride 101 mmol/L      CO2 25.2 mmol/L      Calcium 9.2 mg/dL      Total Protein 7.6 g/dL      Albumin 4.30 g/dL      ALT (SGPT) 12 U/L      AST (SGOT) 19 U/L      Alkaline Phosphatase 151 U/L      Total Bilirubin 0.3 mg/dL      eGFR Non African Amer 31 mL/min/1.73      Globulin 3.3 gm/dL      A/G Ratio 1.3 g/dL      BUN/Creatinine Ratio 11.7     Anion Gap 12.8 mmol/L     Narrative:      GFR Normal >60  Chronic Kidney Disease <60  Kidney Failure <15      CBC Auto Differential [[id number]]  (Abnormal) Collected: 12/17/20 1833    Specimen: Blood Updated: 12/17/20 1850     WBC 19.55  10*3/mm3      RBC 4.45 10*6/mm3      Hemoglobin 12.7 g/dL      Hematocrit 38.4 %      MCV 86.3 fL      MCH 28.5 pg      MCHC 33.1 g/dL      RDW 13.0 %      RDW-SD 39.6 fl      MPV 10.2 fL      Platelets 331 10*3/mm3      Neutrophil % 80.1 %      Lymphocyte % 13.2 %      Monocyte % 4.6 %      Eosinophil % 0.5 %      Basophil % 0.4 %      Immature Grans % 1.2 %      Neutrophils, Absolute 15.67 10*3/mm3      Lymphocytes, Absolute 2.58 10*3/mm3      Monocytes, Absolute 0.90 10*3/mm3      Eosinophils, Absolute 0.10 10*3/mm3      Basophils, Absolute 0.07 10*3/mm3      Immature Grans, Absolute 0.23 10*3/mm3      nRBC 0.0 /100 WBC           Imaging Results (Last 24 Hours)     Procedure Component Value Units Date/Time    CT Head Without Contrast [[id number]] Collected: 12/17/20 2019     Updated: 12/17/20 2047    Narrative:      CT OF THE BRAIN WITHOUT CONTRAST 12/17/2020     HISTORY: Fell, head injury. Patient on blood thinners.     TECHNIQUE: Axial images were obtained through the brain without  intravenous contrast.      FINDINGS: There is moderate diffuse atrophy. There is decreased  attenuation of the periventricular white matter bilaterally consistent  with small vessel white matter ischemic disease.     There is no evidence of acute infarction, hemorrhage, midline shift or  mass effect.     No bony abnormalities are seen. Very minimal mucosal thickening in the  posterior right maxillary sinus is seen.       Impression:      No acute process.           Radiation dose reduction techniques were utilized, including automated  exposure control and exposure modulation based on body size.     This report was finalized on 12/17/2020 8:44 PM by Dr. Basilio Ramsey M.D.       XR Chest 1 View [[id number]] Collected: 12/17/20 1845     Updated: 12/17/20 1852    Narrative:      XR CHEST 1 VW-     HISTORY: Female who is 89 years-old,  preoperative evaluation     TECHNIQUE: Frontal views of the chest     COMPARISON: 07/19/2020      FINDINGS: The heart size is normal. Aorta is tortuous, calcified.  Pulmonary vasculature is unremarkable. No focal pulmonary consolidation,  pleural effusion, or pneumothorax. There appears to be posterior right  eighth rib fracture, age indeterminate, new from the prior exam,  correlate clinically.       Impression:      No focal pulmonary consolidation. Tortuous aorta. Follow-up  as clinically indicated.     This report was finalized on 12/17/2020 6:49 PM by Dr. Julio Cesar Trujillo M.D.       XR Hip With or Without Pelvis 2 - 3 View Right [[id number]] Collected: 12/17/20 1844     Updated: 12/17/20 1848    Narrative:      XR HIP W OR WO PELVIS 2-3 VIEW RIGHT-, XR FEMUR 2 VW RIGHT-     INDICATIONS: Trauma     TECHNIQUE: Frontal view the pelvis, 2 views of the right hip, 3 views of  the right femur     COMPARISON: None available     FINDINGS:     Right intertrochanteric fracture is noted. No other fractures are  identified. Degenerative changes seen at the symphysis pubis and  visualized lower lumbar spine. No dislocation. Arterial calcifications  are conspicuous.       Impression:         Right intertrochanteric fracture.     This report was finalized on 12/17/2020 6:45 PM by Dr. Julio Cesar Trujillo M.D.       XR Femur 2 View Right [[id number]] Collected: 12/17/20 1844     Updated: 12/17/20 1848    Narrative:      XR HIP W OR WO PELVIS 2-3 VIEW RIGHT-, XR FEMUR 2 VW RIGHT-     INDICATIONS: Trauma     TECHNIQUE: Frontal view the pelvis, 2 views of the right hip, 3 views of  the right femur     COMPARISON: None available     FINDINGS:     Right intertrochanteric fracture is noted. No other fractures are  identified. Degenerative changes seen at the symphysis pubis and  visualized lower lumbar spine. No dislocation. Arterial calcifications  are conspicuous.       Impression:         Right intertrochanteric fracture.     This report was finalized on 12/17/2020 6:45 PM by Dr. Julio Cesar Trujillo M.D.             Results  for orders placed during the hospital encounter of 06/01/20   Adult Transthoracic Echo Complete W/ Cont if Necessary Per Protocol    Narrative · Left ventricular systolic function is hyperdynamic (EF > 70%).  · Left ventricular diastolic dysfunction is noted (grade I a w/high LAP)   consistent with impaired relaxation.  · Normal right ventricular cavity size and systolic function noted.  · Left atrial cavity size is moderate-to-severely dilated.  · There is severe nodular calcification of the posterior mitral annulus.   There is moderate calcification of the anterior mitral annulus. There is   calcification of several of the mitral valve chordae.  · There is at least moderate mitral stenosis (heart rate of 89).  · The mitral valve mean gradient is 6 mmHg. The mitral valve peak gradient   is 15 mmHg.  · Mild tricuspid valve regurgitation is present.  · Calculated right ventricular systolic pressure from tricuspid   regurgitation is 43 mmHg.  · There is no evidence of pericardial effusion.          No orders to display        Assessment/Plan     Active Hospital Problems    Diagnosis  POA   • **Closed intertrochanteric fracture of hip, right, initial encounter (CMS/[facility]) [S72.141A]  Yes   • ANA (acute kidney injury) (CMS/[facility]) [N17.9]  Yes   • Leukocytosis [D72.829]  Yes   • Closed fracture of rib of right side [S22.31XA]  Yes   • Atrial fibrillation (CMS/[facility]) [I48.91]  Yes   • History of embolic stroke [Z86.73]  Not Applicable   • CKD (chronic kidney disease), stage III (CMS/[facility]) [N18.30]  Yes   • Hyperlipidemia [E78.5]  Yes   • HTN (hypertension), benign [I10]  Yes   • Gastroesophageal reflux disease without esophagitis [K21.9]  Yes   • Anemia [D64.9]  Yes   • Acquired hypothyroidism [E03.9]  Yes      Resolved Hospital Problems   No resolved problems to display.       Ms. Crane is a 89 y.o. non-smoker with a history of atrial fibrillation, CKD stage III, HTN, HLD, GERD, anemia, hypothyroidism who presents with right  hip pain secondary to a right hip fracture.    Closed intertrochanteric fracture of right hip  -N.p.o. after midnight  -Orthopedic consultation  -Bedrest until evaluated by Ortho  -Supportive care with IV hydration and analgesics  -RCRI: Class II risk, will check proBNP in a.m. the patient's likely okay for surgery    Right rib fracture  -Fracture of the eighth rib, age-indeterminate, new from prior exam  -Supportive care with analgesics  -Encourage IS   -Defer any consultation to rounding MD    ANA on CKD stage III  -Creat 1.54, eGFR 31, baseline creat 1.14  -IV hydration overnight  -Avoid nephrotoxins  -Trend BMP    History of atrial fibrillation  -Patient is currently taking Eliquis, will hold tonight  -Rate controlled  -Continue Lopressor    Hypertension  -Stable, continue Norvasc for now but monitor closely for postop hypotension as patient is likely to go to the OR in a.m.    Hyperlipidemia  -Continue statin therapy    Hypothyroidism  -Continue Synthroid    GERD  -Continue PPI      I discussed the patient's findings and my recommendations with patient and ED provider.    VTE Prophylaxis - SCDs.  Code Status - Full code.       COCO Johnson  [facility]  12/17/20  20:48 EST

## 2020-12-18 NOTE — PROGRESS NOTES
"    DAILY PROGRESS NOTE  [facility]    Patient Identification:  Name: Mei Crane  Age: 89 y.o.  Sex: female  :  1926  MRN: [id number]         Primary Care Physician: Lexie Toussaint APRN    Subjective:  Interval History: Overall did fine last night.  She obviously has pain with movement.  She not really been to much chest wall pain.  She denies any nausea vomiting diarrhea.  She denies any confusion.  She has no problems breathing at this time and is trying her best to stay consistent with use of I-S    Objective: Resting comfortably in bed at this time.  She is interactive conversational and pleasant.  Her pain appears controlled.  No family at bedside though case discussed with RN    Scheduled Meds:amLODIPine, 10 mg, Oral, Daily  atorvastatin, 80 mg, Oral, Daily  ceFAZolin, 2 g, Intravenous, On Call to OR  DULoxetine, 20 mg, Oral, Daily  gabapentin, 300 mg, Oral, Nightly  levothyroxine, 75 mcg, Oral, Q AM  metoprolol tartrate, 50 mg, Oral, BID  pantoprazole, 40 mg, Oral, Q AM  prednisoLONE acetate, 1 drop, Left Eye, Daily      Continuous Infusions:sodium chloride 0.9 % with KCl 20 mEq, 75 mL/hr        Vital signs in last 24 hours:  Temp:  [96 °F (35.6 °C)-98.8 °F (37.1 °C)] 98.8 °F (37.1 °C)  Heart Rate:  [] 100  Resp:  [16-20] 20  BP: (125-162)/(71-80) 128/80    Intake/Output:    Intake/Output Summary (Last 24 hours) at 2020 0747  Last data filed at 2020 2210  Gross per 24 hour   Intake 740 ml   Output --   Net 740 ml       Exam:  /80 (BP Location: Left arm, Patient Position: Lying)   Pulse 100   Temp 98.8 °F (37.1 °C) (Oral)   Resp 20   Ht 162.6 cm (64\")   Wt 68 kg (150 lb)   SpO2 97%   BMI 25.75 kg/m²     General Appearance:    Alert, cooperative,AAOx3                          Head:    Normocephalic, without obvious abnormality, atraumatic                         Throat:   Lips, tongue, gums normal; oral mucosa pink and moist                           " Neck:   Supple, symmetrical, trachea midline, no JVD                         Lungs:    Clear to auscultation bilaterally, respirations unlabored                 Chest Wall:  No significant chest wall pain                          heart:    Regular rate and rhythm, S1 and S2 normal                  Abdomen:     Soft, nontender, bowel sounds active                 Extremities: Right lower extremity shortened and laterally fixated consistent with fracture, no cyanosis or edema                        Pulses:   Pulses palpable in lower extremities                            Skin:   Skin is warm and dry                  Neurologic:   CNII-XII intact, no focal deficits noted     Data Review:  Labs in chart were reviewed.    Assessment:  Active Hospital Problems    Diagnosis  POA   • **Closed intertrochanteric fracture of hip, right, initial encounter (CMS/[facility]) [S72.141A]  Yes   • Closed fracture of rib of right side [S22.31XA]  Yes   • Atrial fibrillation (CMS/[facility]) [I48.91]  Yes   • History of embolic stroke [Z86.73]  Not Applicable   • CKD (chronic kidney disease), stage III (CMS/[facility]) [N18.30]  Yes   • Hyperlipidemia [E78.5]  Yes   • HTN (hypertension), benign [I10]  Yes   • Gastroesophageal reflux disease without esophagitis [K21.9]  Yes   • Anemia [D64.9]  Yes   • Acquired hypothyroidism [E03.9]  Yes      Resolved Hospital Problems    Diagnosis Date Resolved POA   • ANA (acute kidney injury) (CMS/[facility]) [N17.9] 12/18/2020 Yes   • Leukocytosis [D72.829] 12/18/2020 Yes       Plan:    Right intertrochanteric hip fracture   -Orthopedics to surgically correct today -appreciate the assistance/medically cleared from my perspective   -Last dose of Eliquis was yesterday morning -resume as soon as we can postoperatively once okay with Ortho    Right eighth rib fracture indeterminate   -Encourage I-S   -No need for additional work-up/consultation at this time    CKD 3 at baseline -continue gentle IV hydration while  n.p.o.    PAF-RC/AC on hold    HTN stable-parameters written    Replace K via IVF    Reactive leukocytosis resolved without antibiotics    Anemia -probable aspect of anemia secondary to CKD 3 with dilutional effect with drop in hemoglobin overnight.  We will continue to monitor but do anticipate additional acute blood loss postoperatively    West Frank MD  12/18/2020  07:47 EST

## 2020-12-18 NOTE — DISCHARGE PLACEMENT REQUEST
"Mei Crane (89 y.o. Female)     Date of Birth Social Security Number Address Home Phone MRN    02/14/1926  [address] [phone] [id number]    Yazidi Marital Status          Buddhism        Admission Date Admission Type Admitting Provider Attending Provider Department, Room/Bed    12/17/20 Emergency Js Mejia MD Masden, Troy Andrew, MD [address], P885/1    Discharge Date Discharge Disposition Discharge Destination                       Attending Provider: West Frank MD    Allergies: Barbiturates, Codeine    Isolation: None   Infection: None   Code Status: CPR    Ht: 162.6 cm (64\")   Wt: 68 kg (150 lb)    Admission Cmt: None   Principal Problem: Closed intertrochanteric fracture of hip, right, initial encounter (CMS/[facility]) [S72.141A]                 Active Insurance as of 12/17/2020     Primary Coverage     Payor Plan Insurance Group Employer/Plan Group    MEDICARE MEDICARE A & B      Payor Plan Address Payor Plan Phone Number Payor Plan Fax Number Effective Dates    [address] [phone]  2/1/1991 - None Entered    [facility] [id number]       Subscriber Name Subscriber Birth Date Member ID       MEI CRANE 2/14/1926 [id number]           Secondary Coverage     Payor Plan Insurance Group Employer/Plan Group    [facility][1] SUPP [id number]     Payor Plan Address Payor Plan Phone Number Payor Plan Fax Number Effective Dates    [address]   12/1/2016 - None Entered    [facility] [id number]       Subscriber Name Subscriber Birth Date Member ID       MEI CRANE 2/14/1926 [id number]                 Emergency Contacts      (Rel.) Home Phone Work Phone Mobile Phone    EstivenEmily (Sister) -- -- [phone]    SATISH ARENAS ([place]PHE[place]) (Relative) [phone] -- --              "

## 2020-12-18 NOTE — OP NOTE
Intertrochanteric/Subtrochanteric Fracture Operative Note  Dr. GLYNN “Rocky” Bibi II  [phone]    PATIENT NAME: Mei Crane  MRN: [id number]  : 1926 AGE: 89 y.o. GENDER: female  DATE OF OPERATION: 2020  PREOPERATIVE DIAGNOSIS: Hip Fracture  POSTOPERATIVE DIAGNOSIS: Hip Fracture  OPERATION PERFORMED: Right Intramedullary Nailing of the Intertrochanteric Hip Fracture.  SURGEON: Raymond Mtz MD  Circulator: Emani Yeh RN  Radiology Technologist: Ag Lambert  Scrvita Person: Alexia Kovacs  Vendor Representative: Sonja Wolf Ben  Assistant: Christina Lennon PA  ANESTHESIA: General  ASSISTANT: Christina Lennon. This case would not have been possible without another set of skilled surgical hands for retraction, use of instrumentation, and general assistance.  This assistance was vital to the success of the case.   ESTIMATED BLOOD LOSS: 50cc  SPONGE AND NEEDLE COUNT: Correct  INDICATIONS: This patient was found to have an Intertrochanteric Hip Fracture after evaluation in the ER. An orthopaedic consult was placed for management. A discussion of operative versus nonoperative treatment was had with the patient and/or family. They elected to undergo intramedullary nailing of the hip fracture. The risks of surgery were discussed and included the risk of anesthesia, nonunion, malunion, infection, damage to neurovascular structures, implant loosening/fialure, fracture, hardware prominence, the need for further procedures, medical complications, and others. No guarantees were made. The patient wished to proceed with surgery and a surgical consent was signed.  COMPONENTS:   · [employer] Tri[employer] InterTAN nail: 10 mm x 18cm    PERTINENT FINDINGS: Hip Fracture    DETAILS OF PROCEDURE:   The patient was met in the preoperative area. The site was marked. The consent and H&P were reviewed. The patient was then wheeled back to the operative suite and underwent anesthesia. The [place] table boots  were secured to the patients’ feet. The patient was moved onto the [place] table and secured in the supine position. The perineal post was inserted and the boots were secured into the leg holders. Surgical alcohol was used to thoroughly clean the operative area.    REDUCTION  Fluoroscopy was used to visualize the fracture on both an AP and lateral.  Traction, rotation, flexion/extension, abduction/abduction was used as needed to adequately reduce the hip fracture.    The hip and leg were then prepped in the normal sterile fashion, which included Chloroprep and multiple layers of sterile drapes. A surgical timeout was performed in which administration of preoperative antibiotics and the surgical site were confirmed.    A small incision was made just proximal to the greater trochanter and a starting pin was drilled into the tip of the greater trochanter using fluoroscopy to confirm proper location. The opening reamer was then used to open the canal down to the lesser trochanter, visualizing propper position again using fluoroscopy.     OPEN REDUCTION  As fluoroscopic images on both an AP and lateral demonstrated adequate reduction of the fracture, no open techniques needed to be utilized for this case.     NAIL INSERTION  SHORT NAIL: A short nail was then inserted into the femur through the hole made by the opening reamer. The lag screw and compression screw were placed into the femoral head after being drilled and measured, again using fluoroscopy to place the screws in optimal position within the femoral head on both the AP and Lateral views, close to center/center position. Traction was released at this point and the fracture was compressed through the nail. Finally the short nail was secured with one distal interlocking screw which was placed using the jig assembly for the short nail. Final X-Rays showed the fracture reduction to have been maintained and the implant in optimal position.     The wounds were irrigated  "and closed in layers.  The wound was injected with an analgesic coctail.  A sterile dressing was then placed over the incisions. The patient was moved from the [place] table to the [facility] where the boots were removed. The patient was taken to the recovery room in stable condition. There were no complications and the patient tolerated the procedure well.    R \"Rocky\" Bibi HOLMAN MD  Orthopaedic Surgery  [facility]  [phone]    "

## 2020-12-18 NOTE — PLAN OF CARE
Goal Outcome Evaluation:  Plan of Care Reviewed With: patient  Progress: no change  Outcome Summary: ER ADMIT. RIGHT HIP FRACTURE. BEDREST. NPO. PUREWICK IN PLACE.  VSS.  TYLENOL HELPING WITH PAIN. POSSIBLE SX TODAY. EDUCATION PROVIDED ON BLOOD PRESSURE MONITORING. PATIENT VERBALIZED UNDERSTANING.

## 2020-12-18 NOTE — PROGRESS NOTES
Discharge Planning Assessment  [facility]     Patient Name: Mei Crane  MRN: [id number]  Today's Date: 12/18/2020    Admit Date: 12/17/2020    Discharge Needs Assessment     Row Name 12/18/20 0921       Living Environment    Lives With  alone    Current Living Arrangements  home/apartment/condo    Primary Care Provided by  self    Provides Primary Care For  no one    Family Caregiver if Needed  child(néstor), adult    Quality of Family Relationships  helpful;involved;supportive       Transition Planning    Patient/Family Anticipates Transition to  inpatient rehabilitation facility    Patient/Family Anticipated Services at Transition      Transportation Anticipated  health plan transportation       Discharge Needs Assessment    Readmission Within the Last 30 Days  no previous admission in last 30 days    Equipment Currently Used at Home  cane, straight;walker, rolling;rollator;glucometer    Discharge Facility/Level of Care Needs  nursing facility, skilled    Provided Post Acute Provider List?  Refused    Refused Provider List Comment  Requested referrals to [facility].        Discharge Plan     Row Name 12/18/20 0931       Plan    Plan  [facility] SNF -- Accepted    Patient/Family in Agreement with Plan  yes    Plan Comments  Spoke with the patient, verified current information and CCP role explained. Patient states she has good family support at home. She has [employer] visit with her twice a week to drive her places and help with laundry/cleaning occasionally. She's overall IADL and has a walker, cane, rollator and glucometer. She's been to [facility] twice and has no history with . Patient plans to d/c to a SNF. She requests referrals to [facility]. Referrals sent in [facility]; spoke with Petr who has accepted the patient and plans to have a bed for her at [facility] Monday. Plan is for the patient to d/c to SNF, and pt will need  transportation at d/c. Await SNF determinations. CCP following.        Continued Care and Services - Admitted Since 12/17/2020     Destination     Service Provider Request Status Selected Services Address Phone Fax Patient Preferred    [facility]  Pending - Request Sent N/A [address] [facility] [address] [phone] --    [facility]  Pending - Request Sent N/A [address] [facility] [address] [phone] -- --                Demographic Summary     Row Name 12/18/20 0918       General Information    Admission Type  inpatient    Reason for Consult  discharge planning    Preferred Language  English     Used During This Interaction  no       Contact Information    Permission Granted to Share Info With  ;family/designee        Functional Status     Row Name 12/18/20 0920       Functional Status    Usual Activity Tolerance  good    Current Activity Tolerance  good       Functional Status, IADL    Medications  independent    Meal Preparation  independent    Housekeeping  independent;assistive person    Laundry  independent;assistive person    Shopping  independent;assistive person       Mental Status Summary    Recent Changes in Mental Status/Cognitive Functioning  no changes        Psychosocial     Row Name 12/18/20 0920       Intellectual Performance WDL    Level of Consciousness  Alert       Coping/Stress    Patient Personal Strengths  able to adapt    Sources of Support  adult child(néstor);sibling(s)    Reaction to Health Status  accepting    Understanding of Condition and Treatment  adequate understanding of medical condition       Developmental Stage (Eriksson's)    Developmental Stage  Stage 8 (65 years-death/Late Adulthood) Integrity vs. Despair        Abuse/Neglect    No documentation.       Legal    No documentation.       Substance Abuse    No documentation.       Patient Forms    No documentation.           Alona BAUER  MATILDA Nolen

## 2020-12-18 NOTE — PROGRESS NOTES
"Patient with right hip fracture. Plan for fixation surgery tomorrow. Patient to be seen in the afternoon with planned surgery in the later afternoon/evening.    R \"Rocky\" Bibi HOLMAN MD  Orthopaedic Surgery  [facility]  [phone] - [place] Office  [phone] - [place] Office    "

## 2020-12-18 NOTE — ED NOTES
This RN wore goggles, mask, and gloves while in pts room. Pt wearing surgical mask.    +gown, + face shield for Sherice Matthews, MATILDA  12/17/20 0788

## 2020-12-18 NOTE — ANESTHESIA PREPROCEDURE EVALUATION
Anesthesia Evaluation     NPO Solid Status: > 8 hours             Airway   Mallampati: I  TM distance: >3 FB  Neck ROM: full  No difficulty expected  Dental    (+) edentulous    Pulmonary - normal exam   (-) not a smoker  Cardiovascular - normal exam    (+) hypertension, dysrhythmias Atrial Fib, hyperlipidemia,       Neuro/Psych  (+) CVA, dizziness/light headedness, weakness, numbness,     GI/Hepatic/Renal/Endo    (+)  GERD,  renal disease CRI, thyroid problem     Musculoskeletal     Abdominal    Substance History      OB/GYN          Other                        Anesthesia Plan    ASA 3     general     intravenous induction     Anesthetic plan, all risks, benefits, and alternatives have been provided, discussed and informed consent has been obtained with: patient.

## 2020-12-18 NOTE — CONSULTS
Orthopaedic Surgery  Hip Fracture Consult Note  Dr. RENARD Manjarrez” Bibi HOLMAN  [phone]    HPI:  Patient is a 89 y.o. Not  or  female who presents with hip pain after a fall from standing.  They presented to the ER for further workup where a right Intertrochanteric Hip Fracture was found. I was consulted for further management.     MEDICAL HISTORY  Past Medical History:   Diagnosis Date   • Calculus of gallbladder without cholecystitis 8/7/2017   • CKD (chronic kidney disease), stage III 4/20/2016   • DDD (degenerative disc disease), lumbar 2/19/2019   • Disease of thyroid gland    • Hyperlipidemia    • Low back pain    • Peripheral neuropathy    • Stroke (CMS/HCC)    ·   Past Surgical History:   Procedure Laterality Date   • CATARACT EXTRACTION Bilateral    • EPIDURAL BLOCK     • HYSTERECTOMY      age 35   ·   Prior to Admission medications    Medication Sig Start Date End Date Taking? Authorizing Provider   acetaminophen (TYLENOL) 325 MG tablet Take 2 tablets by mouth Every 4 (Four) Hours As Needed for Mild Pain . 6/5/20  Yes Kimi Cartagena APRN   amLODIPine (NORVASC) 10 MG tablet Take 1 tablet by mouth Daily. 9/22/20  Yes Lexie Toussaint APRN   apixaban (Eliquis) 5 MG tablet tablet Take 1 tablet by mouth 2 (Two) Times a Day. 12/10/20  Yes Lisa Dudley APRN   atorvastatin (LIPITOR) 80 MG tablet TAKE ONE TABLET BY MOUTH AT BEDTIME 11/3/20  Yes Lexie Toussaint APRN   DULoxetine (CYMBALTA) 20 MG capsule Take 1 capsule by mouth Daily. 7/15/20  Yes Lexie Toussaint APRN   ferrous gluconate 324 (37.5 Fe) MG tablet tablet Take 1 tablet by mouth Daily With Breakfast. 9/22/20  Yes Lexie Toussaint APRN   gabapentin (NEURONTIN) 300 MG capsule Take 1 capsule by mouth Every Night. 9/22/20  Yes Lexie Toussaint APRN   levothyroxine (Synthroid) 75 MCG tablet Take 1 tablet by mouth Daily. 9/22/20  Yes Lexie Toussaint APRN   metoprolol tartrate (LOPRESSOR) 50 MG tablet Take 1 tablet by mouth 2  "(Two) Times a Day. 9/11/20  Yes Lexie Toussaint APRN   pantoprazole (PROTONIX) 40 MG EC tablet TAKE ONE TABLET BY MOUTH EVERY DAY 12/1/20  Yes Lexie Toussaint APRN   prednisoLONE acetate (PRED FORTE) 1 % ophthalmic suspension Administer 1 drop into the left eye Daily. 2/23/18  Yes Provider, MD Hayley   traMADol-acetaminophen (ULTRACET) 37.5-325 MG per tablet Take 1 tablet by mouth 2 (two) times a day. 8/27/20  Yes Lexie Toussaint APRN   ferrous gluconate (FERGON) 324 MG tablet Take  by mouth Daily. 9/22/20 12/18/20  Provider, MD Hayley   ·   Allergies   Allergen Reactions   • Barbiturates Hives   • Codeine Hives   ·   Most Recent Immunizations   Administered Date(s) Administered   • FLUAD TRI 65YR+ 10/15/2019   • Flu Vaccine Quad PF >18YRS 10/24/2016   • Fluad Quad 65+ 10/01/2020   • Fluzone High Dose =>65 Years ([employer] ONLY) 10/12/2018   • Influenza Split Preservative Free ID 10/24/2016   • Pneumococcal Conjugate 13-Valent (PCV13) 07/25/2017   • Pneumococcal Polysaccharide (PPSV23) 09/17/2019   ·   Social History     Tobacco Use   • Smoking status: Never Smoker   • Smokeless tobacco: Never Used   Substance Use Topics   • Alcohol use: Yes     Comment: occasionally   ·    Social History     Substance and Sexual Activity   Drug Use No   ·     VITALS: /78 (BP Location: Left arm, Patient Position: Lying)   Pulse 81   Temp 98.1 °F (36.7 °C) (Skin)   Resp 18   Ht 162.6 cm (64\")   Wt 68 kg (150 lb)   SpO2 94%   BMI 25.75 kg/m²  Body mass index is 25.75 kg/m².    PHYSICAL EXAM:   · CONSTITUTIONAL: No acute distress  · LUNGS: Equal chest rise, no shortness of air  · CARDIOVASCULAR: palpable peripheral pulses  · SKIN: no skin lesions in the area examined  · LYMPH: no lymphadenopathy in the area examined  · Right Lower Extremity  · Tenderness to Palpation: Tenderness to palpation at the hip  · Pulses:  Palpable DP/PT pulses  · Sensation: Sensation intact to light touch to saphenous/sural/deep " peroneal/superficial peroneal/tibial nerves  · Motor: 5 out of 5 EHL/FHL/TA/GS motor complexes  · Range of Motion: Range of motion of hip deferred secondary to pain.  Positive pain with passive leg roll    RADIOLOGY REVIEW:   Xr Femur 2 View Right    Result Date: 12/17/2020   Right intertrochanteric fracture.  This report was finalized on 12/17/2020 6:45 PM by Dr. Julio Cesar Trujillo M.D.      Ct Head Without Contrast    Result Date: 12/17/2020  No acute process.    Radiation dose reduction techniques were utilized, including automated exposure control and exposure modulation based on body size.  This report was finalized on 12/17/2020 8:44 PM by Dr. Basilio Ramsey M.D.      Xr Chest 1 View    Result Date: 12/17/2020  No focal pulmonary consolidation. Tortuous aorta. Follow-up as clinically indicated.  This report was finalized on 12/17/2020 6:49 PM by Dr. Julio Cesar Trujillo M.D.      Xr Hip With Or Without Pelvis 2 - 3 View Right    Result Date: 12/17/2020   Right intertrochanteric fracture.  This report was finalized on 12/17/2020 6:45 PM by Dr. Julio Cesar Trujillo M.D.        LABS:   Recent Results (from the past 24 hour(s))   Comprehensive Metabolic Panel    Collection Time: 12/17/20  6:33 PM    Specimen: Blood   Result Value Ref Range    Glucose 180 (H) 65 - 99 mg/dL    BUN 18 8 - 23 mg/dL    Creatinine 1.54 (H) 0.57 - 1.00 mg/dL    Sodium 139 136 - 145 mmol/L    Potassium 3.4 (L) 3.5 - 5.2 mmol/L    Chloride 101 98 - 107 mmol/L    CO2 25.2 22.0 - 29.0 mmol/L    Calcium 9.2 8.2 - 9.6 mg/dL    Total Protein 7.6 6.0 - 8.5 g/dL    Albumin 4.30 3.50 - 5.20 g/dL    ALT (SGPT) 12 1 - 33 U/L    AST (SGOT) 19 1 - 32 U/L    Alkaline Phosphatase 151 (H) 39 - 117 U/L    Total Bilirubin 0.3 0.0 - 1.2 mg/dL    eGFR Non African Amer 31 (L) >60 mL/min/1.73    Globulin 3.3 gm/dL    A/G Ratio 1.3 g/dL    BUN/Creatinine Ratio 11.7 7.0 - 25.0    Anion Gap 12.8 5.0 - 15.0 mmol/L   CBC Auto Differential    Collection Time:  12/17/20  6:33 PM    Specimen: Blood   Result Value Ref Range    WBC 19.55 (H) 3.40 - 10.80 10*3/mm3    RBC 4.45 3.77 - 5.28 10*6/mm3    Hemoglobin 12.7 12.0 - 15.9 g/dL    Hematocrit 38.4 34.0 - 46.6 %    MCV 86.3 79.0 - 97.0 fL    MCH 28.5 26.6 - 33.0 pg    MCHC 33.1 31.5 - 35.7 g/dL    RDW 13.0 12.3 - 15.4 %    RDW-SD 39.6 37.0 - 54.0 fl    MPV 10.2 6.0 - 12.0 fL    Platelets 331 140 - 450 10*3/mm3    Neutrophil % 80.1 (H) 42.7 - 76.0 %    Lymphocyte % 13.2 (L) 19.6 - 45.3 %    Monocyte % 4.6 (L) 5.0 - 12.0 %    Eosinophil % 0.5 0.3 - 6.2 %    Basophil % 0.4 0.0 - 1.5 %    Immature Grans % 1.2 (H) 0.0 - 0.5 %    Neutrophils, Absolute 15.67 (H) 1.70 - 7.00 10*3/mm3    Lymphocytes, Absolute 2.58 0.70 - 3.10 10*3/mm3    Monocytes, Absolute 0.90 0.10 - 0.90 10*3/mm3    Eosinophils, Absolute 0.10 0.00 - 0.40 10*3/mm3    Basophils, Absolute 0.07 0.00 - 0.20 10*3/mm3    Immature Grans, Absolute 0.23 (H) 0.00 - 0.05 10*3/mm3    nRBC 0.0 0.0 - 0.2 /100 WBC   COVID-19,BH DEMETRICE IN-HOUSE CEPHEID, NP SWAB IN TRANSPORT MEDIA 8-12 HR TAT - Swab, Nasopharynx    Collection Time: 12/17/20  8:50 PM    Specimen: Nasopharynx; Swab   Result Value Ref Range    COVID19 Not Detected Not Detected - Ref. Range   Protime-INR    Collection Time: 12/17/20 11:00 PM    Specimen: Arm, Left; Blood   Result Value Ref Range    Protime 15.1 (H) 11.7 - 14.2 Seconds    INR 1.21 (H) 0.90 - 1.10   CBC Auto Differential    Collection Time: 12/18/20  4:46 AM    Specimen: Blood   Result Value Ref Range    WBC 8.92 3.40 - 10.80 10*3/mm3    RBC 3.25 (L) 3.77 - 5.28 10*6/mm3    Hemoglobin 9.2 (L) 12.0 - 15.9 g/dL    Hematocrit 28.9 (L) 34.0 - 46.6 %    MCV 88.9 79.0 - 97.0 fL    MCH 28.3 26.6 - 33.0 pg    MCHC 31.8 31.5 - 35.7 g/dL    RDW 13.1 12.3 - 15.4 %    RDW-SD 42.5 37.0 - 54.0 fl    MPV 10.4 6.0 - 12.0 fL    Platelets 200 140 - 450 10*3/mm3    Neutrophil % 70.1 42.7 - 76.0 %    Lymphocyte % 20.0 19.6 - 45.3 %    Monocyte % 8.4 5.0 - 12.0 %    Eosinophil  % 0.7 0.3 - 6.2 %    Basophil % 0.4 0.0 - 1.5 %    Immature Grans % 0.4 0.0 - 0.5 %    Neutrophils, Absolute 6.25 1.70 - 7.00 10*3/mm3    Lymphocytes, Absolute 1.78 0.70 - 3.10 10*3/mm3    Monocytes, Absolute 0.75 0.10 - 0.90 10*3/mm3    Eosinophils, Absolute 0.06 0.00 - 0.40 10*3/mm3    Basophils, Absolute 0.04 0.00 - 0.20 10*3/mm3    Immature Grans, Absolute 0.04 0.00 - 0.05 10*3/mm3    nRBC 0.0 0.0 - 0.2 /100 WBC   Comprehensive Metabolic Panel    Collection Time: 12/18/20  4:46 AM    Specimen: Blood   Result Value Ref Range    Glucose 109 (H) 65 - 99 mg/dL    BUN 13 8 - 23 mg/dL    Creatinine 1.01 (H) 0.57 - 1.00 mg/dL    Sodium 139 136 - 145 mmol/L    Potassium 3.4 (L) 3.5 - 5.2 mmol/L    Chloride 106 98 - 107 mmol/L    CO2 24.2 22.0 - 29.0 mmol/L    Calcium 8.1 (L) 8.2 - 9.6 mg/dL    Total Protein 5.3 (L) 6.0 - 8.5 g/dL    Albumin 3.20 (L) 3.50 - 5.20 g/dL    ALT (SGPT) 10 1 - 33 U/L    AST (SGOT) 16 1 - 32 U/L    Alkaline Phosphatase 101 39 - 117 U/L    Total Bilirubin 0.3 0.0 - 1.2 mg/dL    eGFR Non African Amer 51 (L) >60 mL/min/1.73    Globulin 2.1 gm/dL    A/G Ratio 1.5 g/dL    BUN/Creatinine Ratio 12.9 7.0 - 25.0    Anion Gap 8.8 5.0 - 15.0 mmol/L   ECG 12 Lead    Collection Time: 12/18/20  6:44 AM   Result Value Ref Range    QT Interval 384 ms       IMPRESSION:  Patient is a 89 y.o. Not  or  female with right Intertrochanteric Hip Fracture    PLAN:   · Admited to: Js Mejia MD  · Diet: NPO Now  · Weight Bearing:Right Lower Extremity Non Weight Bearing until after surgery  · Labs: None additional needed  · Imaging: None additional needed  · Surgery: Intramedullary nailing for intertrochanteric hip fracture  · Hip Nail Consent: The risks and benefits of operative versus nonoperative treatment were discussed. They have elected to undergo operative treatment of the  injury. The general risks discussed included but were not limited to the risk of anesthesia, medical complications,  "infection, the need for further procedures, damage to neurovascular structures, blood clots, and continued pain.  No guarantees were made. Specifically, for this fracture I had a thorough discussion with the patient about the operative risks of intramedullary nailing.  These risks included nonunion/malunion, continued pain, advancement of arthritis and hardware irritation.  The patient and/or family wished to proceed with surgery.  The surgical consent was signed.  · Disposition: surgery today    R \"Rocky\" Bibi HOLMAN MD  Orthopaedic Surgery  [facility]  [phone] - [place] Office  [phone] - [place] Office          "

## 2020-12-18 NOTE — ANESTHESIA POSTPROCEDURE EVALUATION
"Patient: Mei Crane    Procedure Summary     Date: 12/18/20 Room / Location: [facility] OR [address] [place] [address] MAIN OR    Anesthesia Start: 1705 Anesthesia Stop: 1802    Procedure: FEMUR INTRAMEDULLARY NAILING/RODDING (Right Thigh) Diagnosis:     Surgeon: Raymond Mtz II, MD Provider: Julio Cesar Hoyt MD    Anesthesia Type: general ASA Status: 3          Anesthesia Type: general    Vitals  Vitals Value Taken Time   /77 12/18/20 1810   Temp 36.8 °C (98.3 °F) 12/18/20 1800   Pulse 98 12/18/20 1820   Resp 16 12/18/20 1810   SpO2 99 % 12/18/20 1820   Vitals shown include unvalidated device data.        Post Anesthesia Care and Evaluation    Patient location during evaluation: PACU  Patient participation: complete - patient participated  Level of consciousness: awake  Pain management: adequate  Airway patency: patent  Anesthetic complications: No anesthetic complications    Cardiovascular status: acceptable  Respiratory status: acceptable  Hydration status: acceptable    Comments: /94 (BP Location: Left arm, Patient Position: Lying)   Pulse 111   Temp 36.8 °C (98.3 °F) (Oral)   Resp 16   Ht 162.6 cm (64\")   Wt 68 kg (150 lb)   SpO2 94%   BMI 25.75 kg/m²         "

## 2020-12-19 LAB
ABO GROUP BLD: NORMAL
ALBUMIN SERPL-MCNC: 2.7 G/DL (ref 3.5–5.2)
ANION GAP SERPL CALCULATED.3IONS-SCNC: 9.1 MMOL/L (ref 5–15)
BLD GP AB SCN SERPL QL: NEGATIVE
BUN SERPL-MCNC: 10 MG/DL (ref 8–23)
BUN/CREAT SERPL: 11.1 (ref 7–25)
CALCIUM SPEC-SCNC: 8 MG/DL (ref 8.2–9.6)
CHLORIDE SERPL-SCNC: 103 MMOL/L (ref 98–107)
CO2 SERPL-SCNC: 24.9 MMOL/L (ref 22–29)
CREAT SERPL-MCNC: 0.9 MG/DL (ref 0.57–1)
DEPRECATED RDW RBC AUTO: 42.3 FL (ref 37–54)
ERYTHROCYTE [DISTWIDTH] IN BLOOD BY AUTOMATED COUNT: 13 % (ref 12.3–15.4)
GFR SERPL CREATININE-BSD FRML MDRD: 58 ML/MIN/1.73
GLUCOSE SERPL-MCNC: 110 MG/DL (ref 65–99)
HCT VFR BLD AUTO: 24.8 % (ref 34–46.6)
HGB BLD-MCNC: 7.9 G/DL (ref 12–15.9)
MCH RBC QN AUTO: 28.4 PG (ref 26.6–33)
MCHC RBC AUTO-ENTMCNC: 31.9 G/DL (ref 31.5–35.7)
MCV RBC AUTO: 89.2 FL (ref 79–97)
PHOSPHATE SERPL-MCNC: 4.3 MG/DL (ref 2.5–4.5)
PLATELET # BLD AUTO: 176 10*3/MM3 (ref 140–450)
PMV BLD AUTO: 10.5 FL (ref 6–12)
POTASSIUM SERPL-SCNC: 4 MMOL/L (ref 3.5–5.2)
RBC # BLD AUTO: 2.78 10*6/MM3 (ref 3.77–5.28)
RH BLD: POSITIVE
SODIUM SERPL-SCNC: 137 MMOL/L (ref 136–145)
T&S EXPIRATION DATE: NORMAL
WBC # BLD AUTO: 9.21 10*3/MM3 (ref 3.4–10.8)

## 2020-12-19 PROCEDURE — 86850 RBC ANTIBODY SCREEN: CPT | Performed by: INTERNAL MEDICINE

## 2020-12-19 PROCEDURE — 86901 BLOOD TYPING SEROLOGIC RH(D): CPT | Performed by: INTERNAL MEDICINE

## 2020-12-19 PROCEDURE — 97162 PT EVAL MOD COMPLEX 30 MIN: CPT

## 2020-12-19 PROCEDURE — 25010000003 CEFAZOLIN IN DEXTROSE 2-4 GM/100ML-% SOLUTION: Performed by: ORTHOPAEDIC SURGERY

## 2020-12-19 PROCEDURE — 63710000001 DIPHENHYDRAMINE PER 50 MG: Performed by: INTERNAL MEDICINE

## 2020-12-19 PROCEDURE — 25010000002 FUROSEMIDE PER 20 MG: Performed by: INTERNAL MEDICINE

## 2020-12-19 PROCEDURE — 80069 RENAL FUNCTION PANEL: CPT | Performed by: ORTHOPAEDIC SURGERY

## 2020-12-19 PROCEDURE — P9016 RBC LEUKOCYTES REDUCED: HCPCS

## 2020-12-19 PROCEDURE — 86900 BLOOD TYPING SEROLOGIC ABO: CPT | Performed by: INTERNAL MEDICINE

## 2020-12-19 PROCEDURE — 86900 BLOOD TYPING SEROLOGIC ABO: CPT

## 2020-12-19 PROCEDURE — 86901 BLOOD TYPING SEROLOGIC RH(D): CPT

## 2020-12-19 PROCEDURE — 85027 COMPLETE CBC AUTOMATED: CPT | Performed by: ORTHOPAEDIC SURGERY

## 2020-12-19 PROCEDURE — 97110 THERAPEUTIC EXERCISES: CPT

## 2020-12-19 PROCEDURE — 25010000002 ONDANSETRON PER 1 MG: Performed by: ORTHOPAEDIC SURGERY

## 2020-12-19 PROCEDURE — 86923 COMPATIBILITY TEST ELECTRIC: CPT

## 2020-12-19 PROCEDURE — 36430 TRANSFUSION BLD/BLD COMPNT: CPT

## 2020-12-19 PROCEDURE — 25810000003 SODIUM CHLORIDE 0.9 % WITH KCL 20 MEQ 20-0.9 MEQ/L-% SOLUTION: Performed by: ORTHOPAEDIC SURGERY

## 2020-12-19 RX ORDER — FUROSEMIDE 10 MG/ML
20 INJECTION INTRAMUSCULAR; INTRAVENOUS ONCE
Status: COMPLETED | OUTPATIENT
Start: 2020-12-19 | End: 2020-12-19

## 2020-12-19 RX ORDER — ACETAMINOPHEN 160 MG/5ML
650 SOLUTION ORAL ONCE
Status: DISCONTINUED | OUTPATIENT
Start: 2020-12-19 | End: 2020-12-21 | Stop reason: HOSPADM

## 2020-12-19 RX ORDER — ACETAMINOPHEN 650 MG/1
650 SUPPOSITORY RECTAL ONCE
Status: DISCONTINUED | OUTPATIENT
Start: 2020-12-19 | End: 2020-12-21 | Stop reason: HOSPADM

## 2020-12-19 RX ORDER — DIPHENHYDRAMINE HCL 25 MG
25 CAPSULE ORAL ONCE
Status: COMPLETED | OUTPATIENT
Start: 2020-12-19 | End: 2020-12-19

## 2020-12-19 RX ORDER — DIPHENHYDRAMINE HYDROCHLORIDE 50 MG/ML
25 INJECTION INTRAMUSCULAR; INTRAVENOUS ONCE
Status: COMPLETED | OUTPATIENT
Start: 2020-12-19 | End: 2020-12-19

## 2020-12-19 RX ORDER — ACETAMINOPHEN 325 MG/1
650 TABLET ORAL ONCE
Status: DISCONTINUED | OUTPATIENT
Start: 2020-12-19 | End: 2020-12-21 | Stop reason: HOSPADM

## 2020-12-19 RX ADMIN — PREDNISOLONE ACETATE 1 DROP: 10 SUSPENSION/ DROPS OPHTHALMIC at 08:23

## 2020-12-19 RX ADMIN — ONDANSETRON 4 MG: 2 INJECTION INTRAMUSCULAR; INTRAVENOUS at 02:17

## 2020-12-19 RX ADMIN — APIXABAN 5 MG: 5 TABLET, FILM COATED ORAL at 20:50

## 2020-12-19 RX ADMIN — ATORVASTATIN CALCIUM 80 MG: 20 TABLET, FILM COATED ORAL at 08:23

## 2020-12-19 RX ADMIN — DIPHENHYDRAMINE HYDROCHLORIDE 25 MG: 25 CAPSULE ORAL at 17:25

## 2020-12-19 RX ADMIN — APIXABAN 5 MG: 5 TABLET, FILM COATED ORAL at 08:23

## 2020-12-19 RX ADMIN — DULOXETINE HYDROCHLORIDE 20 MG: 20 CAPSULE, DELAYED RELEASE ORAL at 08:23

## 2020-12-19 RX ADMIN — ACETAMINOPHEN 650 MG: 325 TABLET, FILM COATED ORAL at 00:39

## 2020-12-19 RX ADMIN — PANTOPRAZOLE SODIUM 40 MG: 40 TABLET, DELAYED RELEASE ORAL at 06:06

## 2020-12-19 RX ADMIN — CEFAZOLIN SODIUM 2 G: 2 INJECTION, SOLUTION INTRAVENOUS at 17:05

## 2020-12-19 RX ADMIN — POTASSIUM CHLORIDE AND SODIUM CHLORIDE 75 ML/HR: 900; 150 INJECTION, SOLUTION INTRAVENOUS at 17:07

## 2020-12-19 RX ADMIN — GABAPENTIN 300 MG: 300 CAPSULE ORAL at 20:50

## 2020-12-19 RX ADMIN — METOPROLOL TARTRATE 50 MG: 50 TABLET, FILM COATED ORAL at 08:23

## 2020-12-19 RX ADMIN — LEVOTHYROXINE SODIUM 75 MCG: 75 TABLET ORAL at 06:06

## 2020-12-19 RX ADMIN — AMLODIPINE BESYLATE 10 MG: 10 TABLET ORAL at 08:23

## 2020-12-19 RX ADMIN — POTASSIUM CHLORIDE AND SODIUM CHLORIDE 75 ML/HR: 900; 150 INJECTION, SOLUTION INTRAVENOUS at 02:15

## 2020-12-19 RX ADMIN — TRAMADOL HYDROCHLORIDE: 50 TABLET, FILM COATED ORAL at 04:46

## 2020-12-19 RX ADMIN — TRAMADOL HYDROCHLORIDE: 50 TABLET, FILM COATED ORAL at 17:05

## 2020-12-19 RX ADMIN — SODIUM CHLORIDE, PRESERVATIVE FREE 10 ML: 5 INJECTION INTRAVENOUS at 20:51

## 2020-12-19 RX ADMIN — CEFAZOLIN SODIUM 2 G: 2 INJECTION, SOLUTION INTRAVENOUS at 08:23

## 2020-12-19 RX ADMIN — METOPROLOL TARTRATE 50 MG: 50 TABLET, FILM COATED ORAL at 20:50

## 2020-12-19 RX ADMIN — FUROSEMIDE 20 MG: 20 INJECTION, SOLUTION INTRAMUSCULAR; INTRAVENOUS at 20:50

## 2020-12-19 RX ADMIN — CEFAZOLIN SODIUM 2 G: 2 INJECTION, SOLUTION INTRAVENOUS at 00:39

## 2020-12-19 NOTE — PLAN OF CARE
Goal Outcome Evaluation:  Plan of Care Reviewed With: patient  Progress: improving   Pt is status post R femur intramedullary nailing post op day 1. This visit She sat up on the edge of bed needing maxA to get there and had moderate complaints of dizziness she felt very fatigued like she was going to pass out she needed to lay back down. Her HgB was >7 at time of PT eval, although her HgB is low and she is expecting to have a blood transfusion.  Progress mobility as tolerated. Plan is d/c to snf.

## 2020-12-19 NOTE — PLAN OF CARE
Goal Outcome Evaluation:  Plan of Care Reviewed With: patient  Progress: no change  Outcome Summary: A&Ox4. VSS. Dressing intact, dried drainage. Up with assist x2. Unable to void this shift, [facility] cath x1, DTV by 2230. SNF when ready. Will cont to monitor.     1750 blood started, premedicated with oral benadryl and tylenol, tolerating well so far

## 2020-12-19 NOTE — PROGRESS NOTES
"  Orthopaedic Surgery   Daily Progress Note  Dr. Raymond Mtz Sr  [phone]  DEMOGRAPHICS:   · Mei Crane   · Age:89 y.o.   · MRN:[id number]  · Admitted: 12/17/2020    PROCEDURE: 1 Day Post-Op s/p Procedure(s):  FEMUR INTRAMEDULLARY NAILING/RODDING     /73 (BP Location: Left arm, Patient Position: Lying)   Pulse 80   Temp 97.3 °F (36.3 °C) (Skin)   Resp 16   Ht 162.6 cm (64\")   Wt 68 kg (150 lb)   SpO2 95%   BMI 25.75 kg/m²     Lab Results (last 24 hours)     Procedure Component Value Units Date/Time    Renal Function Panel [[id number]] Collected: 12/19/20 0711    Specimen: Blood Updated: 12/19/20 0730    CBC (No Diff) [[id number]] Collected: 12/19/20 0711    Specimen: Blood Updated: 12/19/20 0729          Imaging Results (Last 24 Hours)     Procedure Component Value Units Date/Time    XR Hip With or Without Pelvis 1 View Right [[id number]] Collected: 12/18/20 2358     Updated: 12/19/20 0002    Narrative:      AP right hip     HISTORY: Postop fracture fixation     COMPARISON: Right hip 12/17/2020     FINDINGS: There is been Gamma nail fixation of a right proximal femoral  fracture with improved alignment. Recent postsurgical changes include  soft tissue gas and overlying surgical skin staples.     This report was finalized on 12/18/2020 11:59 PM by Dr. Maurilio Ramírez M.D.       XR Hip With or Without Pelvis 2 - 3 View Right [[id number]] Collected: 12/18/20 1802     Updated: 12/18/20 1807    Narrative:      INTRAOPERATIVE PORTABLE VIEW RIGHT HIP     CLINICAL INFORMATION: Right hip intramedullary nail.     FINDINGS: Limited intraoperative fluoroscopic images of the right hip  are submitted for review. There is an intramedullary moshe in the proximal  right femur with screws extending across the femoral neck and proximal  diaphysis. There is appropriate anatomic alignment.     Fluoroscopy time 32 s, 4 images     This report was finalized on 12/18/2020 6:04 PM by Dr. Socorro Arias M.D.       FL C " Arm During Surgery [[id number]] Resulted: 12/18/20 1744     Updated: 12/18/20 1744    Narrative:      This procedure was auto-finalized with no dictation required.          Patient Care Team:  Lexie Toussaint APRN as PCP - General (Family Medicine)  Cecil Jacob OD (Optometry)  Yannick Santo MD as Consulting Physician (Ophthalmology)  Nik Rodriguez MD as Consulting Physician (Orthopedic Surgery)  Lisa Dudley APRN as Nurse Practitioner (Neurology)    SUBJECTIVE  Hip soreness as expected    PHYSICAL EXAM  Neurovascular intact     ASSESSMENT: Patient is a 89 y.o. female who is 1 Day Post-Op s/p Procedure(s):  FEMUR INTRAMEDULLARY NAILING/RODDING     PLAN / DISPOSITION:  Begin to mobilize today    Raymond Mtz Sr, MD  Orthopaedic Surgery  [facility]  [phone]

## 2020-12-19 NOTE — PROGRESS NOTES
Name: Mei Crane ADMIT: 2020   : 1926  PCP: Lexie Toussaint, COCO    MRN: [id number] LOS: 2 days   AGE/SEX: 89 y.o. female  ROOM: [facility]     Subjective   Subjective   Patient without complaint this morning.  Denies chest pain shortness of air, denies abdominal pain nausea vomiting diarrhea.  Tolerating her diet well    Review of Systems   Respiratory: Negative.    Cardiovascular: Negative.    Gastrointestinal: Negative.    Neurological: Negative.    Psychiatric/Behavioral: Negative.         Objective   Objective   Vital Signs  Temp:  [97.3 °F (36.3 °C)-98.7 °F (37.1 °C)] 97.7 °F (36.5 °C)  Heart Rate:  [] 81  Resp:  [12-18] 16  BP: (102-154)/(64-94) 102/64  SpO2:  [91 %-100 %] 91 %  on  Flow (L/min):  [2-4] 2;   Device (Oxygen Therapy): nasal cannula  Body mass index is 25.75 kg/m².  Physical Exam  Vitals signs and nursing note reviewed.   Constitutional:       General: She is not in acute distress.     Appearance: Normal appearance.   HENT:      Head: Normocephalic and atraumatic.   Eyes:      General: No scleral icterus.  Neck:      Musculoskeletal: Neck supple.   Cardiovascular:      Rate and Rhythm: Normal rate and regular rhythm.      Pulses: Normal pulses.      Heart sounds: Normal heart sounds.   Pulmonary:      Effort: Pulmonary effort is normal.      Breath sounds: Normal breath sounds.   Abdominal:      General: Abdomen is flat. Bowel sounds are normal.      Palpations: Abdomen is soft.   Musculoskeletal: Normal range of motion.   Skin:     General: Skin is warm and dry.   Neurological:      General: No focal deficit present.      Mental Status: She is alert and oriented to person, place, and time.   Psychiatric:         Mood and Affect: Mood normal.         Results Review     I reviewed the patient's new clinical results.  Results from last 7 days   Lab Units 20  0711 20  0446 20  1833   WBC 10*3/mm3 9.21 8.92 19.55*   HEMOGLOBIN g/dL 7.9* 9.2* 12.7    PLATELETS 10*3/mm3 176 200 331     Results from last 7 days   Lab Units 12/19/20  0711 12/18/20  0446 12/17/20  1833   SODIUM mmol/L 137 139 139   POTASSIUM mmol/L 4.0 3.4* 3.4*   CHLORIDE mmol/L 103 106 101   CO2 mmol/L 24.9 24.2 25.2   BUN mg/dL 10 13 18   CREATININE mg/dL 0.90 1.01* 1.54*   GLUCOSE mg/dL 110* 109* 180*   Estimated Creatinine Clearance: 36.2 mL/min (by C-G formula based on SCr of 0.9 mg/dL).  Results from last 7 days   Lab Units 12/19/20  0711 12/18/20  0446 12/17/20  1833   ALBUMIN g/dL 2.70* 3.20* 4.30   BILIRUBIN mg/dL  --  0.3 0.3   ALK PHOS U/L  --  101 151*   AST (SGOT) U/L  --  16 19   ALT (SGPT) U/L  --  10 12     Results from last 7 days   Lab Units 12/19/20  0711 12/18/20  0446 12/17/20  1833   CALCIUM mg/dL 8.0* 8.1* 9.2   ALBUMIN g/dL 2.70* 3.20* 4.30   PHOSPHORUS mg/dL 4.3  --   --        COVID19   Date Value Ref Range Status   12/17/2020 Not Detected Not Detected - Ref. Range Final   06/04/2020 Not Detected Not Detected - Ref. Range Final     No results found for: HGBA1C, POCGLU    XR Hip With or Without Pelvis 1 View Right  AP right hip     HISTORY: Postop fracture fixation     COMPARISON: Right hip 12/17/2020     FINDINGS: There is been Gamma nail fixation of a right proximal femoral  fracture with improved alignment. Recent postsurgical changes include  soft tissue gas and overlying surgical skin staples.     This report was finalized on 12/18/2020 11:59 PM by Dr. Maurilio Ramírez M.D.       Scheduled Medications  amLODIPine, 10 mg, Oral, Daily  apixaban, 5 mg, Oral, BID  atorvastatin, 80 mg, Oral, Daily  ceFAZolin, 2 g, Intravenous, Q8H  DULoxetine, 20 mg, Oral, Daily  gabapentin, 300 mg, Oral, Nightly  levothyroxine, 75 mcg, Oral, Q AM  metoprolol tartrate, 50 mg, Oral, BID  pantoprazole, 40 mg, Oral, Q AM  prednisoLONE acetate, 1 drop, Left Eye, Daily    Infusions  lactated ringers, 9 mL/hr, Last Rate: 9 mL/hr (12/18/20 1700)  sodium chloride 0.9 % with KCl 20 mEq, 75 mL/hr,  Last Rate: 75 mL/hr (12/19/20 0215)    Diet  Diet Regular       Assessment/Plan     Active Hospital Problems    Diagnosis  POA   • **Closed intertrochanteric fracture of hip, right, initial encounter (CMS/[facility]) [S72.141A]  Yes   • Closed fracture of rib of right side [S22.31XA]  Yes   • Atrial fibrillation (CMS/[facility]) [I48.91]  Yes   • History of embolic stroke [Z86.73]  Not Applicable   • CKD (chronic kidney disease), stage III (CMS/[facility]) [N18.30]  Yes   • Hyperlipidemia [E78.5]  Yes   • HTN (hypertension), benign [I10]  Yes   • Gastroesophageal reflux disease without esophagitis [K21.9]  Yes   • Anemia [D64.9]  Yes   • Acquired hypothyroidism [E03.9]  Yes      Resolved Hospital Problems    Diagnosis Date Resolved POA   • ANA (acute kidney injury) (CMS/[facility]) [N17.9] 12/18/2020 Yes   • Leukocytosis [D72.829] 12/18/2020 Yes       89 y.o. female admitted with Closed intertrochanteric fracture of hip, right, initial encounter (CMS/[facility]).    · Right intertrochanteric hip fracture: Status post right intramedullary nailing of intertrochanteric hip fracture on 12/18/2020.  Doing well postop.  · Acute kidney injury/chronic kidney disease stage IIIa: Renal function continues to improve with hydration.  Continue intravenous fluids and daily BMPs for now.  · Acute blood loss anemia: Hemoglobin 12.7 on 12/17/2020 7.9 on 12/19/2020.  Will transfuse 1 unit of packed red cells.  Follow her hemoglobin hematocrit closely.  Patient began Eliquis yesterday.  Will order stool guaiac and iron studies.  Continue Protonix 40 mg daily  · Atrial fibrillation/history of embolic stroke/chronic anticoagulant therapy: Rate well controlled on beta-blocker therapy with Lopressor 50 mg twice daily.  Continue her current dose of Eliquis 5 mg twice daily.  · Hypertension: Blood pressures well controlled on amlodipine 10 mg daily and Lopressor 50 mg twice daily  · Hyperlipidemia: Continue statin therapy  · Hypothyroidism: Continue Synthroid  · Major  anxiety depressive disorder: Continue her Cymbalta 20 mg daily  · Eliquis (home med) for DVT prophylaxis.  · Full code.  · Discussed with patient and nursing staff.  · Anticipate discharge to SNU facility next week.      Miguel Zamora MD  [facility]  12/19/20  10:58 EST    Patient was wearing facemask when I entered the room and throughout our encounter.  I wore protective equipment throughout this patient encounter including a face mask, gloves and protective eyewear.  Hand hygiene was performed before donning protective equipment and after removal when leaving the room.

## 2020-12-19 NOTE — THERAPY EVALUATION
Patient Name: Mei Crane  : 1926    MRN: [id number]                              Today's Date: 2020       Admit Date: 2020    Visit Dx:     ICD-10-CM ICD-9-CM   1. Closed intertrochanteric fracture of hip, right, initial encounter (CMS/[facility])  S72.141A 820.21   2. Chronic anticoagulation  Z79.01 V58.61     Patient Active Problem List   Diagnosis   • HTN (hypertension), benign   • Acquired hypothyroidism   • Hyperlipidemia   • Gastroesophageal reflux disease without esophagitis   • Glaucoma   • Macular degeneration   • Anemia   • Osteopenia   • CKD (chronic kidney disease), stage III (CMS/[facility])   • B12 deficiency   • Calculus of gallbladder without cholecystitis   • DDD (degenerative disc disease), lumbar   • Trochanteric bursitis of right hip   • Bilateral leg weakness   • Elevated troponin   • Weakness   • Dizziness   • Hallucinations, visual   • CVA (cerebrovascular accident) (CMS/[facility])   • Embolic stroke (CMS/HCC)   • Hypokalemia   • Hyponatremia   • History of embolic stroke   • Chest pain   • Atrial fibrillation (CMS/[facility])   • Thiazide diuretics causing adverse effect in therapeutic use   • Closed intertrochanteric fracture of hip, right, initial encounter (CMS/[facility])   • Closed fracture of rib of right side     Past Medical History:   Diagnosis Date   • Calculus of gallbladder without cholecystitis 2017   • CKD (chronic kidney disease), stage III 2016   • DDD (degenerative disc disease), lumbar 2019   • Disease of thyroid gland    • Hyperlipidemia    • Low back pain    • Peripheral neuropathy    • Stroke (CMS/[facility])      Past Surgical History:   Procedure Laterality Date   • CATARACT EXTRACTION Bilateral    • EPIDURAL BLOCK     • HYSTERECTOMY      age 35     General Information     Row Name 20 1410          Physical Therapy Time and Intention    Document Type  evaluation  -CS     Mode of Treatment  physical therapy  -CS     Row Name 20 1410          General Information     Patient Profile Reviewed  yes  -CS     Prior Level of Function  independent:  -CS     Existing Precautions/Restrictions  fall  -CS     Row Name 12/19/20 1410          Living Environment    Lives With  alone  -CS     Row Name 12/19/20 1410          Cognition    Orientation Status (Cognition)  oriented x 3  -CS     Row Name 12/19/20 1411 12/19/20 1410       Safety Issues, Functional Mobility    Impairments Affecting Function (Mobility)  endurance/activity tolerance  -CS  shortness of breath;endurance/activity tolerance  -CS      User Key  (r) = Recorded By, (t) = Taken By, (c) = Cosigned By    Initials Name Provider Type    Bridger Moore, PT Physical Therapist        Mobility     Row Name 12/19/20 1411          Bed Mobility    Bed Mobility  supine-sit;sit-supine  -CS     Supine-Sit [place] (Bed Mobility)  maximum assist (25% patient effort);nonverbal cues (demo/gesture);verbal cues  -CS     Sit-Supine [place] (Bed Mobility)  verbal cues;nonverbal cues (demo/gesture)  -CS     Assistive Device (Bed Mobility)  bed rails;head of bed elevated  -CS     Row Name 12/19/20 1411          Transfers    Comment (Transfers)  dizziness limiting activity  -CS       User Key  (r) = Recorded By, (t) = Taken By, (c) = Cosigned By    Initials Name Provider Type    Bridger Moore, PT Physical Therapist        Obj/Interventions    No documentation.       Goals/Plan     Row Name 12/19/20 1412          Bed Mobility Goal 1 (PT)    Activity/Assistive Device (Bed Mobility Goal 1, PT)  sit to supine;supine to sit  -CS     [place] Level/Cues Needed (Bed Mobility Goal 1, PT)  minimum assist (75% or more patient effort)  -CS     Time Frame (Bed Mobility Goal 1, PT)  1 week  -CS     Row Name 12/19/20 1412          Transfer Goal 1 (PT)    Activity/Assistive Device (Transfer Goal 1, PT)  sit-to-stand/stand-to-sit;bed-to-chair/chair-to-bed  -CS     [place] Level/Cues Needed (Transfer Goal 1, PT)  minimum assist (75% or  more patient effort)  -CS     Time Frame (Transfer Goal 1, PT)  1 week  -CS     Row Name 12/19/20 1412          Gait Training Goal 1 (PT)    Activity/Assistive Device (Gait Training Goal 1, PT)  assistive device use  -CS     [place] Level (Gait Training Goal 1, PT)  moderate assist (50-74% patient effort)  -CS     Distance (Gait Training Goal 1, PT)  5  -CS     Time Frame (Gait Training Goal 1, PT)  1 week  -CS       User Key  (r) = Recorded By, (t) = Taken By, (c) = Cosigned By    Initials Name Provider Type    Bridger Moore, PT Physical Therapist        Clinical Impression     Row Name 12/19/20 1411          Pain    Additional Documentation  Pain Scale: FACES Pre/Post-Treatment (Group)  -CS     [facility] Name 12/19/20 1411          Pain Scale: Numbers Pre/Post-Treatment    Pain Intervention(s)  Repositioned;Ambulation/increased activity  -CS     Row Name 12/19/20 1411          Pain Scale: FACES Pre/Post-Treatment    Pain: FACES Scale, Pretreatment  2-->hurts little bit  -CS     Posttreatment Pain Rating  6-->hurts even more  -CS     Row Name 12/19/20 1411          Plan of Care Review    Plan of Care Reviewed With  patient  -CS     [facility] Name 12/19/20 1411          Therapy Assessment/Plan (PT)    Patient/Family Therapy Goals Statement (PT)  be able to walk  -CS     Rehab Potential (PT)  good, to achieve stated therapy goals  -CS     Criteria for Skilled Interventions Met (PT)  yes  -CS       User Key  (r) = Recorded By, (t) = Taken By, (c) = Cosigned By    Initials Name Provider Type    Bridger Moore, PT Physical Therapist        Outcome Measures     Row Name 12/19/20 1413          How much help from another person do you currently need...    Turning from your back to your side while in flat bed without using bedrails?  2  -CS     Moving from lying on back to sitting on the side of a flat bed without bedrails?  2  -CS     Moving to and from a bed to a chair (including a wheelchair)?  2  -CS     Standing up  from a chair using your arms (e.g., wheelchair, bedside chair)?  2  -CS     Climbing 3-5 steps with a railing?  1  -CS     To walk in hospital room?  1  -CS     AM-PAC 6 Clicks Score (PT)  10  -CS     Row Name 12/19/20 1413          Functional Assessment    Outcome Measure Options  AM-PAC 6 Clicks Basic Mobility (PT)  -       User Key  (r) = Recorded By, (t) = Taken By, (c) = Cosigned By    Initials Name Provider Type    CS Bridger Cruz, PT Physical Therapist        Physical Therapy Education                 Title: PT OT SLP Therapies (Done)     Topic: Physical Therapy (Done)     Point: Mobility training (Done)     Learning Progress Summary           Patient Acceptance, E,TB, VU,NR by  at 12/19/2020 1413                   Point: Home exercise program (Done)     Learning Progress Summary           Patient Acceptance, E,TB, VU,NR by  at 12/19/2020 1413                   Point: Body mechanics (Done)     Learning Progress Summary           Patient Acceptance, E,TB, VU,NR by  at 12/19/2020 1413                   Point: Precautions (Done)     Learning Progress Summary           Patient Acceptance, E,TB, VU,NR by  at 12/19/2020 1413                               User Key     Initials Effective Dates Name Provider Type Discipline     05/14/18 -  Bridger Cruz, PT Physical Therapist PT              PT Recommendation and Plan  Planned Therapy Interventions (PT): balance training, bed mobility training, gait training, transfer training, ROM (range of motion), strengthening, manual therapy techniques, home exercise program, patient/family education, neuromuscular re-education  Plan of Care Reviewed With: patient     Time Calculation:   PT Charges     Row Name 12/19/20 1416             Time Calculation    Start Time  1337  -CS      Stop Time  1402  -      Time Calculation (min)  25 min  -      PT Received On  12/19/20  -      PT - Next Appointment  12/20/20  -      PT Goal Re-Cert Due Date  12/26/20   -MICHAEL        User Key  (r) = Recorded By, (t) = Taken By, (c) = Cosigned By    Initials Name Provider Type    Bridger Moore, PT Physical Therapist        Therapy Charges for Today     Code Description Service Date Service Provider Modifiers Qty    95940592575  PT EVAL MOD COMPLEXITY 2 12/19/2020 Bridger Cruz, PT GP 1    [id number] HC PT THER PROC EA 15 MIN 12/19/2020 Bridger Cruz, PT GP 1          PT G-Codes  Outcome Measure Options: AM-PAC 6 Clicks Basic Mobility (PT)  AM-PAC 6 Clicks Score (PT): 10    Bridger Cruz, PT  12/19/2020

## 2020-12-19 NOTE — PLAN OF CARE
Goal Outcome Evaluation:  Plan of Care Reviewed With: patient  Progress: improving  Outcome Summary: POD#1. VSS. PO TYLENOL HELPING WITH PAIN. UP ASSISTX2 TO BSC. DRESSING TO HIP HAS SMALL DRAINAGE. PATIENT WAS VERY UNSTEADY WHEN OOB. IN&OUT CATH X1. EDUCATION PROVIDED ON BP MONITORING AND SAFETY. PATIENT VERBALIZED UNDERSTANDING

## 2020-12-20 LAB
ANION GAP SERPL CALCULATED.3IONS-SCNC: 9.8 MMOL/L (ref 5–15)
BASOPHILS # BLD AUTO: 0.02 10*3/MM3 (ref 0–0.2)
BASOPHILS NFR BLD AUTO: 0.2 % (ref 0–1.5)
BH BB BLOOD EXPIRATION DATE: NORMAL
BH BB BLOOD TYPE BARCODE: 7300
BH BB DISPENSE STATUS: NORMAL
BH BB PRODUCT CODE: NORMAL
BH BB UNIT NUMBER: NORMAL
BUN SERPL-MCNC: 13 MG/DL (ref 8–23)
BUN/CREAT SERPL: 12.6 (ref 7–25)
CALCIUM SPEC-SCNC: 8.1 MG/DL (ref 8.2–9.6)
CHLORIDE SERPL-SCNC: 103 MMOL/L (ref 98–107)
CO2 SERPL-SCNC: 24.2 MMOL/L (ref 22–29)
CREAT SERPL-MCNC: 1.03 MG/DL (ref 0.57–1)
CROSSMATCH INTERPRETATION: NORMAL
DEPRECATED RDW RBC AUTO: 40 FL (ref 37–54)
EOSINOPHIL # BLD AUTO: 0.27 10*3/MM3 (ref 0–0.4)
EOSINOPHIL NFR BLD AUTO: 3.3 % (ref 0.3–6.2)
ERYTHROCYTE [DISTWIDTH] IN BLOOD BY AUTOMATED COUNT: 12.9 % (ref 12.3–15.4)
GFR SERPL CREATININE-BSD FRML MDRD: 50 ML/MIN/1.73
GLUCOSE SERPL-MCNC: 104 MG/DL (ref 65–99)
HCT VFR BLD AUTO: 27.5 % (ref 34–46.6)
HGB BLD-MCNC: 9.1 G/DL (ref 12–15.9)
IMM GRANULOCYTES # BLD AUTO: 0.06 10*3/MM3 (ref 0–0.05)
IMM GRANULOCYTES NFR BLD AUTO: 0.7 % (ref 0–0.5)
IRON 24H UR-MRATE: 19 MCG/DL (ref 37–145)
IRON SATN MFR SERPL: 9 % (ref 20–50)
LYMPHOCYTES # BLD AUTO: 1.65 10*3/MM3 (ref 0.7–3.1)
LYMPHOCYTES NFR BLD AUTO: 19.9 % (ref 19.6–45.3)
MCH RBC QN AUTO: 28.7 PG (ref 26.6–33)
MCHC RBC AUTO-ENTMCNC: 33.1 G/DL (ref 31.5–35.7)
MCV RBC AUTO: 86.8 FL (ref 79–97)
MONOCYTES # BLD AUTO: 0.94 10*3/MM3 (ref 0.1–0.9)
MONOCYTES NFR BLD AUTO: 11.4 % (ref 5–12)
NEUTROPHILS NFR BLD AUTO: 5.34 10*3/MM3 (ref 1.7–7)
NEUTROPHILS NFR BLD AUTO: 64.5 % (ref 42.7–76)
NRBC BLD AUTO-RTO: 0 /100 WBC (ref 0–0.2)
PLATELET # BLD AUTO: 168 10*3/MM3 (ref 140–450)
PMV BLD AUTO: 10.3 FL (ref 6–12)
POTASSIUM SERPL-SCNC: 3.5 MMOL/L (ref 3.5–5.2)
RBC # BLD AUTO: 3.17 10*6/MM3 (ref 3.77–5.28)
SODIUM SERPL-SCNC: 137 MMOL/L (ref 136–145)
TIBC SERPL-MCNC: 213 MCG/DL (ref 298–536)
TRANSFERRIN SERPL-MCNC: 143 MG/DL (ref 200–360)
UNIT  ABO: NORMAL
UNIT  RH: NORMAL
WBC # BLD AUTO: 8.28 10*3/MM3 (ref 3.4–10.8)

## 2020-12-20 PROCEDURE — 80048 BASIC METABOLIC PNL TOTAL CA: CPT | Performed by: INTERNAL MEDICINE

## 2020-12-20 PROCEDURE — 83540 ASSAY OF IRON: CPT | Performed by: INTERNAL MEDICINE

## 2020-12-20 PROCEDURE — 84466 ASSAY OF TRANSFERRIN: CPT | Performed by: INTERNAL MEDICINE

## 2020-12-20 PROCEDURE — 85025 COMPLETE CBC W/AUTO DIFF WBC: CPT | Performed by: INTERNAL MEDICINE

## 2020-12-20 RX ADMIN — TRAMADOL HYDROCHLORIDE: 50 TABLET, FILM COATED ORAL at 10:48

## 2020-12-20 RX ADMIN — AMLODIPINE BESYLATE 10 MG: 10 TABLET ORAL at 09:07

## 2020-12-20 RX ADMIN — ACETAMINOPHEN 650 MG: 325 TABLET, FILM COATED ORAL at 06:01

## 2020-12-20 RX ADMIN — METOPROLOL TARTRATE 50 MG: 50 TABLET, FILM COATED ORAL at 20:05

## 2020-12-20 RX ADMIN — DULOXETINE HYDROCHLORIDE 20 MG: 20 CAPSULE, DELAYED RELEASE ORAL at 09:07

## 2020-12-20 RX ADMIN — GABAPENTIN 300 MG: 300 CAPSULE ORAL at 20:05

## 2020-12-20 RX ADMIN — ATORVASTATIN CALCIUM 80 MG: 20 TABLET, FILM COATED ORAL at 09:07

## 2020-12-20 RX ADMIN — PANTOPRAZOLE SODIUM 40 MG: 40 TABLET, DELAYED RELEASE ORAL at 06:01

## 2020-12-20 RX ADMIN — PREDNISOLONE ACETATE 1 DROP: 10 SUSPENSION/ DROPS OPHTHALMIC at 09:09

## 2020-12-20 RX ADMIN — BISACODYL 5 MG: 5 TABLET ORAL at 09:07

## 2020-12-20 RX ADMIN — METOPROLOL TARTRATE 50 MG: 50 TABLET, FILM COATED ORAL at 09:07

## 2020-12-20 RX ADMIN — LEVOTHYROXINE SODIUM 75 MCG: 75 TABLET ORAL at 06:01

## 2020-12-20 RX ADMIN — APIXABAN 5 MG: 5 TABLET, FILM COATED ORAL at 20:05

## 2020-12-20 RX ADMIN — APIXABAN 5 MG: 5 TABLET, FILM COATED ORAL at 09:07

## 2020-12-20 NOTE — PROGRESS NOTES
"  Orthopaedic Surgery   Daily Progress Note  Dr. Raymond Mtz   [phone]  DEMOGRAPHICS:   · Mei Crane   · Age:89 y.o.   · MRN:[id number]  · Admitted: 12/17/2020    PROCEDURE: 2 Days Post-Op s/p Procedure(s):  FEMUR INTRAMEDULLARY NAILING/RODDING     /74 (BP Location: Left arm, Patient Position: Lying)   Pulse 82   Temp 98 °F (36.7 °C) (Oral)   Resp 18   Ht 162.6 cm (64\")   Wt 68 kg (150 lb)   SpO2 93%   BMI 25.75 kg/m²     Lab Results (last 24 hours)     Procedure Component Value Units Date/Time    Basic Metabolic Panel [[id number]]  (Abnormal) Collected: 12/20/20 0437    Specimen: Blood Updated: 12/20/20 0532     Glucose 104 mg/dL      BUN 13 mg/dL      Creatinine 1.03 mg/dL      Sodium 137 mmol/L      Potassium 3.5 mmol/L      Chloride 103 mmol/L      CO2 24.2 mmol/L      Calcium 8.1 mg/dL      eGFR Non African Amer 50 mL/min/1.73      BUN/Creatinine Ratio 12.6     Anion Gap 9.8 mmol/L     Narrative:      GFR Normal >60  Chronic Kidney Disease <60  Kidney Failure <15      Iron Profile [[id number]]  (Abnormal) Collected: 12/20/20 0437    Specimen: Blood Updated: 12/20/20 0532     Iron 19 mcg/dL      Iron Saturation 9 %      Transferrin 143 mg/dL      TIBC 213 mcg/dL     CBC & Differential [[id number]]  (Abnormal) Collected: 12/20/20 0437    Specimen: Blood Updated: 12/20/20 0508    Narrative:      The following orders were created for panel order CBC & Differential.  Procedure                               Abnormality         Status                     ---------                               -----------         ------                     CBC Auto Differential[[id number]]        Abnormal            Final result                 Please view results for these tests on the individual orders.    CBC Auto Differential [[id number]]  (Abnormal) Collected: 12/20/20 0437    Specimen: Blood Updated: 12/20/20 0508     WBC 8.28 10*3/mm3      RBC 3.17 10*6/mm3      Hemoglobin 9.1 g/dL      Hematocrit 27.5 %  "     MCV 86.8 fL      MCH 28.7 pg      MCHC 33.1 g/dL      RDW 12.9 %      RDW-SD 40.0 fl      MPV 10.3 fL      Platelets 168 10*3/mm3      Neutrophil % 64.5 %      Lymphocyte % 19.9 %      Monocyte % 11.4 %      Eosinophil % 3.3 %      Basophil % 0.2 %      Immature Grans % 0.7 %      Neutrophils, Absolute 5.34 10*3/mm3      Lymphocytes, Absolute 1.65 10*3/mm3      Monocytes, Absolute 0.94 10*3/mm3      Eosinophils, Absolute 0.27 10*3/mm3      Basophils, Absolute 0.02 10*3/mm3      Immature Grans, Absolute 0.06 10*3/mm3      nRBC 0.0 /100 WBC           Imaging Results (Last 24 Hours)     ** No results found for the last 24 hours. **          Patient Care Team:  Lexie Toussaint APRN as PCP - General (Family Medicine)  Cecil Jacob OD (Optometry)  Yannick Santo MD as Consulting Physician (Ophthalmology)  Nik Rodriguez MD as Consulting Physician (Orthopedic Surgery)  Lisa Dudley APRN as Nurse Practitioner (Neurology)    SUBJECTIVE  A little more comfortable today    PHYSICAL EXAM  Wound looks good  Slow to mobilize     ASSESSMENT: Patient is a 89 y.o. female who is 2 Days Post-Op s/p Procedure(s):  FEMUR INTRAMEDULLARY NAILING/RODDING     PLAN / DISPOSITION:  Discharge when medically ready and she is sufficiently mobile.    Raymond Mtz Sr, MD  Orthopaedic Surgery  [facility]  [phone]

## 2020-12-20 NOTE — PLAN OF CARE
Goal Outcome Evaluation:  Plan of Care Reviewed With: patient  Progress: improving  Outcome Summary: POD#2. VSS. PO PAIN MEDICATION HELPING WITH PAIN. UP ASSIST X2 TO BSC. PATIENT VERY UNSTEADY. VOIDED SMALL AMOUNT. IN& OUT CATH X1. PATIENT STILL DUE TO VOID AT 0700. EDUCATION PROVIDED ON BP MONITORING . PATIENT VERBALIZED UNDERSTANDING.

## 2020-12-20 NOTE — PROGRESS NOTES
Name: Mei Crane ADMIT: 2020   : 1926  PCP: Lexie Toussaint TRUDY, COCO    MRN: [id number] LOS: 3 days   AGE/SEX: 89 y.o. female  ROOM: [facility]     Subjective   Subjective   No complaints this morning.    Review of Systems   Constitutional: Negative.    Respiratory: Negative.    Cardiovascular: Negative.    Gastrointestinal: Negative.         Objective   Objective   Vital Signs  Temp:  [97 °F (36.1 °C)-98.4 °F (36.9 °C)] 98.4 °F (36.9 °C)  Heart Rate:  [80-89] 86  Resp:  [16-18] 18  BP: (114-141)/(60-77) 141/77  SpO2:  [82 %-97 %] 91 %  on  Flow (L/min):  [1-2] 1;   Device (Oxygen Therapy): nasal cannula  Body mass index is 25.75 kg/m².  Physical Exam  Vitals signs and nursing note reviewed.   Constitutional:       General: She is not in acute distress.     Appearance: Normal appearance.   HENT:      Head: Normocephalic and atraumatic.   Eyes:      General: No scleral icterus.  Neck:      Musculoskeletal: Neck supple.   Cardiovascular:      Rate and Rhythm: Normal rate and regular rhythm.      Pulses: Normal pulses.      Heart sounds: Normal heart sounds.   Pulmonary:      Effort: Pulmonary effort is normal.      Breath sounds: Normal breath sounds.   Abdominal:      General: Abdomen is flat. Bowel sounds are normal.      Palpations: Abdomen is soft.   Musculoskeletal: Normal range of motion.   Skin:     General: Skin is warm and dry.   Neurological:      General: No focal deficit present.      Mental Status: She is alert and oriented to person, place, and time.   Psychiatric:         Mood and Affect: Mood normal. Results Review     I reviewed the patient's new clinical results.  Results from last 7 days   Lab Units 20  0437 20  0711 20  0446 20  1833   WBC 10*3/mm3 8.28 9.21 8.92 19.55*   HEMOGLOBIN g/dL 9.1* 7.9* 9.2* 12.7   PLATELETS 10*3/mm3 168 176 200 331     Results from last 7 days   Lab Units 20  0437 20  0711 20  0446 20  1833   SODIUM mmol/L  137 137 139 139   POTASSIUM mmol/L 3.5 4.0 3.4* 3.4*   CHLORIDE mmol/L 103 103 106 101   CO2 mmol/L 24.2 24.9 24.2 25.2   BUN mg/dL 13 10 13 18   CREATININE mg/dL 1.03* 0.90 1.01* 1.54*   GLUCOSE mg/dL 104* 110* 109* 180*   Estimated Creatinine Clearance: 31.6 mL/min (A) (by C-G formula based on SCr of 1.03 mg/dL (H)).  Results from last 7 days   Lab Units 12/19/20  0711 12/18/20  0446 12/17/20  1833   ALBUMIN g/dL 2.70* 3.20* 4.30   BILIRUBIN mg/dL  --  0.3 0.3   ALK PHOS U/L  --  101 151*   AST (SGOT) U/L  --  16 19   ALT (SGPT) U/L  --  10 12     Results from last 7 days   Lab Units 12/20/20  0437 12/19/20  0711 12/18/20  0446 12/17/20  1833   CALCIUM mg/dL 8.1* 8.0* 8.1* 9.2   ALBUMIN g/dL  --  2.70* 3.20* 4.30   PHOSPHORUS mg/dL  --  4.3  --   --        COVID19   Date Value Ref Range Status   12/17/2020 Not Detected Not Detected - Ref. Range Final   06/04/2020 Not Detected Not Detected - Ref. Range Final     No results found for: HGBA1C, POCGLU    XR Hip With or Without Pelvis 1 View Right  AP right hip     HISTORY: Postop fracture fixation     COMPARISON: Right hip 12/17/2020     FINDINGS: There is been Gamma nail fixation of a right proximal femoral  fracture with improved alignment. Recent postsurgical changes include  soft tissue gas and overlying surgical skin staples.     This report was finalized on 12/18/2020 11:59 PM by Dr. Maurilio Ramírez M.D.       Scheduled Medications  acetaminophen, 650 mg, Oral, Once    Or  acetaminophen, 650 mg, Oral, Once    Or  acetaminophen, 650 mg, Rectal, Once  amLODIPine, 10 mg, Oral, Daily  apixaban, 5 mg, Oral, BID  atorvastatin, 80 mg, Oral, Daily  DULoxetine, 20 mg, Oral, Daily  gabapentin, 300 mg, Oral, Nightly  levothyroxine, 75 mcg, Oral, Q AM  metoprolol tartrate, 50 mg, Oral, BID  pantoprazole, 40 mg, Oral, Q AM  prednisoLONE acetate, 1 drop, Left Eye, Daily    Infusions  lactated ringers, 9 mL/hr, Last Rate: 9 mL/hr (12/18/20 1700)  sodium chloride 0.9 % with  KCl 20 mEq, 75 mL/hr, Last Rate: 75 mL/hr (12/19/20 1707)    Diet  Diet Regular       Assessment/Plan     Active Hospital Problems    Diagnosis  POA   • **Closed intertrochanteric fracture of hip, right, initial encounter (CMS/formerly [facility]) [S72.141A]  Yes   • Closed fracture of rib of right side [S22.31XA]  Yes   • Atrial fibrillation (CMS/formerly [facility]) [I48.91]  Yes   • History of embolic stroke [Z86.73]  Not Applicable   • CKD (chronic kidney disease), stage III (CMS/formerly [facility]) [N18.30]  Yes   • Hyperlipidemia [E78.5]  Yes   • HTN (hypertension), benign [I10]  Yes   • Gastroesophageal reflux disease without esophagitis [K21.9]  Yes   • Anemia [D64.9]  Yes   • Acquired hypothyroidism [E03.9]  Yes      Resolved Hospital Problems    Diagnosis Date Resolved POA   • ANA (acute kidney injury) (CMS/formerly [facility]) [N17.9] 12/18/2020 Yes   • Leukocytosis [D72.829] 12/18/2020 Yes       89 y.o. female admitted with Closed intertrochanteric fracture of hip, right, initial encounter (CMS/formerly [facility]).    · Right intertrochanteric hip fracture: Status post right intramedullary nailing of intertrochanteric hip fracture on 12/18/2020.  Doing well postop.  · Acute kidney injury/chronic kidney disease stage IIIa: Renal function continues to improve with hydration.  Continue intravenous fluids and daily BMPs for now.  · Acute blood loss anemia: Hemoglobin 12.7 on 12/17/2020 7.9 on 12/19/2020.    Transfused 1 unit of packed red cells 12/19/2020 Follow her hemoglobin hematocrit closely.  Patient began Eliquis yesterday.  Will order stool guaiac and iron studies.  Continue Protonix 40 mg daily  · Atrial fibrillation/history of embolic stroke/chronic anticoagulant therapy: Rate well controlled on beta-blocker therapy with Lopressor 50 mg twice daily.  Continue her current dose of Eliquis 5 mg twice daily.  · Hypertension: Blood pressures well controlled on amlodipine 10 mg daily and Lopressor 50 mg twice daily  · Hyperlipidemia: Continue statin therapy  · Hypothyroidism:  Continue Synthroid  · Major anxiety depressive disorder: Continue her Cymbalta 20 mg daily  · Eliquis (home med) for DVT prophylaxis.  · Full code.  · Discussed with patient and nursing staff.  Anticipate discharge to SNU facility  later this week    Miguel Zamora MD  [facility]  12/20/20  12:11 EST      I wore protective equipment throughout this patient encounter including a face mask, gloves and protective eyewear.  Hand hygiene was performed before donning protective equipment and after removal when leaving the room.

## 2020-12-20 NOTE — PLAN OF CARE
Goal Outcome Evaluation:  Plan of Care Reviewed With: patient  Progress: improving  Outcome Summary: VSS. Able to wean to 1 L O2 but cannot tolerate RA. Dressing CDI. Up with assist x2. Patient able to void this shift, purewick in place for pt comfort. Pain controlled. Plans to D/C to [facility] when ready. Will cont to monitor.

## 2020-12-21 VITALS
BODY MASS INDEX: 25.61 KG/M2 | RESPIRATION RATE: 18 BRPM | OXYGEN SATURATION: 97 % | TEMPERATURE: 97.8 F | HEIGHT: 64 IN | DIASTOLIC BLOOD PRESSURE: 82 MMHG | WEIGHT: 150 LBS | SYSTOLIC BLOOD PRESSURE: 153 MMHG | HEART RATE: 85 BPM

## 2020-12-21 LAB
BASOPHILS # BLD AUTO: 0.03 10*3/MM3 (ref 0–0.2)
BASOPHILS NFR BLD AUTO: 0.4 % (ref 0–1.5)
DEPRECATED RDW RBC AUTO: 41.5 FL (ref 37–54)
EOSINOPHIL # BLD AUTO: 0.39 10*3/MM3 (ref 0–0.4)
EOSINOPHIL NFR BLD AUTO: 4.8 % (ref 0.3–6.2)
ERYTHROCYTE [DISTWIDTH] IN BLOOD BY AUTOMATED COUNT: 13 % (ref 12.3–15.4)
HCT VFR BLD AUTO: 29.2 % (ref 34–46.6)
HGB BLD-MCNC: 9.5 G/DL (ref 12–15.9)
IMM GRANULOCYTES # BLD AUTO: 0.04 10*3/MM3 (ref 0–0.05)
IMM GRANULOCYTES NFR BLD AUTO: 0.5 % (ref 0–0.5)
LYMPHOCYTES # BLD AUTO: 1.61 10*3/MM3 (ref 0.7–3.1)
LYMPHOCYTES NFR BLD AUTO: 19.7 % (ref 19.6–45.3)
MCH RBC QN AUTO: 28.5 PG (ref 26.6–33)
MCHC RBC AUTO-ENTMCNC: 32.5 G/DL (ref 31.5–35.7)
MCV RBC AUTO: 87.7 FL (ref 79–97)
MONOCYTES # BLD AUTO: 0.79 10*3/MM3 (ref 0.1–0.9)
MONOCYTES NFR BLD AUTO: 9.7 % (ref 5–12)
NEUTROPHILS NFR BLD AUTO: 5.31 10*3/MM3 (ref 1.7–7)
NEUTROPHILS NFR BLD AUTO: 64.9 % (ref 42.7–76)
NRBC BLD AUTO-RTO: 0 /100 WBC (ref 0–0.2)
PLATELET # BLD AUTO: 184 10*3/MM3 (ref 140–450)
PMV BLD AUTO: 10.3 FL (ref 6–12)
RBC # BLD AUTO: 3.33 10*6/MM3 (ref 3.77–5.28)
WBC # BLD AUTO: 8.17 10*3/MM3 (ref 3.4–10.8)

## 2020-12-21 PROCEDURE — 85025 COMPLETE CBC W/AUTO DIFF WBC: CPT | Performed by: INTERNAL MEDICINE

## 2020-12-21 RX ORDER — POLYETHYLENE GLYCOL 3350 17 G/17G
17 POWDER, FOR SOLUTION ORAL DAILY
Start: 2020-12-21 | End: 2022-02-24

## 2020-12-21 RX ORDER — AMOXICILLIN 250 MG
2 CAPSULE ORAL 2 TIMES DAILY
Status: DISCONTINUED | OUTPATIENT
Start: 2020-12-21 | End: 2020-12-21 | Stop reason: HOSPADM

## 2020-12-21 RX ORDER — POLYETHYLENE GLYCOL 3350 17 G/17G
17 POWDER, FOR SOLUTION ORAL DAILY
Status: DISCONTINUED | OUTPATIENT
Start: 2020-12-21 | End: 2020-12-21 | Stop reason: HOSPADM

## 2020-12-21 RX ADMIN — LEVOTHYROXINE SODIUM 75 MCG: 75 TABLET ORAL at 05:51

## 2020-12-21 RX ADMIN — AMLODIPINE BESYLATE 10 MG: 10 TABLET ORAL at 08:57

## 2020-12-21 RX ADMIN — DOCUSATE SODIUM 50MG AND SENNOSIDES 8.6MG 2 TABLET: 8.6; 5 TABLET, FILM COATED ORAL at 08:57

## 2020-12-21 RX ADMIN — METOPROLOL TARTRATE 50 MG: 50 TABLET, FILM COATED ORAL at 08:57

## 2020-12-21 RX ADMIN — ATORVASTATIN CALCIUM 80 MG: 20 TABLET, FILM COATED ORAL at 08:57

## 2020-12-21 RX ADMIN — APIXABAN 5 MG: 5 TABLET, FILM COATED ORAL at 08:57

## 2020-12-21 RX ADMIN — POLYETHYLENE GLYCOL 3350 17 G: 17 POWDER, FOR SOLUTION ORAL at 08:58

## 2020-12-21 RX ADMIN — ACETAMINOPHEN 650 MG: 325 TABLET, FILM COATED ORAL at 13:28

## 2020-12-21 RX ADMIN — DULOXETINE HYDROCHLORIDE 20 MG: 20 CAPSULE, DELAYED RELEASE ORAL at 08:58

## 2020-12-21 RX ADMIN — PANTOPRAZOLE SODIUM 40 MG: 40 TABLET, DELAYED RELEASE ORAL at 05:51

## 2020-12-21 RX ADMIN — BISACODYL 10 MG: 10 SUPPOSITORY RECTAL at 08:58

## 2020-12-21 NOTE — PROGRESS NOTES
"Physicians Statement of Medical Necessity for  Ambulance Transportation    GENERAL INFORMATION     Name: Mei Crane  YOB: 1926  Medicare #: [id number] (See Facesheet)  Transport Date: 12/21/2020 (Valid for round trips this date, or for scheduled repetitive trips for 60 days from the date signed below.)  Origin: [facility]  Destination: [facility]  Is the Patient's stay covered under Medicare Part A (PPS/DRG?)Yes   Closest appropriate facility? Yes  If this a hosp-hosp transfer? No  Is this a hospice patient? No    MEDICAL NECESSITY QUESTIONAIRE    Ambulance Transportation is medically necessary only if other means of transportation are contraindicated or would be potentially harmful to the patient.  To meet this requirement, the patient must be either \"bed confined\" or suffer from a condition such that transport by means other than an ambulance is contraindicated by the patient's condition.  The following questions must be answered by the healthcare professional signing below for this form to be valid:     1) Describe the MEDICAL CONDITION (physical and/or mental) of this patient AT THE TIME OF AMBULANCE TRANSPORT that requires the patient to be transported in an ambulance, and why transport by other means is contraindicated by the patient's condition:   Past Medical History:   Diagnosis Date   • Calculus of gallbladder without cholecystitis 8/7/2017   • CKD (chronic kidney disease), stage III 4/20/2016   • DDD (degenerative disc disease), lumbar 2/19/2019   • Disease of thyroid gland    • Hyperlipidemia    • Low back pain    • Peripheral neuropathy    • Stroke (CMS/HCC)       Past Surgical History:   Procedure Laterality Date   • CATARACT EXTRACTION Bilateral    • EPIDURAL BLOCK     • FEMUR IM NAILING/RODDING Right 12/18/2020    Procedure: FEMUR INTRAMEDULLARY NAILING/RODDING;  Surgeon: Raymond Mtz II, MD;  Location: [facility] OR;  Service: Orthopedics;  " "Laterality: Right;   • HYSTERECTOMY      age 35      2) Is this patient \"bed confined\" as defined below?Yes    To be \"bed confined\" the patient must satisfy all three of the following criteria:  (1) unable to get up from bed without assistance; AND (2) unable to ambulate;  AND (3) unable to sit in a chair or wheelchair.  3) Can this patient safely be transported by car or wheelchair van (I.e., may safely sit during transport, without an attendant or monitoring?)No   4. In addition to completing questions 1-3 above, please check any of the following conditions that apply*:          *Note: supporting documentation for any boxes checked must be maintained in the patient's medical records Moderate/severe pain on movement, Requires oxygen - unable to self administer and Other Hip fracture. Status post right intramedullary nailing of hte intertrochanteric hip fracture.      SIGNATURE OF PHYSICIAN OR OTHER AUTHORIZED HEALTHCARE PROFESSIONAL    I certify that the above information is true and correct based on my evaluation of this patient, and represent that the patient requires transport by ambulance and that other forms of transport are contraindicated.  I understand that this information will be used by the Centers for Medicare and Medicaid Services (CMS) to support the determiniation of medical necessity for ambulance services, and I represent that I have personal knowledge of the patient's condition at the time of transport.       If this box is checked, I also certify that the patient is physically or mentally incapable of signing the ambulance service's claim form and that the institution with which I am affiliated has furnished care, services or assistance to the patient.  My signature below is made on behalf of the patient pursuant to 42 .36(b)(4). In accordance with 42 .37, the specific reason(s) that the patient is physically or mentally incapable of signing the claim for is as follows: "     Signature of Physician or Healthcare Professional  Date/Time:        (For Scheduled repetitive transport, this form is not valid for transports performed more than 60 days after this date).                                                                                                                                            --------------------------------------------------------------------------------------------  Printed Name and Credentials of Physician or Authorized Healthcare Professional     *Form must be signed by patient's attending physician for scheduled, repetitive transports,.  For non-repetitive ambulance transports, if unable to obtain the signature of the attending physician, any of the following may sign (please select below):     Physician  Clinical Nurse Specialist  Registered Nurse     Physician Assistant  Discharge Planner  Licensed Practical Nurse     Nurse Practitioner

## 2020-12-21 NOTE — PLAN OF CARE
Goal Outcome Evaluation:  Plan of Care Reviewed With: patient  Progress: improving  Outcome Summary: VSS, 1Lnc for sats dropping while asleep, SL, voiding per lucina without issue, encouraged IS use, to [facility] for rehab on DC

## 2020-12-21 NOTE — DISCHARGE SUMMARY
[facility]    Date of Discharge:  12/21/2020    PCP: Lexie Toussaint, COCO    Discharge Diagnosis:   Active Hospital Problems    Diagnosis  POA   • **Closed intertrochanteric fracture of hip, right, initial encounter (CMS/[facility]) [S72.141A]  Yes   • Closed fracture of rib of right side [S22.31XA]  Yes   • Atrial fibrillation (CMS/[facility]) [I48.91]  Yes   • History of embolic stroke [Z86.73]  Not Applicable   • CKD (chronic kidney disease), stage III (CMS/[facility]) [N18.30]  Yes   • Hyperlipidemia [E78.5]  Yes   • HTN (hypertension), benign [I10]  Yes   • Gastroesophageal reflux disease without esophagitis [K21.9]  Yes   • Anemia [D64.9]  Yes   • Acquired hypothyroidism [E03.9]  Yes      Resolved Hospital Problems    Diagnosis Date Resolved POA   • ANA (acute kidney injury) (CMS/[facility]) [N17.9] 12/18/2020 Yes   • Leukocytosis [D72.829] 12/18/2020 Yes     Procedure(s):  FEMUR INTRAMEDULLARY NAILING/RODDING     Consults     Date and Time Order Name Status Description    12/17/2020 1955 Inpatient Orthopedic Surgery Consult Completed     12/17/2020 1935 [facility] (on-call MD unless specified) Details Completed     12/17/2020 1924 Ortho (on-call MD unless specified) Completed         Hospital Course  Please see history and physical for details. Patient is a 89 y.o. female initially admitted for mechanical fall which resulted in a right intertrochanteric hip fracture.  Orthopedics was consulted and Dr. Mtz performed a right intramedullary nailing of that intertrochanteric fracture on 12/18/2020.  Postoperative course was compounded by anemia of chronic disease made worse by acute blood loss anemia in which the patient did require transfusion of packed red blood cells.  Since then her hemoglobin is stabilized and has continued to show an upward trend.  She can resume her iron supplementation as prior to admission post discharge.  Anticoagulation with Eliquis has been resumed for A. fib  with past history of embolic CVA and patient has continued to clinically improve.  She is alert and oriented x3.  Vital signs are stable as well as afebrile.  Other comorbidities are seemingly stable.  Previous issues with mild acute renal failure have resolved with the past history of CKD 3A.  At this juncture she is medically stable to be transition to subacute rehab.  All questions have been answered to the patient to the best my ability.  Orthopedics is okay with disposition with plans for outpatient follow-up.  CCP is to coordinate final discharge needs.  Patient is thankful for the care she received while here as well as amenable to the above plans.       Condition on Discharge: Improved.     Temp:  [97.2 °F (36.2 °C)-98.4 °F (36.9 °C)] 97.8 °F (36.6 °C)  Heart Rate:  [79-91] 91  Resp:  [18] 18  BP: (126-149)/(75-83) 138/80  Body mass index is 25.75 kg/m².    Physical Exam  Constitutional:       Appearance: Normal appearance.   HENT:      Head: Normocephalic.      Nose: Nose normal.      Mouth/Throat:      Mouth: Mucous membranes are moist.      Pharynx: Oropharynx is clear.   Eyes:      Extraocular Movements: Extraocular movements intact.      Conjunctiva/sclera: Conjunctivae normal.      Pupils: Pupils are equal, round, and reactive to light.   Cardiovascular:      Rate and Rhythm: Normal rate and regular rhythm.   Pulmonary:      Effort: Pulmonary effort is normal. No respiratory distress.      Breath sounds: Normal breath sounds.   Abdominal:      General: Bowel sounds are normal. There is no distension.      Palpations: Abdomen is soft.      Tenderness: There is no abdominal tenderness.   Musculoskeletal:         General: No swelling.   Skin:     General: Skin is warm and dry.   Neurological:      General: No focal deficit present.      Mental Status: She is alert and oriented to person, place, and time.      Cranial Nerves: No cranial nerve deficit.   Psychiatric:         Mood and Affect: Mood normal.          Behavior: Behavior normal.     [unfilled]    Disposition: Skilled Nursing Facility (DC - External)       Discharge Medications      New Medications      Instructions Start Date   polyethylene glycol 17 g packet  Commonly known as: MIRALAX   17 g, Oral, Daily         Continue These Medications      Instructions Start Date   acetaminophen 325 MG tablet  Commonly known as: TYLENOL   650 mg, Oral, Every 4 Hours PRN      amLODIPine 10 MG tablet  Commonly known as: NORVASC   10 mg, Oral, Daily      apixaban 5 MG tablet tablet  Commonly known as: Eliquis   5 mg, Oral, 2 Times Daily      atorvastatin 80 MG tablet  Commonly known as: LIPITOR   TAKE ONE TABLET BY MOUTH AT BEDTIME      DULoxetine 20 MG capsule  Commonly known as: CYMBALTA   20 mg, Oral, Daily      ferrous gluconate 324 (37.5 Fe) MG tablet tablet   324 mg, Oral, Daily With Breakfast      gabapentin 300 MG capsule  Commonly known as: NEURONTIN   300 mg, Oral, Nightly      levothyroxine 75 MCG tablet  Commonly known as: Synthroid   75 mcg, Oral, Daily      metoprolol tartrate 50 MG tablet  Commonly known as: LOPRESSOR   50 mg, Oral, 2 Times Daily      pantoprazole 40 MG EC tablet  Commonly known as: PROTONIX   TAKE ONE TABLET BY MOUTH EVERY DAY      prednisoLONE acetate 1 % ophthalmic suspension  Commonly known as: PRED FORTE   1 drop, Left Eye, Daily      traMADol-acetaminophen 37.5-325 MG per tablet  Commonly known as: ULTRACET   1 tablet, Oral, 2 times daily              Additional Instructions for the Follow-ups that You Need to Schedule     Discharge Follow-up with PCP   As directed       Currently Documented PCP:    Lexie Toussaint APRN    PCP Phone Number:    [phone]     Follow Up Details: PCP post rehab.  Orthopedics per their recommendations            Contact information for follow-up providers     Lexie Toussaint APRN .    Specialty: Family Medicine  Why: PCP post rehab.  Orthopedics per their recommendations  Contact information:  [address]  [facility] [address]  [facility] [address]  [phone]                   Contact information for after-discharge care     Destination     [facility] .    Service: Skilled Nursing  Contact information:  [address] [facility] [id number] [phone]                               West Frank MD  12/21/20  08:16 EST    Discharge time spent greater than 30 minutes.

## 2020-12-21 NOTE — PLAN OF CARE
Goal Outcome Evaluation:  Plan of Care Reviewed With: patient  Progress: improving  Outcome Summary: VSS. PT voiding per lucina. Pain well controlled. Plans to d.c today to [facility] pending BM. Will continue to monitor.

## 2020-12-21 NOTE — PROGRESS NOTES
Continued Stay Note  [facility]     Patient Name: Mei Crane  MRN: [id number]  Today's Date: 12/21/2020    Admit Date: 12/17/2020    Discharge Plan     Row Name 12/21/20 0908       Plan    Plan  [facility] --  Accepted    Patient/Family in Agreement with Plan  yes    Plan Comments  Discharge orders noted. Spoke with Rebecca[facility] who has accepted the patient and states she has a SNF bed available for the pt today. Scheduled a [facility] to transport the patient to [facility] today at 1300. Updated the patient's nurse/MATILDA Layotn who is agreeable and states the patient needs to have a BM today proir to d/c. Updated the patient who is agreeable. Per the pt's request, CCP called and updated her sister/Emily Jean-Baptiste who is agreeable to the d/c plan. CCP following.        Discharge Codes    No documentation.       Expected Discharge Date and Time     Expected Discharge Date Expected Discharge Time    Dec 21, 2020             Alona Nolen RN

## 2020-12-21 NOTE — PROGRESS NOTES
Continued Stay Note  [facility]     Patient Name: Mei Crane  MRN: [id number]  Today's Date: 12/21/2020    Admit Date: 12/17/2020    Discharge Plan     Row Name 12/21/20 1304       Plan    Plan Comments  Spoke with the patient's nurse/MATILDA Layton who said the patient has had a bowel movement today. Updated [facility] who is understanding and states the pt is ok to d/c to [facility] today. [facility] to transport her today at 1300. No other needs identified. CCP following.    Final Discharge Disposition Code  03 - skilled nursing facility (SNF)    Final Note  Pt to d/c to [facility]. [facility] to transport.        Discharge Codes    No documentation.       Expected Discharge Date and Time     Expected Discharge Date Expected Discharge Time    Dec 21, 2020             Alona Nolen RN

## 2020-12-29 RX ORDER — METOPROLOL TARTRATE 50 MG/1
TABLET, FILM COATED ORAL
Qty: 60 TABLET | Refills: 2 | Status: SHIPPED | OUTPATIENT
Start: 2020-12-29 | End: 2021-05-05

## 2020-12-29 RX ORDER — DULOXETIN HYDROCHLORIDE 20 MG/1
CAPSULE, DELAYED RELEASE ORAL
Qty: 30 CAPSULE | Refills: 2 | Status: SHIPPED | OUTPATIENT
Start: 2020-12-29 | End: 2021-05-05

## 2021-01-22 ENCOUNTER — TELEPHONE (OUTPATIENT)
Dept: FAMILY MEDICINE CLINIC | Facility: CLINIC | Age: 86
End: 2021-01-22

## 2021-01-22 NOTE — TELEPHONE ENCOUNTER
DOLORES WITH [facility] CALLED STATING THAT PATIENT IS BEING DISCHARGED FROM [facility] IN [facility] AND IS REQUESTING VERBAL ORDERS    SKILLED NURSING  PHYSICAL THERAPY  OCCUPATIONAL THERAPY    PLEASE CALL BACK AND ADVISE  CB#: [phone]

## 2021-01-27 ENCOUNTER — TELEPHONE (OUTPATIENT)
Dept: FAMILY MEDICINE CLINIC | Facility: CLINIC | Age: 86
End: 2021-01-27

## 2021-01-27 NOTE — TELEPHONE ENCOUNTER
No answer/No - HUB if Juan calls back please transfer or give the OK for OT per Lydia SIEGEL    Thanks

## 2021-01-27 NOTE — TELEPHONE ENCOUNTER
Caller: ANGELINA    Relationship: [facility]      Best call back number: [phone]    What orders are you requesting (i.e. lab or imaging):   VERBAL OCCUPATIONAL THERAPY 2 TIMES A WEEK FOR 2 WEEKS, 1 TIME A WEEK FOR 2 WEEKS.     In what timeframe would the patient need to come in: TODAY      Where will you receive your lab/imaging services: [facility]      Additional notes:

## 2021-01-27 NOTE — TELEPHONE ENCOUNTER
Tami from [facility] called and asked for skilled nursing verbal orders.  I ok 'd.  I also asked her to let Juan know that we have given verbal orders for OT for the patient.  She voiced understanding and would relay the message.

## 2021-02-01 DIAGNOSIS — E78.2 MIXED HYPERLIPIDEMIA: ICD-10-CM

## 2021-02-01 DIAGNOSIS — I10 HTN (HYPERTENSION), BENIGN: ICD-10-CM

## 2021-02-01 DIAGNOSIS — E53.8 B12 DEFICIENCY: ICD-10-CM

## 2021-02-01 DIAGNOSIS — D64.9 ANEMIA, UNSPECIFIED TYPE: ICD-10-CM

## 2021-02-01 DIAGNOSIS — E03.9 ACQUIRED HYPOTHYROIDISM: ICD-10-CM

## 2021-02-01 DIAGNOSIS — E03.9 ACQUIRED HYPOTHYROIDISM: Primary | ICD-10-CM

## 2021-02-01 NOTE — TELEPHONE ENCOUNTER
Spoke with Tami from [facility].  Patient is scheduled to come in for labs 2/2/21 & office visit on 2/9/21, will get labs and do injection at office visit.

## 2021-02-03 LAB — VIT B12 SERPL-MCNC: 758 PG/ML (ref 232–1245)

## 2021-02-09 ENCOUNTER — OFFICE VISIT (OUTPATIENT)
Dept: FAMILY MEDICINE CLINIC | Facility: CLINIC | Age: 86
End: 2021-02-09

## 2021-02-09 VITALS
BODY MASS INDEX: 24.92 KG/M2 | HEIGHT: 64 IN | TEMPERATURE: 98.2 F | HEART RATE: 81 BPM | OXYGEN SATURATION: 95 % | DIASTOLIC BLOOD PRESSURE: 68 MMHG | WEIGHT: 146 LBS | SYSTOLIC BLOOD PRESSURE: 110 MMHG

## 2021-02-09 DIAGNOSIS — E03.9 ACQUIRED HYPOTHYROIDISM: ICD-10-CM

## 2021-02-09 DIAGNOSIS — Z00.00 MEDICARE ANNUAL WELLNESS VISIT, SUBSEQUENT: Primary | ICD-10-CM

## 2021-02-09 LAB
ALBUMIN SERPL-MCNC: 4.2 G/DL (ref 3.5–4.6)
ALBUMIN/GLOB SERPL: 1.8 {RATIO} (ref 1.2–2.2)
ALP SERPL-CCNC: 211 IU/L (ref 39–117)
ALT SERPL-CCNC: 22 IU/L (ref 0–32)
AST SERPL-CCNC: 35 IU/L (ref 0–40)
BASOPHILS # BLD AUTO: 0 X10E3/UL (ref 0–0.2)
BASOPHILS NFR BLD AUTO: 1 %
BILIRUB SERPL-MCNC: 0.4 MG/DL (ref 0–1.2)
BUN SERPL-MCNC: 16 MG/DL (ref 10–36)
BUN/CREAT SERPL: 12 (ref 12–28)
CALCIUM SERPL-MCNC: 9.2 MG/DL (ref 8.7–10.3)
CHLORIDE SERPL-SCNC: 102 MMOL/L (ref 96–106)
CHOLEST SERPL-MCNC: 165 MG/DL (ref 100–199)
CO2 SERPL-SCNC: 20 MMOL/L (ref 20–29)
CREAT SERPL-MCNC: 1.38 MG/DL (ref 0.57–1)
EOSINOPHIL # BLD AUTO: 0 X10E3/UL (ref 0–0.4)
EOSINOPHIL NFR BLD AUTO: 1 %
ERYTHROCYTE [DISTWIDTH] IN BLOOD BY AUTOMATED COUNT: 13.1 % (ref 11.7–15.4)
GLOBULIN SER CALC-MCNC: 2.4 G/DL (ref 1.5–4.5)
GLUCOSE SERPL-MCNC: 104 MG/DL (ref 65–99)
HCT VFR BLD AUTO: 35.2 % (ref 34–46.6)
HDLC SERPL-MCNC: 55 MG/DL
HGB BLD-MCNC: 11.4 G/DL (ref 11.1–15.9)
IMM GRANULOCYTES # BLD AUTO: 0 X10E3/UL (ref 0–0.1)
IMM GRANULOCYTES NFR BLD AUTO: 1 %
IRON SATN MFR SERPL: 15 % (ref 15–55)
IRON SERPL-MCNC: 39 UG/DL (ref 27–139)
LDLC SERPL CALC-MCNC: 81 MG/DL (ref 0–99)
LYMPHOCYTES # BLD AUTO: 0.8 X10E3/UL (ref 0.7–3.1)
LYMPHOCYTES NFR BLD AUTO: 19 %
Lab: NORMAL
MCH RBC QN AUTO: 28.6 PG (ref 26.6–33)
MCHC RBC AUTO-ENTMCNC: 32.4 G/DL (ref 31.5–35.7)
MCV RBC AUTO: 88 FL (ref 79–97)
METHYLMALONATE SERPL-SCNC: 228 NMOL/L (ref 0–378)
MONOCYTES # BLD AUTO: 0.3 X10E3/UL (ref 0.1–0.9)
MONOCYTES NFR BLD AUTO: 6 %
NEUTROPHILS # BLD AUTO: 3.2 X10E3/UL (ref 1.4–7)
NEUTROPHILS NFR BLD AUTO: 72 %
PLATELET # BLD AUTO: 250 X10E3/UL (ref 150–450)
POTASSIUM SERPL-SCNC: 3.6 MMOL/L (ref 3.5–5.2)
PROT SERPL-MCNC: 6.6 G/DL (ref 6–8.5)
RBC # BLD AUTO: 3.99 X10E6/UL (ref 3.77–5.28)
SODIUM SERPL-SCNC: 141 MMOL/L (ref 134–144)
T3FREE SERPL-MCNC: 1.8 PG/ML (ref 2–4.4)
T4 FREE SERPL-MCNC: 1.5 NG/DL (ref 0.82–1.77)
TIBC SERPL-MCNC: 260 UG/DL (ref 250–450)
TRIGL SERPL-MCNC: 174 MG/DL (ref 0–149)
TSH SERPL DL<=0.005 MIU/L-ACNC: 8.57 UIU/ML (ref 0.45–4.5)
UIBC SERPL-MCNC: 221 UG/DL (ref 118–369)
VIT B12 SERPL-MCNC: 719 PG/ML (ref 232–1245)
VLDLC SERPL CALC-MCNC: 29 MG/DL (ref 5–40)
WBC # BLD AUTO: 4.3 X10E3/UL (ref 3.4–10.8)

## 2021-02-09 PROCEDURE — G0439 PPPS, SUBSEQ VISIT: HCPCS | Performed by: NURSE PRACTITIONER

## 2021-02-09 RX ORDER — LEVOTHYROXINE SODIUM 88 UG/1
88 TABLET ORAL DAILY
Qty: 90 TABLET | Refills: 1 | Status: SHIPPED | OUTPATIENT
Start: 2021-02-09 | End: 2021-05-10

## 2021-02-09 NOTE — PROGRESS NOTES
The ABCs of the Annual Wellness Visit  Subsequent Medicare Wellness Visit    Chief Complaint   Patient presents with   • Medicare Wellness-subsequent     review labs       Subjective   History of Present Illness:  Mei Crane is a 89 y.o. female who presents for a Subsequent Medicare Wellness Visit.    HEALTH RISK ASSESSMENT    Recent Hospitalizations:  Recently treated at the following:  [facility]    Current Medical Providers:  Patient Care Team:  Lexie Toussaint APRN as PCP - General (Family Medicine)  Cecil Jacob, VANITA (Optometry)  Yannick Santo MD as Consulting Physician (Ophthalmology)  Nik Rodriguez MD as Consulting Physician (Orthopedic Surgery)  Lisa Dudley APRN as Nurse Practitioner (Neurology)    Smoking Status:  Social History     Tobacco Use   Smoking Status Never Smoker   Smokeless Tobacco Never Used       Alcohol Consumption:  Social History     Substance and Sexual Activity   Alcohol Use Yes    Comment: occasionally       Depression Screen:   PHQ-2/PHQ-9 Depression Screening 2/9/2021   Little interest or pleasure in doing things 0   Feeling down, depressed, or hopeless 0   Trouble falling or staying asleep, or sleeping too much -   Feeling tired or having little energy -   Poor appetite or overeating -   Feeling bad about yourself - or that you are a failure or have let yourself or your family down -   Trouble concentrating on things, such as reading the newspaper or watching television -   Moving or speaking so slowly that other people could have noticed. Or the opposite - being so fidgety or restless that you have been moving around a lot more than usual -   Thoughts that you would be better off dead, or of hurting yourself in some way -   Total Score 0   If you checked off any problems, how difficult have these problems made it for you to do your work, take care of things at home, or get along with other people? -       Fall Risk Screen:  LATOYA Fall  Risk Assessment was completed, and patient is at HIGH risk for falls. Assessment completed on:2/9/2021    Health Habits and Functional and Cognitive Screening:  Functional & Cognitive Status 2/9/2021   Do you have difficulty preparing food and eating? Yes   Do you have difficulty bathing yourself, getting dressed or grooming yourself? Yes   Do you have difficulty using the toilet? No   Do you have difficulty moving around from place to place? No   Do you have trouble with steps or getting out of a bed or a chair? Yes   Current Diet Well Balanced Diet   Dental Exam Up to date        Dental Exam Comment -   Eye Exam Up to date        Eye Exam Comment -   Exercise (times per week) 7 times per week   Current Exercise Activities Include Walking   Do you need help using the phone?  No   Are you deaf or do you have serious difficulty hearing?  No   Do you need help with transportation? Yes   Do you need help shopping? Yes   Do you need help preparing meals?  Yes   Do you need help with housework?  Yes   Do you need help with laundry? Yes   Do you need help taking your medications? No   Do you need help managing money? No   Do you ever drive or ride in a car without wearing a seat belt? No   Have you felt unusual stress, anger or loneliness in the last month? No   Who do you live with? Other   If you need help, do you have trouble finding someone available to you? No   Have you been bothered in the last four weeks by sexual problems? No   Do you have difficulty concentrating, remembering or making decisions? No         Does the patient have evidence of cognitive impairment? No    Asprin use counseling:Contraindicated from taking ASA    Age-appropriate Screening Schedule:  Refer to the list below for future screening recommendations based on patient's age, sex and/or medical conditions. Orders for these recommended tests are listed in the plan section. The patient has been provided with a written plan.    Health Maintenance    Topic Date Due   • TDAP/TD VACCINES (1 - Tdap) 02/14/1945   • DXA SCAN  09/11/2020   • MAMMOGRAM  09/18/2020   • LIPID PANEL  06/04/2021   • INFLUENZA VACCINE  Completed   • ZOSTER VACCINE  Discontinued          The following portions of the patient's history were reviewed and updated as appropriate: allergies, current medications, past family history, past medical history, past social history, past surgical history and problem list.    Outpatient Medications Prior to Visit   Medication Sig Dispense Refill   • acetaminophen (TYLENOL) 325 MG tablet Take 2 tablets by mouth Every 4 (Four) Hours As Needed for Mild Pain .     • amLODIPine (NORVASC) 10 MG tablet Take 1 tablet by mouth Daily. 30 tablet 5   • apixaban (Eliquis) 5 MG tablet tablet Take 1 tablet by mouth 2 (Two) Times a Day. 60 tablet 0   • atorvastatin (LIPITOR) 80 MG tablet TAKE ONE TABLET BY MOUTH AT BEDTIME 30 tablet 1   • DULoxetine (CYMBALTA) 20 MG capsule TAKE ONE CAPSULE BY MOUTH EVERY DAY 30 capsule 2   • gabapentin (NEURONTIN) 300 MG capsule Take 1 capsule by mouth Every Night. 28 capsule 5   • levothyroxine (Synthroid) 75 MCG tablet Take 1 tablet by mouth Daily. 90 tablet 1   • metoprolol tartrate (LOPRESSOR) 50 MG tablet TAKE ONE TABLET BY MOUTH TWICE DAILY 60 tablet 2   • pantoprazole (PROTONIX) 40 MG EC tablet TAKE ONE TABLET BY MOUTH EVERY DAY 90 tablet 1   • polyethylene glycol (MIRALAX) 17 g packet Take 17 g by mouth Daily.     • prednisoLONE acetate (PRED FORTE) 1 % ophthalmic suspension Administer 1 drop into the left eye Daily.  3   • ferrous gluconate 324 (37.5 Fe) MG tablet tablet Take 1 tablet by mouth Daily With Breakfast. 30 tablet 6   • traMADol-acetaminophen (ULTRACET) 37.5-325 MG per tablet Take 1 tablet by mouth 2 (two) times a day. 60 tablet 0     Facility-Administered Medications Prior to Visit   Medication Dose Route Frequency Provider Last Rate Last Admin   • cyanocobalamin injection 1,000 mcg  1,000 mcg Intramuscular Q28  Days Jae Bray MD   1,000 mcg at 11/05/20 1052       Patient Active Problem List   Diagnosis   • HTN (hypertension), benign   • Acquired hypothyroidism   • Hyperlipidemia   • Gastroesophageal reflux disease without esophagitis   • Glaucoma   • Macular degeneration   • Anemia   • Osteopenia   • CKD (chronic kidney disease), stage III (CMS/[facility])   • B12 deficiency   • Calculus of gallbladder without cholecystitis   • DDD (degenerative disc disease), lumbar   • Trochanteric bursitis of right hip   • Bilateral leg weakness   • Elevated troponin   • Weakness   • Dizziness   • Hallucinations, visual   • CVA (cerebrovascular accident) (CMS/[facility])   • Embolic stroke (CMS/[facility])   • Hypokalemia   • Hyponatremia   • History of embolic stroke   • Chest pain   • Atrial fibrillation (CMS/[facility])   • Thiazide diuretics causing adverse effect in therapeutic use   • Closed intertrochanteric fracture of hip, right, initial encounter (CMS/[facility])   • Closed fracture of rib of right side       Advanced Care Planning:  ACP discussion was held with the patient during this visit. Patient does not have an advance directive, declines further assistance.    Review of Systems   Constitutional: Negative.    HENT: Negative.    Eyes: Negative.    Respiratory: Negative.    Cardiovascular: Negative.    Gastrointestinal: Negative.    Genitourinary: Negative.    Musculoskeletal: Positive for arthralgias (right knee pain).   Skin: Negative.    Neurological: Positive for dizziness (improving).   Psychiatric/Behavioral: Negative.        Compared to one year ago, the patient feels her physical health is worse.  Compared to one year ago, the patient feels her mental health is the same.    Reviewed chart for potential of high risk medication in the elderly: yes  Reviewed chart for potential of harmful drug interactions in the elderly:yes    Objective         Vitals:    02/09/21 1249   BP: 110/68   BP Location: Left arm   Patient Position: Sitting   Cuff  "Size: Adult   Pulse: 81   Temp: 98.2 °F (36.8 °C)   TempSrc: Infrared   SpO2: 95%   Weight: 66.2 kg (146 lb)   Height: 162.6 cm (64\")       Body mass index is 25.06 kg/m².  Discussed the patient's BMI with her. The BMI is in the acceptable range.    Physical Exam  Vitals signs reviewed.   Constitutional:       Appearance: Normal appearance.   Cardiovascular:      Rate and Rhythm: Normal rate and regular rhythm.      Pulses: Normal pulses.      Heart sounds: Normal heart sounds.   Pulmonary:      Effort: Pulmonary effort is normal.      Breath sounds: Normal breath sounds.   Abdominal:      General: Bowel sounds are normal.      Palpations: Abdomen is soft.   Skin:     General: Skin is warm and dry.   Neurological:      Mental Status: She is alert and oriented to person, place, and time.   Psychiatric:      Comments: No acute distress         Lab Results   Component Value Date     (H) 02/02/2021    CHLPL 165 02/02/2021    TRIG 174 (H) 02/02/2021    HDL 55 02/02/2021    LDL 81 02/02/2021    VLDL 29 02/02/2021        Assessment/Plan   Medicare Risks and Personalized Health Plan  CMS Preventative Services Quick Reference  postponed DEXA scan for now    The above risks/problems have been discussed with the patient.  Pertinent information has been shared with the patient in the After Visit Summary.  Follow up plans and orders are seen below in the Assessment/Plan Section.    Diagnoses and all orders for this visit:    1. Medicare annual wellness visit, subsequent (Primary)      Follow Up:  No follow-ups on file.     An After Visit Summary and PPPS were given to the patient.       I have not seen Ms. Crane in months and I just now was able to discuss the results of her chest CT from last May. Ms. Crane is aware of the results and does not wish to have a PET scan for further evaluation on the nodules noted on the CT.       "

## 2021-02-09 NOTE — PATIENT INSTRUCTIONS
Medicare Wellness  Personal Prevention Plan of Service     Date of Office Visit:  2021  Encounter Provider:  COCO Garcia  Place of Service:  [facility] INTER[facility]  Patient Name: Mei Crane  :  1926    As part of the Medicare Wellness portion of your visit today, we are providing you with this personalized preventive plan of services (PPPS). This plan is based upon recommendations of the [place] Preventive Services Task Force (USPSTF) and the Advisory Committee on Immunization Practices (ACIP).    This lists the preventive care services that should be considered, and provides dates of when you are due. Items listed as completed are up-to-date and do not require any further intervention.    Health Maintenance   Topic Date Due   • TDAP/TD VACCINES (1 - Tdap) 1945   • DXA SCAN  2020   • ANNUAL WELLNESS VISIT  2020   • MAMMOGRAM  2020   • LIPID PANEL  2021   • INFLUENZA VACCINE  Completed   • Pneumococcal Vaccine 65+  Completed   • MENINGOCOCCAL VACCINE  Aged Out   • ZOSTER VACCINE  Discontinued       No orders of the defined types were placed in this encounter.      No follow-ups on file.

## 2021-02-22 ENCOUNTER — TELEPHONE (OUTPATIENT)
Dept: FAMILY MEDICINE CLINIC | Facility: CLINIC | Age: 86
End: 2021-02-22

## 2021-02-22 DIAGNOSIS — I48.91 ATRIAL FIBRILLATION, NEW ONSET (HCC): ICD-10-CM

## 2021-02-22 NOTE — TELEPHONE ENCOUNTER
Juan called to let you know patient has been d/c'd from OT home health. She will still have PT for about another month. She has net all OT goals.

## 2021-02-24 ENCOUNTER — TELEPHONE (OUTPATIENT)
Dept: FAMILY MEDICINE CLINIC | Facility: CLINIC | Age: 86
End: 2021-02-24

## 2021-02-24 DIAGNOSIS — R60.0 EDEMA OF BOTH LOWER LEGS: Primary | ICD-10-CM

## 2021-02-24 DIAGNOSIS — I48.91 ATRIAL FIBRILLATION, NEW ONSET (HCC): ICD-10-CM

## 2021-02-24 RX ORDER — POTASSIUM CHLORIDE 750 MG/1
10 TABLET, FILM COATED, EXTENDED RELEASE ORAL DAILY
Qty: 90 TABLET | Refills: 0 | Status: SHIPPED | OUTPATIENT
Start: 2021-02-24 | End: 2021-05-10

## 2021-02-24 RX ORDER — APIXABAN 5 MG/1
TABLET, FILM COATED ORAL
Qty: 60 TABLET | Refills: 0 | OUTPATIENT
Start: 2021-02-24

## 2021-02-24 RX ORDER — FUROSEMIDE 20 MG/1
20 TABLET ORAL DAILY
Qty: 90 TABLET | Refills: 0 | Status: SHIPPED | OUTPATIENT
Start: 2021-02-24 | End: 2021-05-10

## 2021-02-24 NOTE — TELEPHONE ENCOUNTER
Called patient and gave her the information.  Lab appt was scheduled and patient aware of lab appt itme.

## 2021-02-24 NOTE — TELEPHONE ENCOUNTER
Please let her know I have sent in a prescription for her Furosemide. I have also sent in a prescription for potassium since her last level was low normal. I want her to come in in 4 week to have her potassium checked.

## 2021-02-24 NOTE — TELEPHONE ENCOUNTER
Pt confirmed her PCP did send refills. Pt confirms PCP has taken over management of Eliquis. Expressed the importance of not missing any doses of blood thinners. Pt verbalized understanding.    Vascular Attending:        HPI:  Pt interviewed with pacific  [id number].  75 year old F, PMHx morbid obesity, HTN, HLD, GERD, TIA with carotid artery disease s/p left carotid endarterectomy, Chronic Kidney disease (baseline creatinine 1.45) chronic diastolic heart failure with severe aortic stenosis (PG 60, PV 4 from ECHO in 1/31/20) followed by Dr. Dubois, presented to [facility] with symptoms of dizziness, weakness, chest pain, r/i NSTEMI, peak troponin of 10.  She reports having CP and SOB for several days associated with PND. She states the pain started while she was walking, though she is unable to quantify how much exertion prompted the symptoms. Denies orthopnea, LE edema. She was admitted for further workup and started on a heparin gtt. CT chest negative for PE, CT head significant for 2.6cm x 2.1cm mass lesion with mild hydrocephalus. Echo revealed severe AS and patient was transferred to [facility] for surgical evaluation.     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 1000 milliLiter(s) (50 mL/Hr) IV Continuous <Continuous>  dextrose 50% Injectable 12.5 Gram(s) IV Push once  dextrose 50% Injectable 25 Gram(s) IV Push once  dextrose 50% Injectable 25 Gram(s) IV Push once  heparin  Infusion 1300 Unit(s)/Hr (13 mL/Hr) IV Continuous <Continuous>  insulin lispro (HumaLOG) corrective regimen sliding scale   SubCutaneous Before meals and at bedtime  lactated ringers. 1000 milliLiter(s) (50 mL/Hr) IV Continuous <Continuous>  metoprolol tartrate Injectable 5 milliGRAM(s) IV Push every 6 hours  pantoprazole  Injectable 40 milliGRAM(s) IV Push daily    MEDICATIONS  (PRN):  dextrose 40% Gel 15 Gram(s) Oral once PRN Blood Glucose LESS THAN 70 milliGRAM(s)/deciliter  glucagon  Injectable 1 milliGRAM(s) IntraMuscular once PRN Glucose LESS THAN 70 milligrams/deciliter      Allergies  No Known Allergies    Vital Signs Last 24 Hrs  T(C): 36.2 (23 May 2020 01:15), Max: 36.8 (22 May 2020 14:00)  T(F): 97.1 (23 May 2020 01:15), Max: 98.2 (22 May 2020 14:00)  HR: 109 (23 May 2020 00:00) (86 - 109)  BP: 146/81 (22 May 2020 21:00) (146/81 - 146/81)  BP(mean): 102 (22 May 2020 21:00) (102 - 102)  RR: 20 (23 May 2020 00:00) (12 - 20)  SpO2: 96% (23 May 2020 00:00) (94% - 100%)    PHYSICAL EXAM:  Constitutional: AMARILIS, VSS  Respiratory: Unlabored  Cardiovascular: S1S2  Extremities: no edema discoloration or temp difference b/l  Vascular: 2+ radial  Neurological: intact sensory/motor    LABS:                        11.3   7.53  )-----------( 144      ( 22 May 2020 19:05 )             36.2     05-22    137  |  102  |  23  ----------------------------<  114<H>  3.8   |  24  |  1.04    Ca    8.7      22 May 2020 19:05  Phos  2.8     05-22  Mg     1.9     05-22    TPro  6.0  /  Alb  3.2<L>  /  TBili  0.4  /  DBili  x   /  AST  17  /  ALT  12  /  AlkPhos  44  05-22    PT/INR - ( 22 May 2020 19:05 )   PT: 14.0 sec;   INR: 1.22          PTT - ( 22 May 2020 19:05 )  PTT:80.5 sec      RADIOLOGY & ADDITIONAL STUDIES

## 2021-02-24 NOTE — TELEPHONE ENCOUNTER
It looks like Eliquis was just refilled by her PCP. She is no longer following up with neurology. Maria Guadalupe, can you check with patient and make sure she has refills of Eliquis from her PCP? Thanks.

## 2021-02-24 NOTE — TELEPHONE ENCOUNTER
Pt said she was taken off her water pill to do the surgery on her right leg but the right leg is swelling so bad she wants to know if she can go back on the water pill now.  Please contact pt to discuss.  Thanks, ADDIS

## 2021-02-26 RX ORDER — ATORVASTATIN CALCIUM 80 MG/1
80 TABLET, FILM COATED ORAL
Qty: 30 TABLET | Refills: 1 | Status: SHIPPED | OUTPATIENT
Start: 2021-02-26 | End: 2021-05-05

## 2021-03-15 DIAGNOSIS — I10 HTN (HYPERTENSION), BENIGN: ICD-10-CM

## 2021-03-15 DIAGNOSIS — R60.0 EDEMA OF BOTH LOWER LEGS: Primary | ICD-10-CM

## 2021-03-22 ENCOUNTER — TELEPHONE (OUTPATIENT)
Dept: FAMILY MEDICINE CLINIC | Facility: CLINIC | Age: 86
End: 2021-03-22

## 2021-03-22 NOTE — TELEPHONE ENCOUNTER
Caller: RENEA     Relationship to patient: [facility]     Best call back number: [phone]    Patient is needing:PATIENT WAS DISCHARGED FROM [facility] MARCH 19, 2021. TREATMENT PLAN HAS BEEN  COMPLETED

## 2021-03-24 ENCOUNTER — OFFICE VISIT (OUTPATIENT)
Dept: FAMILY MEDICINE CLINIC | Facility: CLINIC | Age: 86
End: 2021-03-24

## 2021-03-24 ENCOUNTER — LAB (OUTPATIENT)
Dept: FAMILY MEDICINE CLINIC | Facility: CLINIC | Age: 86
End: 2021-03-24

## 2021-03-24 VITALS
HEIGHT: 64 IN | DIASTOLIC BLOOD PRESSURE: 74 MMHG | BODY MASS INDEX: 24.24 KG/M2 | OXYGEN SATURATION: 95 % | TEMPERATURE: 98 F | HEART RATE: 86 BPM | SYSTOLIC BLOOD PRESSURE: 126 MMHG | WEIGHT: 142 LBS

## 2021-03-24 DIAGNOSIS — E03.9 ACQUIRED HYPOTHYROIDISM: ICD-10-CM

## 2021-03-24 DIAGNOSIS — M79.604 PAIN IN BOTH LOWER EXTREMITIES: Primary | ICD-10-CM

## 2021-03-24 DIAGNOSIS — M79.605 PAIN IN BOTH LOWER EXTREMITIES: Primary | ICD-10-CM

## 2021-03-24 LAB
MAGNESIUM SERPL-MCNC: 1.6 MG/DL (ref 1.7–2.3)
TSH SERPL DL<=0.005 MIU/L-ACNC: 0.78 UIU/ML (ref 0.27–4.2)

## 2021-03-24 PROCEDURE — 99214 OFFICE O/P EST MOD 30 MIN: CPT | Performed by: NURSE PRACTITIONER

## 2021-03-24 NOTE — PROGRESS NOTES
Chief Complaint  Leg Pain (Both)    Subjective          Mei Crane presents to [facility]  Ms. Crane presents to the office today to have her labs drawn to follow up on restarting furosemide and potassium. She wanted to be seen by me also with complaints of pain in both legs. This is not a new problem for her and she is currently taking Duloxetine 20 mg daily and Gabapentin 300 mg daily.     Leg Pain   The incident occurred more than 1 week ago (chronic ). There was no injury mechanism. Pain location: both legs. The quality of the pain is described as aching. The pain is moderate. She reports no foreign bodies present. Nothing aggravates the symptoms. Treatments tried: gabapentin, Duloxetine. The treatment provided moderate relief.       I have reviewed the patient's medical history in detail and updated the computerized patient record.    Current Outpatient Medications:   •  acetaminophen (TYLENOL) 325 MG tablet, Take 2 tablets by mouth Every 4 (Four) Hours As Needed for Mild Pain ., Disp: , Rfl:   •  amLODIPine (NORVASC) 10 MG tablet, Take 1 tablet by mouth Daily., Disp: 30 tablet, Rfl: 5  •  apixaban (Eliquis) 5 MG tablet tablet, Take 1 tablet by mouth 2 (Two) Times a Day., Disp: 60 tablet, Rfl: 1  •  atorvastatin (LIPITOR) 80 MG tablet, Take 1 tablet by mouth every night at bedtime., Disp: 30 tablet, Rfl: 1  •  DULoxetine (CYMBALTA) 20 MG capsule, TAKE ONE CAPSULE BY MOUTH EVERY DAY, Disp: 30 capsule, Rfl: 2  •  ferrous gluconate 324 (37.5 Fe) MG tablet tablet, Take 1 tablet by mouth Daily With Breakfast., Disp: 30 tablet, Rfl: 6  •  furosemide (Lasix) 20 MG tablet, Take 1 tablet by mouth Daily., Disp: 90 tablet, Rfl: 0  •  gabapentin (NEURONTIN) 300 MG capsule, Take 1 capsule by mouth Every Night., Disp: 28 capsule, Rfl: 5  •  levothyroxine (Synthroid) 88 MCG tablet, Take 1 tablet by mouth Daily., Disp: 90 tablet, Rfl: 1  •  metoprolol tartrate (LOPRESSOR) 50 MG tablet,  "TAKE ONE TABLET BY MOUTH TWICE DAILY, Disp: 60 tablet, Rfl: 2  •  pantoprazole (PROTONIX) 40 MG EC tablet, TAKE ONE TABLET BY MOUTH EVERY DAY, Disp: 90 tablet, Rfl: 1  •  polyethylene glycol (MIRALAX) 17 g packet, Take 17 g by mouth Daily., Disp:  , Rfl:   •  potassium chloride (K-DUR,KLOR-CON) 10 MEQ ER tablet, Take 1 tablet by mouth Daily., Disp: 90 tablet, Rfl: 0  •  prednisoLONE acetate (PRED FORTE) 1 % ophthalmic suspension, Administer 1 drop into the left eye Daily., Disp: , Rfl: 3  •  traMADol-acetaminophen (ULTRACET) 37.5-325 MG per tablet, Take 1 tablet by mouth 2 (two) times a day., Disp: 60 tablet, Rfl: 0    Current Facility-Administered Medications:   •  cyanocobalamin injection 1,000 mcg, 1,000 mcg, Intramuscular, Q28 Days, Jae Bray MD, 1,000 mcg at 11/05/20 1052     Objective   Vital Signs:   /74 (BP Location: Right arm, Patient Position: Sitting, Cuff Size: Adult)   Pulse 86   Temp 98 °F (36.7 °C) (Infrared)   Ht 162.6 cm (64\")   Wt 64.4 kg (142 lb)   SpO2 95%   BMI 24.37 kg/m²     Physical Exam  Vitals reviewed.   Constitutional:       Appearance: Normal appearance.   Cardiovascular:      Rate and Rhythm: Normal rate and regular rhythm.      Pulses: Normal pulses.      Heart sounds: Normal heart sounds.   Pulmonary:      Effort: Pulmonary effort is normal.      Breath sounds: Normal breath sounds.   Musculoskeletal:         General: Normal range of motion.      Right lower leg: No edema.      Left lower leg: No edema.   Skin:     General: Skin is warm and dry.   Neurological:      Mental Status: She is alert and oriented to person, place, and time.   Psychiatric:      Comments: No acute distress        Result Review :                 Assessment and Plan    Diagnoses and all orders for this visit:    1. Pain in both lower extremities (Primary)  -     Magnesium    I have discussed with Ms. Crane today that before I can change her treatment for her leg pain I want to see what " her labs are. I discussed that taking the Furosemide can change her sodium and potassium levels. That is why I put her on potassium with the Furosemide. If either level is out of balance she could have worsening leg pain. I will also assess her magnesium level today along with her TSH ( which is due to be tested next week due to dose adjustment).   She is to continue all medications for now until her lab results have been reviewed.     Follow Up   No follow-ups on file.  Patient was given instructions and counseling regarding her condition or for health maintenance advice. Please see specific information pulled into the AVS if appropriate.

## 2021-03-25 ENCOUNTER — TELEPHONE (OUTPATIENT)
Dept: FAMILY MEDICINE CLINIC | Facility: CLINIC | Age: 86
End: 2021-03-25

## 2021-03-25 NOTE — TELEPHONE ENCOUNTER
S/w patient and gave her the results and information.  Patient stated she has some magnesium left and will start back on it.

## 2021-03-25 NOTE — TELEPHONE ENCOUNTER
----- Message from COCO Garcia sent at 3/25/2021  8:19 AM EDT -----  Please let her know her labs are good but her magnesium  level is low. She is to start an OTC magnesium supplement daily. I will send an order into [facility] for the magnesium.

## 2021-03-25 NOTE — TELEPHONE ENCOUNTER
Patients sister called and stated that patient was exposed to COVID on Monday. This call was a bit confusing because Emily reported patient has lost her since of taste and smell for over a week.    I called patient and again she is reporting a decreased taste and smell and denies any other symptoms.     I will forward this to  for further advisement.    (This caretaker goes to her house daily and got tested on Monday)    Patient stated that she did get her vaccine.

## 2021-03-25 NOTE — PROGRESS NOTES
Please let her know her labs are good but her magnesium  level is low. She is to start an OTC magnesium supplement daily. I will send an order into [facility] for the magnesium.

## 2021-03-26 ENCOUNTER — OFFICE VISIT (OUTPATIENT)
Dept: FAMILY MEDICINE CLINIC | Facility: CLINIC | Age: 86
End: 2021-03-26

## 2021-03-26 DIAGNOSIS — E61.2 MAGNESIUM DEFICIENCY: ICD-10-CM

## 2021-03-26 DIAGNOSIS — Z20.822 CONTACT WITH AND (SUSPECTED) EXPOSURE TO COVID-19: Primary | ICD-10-CM

## 2021-03-26 PROCEDURE — 99442 PR PHYS/QHP TELEPHONE EVALUATION 11-20 MIN: CPT | Performed by: NURSE PRACTITIONER

## 2021-03-26 RX ORDER — CALCIUM CARBONATE 300MG(750)
1 TABLET,CHEWABLE ORAL DAILY
Qty: 28 TABLET | Refills: 5 | Status: SHIPPED | OUTPATIENT
Start: 2021-03-26 | End: 2021-08-23

## 2021-03-26 NOTE — TELEPHONE ENCOUNTER
S/w patient & her caregiver today, she called complaining of leg pain.  I reminded patient of the conversation yesterday and that she was supposed to start taking magnesium.  Patient was confused and said today she thought I meant potassium.  Patient's caregiver was told patient is to take magnesium that was sent to the pharmacy.  She stated she will  the medicine and have her start on that today.

## 2021-03-26 NOTE — PROGRESS NOTES
Chief Complaint  Exposure To Known Illness, Loss Of Taste, and Loss Of Smell    This visit has been rescheduled as a phone visit to comply with patient safety concerns in accordance with CDC recommendations. Total time of discussion was 14 minutes.    You have chosen to receive care through a telephone visit. Do you consent to use a telephone visit for your medical care today? Yes    Subjective          Mei Crane presents to [facility]  Exposed to COVID through her care giver. She last saw her care giver this past Monday and she was notifed the following day that her care giver was positive for COVID. Has had both Covid vaccines, the last on 3/13/21. Currently has lost her sense of taste and no other symptoms.     I have reviewed the patient's medical history in detail and updated the computerized patient record.    Current Outpatient Medications:   •  acetaminophen (TYLENOL) 325 MG tablet, Take 2 tablets by mouth Every 4 (Four) Hours As Needed for Mild Pain ., Disp: , Rfl:   •  amLODIPine (NORVASC) 10 MG tablet, Take 1 tablet by mouth Daily., Disp: 30 tablet, Rfl: 5  •  apixaban (Eliquis) 5 MG tablet tablet, Take 1 tablet by mouth 2 (Two) Times a Day., Disp: 60 tablet, Rfl: 1  •  atorvastatin (LIPITOR) 80 MG tablet, Take 1 tablet by mouth every night at bedtime., Disp: 30 tablet, Rfl: 1  •  DULoxetine (CYMBALTA) 20 MG capsule, TAKE ONE CAPSULE BY MOUTH EVERY DAY, Disp: 30 capsule, Rfl: 2  •  ferrous gluconate 324 (37.5 Fe) MG tablet tablet, Take 1 tablet by mouth Daily With Breakfast., Disp: 30 tablet, Rfl: 6  •  furosemide (Lasix) 20 MG tablet, Take 1 tablet by mouth Daily., Disp: 90 tablet, Rfl: 0  •  gabapentin (NEURONTIN) 300 MG capsule, Take 1 capsule by mouth Every Night., Disp: 28 capsule, Rfl: 5  •  levothyroxine (Synthroid) 88 MCG tablet, Take 1 tablet by mouth Daily., Disp: 90 tablet, Rfl: 1  •  metoprolol tartrate (LOPRESSOR) 50 MG tablet, TAKE ONE TABLET BY MOUTH  TWICE DAILY, Disp: 60 tablet, Rfl: 2  •  pantoprazole (PROTONIX) 40 MG EC tablet, TAKE ONE TABLET BY MOUTH EVERY DAY, Disp: 90 tablet, Rfl: 1  •  polyethylene glycol (MIRALAX) 17 g packet, Take 17 g by mouth Daily., Disp:  , Rfl:   •  potassium chloride (K-DUR,KLOR-CON) 10 MEQ ER tablet, Take 1 tablet by mouth Daily., Disp: 90 tablet, Rfl: 0  •  prednisoLONE acetate (PRED FORTE) 1 % ophthalmic suspension, Administer 1 drop into the left eye Daily., Disp: , Rfl: 3  •  traMADol-acetaminophen (ULTRACET) 37.5-325 MG per tablet, Take 1 tablet by mouth 2 (two) times a day., Disp: 60 tablet, Rfl: 0  •  Magnesium 400 MG tablet, Take 1 tablet by mouth Daily., Disp: 28 tablet, Rfl: 5    Current Facility-Administered Medications:   •  cyanocobalamin injection 1,000 mcg, 1,000 mcg, Intramuscular, Q28 Days, Jae Bray MD, 1,000 mcg at 11/05/20 1052  Objective   Vital Signs:   There were no vitals taken for this visit.      Physical Exam  Deferred     Result Review :                 Assessment and Plan    Diagnoses and all orders for this visit:    1. Contact with and (suspected) exposure to covid-19 (Primary)  -     COVID-19,[facility] ROUTINE, NP/OP SWAB IN TRANSPORT MEDIA OR ESWAB 72 HR TAT - Swab, Oropharynx    2. Magnesium deficiency  -     Magnesium 400 MG tablet; Take 1 tablet by mouth Daily.  Dispense: 28 tablet; Refill: 5    Ms. Crane presents per telephone visit with loss of taste. She is to come up to the office today to have a COVID test done.   Have also discussed with her that she is to start taking Magnesium for her low magnesium level. She was notified yesterday of this she reported she had magnesium OTC and would take that. According to her current Caregiver she has been taking extra potassium. She was told not to take the extra potassium and to take the potassium as prescribed. A prescription for magnesium was sent to her pharmacy to be put into her pill pack.     Follow Up   No follow-ups on  file.  Patient was given instructions and counseling regarding her condition or for health maintenance advice. Please see specific information pulled into the AVS if appropriate.

## 2021-03-26 NOTE — TELEPHONE ENCOUNTER
Scheduled patient at 10:45 for a telephone visit. She stated that she will have someone available to bring her for a test

## 2021-03-27 LAB
LABCORP SARS-COV-2, NAA 2 DAY TAT: NORMAL
SARS-COV-2 RNA RESP QL NAA+PROBE: DETECTED

## 2021-03-29 ENCOUNTER — TELEPHONE (OUTPATIENT)
Dept: FAMILY MEDICINE CLINIC | Facility: CLINIC | Age: 86
End: 2021-03-29

## 2021-03-29 NOTE — TELEPHONE ENCOUNTER
----- Message from COCO Garcia sent at 3/29/2021 12:32 PM EDT -----  Please notify her of the results.

## 2021-04-06 DIAGNOSIS — G25.81 RLS (RESTLESS LEGS SYNDROME): ICD-10-CM

## 2021-04-06 DIAGNOSIS — I48.91 ATRIAL FIBRILLATION, NEW ONSET (HCC): ICD-10-CM

## 2021-04-06 RX ORDER — GABAPENTIN 300 MG/1
CAPSULE ORAL
Qty: 28 CAPSULE | Refills: 5 | OUTPATIENT
Start: 2021-04-06

## 2021-04-06 RX ORDER — APIXABAN 5 MG/1
TABLET, FILM COATED ORAL
Qty: 60 TABLET | Refills: 1 | Status: SHIPPED | OUTPATIENT
Start: 2021-04-06 | End: 2021-06-01

## 2021-04-14 ENCOUNTER — OFFICE VISIT (OUTPATIENT)
Dept: FAMILY MEDICINE CLINIC | Facility: CLINIC | Age: 86
End: 2021-04-14

## 2021-04-14 DIAGNOSIS — G25.81 RLS (RESTLESS LEGS SYNDROME): ICD-10-CM

## 2021-04-14 PROCEDURE — 99213 OFFICE O/P EST LOW 20 MIN: CPT | Performed by: NURSE PRACTITIONER

## 2021-04-14 RX ORDER — GABAPENTIN 300 MG/1
CAPSULE ORAL
Qty: 28 CAPSULE | Refills: 5 | OUTPATIENT
Start: 2021-04-14

## 2021-04-14 RX ORDER — GABAPENTIN 300 MG/1
300 CAPSULE ORAL NIGHTLY
Qty: 28 CAPSULE | Refills: 5 | Status: SHIPPED | OUTPATIENT
Start: 2021-04-14 | End: 2021-10-11 | Stop reason: SDUPTHER

## 2021-04-14 NOTE — PROGRESS NOTES
Chief Complaint  Restless Legs Syndrome (CSE Gabapentin)    This visit has been rescheduled as a phone visit to comply with patient safety concerns in accordance with CDC recommendations. Total time of discussion was 8 minutes.    You have chosen to receive care through a telephone visit. Do you consent to use a telephone visit for your medical care today? Yes    Subjective          Mei Crane presents to [facility]  Ms. Crane presents per telehealth visit to get refills on Gabapentin. She is currently taking Gabapentin 300 mg at night to help with the pain in her feet. She states that the medication is working for her.     I have reviewed the patient's medical history in detail and updated the computerized patient record.    Current Outpatient Medications:   •  acetaminophen (TYLENOL) 325 MG tablet, Take 2 tablets by mouth Every 4 (Four) Hours As Needed for Mild Pain ., Disp: , Rfl:   •  amLODIPine (NORVASC) 10 MG tablet, Take 1 tablet by mouth Daily., Disp: 30 tablet, Rfl: 5  •  atorvastatin (LIPITOR) 80 MG tablet, Take 1 tablet by mouth every night at bedtime., Disp: 30 tablet, Rfl: 1  •  DULoxetine (CYMBALTA) 20 MG capsule, TAKE ONE CAPSULE BY MOUTH EVERY DAY, Disp: 30 capsule, Rfl: 2  •  Eliquis 5 MG tablet tablet, TAKE ONE TABLET BY MOUTH TWICE DAILY, Disp: 60 tablet, Rfl: 1  •  ferrous gluconate 324 (37.5 Fe) MG tablet tablet, Take 1 tablet by mouth Daily With Breakfast., Disp: 30 tablet, Rfl: 6  •  furosemide (Lasix) 20 MG tablet, Take 1 tablet by mouth Daily., Disp: 90 tablet, Rfl: 0  •  gabapentin (NEURONTIN) 300 MG capsule, Take 1 capsule by mouth Every Night., Disp: 28 capsule, Rfl: 5  •  levothyroxine (Synthroid) 88 MCG tablet, Take 1 tablet by mouth Daily., Disp: 90 tablet, Rfl: 1  •  Magnesium 400 MG tablet, Take 1 tablet by mouth Daily., Disp: 28 tablet, Rfl: 5  •  metoprolol tartrate (LOPRESSOR) 50 MG tablet, TAKE ONE TABLET BY MOUTH TWICE DAILY, Disp: 60  tablet, Rfl: 2  •  pantoprazole (PROTONIX) 40 MG EC tablet, TAKE ONE TABLET BY MOUTH EVERY DAY, Disp: 90 tablet, Rfl: 1  •  potassium chloride (K-DUR,KLOR-CON) 10 MEQ ER tablet, Take 1 tablet by mouth Daily., Disp: 90 tablet, Rfl: 0  •  prednisoLONE acetate (PRED FORTE) 1 % ophthalmic suspension, Administer 1 drop into the left eye Daily., Disp: , Rfl: 3  •  polyethylene glycol (MIRALAX) 17 g packet, Take 17 g by mouth Daily., Disp:  , Rfl:     Current Facility-Administered Medications:   •  cyanocobalamin injection 1,000 mcg, 1,000 mcg, Intramuscular, Q28 Days, Jae Bray MD, 1,000 mcg at 11/05/20 1052     Objective   Vital Signs:   There were no vitals taken for this visit.      Physical Exam  Deferred telephone visit.    Result Review :                 Assessment and Plan    Diagnoses and all orders for this visit:    1. RLS (restless legs syndrome)  -     gabapentin (NEURONTIN) 300 MG capsule; Take 1 capsule by mouth Every Night.  Dispense: 28 capsule; Refill: 5    She is to continue Gabapentin 300 mg once a night as prescribed.     CSA date: 9/22/2020    Last UDT: 11/5/20 (blood drawl)    Next UDT: 9/2021    Last CSE: 9/22//20    Next CSE: 10/14/2021    PANCHO report reviewed and verified: 4/14/21    Refills allowed on:  5 refills prior to 10/14/21      Follow Up   No follow-ups on file.  Patient was given instructions and counseling regarding her condition or for health maintenance advice. Please see specific information pulled into the AVS if appropriate.

## 2021-05-05 DIAGNOSIS — D64.9 ANEMIA, UNSPECIFIED TYPE: ICD-10-CM

## 2021-05-05 RX ORDER — METOPROLOL TARTRATE 50 MG/1
TABLET, FILM COATED ORAL
Qty: 60 TABLET | Refills: 5 | Status: SHIPPED | OUTPATIENT
Start: 2021-05-05 | End: 2021-10-15

## 2021-05-05 RX ORDER — DULOXETIN HYDROCHLORIDE 20 MG/1
CAPSULE, DELAYED RELEASE ORAL
Qty: 30 CAPSULE | Refills: 5 | Status: SHIPPED | OUTPATIENT
Start: 2021-05-05 | End: 2021-10-15

## 2021-05-05 RX ORDER — ATORVASTATIN CALCIUM 80 MG/1
TABLET, FILM COATED ORAL
Qty: 30 TABLET | Refills: 5 | Status: SHIPPED | OUTPATIENT
Start: 2021-05-05 | End: 2021-10-15

## 2021-05-05 RX ORDER — DOXYCYCLINE HYCLATE 50 MG/1
CAPSULE, GELATIN COATED ORAL
Qty: 30 TABLET | Refills: 5 | Status: SHIPPED | OUTPATIENT
Start: 2021-05-05 | End: 2021-10-15

## 2021-05-05 RX ORDER — AMLODIPINE BESYLATE 10 MG/1
TABLET ORAL
Qty: 30 TABLET | Refills: 5 | Status: SHIPPED | OUTPATIENT
Start: 2021-05-05 | End: 2021-10-15

## 2021-05-10 DIAGNOSIS — E03.9 ACQUIRED HYPOTHYROIDISM: ICD-10-CM

## 2021-05-10 DIAGNOSIS — R60.0 EDEMA OF BOTH LOWER LEGS: ICD-10-CM

## 2021-05-10 RX ORDER — LEVOTHYROXINE SODIUM 88 UG/1
TABLET ORAL
Qty: 90 TABLET | Refills: 2 | Status: SHIPPED | OUTPATIENT
Start: 2021-05-10 | End: 2022-03-14

## 2021-05-10 RX ORDER — PANTOPRAZOLE SODIUM 40 MG/1
TABLET, DELAYED RELEASE ORAL
Qty: 90 TABLET | Refills: 2 | Status: SHIPPED | OUTPATIENT
Start: 2021-05-10 | End: 2022-03-14

## 2021-05-10 RX ORDER — FUROSEMIDE 20 MG/1
TABLET ORAL
Qty: 90 TABLET | Refills: 2 | Status: SHIPPED | OUTPATIENT
Start: 2021-05-10 | End: 2022-01-17

## 2021-05-10 RX ORDER — POTASSIUM CHLORIDE 750 MG/1
TABLET, FILM COATED, EXTENDED RELEASE ORAL
Qty: 90 TABLET | Refills: 2 | Status: SHIPPED | OUTPATIENT
Start: 2021-05-10 | End: 2022-01-17

## 2021-05-31 DIAGNOSIS — I48.91 ATRIAL FIBRILLATION, NEW ONSET (HCC): ICD-10-CM

## 2021-06-01 RX ORDER — APIXABAN 5 MG/1
TABLET, FILM COATED ORAL
Qty: 60 TABLET | Refills: 8 | Status: SHIPPED | OUTPATIENT
Start: 2021-06-01 | End: 2022-02-15

## 2021-06-11 ENCOUNTER — OFFICE VISIT (OUTPATIENT)
Dept: FAMILY MEDICINE CLINIC | Facility: CLINIC | Age: 86
End: 2021-06-11

## 2021-06-11 VITALS
SYSTOLIC BLOOD PRESSURE: 126 MMHG | HEART RATE: 87 BPM | WEIGHT: 140 LBS | OXYGEN SATURATION: 92 % | DIASTOLIC BLOOD PRESSURE: 56 MMHG | HEIGHT: 64 IN | BODY MASS INDEX: 23.9 KG/M2

## 2021-06-11 DIAGNOSIS — D50.9 IRON DEFICIENCY ANEMIA, UNSPECIFIED IRON DEFICIENCY ANEMIA TYPE: Primary | ICD-10-CM

## 2021-06-11 DIAGNOSIS — E55.9 VITAMIN D DEFICIENCY: ICD-10-CM

## 2021-06-11 PROCEDURE — 99212 OFFICE O/P EST SF 10 MIN: CPT | Performed by: NURSE PRACTITIONER

## 2021-06-11 NOTE — PROGRESS NOTES
Chief Complaint  Fatigue    Subjective          Mei Crane presents to [facility]  Ms. Crane presents today with worsening fatigue. This is not a new problem for her and has been going on for at least 5 years. Previous treatments include B 12 injections and oral tabs. She also has NELLY. She is prescribed Ferrous gluconate 324 cap daily. She reported to the MA that she doesn't usually take the medications because it constipates her. She told me she is taking all of her medications. She does not feel that the B 12 oral tab works for her and she wants B 12 injections. She has been without B 12 injections for almost a year now. Her B 12 level in February was reported as 719 and 758 on the same date.     Fatigue  This is a chronic problem. The current episode started more than 1 year ago. Associated symptoms include arthralgias (chronic), fatigue and myalgias. Nothing aggravates the symptoms.     I have reviewed the patient's medical history in detail and updated the computerized patient record.    Current Outpatient Medications:   •  acetaminophen (TYLENOL) 325 MG tablet, Take 2 tablets by mouth Every 4 (Four) Hours As Needed for Mild Pain ., Disp: , Rfl:   •  amLODIPine (NORVASC) 10 MG tablet, TAKE ONE TABLET BY MOUTH EVERY DAY, Disp: 30 tablet, Rfl: 5  •  atorvastatin (LIPITOR) 80 MG tablet, TAKE ONE TABLET BY MOUTH AT BEDTIME, Disp: 30 tablet, Rfl: 5  •  DULoxetine (CYMBALTA) 20 MG capsule, TAKE ONE CAPSULE BY MOUTH EVERY DAY, Disp: 30 capsule, Rfl: 5  •  Eliquis 5 MG tablet tablet, TAKE ONE TABLET BY MOUTH TWICE DAILY, Disp: 60 tablet, Rfl: 8  •  ferrous gluconate (FERGON) 324 MG tablet, TAKE ONE CAPSULE BY MOUTH EVERY DAY (Patient taking differently: Patient takes when she wants cause it constipates her), Disp: 30 tablet, Rfl: 5  •  furosemide (LASIX) 20 MG tablet, TAKE ONE TABLET BY MOUTH EVERY DAY, Disp: 90 tablet, Rfl: 2  •  gabapentin (NEURONTIN) 300 MG capsule, Take 1  "capsule by mouth Every Night., Disp: 28 capsule, Rfl: 5  •  levothyroxine (SYNTHROID, LEVOTHROID) 88 MCG tablet, TAKE ONE TABLET BY MOUTH EVERY DAY, Disp: 90 tablet, Rfl: 2  •  Magnesium 400 MG tablet, Take 1 tablet by mouth Daily., Disp: 28 tablet, Rfl: 5  •  metoprolol tartrate (LOPRESSOR) 50 MG tablet, TAKE ONE TABLET BY MOUTH TWICE DAILY, Disp: 60 tablet, Rfl: 5  •  pantoprazole (PROTONIX) 40 MG EC tablet, TAKE ONE TABLET BY MOUTH EVERY DAY, Disp: 90 tablet, Rfl: 2  •  polyethylene glycol (MIRALAX) 17 g packet, Take 17 g by mouth Daily., Disp:  , Rfl:   •  potassium chloride 10 MEQ CR tablet, TAKE ONE TABLET BY MOUTH EVERY DAY, Disp: 90 tablet, Rfl: 2  •  prednisoLONE acetate (PRED FORTE) 1 % ophthalmic suspension, Administer 1 drop into the left eye Daily., Disp: , Rfl: 3  No current facility-administered medications for this visit.  Objective   Vital Signs:   /56 (BP Location: Right arm, Patient Position: Sitting, Cuff Size: Adult)   Pulse 87   Ht 162.6 cm (64\")   Wt 63.5 kg (140 lb)   SpO2 92%   BMI 24.03 kg/m²     Physical Exam  Vitals reviewed.   Constitutional:       Appearance: Normal appearance.   Cardiovascular:      Rate and Rhythm: Normal rate and regular rhythm.      Pulses: Normal pulses.      Heart sounds: Normal heart sounds.   Pulmonary:      Effort: Pulmonary effort is normal.      Breath sounds: Normal breath sounds.   Skin:     General: Skin is warm and dry.   Neurological:      Mental Status: She is alert and oriented to person, place, and time.   Psychiatric:      Comments: No acute distress        Result Review :                 Assessment and Plan    Diagnoses and all orders for this visit:    1. Iron deficiency anemia, unspecified iron deficiency anemia type (Primary)  -     Iron Profile  -     CBC (No Diff)  -     Vitamin B12  -     Folate    2. Vitamin D deficiency  -     Vitamin D 25 Hydroxy    Ms. Crane presents with complaints of fatigue. She appears to be doing well " today.  I have discussed with her that I would like to evaluate her iron level, vitamin D level and B 12 level.   She is to return on Monday for the labs.     Follow Up   Return if symptoms worsen or fail to improve, for Next scheduled follow up.  Patient was given instructions and counseling regarding her condition or for health maintenance advice. Please see specific information pulled into the AVS if appropriate.

## 2021-06-15 ENCOUNTER — TELEPHONE (OUTPATIENT)
Dept: FAMILY MEDICINE CLINIC | Facility: CLINIC | Age: 86
End: 2021-06-15

## 2021-06-15 LAB
25(OH)D3+25(OH)D2 SERPL-MCNC: 27 NG/ML (ref 30–100)
ERYTHROCYTE [DISTWIDTH] IN BLOOD BY AUTOMATED COUNT: 14.3 % (ref 11.7–15.4)
FOLATE SERPL-MCNC: 5.5 NG/ML
HCT VFR BLD AUTO: 35.3 % (ref 34–46.6)
HGB BLD-MCNC: 11.3 G/DL (ref 11.1–15.9)
IRON SATN MFR SERPL: 25 % (ref 15–55)
IRON SERPL-MCNC: 72 UG/DL (ref 27–139)
MCH RBC QN AUTO: 29 PG (ref 26.6–33)
MCHC RBC AUTO-ENTMCNC: 32 G/DL (ref 31.5–35.7)
MCV RBC AUTO: 91 FL (ref 79–97)
PLATELET # BLD AUTO: 283 X10E3/UL (ref 150–450)
RBC # BLD AUTO: 3.9 X10E6/UL (ref 3.77–5.28)
TIBC SERPL-MCNC: 285 UG/DL (ref 250–450)
UIBC SERPL-MCNC: 213 UG/DL (ref 118–369)
VIT B12 SERPL-MCNC: 464 PG/ML (ref 232–1245)
WBC # BLD AUTO: 8.1 X10E3/UL (ref 3.4–10.8)

## 2021-06-15 NOTE — TELEPHONE ENCOUNTER
----- Message from COCO Garcia sent at 6/15/2021  1:26 PM EDT -----  Please let her know her B 12 is normal. Her Vitamin D level is low and she can take Vitamin D 3,  2000 units daily. She should continue with the oral B 12.

## 2021-06-15 NOTE — PROGRESS NOTES
Please let her know her B 12 is normal. Her Vitamin D level is low and she can take Vitamin D 3,  2000 units daily. She should continue with the oral B 12.

## 2021-08-23 DIAGNOSIS — E61.2 MAGNESIUM DEFICIENCY: ICD-10-CM

## 2021-08-30 ENCOUNTER — TELEPHONE (OUTPATIENT)
Dept: FAMILY MEDICINE CLINIC | Facility: CLINIC | Age: 86
End: 2021-08-30

## 2021-08-30 NOTE — TELEPHONE ENCOUNTER
Pt thought she needed labs in September but the only labs set-up are Feb 2022 with [facility].  Please advise if labs should be ordered.  Thanks, ADDIS

## 2021-08-30 NOTE — TELEPHONE ENCOUNTER
S/w patient and let her know that she does not need labs done in September, that she will need them done in February for her appointment.  Patient voiced understanding.

## 2021-09-20 DIAGNOSIS — G25.81 RLS (RESTLESS LEGS SYNDROME): ICD-10-CM

## 2021-09-20 RX ORDER — GABAPENTIN 300 MG/1
CAPSULE ORAL
Qty: 28 CAPSULE | Refills: 5 | OUTPATIENT
Start: 2021-09-20

## 2021-09-25 DIAGNOSIS — G25.81 RLS (RESTLESS LEGS SYNDROME): ICD-10-CM

## 2021-09-27 RX ORDER — GABAPENTIN 300 MG/1
CAPSULE ORAL
Qty: 28 CAPSULE | Refills: 5 | OUTPATIENT
Start: 2021-09-27

## 2021-10-08 DIAGNOSIS — G25.81 RLS (RESTLESS LEGS SYNDROME): ICD-10-CM

## 2021-10-08 RX ORDER — GABAPENTIN 300 MG/1
CAPSULE ORAL
Qty: 28 CAPSULE | Refills: 5 | OUTPATIENT
Start: 2021-10-08

## 2021-10-11 ENCOUNTER — OFFICE VISIT (OUTPATIENT)
Dept: FAMILY MEDICINE CLINIC | Facility: CLINIC | Age: 86
End: 2021-10-11

## 2021-10-11 VITALS
HEIGHT: 64 IN | HEART RATE: 97 BPM | DIASTOLIC BLOOD PRESSURE: 68 MMHG | SYSTOLIC BLOOD PRESSURE: 130 MMHG | OXYGEN SATURATION: 91 % | BODY MASS INDEX: 24.24 KG/M2 | WEIGHT: 142 LBS

## 2021-10-11 DIAGNOSIS — Z23 NEED FOR INFLUENZA VACCINATION: Primary | ICD-10-CM

## 2021-10-11 DIAGNOSIS — Z79.899 ENCOUNTER FOR LONG-TERM (CURRENT) USE OF HIGH-RISK MEDICATION: ICD-10-CM

## 2021-10-11 DIAGNOSIS — G25.81 RLS (RESTLESS LEGS SYNDROME): ICD-10-CM

## 2021-10-11 PROCEDURE — G0008 ADMIN INFLUENZA VIRUS VAC: HCPCS | Performed by: NURSE PRACTITIONER

## 2021-10-11 PROCEDURE — 99213 OFFICE O/P EST LOW 20 MIN: CPT | Performed by: NURSE PRACTITIONER

## 2021-10-11 PROCEDURE — 90662 IIV NO PRSV INCREASED AG IM: CPT | Performed by: NURSE PRACTITIONER

## 2021-10-11 RX ORDER — GABAPENTIN 300 MG/1
300 CAPSULE ORAL NIGHTLY
Qty: 28 CAPSULE | Refills: 5 | Status: SHIPPED | OUTPATIENT
Start: 2021-10-11 | End: 2022-03-14

## 2021-10-11 NOTE — PROGRESS NOTES
Chief Complaint  Restless Legs Syndrome (CSE)    Subjective          Mei Crane presents to [facility]  Ms. Crane presents today for her 6 month CSE to get refills on gabapentin 300 mg for her leg pain. She states the medication does help her pain.     I have reviewed the patient's medical history in detail and updated the computerized patient record.    Current Outpatient Medications:   •  acetaminophen (TYLENOL) 325 MG tablet, Take 2 tablets by mouth Every 4 (Four) Hours As Needed for Mild Pain ., Disp: , Rfl:   •  amLODIPine (NORVASC) 10 MG tablet, TAKE ONE TABLET BY MOUTH EVERY DAY, Disp: 30 tablet, Rfl: 5  •  atorvastatin (LIPITOR) 80 MG tablet, TAKE ONE TABLET BY MOUTH AT BEDTIME, Disp: 30 tablet, Rfl: 5  •  DULoxetine (CYMBALTA) 20 MG capsule, TAKE ONE CAPSULE BY MOUTH EVERY DAY, Disp: 30 capsule, Rfl: 5  •  Eliquis 5 MG tablet tablet, TAKE ONE TABLET BY MOUTH TWICE DAILY, Disp: 60 tablet, Rfl: 8  •  ferrous gluconate (FERGON) 324 MG tablet, TAKE ONE CAPSULE BY MOUTH EVERY DAY (Patient taking differently: Patient takes when she wants cause it constipates her), Disp: 30 tablet, Rfl: 5  •  furosemide (LASIX) 20 MG tablet, TAKE ONE TABLET BY MOUTH EVERY DAY, Disp: 90 tablet, Rfl: 2  •  gabapentin (NEURONTIN) 300 MG capsule, Take 1 capsule by mouth Every Night., Disp: 28 capsule, Rfl: 5  •  levothyroxine (SYNTHROID, LEVOTHROID) 88 MCG tablet, TAKE ONE TABLET BY MOUTH EVERY DAY, Disp: 90 tablet, Rfl: 2  •  magnesium oxide (MAGOX) 400 (241.3 Mg) MG tablet tablet, TAKE ONE TABLET BY MOUTH EVERY DAY, Disp: 28 tablet, Rfl: 5  •  metoprolol tartrate (LOPRESSOR) 50 MG tablet, TAKE ONE TABLET BY MOUTH TWICE DAILY, Disp: 60 tablet, Rfl: 5  •  pantoprazole (PROTONIX) 40 MG EC tablet, TAKE ONE TABLET BY MOUTH EVERY DAY, Disp: 90 tablet, Rfl: 2  •  polyethylene glycol (MIRALAX) 17 g packet, Take 17 g by mouth Daily., Disp:  , Rfl:   •  potassium chloride 10 MEQ CR tablet, TAKE ONE TABLET  "BY MOUTH EVERY DAY, Disp: 90 tablet, Rfl: 2  •  prednisoLONE acetate (PRED FORTE) 1 % ophthalmic suspension, Administer 1 drop into the left eye Daily., Disp: , Rfl: 3     Objective   Vital Signs:   /68 (BP Location: Right arm, Patient Position: Sitting, Cuff Size: Adult)   Pulse 97   Ht 162.6 cm (64\")   Wt 64.4 kg (142 lb)   SpO2 91%   BMI 24.37 kg/m²     Physical Exam  Vitals reviewed.   Constitutional:       Appearance: Normal appearance. She is normal weight.   Cardiovascular:      Rate and Rhythm: Normal rate and regular rhythm.      Pulses: Normal pulses.      Heart sounds: Normal heart sounds.   Pulmonary:      Effort: Pulmonary effort is normal.      Breath sounds: Normal breath sounds.   Skin:     General: Skin is warm and dry.   Neurological:      Mental Status: She is alert and oriented to person, place, and time.   Psychiatric:      Comments: No acute distress        Result Review :                 Assessment and Plan    Diagnoses and all orders for this visit:    1. Need for influenza vaccination (Primary)  -     Fluzone High-Dose 65+yrs    2. RLS (restless legs syndrome)  -     gabapentin (NEURONTIN) 300 MG capsule; Take 1 capsule by mouth Every Night.  Dispense: 28 capsule; Refill: 5    Ms. Crane appears to be doing well today.   She is to continue Gabapentin 300 mg nightly for her RLS pain.    CSA date: 9/22/2022    Last UDT: 11/5/2020    Next UDT: 4/11/2022    Last CSE: 4/14/21    Next CSE: 4/11/22    PANCHO report reviewed and verified: 10/11/21    Refills allowed on: 5 refills prior to 4/11/22      Follow Up   Return in about 6 months (around 4/11/2022) for Medicare Wellness, Fasting labs 1 week prior to next f/u, CSE with UDT.  Patient was given instructions and counseling regarding her condition or for health maintenance advice. Please see specific information pulled into the AVS if appropriate.       "

## 2021-10-15 DIAGNOSIS — D64.9 ANEMIA, UNSPECIFIED TYPE: ICD-10-CM

## 2021-10-15 RX ORDER — METOPROLOL TARTRATE 50 MG/1
TABLET, FILM COATED ORAL
Qty: 60 TABLET | Refills: 5 | Status: SHIPPED | OUTPATIENT
Start: 2021-10-15 | End: 2022-04-08

## 2021-10-15 RX ORDER — DOXYCYCLINE HYCLATE 50 MG/1
CAPSULE, GELATIN COATED ORAL
Qty: 30 TABLET | Refills: 5 | Status: SHIPPED | OUTPATIENT
Start: 2021-10-15 | End: 2021-10-15

## 2021-10-15 RX ORDER — AMLODIPINE BESYLATE 10 MG/1
TABLET ORAL
Qty: 30 TABLET | Refills: 5 | Status: SHIPPED | OUTPATIENT
Start: 2021-10-15 | End: 2022-04-08

## 2021-10-15 RX ORDER — ATORVASTATIN CALCIUM 80 MG/1
TABLET, FILM COATED ORAL
Qty: 30 TABLET | Refills: 5 | Status: SHIPPED | OUTPATIENT
Start: 2021-10-15 | End: 2022-04-08

## 2021-10-15 RX ORDER — DULOXETIN HYDROCHLORIDE 20 MG/1
CAPSULE, DELAYED RELEASE ORAL
Qty: 30 CAPSULE | Refills: 5 | Status: SHIPPED | OUTPATIENT
Start: 2021-10-15 | End: 2022-04-08

## 2021-10-15 RX ORDER — DOXYCYCLINE HYCLATE 50 MG/1
CAPSULE, GELATIN COATED ORAL
Qty: 30 TABLET | Refills: 5 | Status: SHIPPED | OUTPATIENT
Start: 2021-10-15 | End: 2022-02-24

## 2022-01-17 DIAGNOSIS — R60.0 EDEMA OF BOTH LOWER LEGS: ICD-10-CM

## 2022-01-17 RX ORDER — FUROSEMIDE 20 MG/1
TABLET ORAL
Qty: 28 TABLET | Refills: 2 | Status: SHIPPED | OUTPATIENT
Start: 2022-01-17 | End: 2022-04-08

## 2022-01-17 RX ORDER — POTASSIUM CHLORIDE 750 MG/1
TABLET, FILM COATED, EXTENDED RELEASE ORAL
Qty: 28 TABLET | Refills: 2 | Status: SHIPPED | OUTPATIENT
Start: 2022-01-17 | End: 2022-04-08

## 2022-01-26 ENCOUNTER — TELEPHONE (OUTPATIENT)
Dept: NEUROLOGY | Facility: CLINIC | Age: 87
End: 2022-01-26

## 2022-01-26 ENCOUNTER — TELEPHONE (OUTPATIENT)
Dept: FAMILY MEDICINE CLINIC | Facility: CLINIC | Age: 87
End: 2022-01-26

## 2022-01-26 DIAGNOSIS — I48.91 ATRIAL FIBRILLATION, UNSPECIFIED TYPE: Primary | ICD-10-CM

## 2022-01-26 NOTE — TELEPHONE ENCOUNTER
Caller: ANDREAS    Relationship: SELF    Best call back number: [phone]    What medications are you currently taking:   Current Outpatient Medications on File Prior to Visit   Medication Sig Dispense Refill   • acetaminophen (TYLENOL) 325 MG tablet Take 2 tablets by mouth Every 4 (Four) Hours As Needed for Mild Pain .     • amLODIPine (NORVASC) 10 MG tablet TAKE ONE TABLET BY MOUTH EVERY DAY 30 tablet 5   • atorvastatin (LIPITOR) 80 MG tablet TAKE ONE TABLET BY MOUTH AT BEDTIME 30 tablet 5   • DULoxetine (CYMBALTA) 20 MG capsule TAKE ONE CAPSULE BY MOUTH EVERY DAY 30 capsule 5   • Eliquis 5 MG tablet tablet TAKE ONE TABLET BY MOUTH TWICE DAILY 60 tablet 8   • ferrous gluconate (FERGON) 324 MG tablet Patient takes when she wants cause it constipates her 30 tablet 5   • furosemide (LASIX) 20 MG tablet TAKE ONE TABLET BY MOUTH EVERY DAY 28 tablet 2   • gabapentin (NEURONTIN) 300 MG capsule Take 1 capsule by mouth Every Night. 28 capsule 5   • levothyroxine (SYNTHROID, LEVOTHROID) 88 MCG tablet TAKE ONE TABLET BY MOUTH EVERY DAY 90 tablet 2   • magnesium oxide (MAGOX) 400 (241.3 Mg) MG tablet tablet TAKE ONE TABLET BY MOUTH EVERY DAY 28 tablet 5   • metoprolol tartrate (LOPRESSOR) 50 MG tablet TAKE ONE TABLET BY MOUTH TWICE DAILY 60 tablet 5   • pantoprazole (PROTONIX) 40 MG EC tablet TAKE ONE TABLET BY MOUTH EVERY DAY 90 tablet 2   • polyethylene glycol (MIRALAX) 17 g packet Take 17 g by mouth Daily.     • potassium chloride 10 MEQ CR tablet TAKE ONE TABLET BY MOUTH EVERY DAY 28 tablet 2   • prednisoLONE acetate (PRED FORTE) 1 % ophthalmic suspension Administer 1 drop into the left eye Daily.  3     No current facility-administered medications on file prior to visit.        Which medication are you concerned about: Eliquis 5 MG tablet tablet    Who prescribed you this medication: LEANDRO SEPULVEDA    What are your concerns: PT STATES THAT SHE WENT TO GO GET A REFILL AND SHE STATES THE PHARMACY ADVISED HER IT  WILL COST ALMOST $400 IN ORDER TO RECEIVE. SHE IS CONCERNED SHE WILL NOT BE ABLE TO AFFORD THE MEDICATION AND WONDERING IF THERE IS AN ALTERNATIVE MEDICATION SHE CAN HAVE ? PLEASE REVIEW AND ADVISE.

## 2022-01-26 NOTE — TELEPHONE ENCOUNTER
I have discussed with Ms. Crane that I would like for her to be evaluated by cardiology to see if it is appropriate to discontinue the Elequis. She wants an alternative which is not as expensive as Elequis. I do not believe that warfarin is appropriate for her because she has difficulty scheduling rides and would have difficulty making appointments for INRs.

## 2022-01-28 NOTE — TELEPHONE ENCOUNTER
I spoke with patient and she says she already has a sample of Eliquis.  She also says she will be seeing a Neurologist closer to where she lives.  She does not know the name of the Neurologist, but is waiting on a call from their office to schedule an appt.

## 2022-02-15 DIAGNOSIS — I48.91 ATRIAL FIBRILLATION, NEW ONSET: ICD-10-CM

## 2022-02-15 DIAGNOSIS — E61.2 MAGNESIUM DEFICIENCY: ICD-10-CM

## 2022-02-15 RX ORDER — APIXABAN 5 MG/1
TABLET, FILM COATED ORAL
Qty: 60 TABLET | Refills: 5 | Status: SHIPPED | OUTPATIENT
Start: 2022-02-15 | End: 2022-07-22

## 2022-02-15 RX ORDER — MAGNESIUM OXIDE 400 MG/1
TABLET ORAL
Qty: 28 TABLET | Refills: 5 | Status: SHIPPED | OUTPATIENT
Start: 2022-02-15 | End: 2022-07-22

## 2022-02-24 ENCOUNTER — OFFICE VISIT (OUTPATIENT)
Dept: CARDIOLOGY | Facility: CLINIC | Age: 87
End: 2022-02-24

## 2022-02-24 VITALS
HEIGHT: 64 IN | WEIGHT: 150 LBS | RESPIRATION RATE: 20 BRPM | SYSTOLIC BLOOD PRESSURE: 152 MMHG | HEART RATE: 88 BPM | OXYGEN SATURATION: 95 % | BODY MASS INDEX: 25.61 KG/M2 | DIASTOLIC BLOOD PRESSURE: 86 MMHG

## 2022-02-24 DIAGNOSIS — I10 HTN (HYPERTENSION), BENIGN: ICD-10-CM

## 2022-02-24 DIAGNOSIS — I48.0 PAROXYSMAL ATRIAL FIBRILLATION: Primary | ICD-10-CM

## 2022-02-24 PROCEDURE — 99204 OFFICE O/P NEW MOD 45 MIN: CPT | Performed by: INTERNAL MEDICINE

## 2022-02-24 PROCEDURE — 93000 ELECTROCARDIOGRAM COMPLETE: CPT | Performed by: INTERNAL MEDICINE

## 2022-02-24 NOTE — PROGRESS NOTES
Subjective:     Encounter Date:02/24/2022      Patient ID: Mei Crane is a 89 y.o. female.    Chief Complaint:  Chief Complaint   Patient presents with   • Establish Care   • Atrial Fibrillation   • Cerebrovascular Accident   • Hypertension   • Hyperlipidemia       HPI:  Ms Crane is a 89 yo who is referred for evaluation and management of paroxysmal atrial fibrillation. Her past medical history is significant for HTN, HLD, hypothyroidism on supplement, and CKD.  She was hospitalized 6/2020 with symptoms of weakness.  Evaluation included an MRI which showed multiple subacute infarcts in different vascular distributions. She had AF with RVR while hospitalized.  She was rate controlled and placed on apixaban.  Her echo showed an EF of 70%, moderate MS with moderate-severe LAE, moderate AS and mild TR with an estimated PA pressure of 40-45mmHg.    Today, she states that she has been doing well without any significant palpitations.  She denies dyspnea, chest pain, edema, PND, orthopnea or syncope.  She is relatively active for a 89 yo.      The following portions of the patient's history were reviewed and updated as appropriate: allergies, current medications, past family history, past medical history, past social history, past surgical history and problem list.    Problem List:  Patient Active Problem List   Diagnosis   • HTN (hypertension), benign   • Acquired hypothyroidism   • Hyperlipidemia   • Gastroesophageal reflux disease without esophagitis   • Glaucoma   • Macular degeneration   • Anemia   • Osteopenia   • CKD (chronic kidney disease), stage III (CMS/HCC)   • B12 deficiency   • Calculus of gallbladder without cholecystitis   • DDD (degenerative disc disease), lumbar   • Trochanteric bursitis of right hip   • Bilateral leg weakness   • Elevated troponin   • Weakness   • Dizziness   • Hallucinations, visual   • CVA (cerebrovascular accident) ([facility])   • Embolic stroke ([facility])   • Hypokalemia   •  Hyponatremia   • History of embolic stroke   • Chest pain   • Atrial fibrillation (HCC)   • Thiazide diuretics causing adverse effect in therapeutic use   • Closed intertrochanteric fracture of hip, right, initial encounter ([facility])   • Closed fracture of rib of right side       Active Med List:    Current Outpatient Medications:   •  acetaminophen (TYLENOL) 325 MG tablet, Take 2 tablets by mouth Every 4 (Four) Hours As Needed for Mild Pain ., Disp: , Rfl:   •  amLODIPine (NORVASC) 10 MG tablet, TAKE ONE TABLET BY MOUTH EVERY DAY, Disp: 30 tablet, Rfl: 5  •  atorvastatin (LIPITOR) 80 MG tablet, TAKE ONE TABLET BY MOUTH AT BEDTIME, Disp: 30 tablet, Rfl: 5  •  DULoxetine (CYMBALTA) 20 MG capsule, TAKE ONE CAPSULE BY MOUTH EVERY DAY, Disp: 30 capsule, Rfl: 5  •  Eliquis 5 MG tablet tablet, TAKE ONE TABLET BY MOUTH TWICE DAILY, Disp: 60 tablet, Rfl: 5  •  furosemide (LASIX) 20 MG tablet, TAKE ONE TABLET BY MOUTH EVERY DAY (Patient taking differently: Take 20 mg by mouth As Needed.), Disp: 28 tablet, Rfl: 2  •  gabapentin (NEURONTIN) 300 MG capsule, Take 1 capsule by mouth Every Night., Disp: 28 capsule, Rfl: 5  •  levothyroxine (SYNTHROID, LEVOTHROID) 88 MCG tablet, TAKE ONE TABLET BY MOUTH EVERY DAY, Disp: 90 tablet, Rfl: 2  •  magnesium oxide (MAG-OX) 400 MG tablet, TAKE ONE TABLET BY MOUTH EVERY DAY, Disp: 28 tablet, Rfl: 5  •  metoprolol tartrate (LOPRESSOR) 50 MG tablet, TAKE ONE TABLET BY MOUTH TWICE DAILY, Disp: 60 tablet, Rfl: 5  •  pantoprazole (PROTONIX) 40 MG EC tablet, TAKE ONE TABLET BY MOUTH EVERY DAY, Disp: 90 tablet, Rfl: 2  •  potassium chloride 10 MEQ CR tablet, TAKE ONE TABLET BY MOUTH EVERY DAY, Disp: 28 tablet, Rfl: 2  •  prednisoLONE acetate (PRED FORTE) 1 % ophthalmic suspension, Administer 1 drop into the left eye Daily., Disp: , Rfl: 3     Past Medical History:  Past Medical History:   Diagnosis Date   • Calculus of gallbladder without cholecystitis 8/7/2017   • CKD (chronic kidney disease), stage  "III (HCC) 4/20/2016   • DDD (degenerative disc disease), lumbar 2/19/2019   • Disease of thyroid gland    • Hyperlipidemia    • Low back pain    • Peripheral neuropathy    • Stroke (HCC)        Past Surgical History:  Past Surgical History:   Procedure Laterality Date   • CATARACT EXTRACTION Bilateral    • EPIDURAL BLOCK     • FEMUR IM NAILING/RODDING Right 12/18/2020    Procedure: FEMUR INTRAMEDULLARY NAILING/RODDING;  Surgeon: Raymond Mtz II, MD;  Location: [facility];  Service: Orthopedics;  Laterality: Right;   • HYSTERECTOMY      age 35       Social History:  Social History     Socioeconomic History   • Marital status:    Tobacco Use   • Smoking status: Never Smoker   • Smokeless tobacco: Never Used   Substance and Sexual Activity   • Alcohol use: Yes     Comment: occasionally   • Drug use: No   • Sexual activity: Defer       Allergies:  Allergies   Allergen Reactions   • Barbiturates Hives   • Codeine Hives       Immunizations:  Immunization History   Administered Date(s) Administered   • COVID-19 (MODERNA) 1st, 2nd, 3rd Dose Only 02/13/2021, 03/13/2021   • FLUAD TRI 65YR+ 10/15/2019   • Flu Vaccine Quad PF >18YRS 10/24/2016   • Fluad Quad 65+ 10/01/2020   • Fluzone High Dose =>65 Years ([employer] ONLY) 10/05/2017, 10/12/2018   • Fluzone High-Dose 65+yrs 10/11/2021   • Influenza Split Preservative Free ID 10/24/2016   • Pneumococcal Conjugate 13-Valent (PCV13) 07/25/2017   • Pneumococcal Polysaccharide (PPSV23) 09/17/2019          Objective:         Review of Systems   Constitutional: Negative for fatigue.   Respiratory: Negative for shortness of breath.    Cardiovascular: Negative for chest pain, palpitations and leg swelling.   All other systems reviewed and are negative.       /86   Pulse 88   Resp 20   Ht 162.6 cm (64\")   Wt 68 kg (150 lb)   SpO2 95%   BMI 25.75 kg/m²     Cardiovascular:      Normal rate. Regular rhythm.      Murmurs: There is a grade 3/6 mid to late " systolic murmur at the ULSB.         In-Office Procedure(s):    ECG 12 Lead    Date/Time: 2/24/2022 11:14 AM  Performed by: Kiran Becerra MD  Authorized by: Kiran Becerra MD   Comparison: compared with previous ECG from 7/19/2020  Comparison to previous ECG: NSR, LVH  Rhythm: sinus rhythm  Other findings: left ventricular hypertrophy    Clinical impression: abnormal EKG          ECG 12 Lead    (Results Pending)        ASCVD RIsk Score::  The ASCVD Risk score ([place] CHAITANYA Jr., et al., 2013) failed to calculate for the following reasons:    The 2013 ASCVD risk score is only valid for ages 40 to 79    The patient has a prior MI or stroke diagnosis    Recent Radiology:          Lab Review:       Recent labs reviewed and interpreted for clinical significance and application          Assessment:          Diagnosis Plan   1. Paroxysmal atrial fibrillation (HCC)  ECG 12 Lead    Holter Monitor - 72 Hour Up To 15 Days   2. HTN (hypertension), benign            Plan:      1. Valvular heart disease with moderate MS and moderate AS - seemingly asymptomatic, no history of chest pain or syncope.  Discussed repeating her echo but she declined    2. Paroxysmal AF - asymptomatic, [id number] of at least 6(age, gender, HTN and embolic phenomenon)  Continue apixaban and metoprolol    3. HTN - reasonable control    4. Remote stroke - continue apixaban    5. HLD - statin    6. Hypothyroidism - on supplement per PCP    7. CKD - baseline creatinine about 1.2 with GFR around 40    RTC 6 months      Level of Care:                 Kiran Becerra MD  02/24/22

## 2022-03-14 DIAGNOSIS — E03.9 ACQUIRED HYPOTHYROIDISM: ICD-10-CM

## 2022-03-14 DIAGNOSIS — G25.81 RLS (RESTLESS LEGS SYNDROME): ICD-10-CM

## 2022-03-14 RX ORDER — LEVOTHYROXINE SODIUM 88 UG/1
TABLET ORAL
Qty: 90 TABLET | Refills: 2 | Status: SHIPPED | OUTPATIENT
Start: 2022-03-14 | End: 2022-11-15

## 2022-03-14 RX ORDER — PANTOPRAZOLE SODIUM 40 MG/1
TABLET, DELAYED RELEASE ORAL
Qty: 90 TABLET | Refills: 2 | Status: SHIPPED | OUTPATIENT
Start: 2022-03-14 | End: 2022-11-15

## 2022-03-14 RX ORDER — GABAPENTIN 300 MG/1
CAPSULE ORAL
Qty: 28 CAPSULE | Refills: 0 | Status: SHIPPED | OUTPATIENT
Start: 2022-03-14 | End: 2022-04-11 | Stop reason: SDUPTHER

## 2022-03-29 DIAGNOSIS — E53.8 B12 DEFICIENCY: ICD-10-CM

## 2022-03-29 DIAGNOSIS — D64.9 ANEMIA, UNSPECIFIED TYPE: ICD-10-CM

## 2022-03-29 DIAGNOSIS — E61.2 MAGNESIUM DEFICIENCY: ICD-10-CM

## 2022-03-29 DIAGNOSIS — E03.9 ACQUIRED HYPOTHYROIDISM: ICD-10-CM

## 2022-03-29 DIAGNOSIS — E78.2 MIXED HYPERLIPIDEMIA: ICD-10-CM

## 2022-03-29 DIAGNOSIS — E55.9 VITAMIN D DEFICIENCY: ICD-10-CM

## 2022-03-29 DIAGNOSIS — I10 HTN (HYPERTENSION), BENIGN: Primary | ICD-10-CM

## 2022-03-29 DIAGNOSIS — N18.30 STAGE 3 CHRONIC KIDNEY DISEASE, UNSPECIFIED WHETHER STAGE 3A OR 3B CKD: ICD-10-CM

## 2022-04-08 DIAGNOSIS — G25.81 RLS (RESTLESS LEGS SYNDROME): ICD-10-CM

## 2022-04-08 DIAGNOSIS — R60.0 EDEMA OF BOTH LOWER LEGS: ICD-10-CM

## 2022-04-08 RX ORDER — DULOXETIN HYDROCHLORIDE 20 MG/1
CAPSULE, DELAYED RELEASE ORAL
Qty: 30 CAPSULE | Refills: 5 | Status: SHIPPED | OUTPATIENT
Start: 2022-04-08 | End: 2022-09-13

## 2022-04-08 RX ORDER — ATORVASTATIN CALCIUM 80 MG/1
TABLET, FILM COATED ORAL
Qty: 30 TABLET | Refills: 5 | Status: SHIPPED | OUTPATIENT
Start: 2022-04-08 | End: 2022-09-13

## 2022-04-08 RX ORDER — POTASSIUM CHLORIDE 750 MG/1
TABLET, FILM COATED, EXTENDED RELEASE ORAL
Qty: 28 TABLET | Refills: 5 | Status: SHIPPED | OUTPATIENT
Start: 2022-04-08 | End: 2022-09-13

## 2022-04-08 RX ORDER — GABAPENTIN 300 MG/1
CAPSULE ORAL
Qty: 28 CAPSULE | Refills: 5 | OUTPATIENT
Start: 2022-04-08

## 2022-04-08 RX ORDER — METOPROLOL TARTRATE 50 MG/1
TABLET, FILM COATED ORAL
Qty: 60 TABLET | Refills: 5 | Status: SHIPPED | OUTPATIENT
Start: 2022-04-08 | End: 2022-09-13

## 2022-04-08 RX ORDER — AMLODIPINE BESYLATE 10 MG/1
TABLET ORAL
Qty: 30 TABLET | Refills: 5 | Status: SHIPPED | OUTPATIENT
Start: 2022-04-08 | End: 2022-09-13

## 2022-04-08 RX ORDER — FUROSEMIDE 20 MG/1
20 TABLET ORAL DAILY
Qty: 28 TABLET | Refills: 5 | Status: SHIPPED | OUTPATIENT
Start: 2022-04-08 | End: 2022-09-13

## 2022-04-11 ENCOUNTER — OFFICE VISIT (OUTPATIENT)
Dept: FAMILY MEDICINE CLINIC | Facility: CLINIC | Age: 87
End: 2022-04-11

## 2022-04-11 VITALS
BODY MASS INDEX: 25.61 KG/M2 | DIASTOLIC BLOOD PRESSURE: 68 MMHG | HEART RATE: 75 BPM | WEIGHT: 150 LBS | HEIGHT: 64 IN | SYSTOLIC BLOOD PRESSURE: 148 MMHG | OXYGEN SATURATION: 94 %

## 2022-04-11 DIAGNOSIS — Z00.00 MEDICARE ANNUAL WELLNESS VISIT, SUBSEQUENT: Primary | ICD-10-CM

## 2022-04-11 DIAGNOSIS — G25.81 RLS (RESTLESS LEGS SYNDROME): ICD-10-CM

## 2022-04-11 PROCEDURE — G0439 PPPS, SUBSEQ VISIT: HCPCS | Performed by: NURSE PRACTITIONER

## 2022-04-11 PROCEDURE — 1159F MED LIST DOCD IN RCRD: CPT | Performed by: NURSE PRACTITIONER

## 2022-04-11 PROCEDURE — 1170F FXNL STATUS ASSESSED: CPT | Performed by: NURSE PRACTITIONER

## 2022-04-11 RX ORDER — GABAPENTIN 300 MG/1
300 CAPSULE ORAL
Qty: 28 CAPSULE | Refills: 5 | Status: SHIPPED | OUTPATIENT
Start: 2022-04-11 | End: 2022-10-10 | Stop reason: SDUPTHER

## 2022-04-11 NOTE — PROGRESS NOTES
Called patient no answer left message to call office.  Offer 4/1/2021 at 2pm w/ Dr Chasity ruff per Dr Beckett.   The ABCs of the Annual Wellness Visit  Subsequent Medicare Wellness Visit    Chief Complaint   Patient presents with   • Medicare Wellness-subsequent     & review labs      Subjective    History of Present Illness:  Mei Crane is a 89 y.o. female who presents for a Subsequent Medicare Wellness Visit.    The following portions of the patient's history were reviewed and   updated as appropriate: allergies, current medications, past family history, past medical history, past social history, past surgical history and problem list.    Compared to one year ago, the patient feels her physical   health is better.    Compared to one year ago, the patient feels her mental   health is better.    Recent Hospitalizations:  She was not admitted to the hospital during the last year.       Current Medical Providers:  Patient Care Team:  Lexie Toussaint APRN as PCP - General (Family Medicine)  Cecil Jacob OD (Optometry)  Yannick Santo MD as Consulting Physician (Ophthalmology)  Nik Rodriguez MD as Consulting Physician (Orthopedic Surgery)  Lisa Dudley APRN as Nurse Practitioner (Neurology)  Koko Kennedy MD as Consulting Physician (Cardiology)    Outpatient Medications Prior to Visit   Medication Sig Dispense Refill   • acetaminophen (TYLENOL) 325 MG tablet Take 2 tablets by mouth Every 4 (Four) Hours As Needed for Mild Pain .     • amLODIPine (NORVASC) 10 MG tablet TAKE ONE TABLET BY MOUTH EVERY DAY 30 tablet 5   • atorvastatin (LIPITOR) 80 MG tablet TAKE ONE TABLET BY MOUTH AT BEDTIME 30 tablet 5   • DULoxetine (CYMBALTA) 20 MG capsule TAKE ONE CAPSULE BY MOUTH EVERY DAY 30 capsule 5   • Eliquis 5 MG tablet tablet TAKE ONE TABLET BY MOUTH TWICE DAILY 60 tablet 5   • furosemide (LASIX) 20 MG tablet Take 1 tablet by mouth Daily. 28 tablet 5   • gabapentin (NEURONTIN) 300 MG capsule TAKE ONE CAPSULE BY MOUTH AT BEDTIME 28 capsule 0   • levothyroxine (SYNTHROID, LEVOTHROID) 88 MCG tablet TAKE ONE  TABLET BY MOUTH EVERY DAY 90 tablet 2   • magnesium oxide (MAG-OX) 400 MG tablet TAKE ONE TABLET BY MOUTH EVERY DAY 28 tablet 5   • metoprolol tartrate (LOPRESSOR) 50 MG tablet TAKE ONE TABLET BY MOUTH TWICE DAILY 60 tablet 5   • pantoprazole (PROTONIX) 40 MG EC tablet TAKE ONE TABLET BY MOUTH EVERY DAY 90 tablet 2   • potassium chloride 10 MEQ CR tablet TAKE ONE TABLET BY MOUTH EVERY DAY 28 tablet 5   • prednisoLONE acetate (PRED FORTE) 1 % ophthalmic suspension Administer 1 drop into the left eye Daily.  3     No facility-administered medications prior to visit.       No opioid medication identified on active medication list. I have reviewed chart for other potential  high risk medication/s and harmful drug interactions in the elderly.          Aspirin is not on active medication list.  Aspirin use is contraindicated for this patient due to: current use of Eliquis.  .    Patient Active Problem List   Diagnosis   • HTN (hypertension), benign   • Acquired hypothyroidism   • Hyperlipidemia   • Gastroesophageal reflux disease without esophagitis   • Glaucoma   • Macular degeneration   • Anemia   • Osteopenia   • CKD (chronic kidney disease), stage III (CMS/[facility])   • B12 deficiency   • Calculus of gallbladder without cholecystitis   • DDD (degenerative disc disease), lumbar   • Trochanteric bursitis of right hip   • Bilateral leg weakness   • Elevated troponin   • Weakness   • Dizziness   • Hallucinations, visual   • CVA (cerebrovascular accident) ([facility])   • Embolic stroke ([facility])   • Hypokalemia   • Hyponatremia   • History of embolic stroke   • Chest pain   • Atrial fibrillation ([facility])   • Thiazide diuretics causing adverse effect in therapeutic use   • Closed intertrochanteric fracture of hip, right, initial encounter ([facility])   • Closed fracture of rib of right side     Advance Care Planning  Advance Directive is not on file.  ACP discussion was held with the patient during this visit. Patient has an advance directive (not  "in EMR), copy requested.    Review of Systems   Constitutional: Negative.    HENT: Negative.    Eyes: Negative.    Respiratory: Negative.    Cardiovascular: Negative.    Gastrointestinal: Negative.    Genitourinary: Negative.    Musculoskeletal: Positive for arthralgias.   Skin: Negative.    Neurological: Negative.    Psychiatric/Behavioral: Negative.         Objective    Vitals:    04/11/22 1146   BP: 148/68   BP Location: Left arm   Patient Position: Sitting   Cuff Size: Adult   Pulse: 75   SpO2: 94%   Weight: 68 kg (150 lb)   Height: 162.6 cm (64\")     BMI Readings from Last 1 Encounters:   04/11/22 25.75 kg/m²   BMI is above normal parameters. Recommendations include: .    Does the patient have evidence of cognitive impairment? No    Physical Exam  Constitutional:       Appearance: Normal appearance.   HENT:      Right Ear: Tympanic membrane normal.      Left Ear: Tympanic membrane normal.   Cardiovascular:      Rate and Rhythm: Normal rate and regular rhythm.      Heart sounds: Murmur heard.    Systolic murmur is present.  Pulmonary:      Effort: Pulmonary effort is normal.      Breath sounds: Normal breath sounds.   Musculoskeletal:      Right lower leg: No edema.      Left lower leg: No edema.   Skin:     Findings: Rash present.   Neurological:      Mental Status: She is alert and oriented to person, place, and time.   Psychiatric:         Mood and Affect: Mood normal.         Behavior: Behavior normal.         Thought Content: Thought content normal.         Judgment: Judgment normal.       Lab Results   Component Value Date    CHLPL 167 04/04/2022    TRIG 156 (H) 04/04/2022    HDL 52 04/04/2022    LDL 88 04/04/2022    VLDL 27 04/04/2022            HEALTH RISK ASSESSMENT    Smoking Status:  Social History     Tobacco Use   Smoking Status Never Smoker   Smokeless Tobacco Never Used     Alcohol Consumption:  Social History     Substance and Sexual Activity   Alcohol Use Yes    Comment: occasionally     Fall " Risk Screen:    [facility], and patient is at HIGH risk for falls. Assessment completed on:4/11/2022    Depression Screening:  PHQ-2/PHQ-9 Depression Screening 4/11/2022   Retired Total Score -   Little Interest or Pleasure in Doing Things 0-->not at all   Feeling Down, Depressed or Hopeless 0-->not at all   PHQ-9: Brief Depression Severity Measure Score 0       Health Habits and Functional and Cognitive Screening:  Functional & Cognitive Status 4/11/2022   Do you have difficulty preparing food and eating? No   Do you have difficulty bathing yourself, getting dressed or grooming yourself? No   Do you have difficulty using the toilet? No   Do you have difficulty moving around from place to place? Yes   Do you have trouble with steps or getting out of a bed or a chair? No   Current Diet Well Balanced Diet   Dental Exam Up to date        Dental Exam Comment -   Eye Exam Up to date        Eye Exam Comment -   Exercise (times per week) 0 times per week   Current Exercises Include No Regular Exercise   Current Exercise Activities Include -   Do you need help using the phone?  No   Are you deaf or do you have serious difficulty hearing?  No   Do you need help with transportation? Yes   Do you need help shopping? Yes   Do you need help preparing meals?  No   Do you need help with housework?  No   Do you need help with laundry? No   Do you need help taking your medications? No   Do you need help managing money? No   Do you ever drive or ride in a car without wearing a seat belt? No   Have you felt unusual stress, anger or loneliness in the last month? No   Who do you live with? Alone   If you need help, do you have trouble finding someone available to you? No   Have you been bothered in the last four weeks by sexual problems? No   Do you have difficulty concentrating, remembering or making decisions? No       Age-appropriate Screening Schedule:  Refer to the list below for future screening  recommendations based on patient's age, sex and/or medical conditions. Orders for these recommended tests are listed in the plan section. The patient has been provided with a written plan.    Health Maintenance   Topic Date Due   • TDAP/TD VACCINES (1 - Tdap) Never done   • URINE MICROALBUMIN  03/08/2019   • DXA SCAN  09/11/2020   • HEMOGLOBIN A1C  12/04/2020   • INFLUENZA VACCINE  08/01/2022   • DIABETIC EYE EXAM  11/18/2022   • LIPID PANEL  04/04/2023   • MAMMOGRAM  Discontinued   • ZOSTER VACCINE  Discontinued              Assessment/Plan   CMS Preventative Services Quick Reference  Risk Factors Identified During Encounter  .  The above risks/problems have been discussed with the patient.  Follow up actions/plans if indicated are seen below in the Assessment/Plan Section.  Pertinent information has been shared with the patient in the After Visit Summary.    Diagnoses and all orders for this visit:    1. Medicare annual wellness visit, subsequent (Primary)        Follow Up:   No follow-ups on file.     An After Visit Summary and PPPS were made available to the patient.

## 2022-04-11 NOTE — PATIENT INSTRUCTIONS
Medicare Wellness  Personal Prevention Plan of Service     Date of Office Visit:    Encounter Provider:  COCO Garcia  Place of Service:  [facility]  Patient Name: Mei Crane  :  1926    As part of the Medicare Wellness portion of your visit today, we are providing you with this personalized preventive plan of services (PPPS). This plan is based upon recommendations of the [place] Preventive Services Task Force (USPSTF) and the Advisory Committee on Immunization Practices (ACIP).    This lists the preventive care services that should be considered, and provides dates of when you are due. Items listed as completed are up-to-date and do not require any further intervention.    Health Maintenance   Topic Date Due    TDAP/TD VACCINES (1 - Tdap) Never done    URINE MICROALBUMIN  2019    DXA SCAN  2020    HEMOGLOBIN A1C  2020    ANNUAL WELLNESS VISIT  2022    INFLUENZA VACCINE  2022    DIABETIC EYE EXAM  2022    LIPID PANEL  2023    COVID-19 Vaccine  Completed    Pneumococcal Vaccine 65+  Completed    MAMMOGRAM  Discontinued    ZOSTER VACCINE  Discontinued       No orders of the defined types were placed in this encounter.      No follow-ups on file.

## 2022-07-18 ENCOUNTER — TELEPHONE (OUTPATIENT)
Dept: FAMILY MEDICINE CLINIC | Facility: CLINIC | Age: 87
End: 2022-07-18

## 2022-07-18 NOTE — TELEPHONE ENCOUNTER
Pt back is hurting and said she normally takes a muscle relaxer. Advised Lexie SAUNDERS may require an appointment before prescribing but pt said she would not be able to come for a couple days.  Pt said she has gotten a prescription from Lexie SAUNDERS before and to send her a msg.

## 2022-07-22 ENCOUNTER — OFFICE VISIT (OUTPATIENT)
Dept: FAMILY MEDICINE CLINIC | Facility: CLINIC | Age: 87
End: 2022-07-22

## 2022-07-22 VITALS
HEART RATE: 87 BPM | HEIGHT: 64 IN | SYSTOLIC BLOOD PRESSURE: 112 MMHG | BODY MASS INDEX: 25.27 KG/M2 | WEIGHT: 148 LBS | OXYGEN SATURATION: 95 % | DIASTOLIC BLOOD PRESSURE: 58 MMHG

## 2022-07-22 DIAGNOSIS — E61.2 MAGNESIUM DEFICIENCY: ICD-10-CM

## 2022-07-22 DIAGNOSIS — E03.9 ACQUIRED HYPOTHYROIDISM: ICD-10-CM

## 2022-07-22 DIAGNOSIS — M51.36 DDD (DEGENERATIVE DISC DISEASE), LUMBAR: Primary | ICD-10-CM

## 2022-07-22 DIAGNOSIS — I48.91 ATRIAL FIBRILLATION, NEW ONSET: ICD-10-CM

## 2022-07-22 PROCEDURE — 99213 OFFICE O/P EST LOW 20 MIN: CPT | Performed by: NURSE PRACTITIONER

## 2022-07-22 RX ORDER — BACLOFEN 10 MG/1
TABLET ORAL
Qty: 30 TABLET | Refills: 0 | Status: SHIPPED | OUTPATIENT
Start: 2022-07-22

## 2022-07-22 RX ORDER — LANOLIN ALCOHOL/MO/W.PET/CERES
CREAM (GRAM) TOPICAL
Qty: 28 TABLET | Refills: 5 | Status: SHIPPED | OUTPATIENT
Start: 2022-07-22 | End: 2023-01-03

## 2022-07-22 RX ORDER — APIXABAN 5 MG/1
TABLET, FILM COATED ORAL
Qty: 60 TABLET | Refills: 5 | Status: SHIPPED | OUTPATIENT
Start: 2022-07-22 | End: 2023-01-05

## 2022-07-22 NOTE — PROGRESS NOTES
Chief Complaint  Pain (Lower back pain. )    Subjective        Mei Crane presents to [facility]  Pain  This is a chronic problem. The current episode started more than 1 year ago. The problem occurs constantly. The problem has been waxing and waning. Associated symptoms include fatigue and myalgias. The symptoms are aggravated by walking (when getting up from a sitting to standing position). Treatments tried: gabapentin. The treatment provided no relief.     I have reviewed the patient's medical history in detail and updated the computerized patient record.    Current Outpatient Medications:   •  acetaminophen (TYLENOL) 325 MG tablet, Take 2 tablets by mouth Every 4 (Four) Hours As Needed for Mild Pain ., Disp: , Rfl:   •  amLODIPine (NORVASC) 10 MG tablet, TAKE ONE TABLET BY MOUTH EVERY DAY, Disp: 30 tablet, Rfl: 5  •  atorvastatin (LIPITOR) 80 MG tablet, TAKE ONE TABLET BY MOUTH AT BEDTIME, Disp: 30 tablet, Rfl: 5  •  DULoxetine (CYMBALTA) 20 MG capsule, TAKE ONE CAPSULE BY MOUTH EVERY DAY, Disp: 30 capsule, Rfl: 5  •  Eliquis 5 MG tablet tablet, TAKE ONE TABLET BY MOUTH TWICE DAILY, Disp: 60 tablet, Rfl: 5  •  furosemide (LASIX) 20 MG tablet, Take 1 tablet by mouth Daily., Disp: 28 tablet, Rfl: 5  •  gabapentin (NEURONTIN) 300 MG capsule, Take 1 capsule by mouth every night at bedtime., Disp: 28 capsule, Rfl: 5  •  levothyroxine (SYNTHROID, LEVOTHROID) 88 MCG tablet, TAKE ONE TABLET BY MOUTH EVERY DAY, Disp: 90 tablet, Rfl: 2  •  Magnesium Oxide 400 (240 Mg) MG tablet, TAKE ONE TABLET BY MOUTH EVERY DAY, Disp: 28 tablet, Rfl: 5  •  metoprolol tartrate (LOPRESSOR) 50 MG tablet, TAKE ONE TABLET BY MOUTH TWICE DAILY, Disp: 60 tablet, Rfl: 5  •  pantoprazole (PROTONIX) 40 MG EC tablet, TAKE ONE TABLET BY MOUTH EVERY DAY, Disp: 90 tablet, Rfl: 2  •  potassium chloride 10 MEQ CR tablet, TAKE ONE TABLET BY MOUTH EVERY DAY, Disp: 28 tablet, Rfl: 5  •  prednisoLONE acetate (PRED FORTE)  "1 % ophthalmic suspension, Administer 1 drop into the left eye Daily., Disp: , Rfl: 3  •  baclofen (LIORESAL) 10 MG tablet, Take 1/2 tab to 1 tab three times a day as needed., Disp: 30 tablet, Rfl: 0     Objective   Vital Signs:  /58 (BP Location: Left arm, Patient Position: Sitting, Cuff Size: Adult)   Pulse 87   Ht 162.6 cm (64\")   Wt 67.1 kg (148 lb)   SpO2 95%   BMI 25.40 kg/m²   Estimated body mass index is 25.4 kg/m² as calculated from the following:    Height as of this encounter: 162.6 cm (64\").    Weight as of this encounter: 67.1 kg (148 lb).          Physical Exam  Vitals reviewed.   Constitutional:       Appearance: Normal appearance.   Cardiovascular:      Rate and Rhythm: Normal rate and regular rhythm.      Pulses: Normal pulses.      Heart sounds: Normal heart sounds.   Pulmonary:      Effort: Pulmonary effort is normal.      Breath sounds: Normal breath sounds.   Musculoskeletal:         General: Normal range of motion.   Skin:     General: Skin is warm and dry.   Neurological:      Mental Status: She is alert and oriented to person, place, and time.   Psychiatric:      Comments: No acute distress        Result Review :                Assessment and Plan   Diagnoses and all orders for this visit:    1. DDD (degenerative disc disease), lumbar (Primary)  -     baclofen (LIORESAL) 10 MG tablet; Take 1/2 tab to 1 tab three times a day as needed.  Dispense: 30 tablet; Refill: 0    2. Acquired hypothyroidism  -     TSH; Future    Ms Crane does not appear to be in any acute distress.  I will refill the Baclofen 10 mg 0.5 to 1 tab as needed three times a day as needed for muscle spasms.            Follow Up   Return if symptoms worsen or fail to improve, for Next scheduled follow up.  Patient was given instructions and counseling regarding her condition or for health maintenance advice. Please see specific information pulled into the AVS if appropriate.       "

## 2022-09-13 DIAGNOSIS — R60.0 EDEMA OF BOTH LOWER LEGS: ICD-10-CM

## 2022-09-13 DIAGNOSIS — G25.81 RLS (RESTLESS LEGS SYNDROME): ICD-10-CM

## 2022-09-13 RX ORDER — GABAPENTIN 300 MG/1
CAPSULE ORAL
Qty: 28 CAPSULE | Refills: 5 | OUTPATIENT
Start: 2022-09-13

## 2022-09-13 RX ORDER — ATORVASTATIN CALCIUM 80 MG/1
TABLET, FILM COATED ORAL
Qty: 30 TABLET | Refills: 5 | Status: SHIPPED | OUTPATIENT
Start: 2022-09-13 | End: 2023-03-08

## 2022-09-13 RX ORDER — AMLODIPINE BESYLATE 10 MG/1
TABLET ORAL
Qty: 30 TABLET | Refills: 5 | Status: SHIPPED | OUTPATIENT
Start: 2022-09-13 | End: 2023-03-08

## 2022-09-13 RX ORDER — METOPROLOL TARTRATE 50 MG/1
TABLET, FILM COATED ORAL
Qty: 60 TABLET | Refills: 5 | Status: SHIPPED | OUTPATIENT
Start: 2022-09-13 | End: 2023-03-08

## 2022-09-13 RX ORDER — POTASSIUM CHLORIDE 750 MG/1
TABLET, FILM COATED, EXTENDED RELEASE ORAL
Qty: 28 TABLET | Refills: 5 | Status: SHIPPED | OUTPATIENT
Start: 2022-09-13 | End: 2023-03-08

## 2022-09-13 RX ORDER — DULOXETIN HYDROCHLORIDE 20 MG/1
CAPSULE, DELAYED RELEASE ORAL
Qty: 30 CAPSULE | Refills: 5 | Status: SHIPPED | OUTPATIENT
Start: 2022-09-13 | End: 2023-03-08

## 2022-09-13 RX ORDER — FUROSEMIDE 20 MG/1
TABLET ORAL
Qty: 28 TABLET | Refills: 5 | Status: SHIPPED | OUTPATIENT
Start: 2022-09-13 | End: 2023-03-08

## 2022-09-22 DIAGNOSIS — G25.81 RLS (RESTLESS LEGS SYNDROME): ICD-10-CM

## 2022-09-22 RX ORDER — GABAPENTIN 300 MG/1
CAPSULE ORAL
Qty: 28 CAPSULE | Refills: 5 | OUTPATIENT
Start: 2022-09-22

## 2022-09-26 DIAGNOSIS — G25.81 RLS (RESTLESS LEGS SYNDROME): ICD-10-CM

## 2022-09-27 RX ORDER — GABAPENTIN 300 MG/1
CAPSULE ORAL
Qty: 28 CAPSULE | Refills: 5 | OUTPATIENT
Start: 2022-09-27

## 2022-10-04 DIAGNOSIS — G25.81 RLS (RESTLESS LEGS SYNDROME): ICD-10-CM

## 2022-10-04 RX ORDER — GABAPENTIN 300 MG/1
CAPSULE ORAL
Qty: 28 CAPSULE | Refills: 5 | OUTPATIENT
Start: 2022-10-04

## 2022-10-10 ENCOUNTER — OFFICE VISIT (OUTPATIENT)
Dept: FAMILY MEDICINE CLINIC | Facility: CLINIC | Age: 87
End: 2022-10-10

## 2022-10-10 VITALS
HEART RATE: 103 BPM | HEIGHT: 64 IN | OXYGEN SATURATION: 93 % | SYSTOLIC BLOOD PRESSURE: 108 MMHG | DIASTOLIC BLOOD PRESSURE: 48 MMHG | WEIGHT: 147 LBS | BODY MASS INDEX: 25.1 KG/M2

## 2022-10-10 DIAGNOSIS — G25.81 RLS (RESTLESS LEGS SYNDROME): ICD-10-CM

## 2022-10-10 PROCEDURE — 99214 OFFICE O/P EST MOD 30 MIN: CPT | Performed by: NURSE PRACTITIONER

## 2022-10-10 RX ORDER — GABAPENTIN 300 MG/1
300 CAPSULE ORAL
Qty: 28 CAPSULE | Refills: 5 | Status: SHIPPED | OUTPATIENT
Start: 2022-10-10 | End: 2023-03-08

## 2022-10-10 NOTE — PROGRESS NOTES
Chief Complaint  Back Pain (CSE) and Hypothyroidism (& review labs)    Subjective        Mei Crane presents to [facility]  History of Present Illness  F/u on thyroid disease and also to get Gabapentin for chronic back pain.  Hypothyroidism  This is a chronic problem. The current episode started more than 1 year ago. The problem occurs constantly. The problem has been waxing and waning. Associated symptoms include fatigue and myalgias. Nothing aggravates the symptoms. Treatments tried: levothyroxine.     I have reviewed the patient's medical history in detail and updated the computerized patient record.    Current Outpatient Medications:   •  acetaminophen (TYLENOL) 325 MG tablet, Take 2 tablets by mouth Every 4 (Four) Hours As Needed for Mild Pain ., Disp: , Rfl:   •  amLODIPine (NORVASC) 10 MG tablet, TAKE ONE TABLET BY MOUTH EVERY DAY, Disp: 30 tablet, Rfl: 5  •  atorvastatin (LIPITOR) 80 MG tablet, TAKE ONE TABLET BY MOUTH AT BEDTIME, Disp: 30 tablet, Rfl: 5  •  baclofen (LIORESAL) 10 MG tablet, Take 1/2 tab to 1 tab three times a day as needed., Disp: 30 tablet, Rfl: 0  •  DULoxetine (CYMBALTA) 20 MG capsule, TAKE ONE CAPSULE BY MOUTH EVERY DAY, Disp: 30 capsule, Rfl: 5  •  Eliquis 5 MG tablet tablet, TAKE ONE TABLET BY MOUTH TWICE DAILY, Disp: 60 tablet, Rfl: 5  •  furosemide (LASIX) 20 MG tablet, TAKE ONE TABLET BY MOUTH EVERY DAY, Disp: 28 tablet, Rfl: 5  •  gabapentin (NEURONTIN) 300 MG capsule, Take 1 capsule by mouth every night at bedtime., Disp: 28 capsule, Rfl: 5  •  levothyroxine (SYNTHROID, LEVOTHROID) 88 MCG tablet, TAKE ONE TABLET BY MOUTH EVERY DAY, Disp: 90 tablet, Rfl: 2  •  Magnesium Oxide 400 (240 Mg) MG tablet, TAKE ONE TABLET BY MOUTH EVERY DAY, Disp: 28 tablet, Rfl: 5  •  metoprolol tartrate (LOPRESSOR) 50 MG tablet, TAKE ONE TABLET BY MOUTH TWICE DAILY, Disp: 60 tablet, Rfl: 5  •  pantoprazole (PROTONIX) 40 MG EC tablet, TAKE ONE TABLET BY MOUTH EVERY  "DAY, Disp: 90 tablet, Rfl: 2  •  potassium chloride 10 MEQ CR tablet, TAKE ONE TABLET BY MOUTH EVERY DAY, Disp: 28 tablet, Rfl: 5  •  prednisoLONE acetate (PRED FORTE) 1 % ophthalmic suspension, Administer 1 drop into the left eye Daily., Disp: , Rfl: 3  Objective   Vital Signs:  /48 (BP Location: Right arm, Patient Position: Sitting, Cuff Size: Adult)   Pulse 103   Ht 162.6 cm (64\")   Wt 66.7 kg (147 lb)   SpO2 93%   BMI 25.23 kg/m²   Estimated body mass index is 25.23 kg/m² as calculated from the following:    Height as of this encounter: 162.6 cm (64\").    Weight as of this encounter: 66.7 kg (147 lb).          Physical Exam  Constitutional:       Appearance: Normal appearance.   Cardiovascular:      Rate and Rhythm: Normal rate and regular rhythm.      Pulses: Normal pulses.      Heart sounds: Normal heart sounds.   Pulmonary:      Effort: Pulmonary effort is normal.      Breath sounds: Normal breath sounds.   Musculoskeletal:         General: Normal range of motion.      Comments: Uses a cane to assist with ambulation   Skin:     General: Skin is warm and dry.   Neurological:      Mental Status: She is alert and oriented to person, place, and time.   Psychiatric:      Comments: No acute distress        Result Review :                Assessment and Plan   Diagnoses and all orders for this visit:    1. RLS (restless legs syndrome)  -     gabapentin (NEURONTIN) 300 MG capsule; Take 1 capsule by mouth every night at bedtime.  Dispense: 28 capsule; Refill: 5      Ms. Crane appears to be doing well today.  I will refill her Gabapentin 300 mg nightly for her back pain.  Her TSH is low. She is to continue Levothyroxine 88 mcg daily as prescribed.         CSA date: 9/22/20    Last UDT: 4/11/22    Next UDT: 4/2023    Last CSE:4/11/22    Next CSE: 4/10/23    PANCHO report reviewed and verified: 10/10/22    Refills allowed on: 5 refills prior to 4/10/23       Follow Up   Return in about 6 months (around " 4/11/2023), or if symptoms worsen or fail to improve, for Medicare Wellness, Fasting labs 1 week prior to next f/u, CSE with UDT.  Patient was given instructions and counseling regarding her condition or for health maintenance advice. Please see specific information pulled into the AVS if appropriate.

## 2022-11-15 DIAGNOSIS — E03.9 ACQUIRED HYPOTHYROIDISM: ICD-10-CM

## 2022-11-15 RX ORDER — LEVOTHYROXINE SODIUM 88 UG/1
TABLET ORAL
Qty: 90 TABLET | Refills: 2 | Status: SHIPPED | OUTPATIENT
Start: 2022-11-15

## 2022-11-15 RX ORDER — PANTOPRAZOLE SODIUM 40 MG/1
TABLET, DELAYED RELEASE ORAL
Qty: 90 TABLET | Refills: 2 | Status: SHIPPED | OUTPATIENT
Start: 2022-11-15

## 2022-12-07 ENCOUNTER — OFFICE VISIT (OUTPATIENT)
Dept: FAMILY MEDICINE CLINIC | Facility: CLINIC | Age: 87
End: 2022-12-07

## 2022-12-07 VITALS
WEIGHT: 144.4 LBS | BODY MASS INDEX: 24.65 KG/M2 | HEIGHT: 64 IN | DIASTOLIC BLOOD PRESSURE: 55 MMHG | SYSTOLIC BLOOD PRESSURE: 100 MMHG | OXYGEN SATURATION: 90 % | HEART RATE: 104 BPM

## 2022-12-07 DIAGNOSIS — Z20.822 SUSPECTED COVID-19 VIRUS INFECTION: ICD-10-CM

## 2022-12-07 DIAGNOSIS — J40 BRONCHITIS: Primary | ICD-10-CM

## 2022-12-07 DIAGNOSIS — Z23 NEEDS FLU SHOT: ICD-10-CM

## 2022-12-07 LAB
EXPIRATION DATE: NORMAL
FLUAV AG UPPER RESP QL IA.RAPID: NOT DETECTED
FLUBV AG UPPER RESP QL IA.RAPID: NOT DETECTED
INTERNAL CONTROL: NORMAL
Lab: NORMAL
SARS-COV-2 AG UPPER RESP QL IA.RAPID: NOT DETECTED

## 2022-12-07 PROCEDURE — 99214 OFFICE O/P EST MOD 30 MIN: CPT | Performed by: NURSE PRACTITIONER

## 2022-12-07 PROCEDURE — 87428 SARSCOV & INF VIR A&B AG IA: CPT | Performed by: NURSE PRACTITIONER

## 2022-12-07 RX ORDER — PREDNISONE 20 MG/1
20 TABLET ORAL 2 TIMES DAILY
Qty: 10 TABLET | Refills: 0 | Status: SHIPPED | OUTPATIENT
Start: 2022-12-07 | End: 2022-12-12

## 2022-12-07 NOTE — PROGRESS NOTES
Chief Complaint  Cough and Nasal Congestion (COUGH, CONGESTION)    Subjective        Mei Crane presents to [facility]  Cough  This is a new problem. The current episode started in the past 7 days. The problem has been unchanged. The cough is productive of sputum. Associated symptoms include rhinorrhea and wheezing. Pertinent negatives include no nasal congestion, postnasal drip or shortness of breath. Chest pain: from coughing. Ear congestion: itching. Nothing aggravates the symptoms. She has tried OTC cough suppressant for the symptoms. The treatment provided mild relief.     I have reviewed the patient's medical history in detail and updated the computerized patient record.    Current Outpatient Medications:   •  acetaminophen (TYLENOL) 325 MG tablet, Take 2 tablets by mouth Every 4 (Four) Hours As Needed for Mild Pain ., Disp: , Rfl:   •  amLODIPine (NORVASC) 10 MG tablet, TAKE ONE TABLET BY MOUTH EVERY DAY, Disp: 30 tablet, Rfl: 5  •  atorvastatin (LIPITOR) 80 MG tablet, TAKE ONE TABLET BY MOUTH AT BEDTIME, Disp: 30 tablet, Rfl: 5  •  baclofen (LIORESAL) 10 MG tablet, Take 1/2 tab to 1 tab three times a day as needed., Disp: 30 tablet, Rfl: 0  •  DULoxetine (CYMBALTA) 20 MG capsule, TAKE ONE CAPSULE BY MOUTH EVERY DAY, Disp: 30 capsule, Rfl: 5  •  Eliquis 5 MG tablet tablet, TAKE ONE TABLET BY MOUTH TWICE DAILY, Disp: 60 tablet, Rfl: 5  •  furosemide (LASIX) 20 MG tablet, TAKE ONE TABLET BY MOUTH EVERY DAY, Disp: 28 tablet, Rfl: 5  •  gabapentin (NEURONTIN) 300 MG capsule, Take 1 capsule by mouth every night at bedtime., Disp: 28 capsule, Rfl: 5  •  levothyroxine (SYNTHROID, LEVOTHROID) 88 MCG tablet, TAKE ONE TABLET BY MOUTH EVERY DAY, Disp: 90 tablet, Rfl: 2  •  Magnesium Oxide 400 (240 Mg) MG tablet, TAKE ONE TABLET BY MOUTH EVERY DAY, Disp: 28 tablet, Rfl: 5  •  metoprolol tartrate (LOPRESSOR) 50 MG tablet, TAKE ONE TABLET BY MOUTH TWICE DAILY, Disp: 60 tablet, Rfl: 5  •   "pantoprazole (PROTONIX) 40 MG EC tablet, TAKE ONE TABLET BY MOUTH EVERY DAY, Disp: 90 tablet, Rfl: 2  •  potassium chloride 10 MEQ CR tablet, TAKE ONE TABLET BY MOUTH EVERY DAY, Disp: 28 tablet, Rfl: 5  •  prednisoLONE acetate (PRED FORTE) 1 % ophthalmic suspension, Administer 1 drop into the left eye Daily., Disp: , Rfl: 3  •  predniSONE (DELTASONE) 20 MG tablet, Take 1 tablet by mouth 2 (Two) Times a Day for 5 days., Disp: 10 tablet, Rfl: 0     Objective   Vital Signs:  /55 (BP Location: Right arm, Patient Position: Sitting, Cuff Size: Adult)   Pulse 104   Ht 162.6 cm (64.02\")   Wt 65.5 kg (144 lb 6.4 oz)   SpO2 90%   BMI 24.77 kg/m²   Estimated body mass index is 24.77 kg/m² as calculated from the following:    Height as of this encounter: 162.6 cm (64.02\").    Weight as of this encounter: 65.5 kg (144 lb 6.4 oz).    BMI is within normal parameters. No other follow-up for BMI required.      Physical Exam  Vitals reviewed.   Constitutional:       Appearance: Normal appearance.   Cardiovascular:      Rate and Rhythm: Normal rate and regular rhythm.      Pulses: Normal pulses.      Heart sounds: Normal heart sounds.   Pulmonary:      Effort: Pulmonary effort is normal.      Breath sounds: Decreased breath sounds present.   Skin:     General: Skin is warm and dry.   Neurological:      Mental Status: She is alert and oriented to person, place, and time.   Psychiatric:      Comments: No acute distress        Result Review :                Assessment and Plan   Diagnoses and all orders for this visit:    1. Bronchitis (Primary)  -     predniSONE (DELTASONE) 20 MG tablet; Take 1 tablet by mouth 2 (Two) Times a Day for 5 days.  Dispense: 10 tablet; Refill: 0    2. Suspected COVID-19 virus infection  -     POCT SARS-CoV-2 Antigen CARMEN    3. Needs flu shot  -     Cancel: Fluzone High-Dose 65+yrs    Ms Royce does not appear to be in any acute distress.  Her breath sounds are diminished.  She is negative for both " the flu and Covid.  I will treat her for bronchitis and she is to start prednisone 20 mg twice a day for 5 days.           Follow Up   Return if symptoms worsen or fail to improve, for Next scheduled follow up.  Patient was given instructions and counseling regarding her condition or for health maintenance advice. Please see specific information pulled into the AVS if appropriate.

## 2022-12-12 ENCOUNTER — TELEPHONE (OUTPATIENT)
Dept: FAMILY MEDICINE CLINIC | Facility: CLINIC | Age: 87
End: 2022-12-12

## 2022-12-12 NOTE — TELEPHONE ENCOUNTER
Please call her and tell her she just needs to give it time for the symptoms to resolve. As long as she is not running a fever greater than 101, she is fine. She may take over the counter cough medications if needed.

## 2022-12-12 NOTE — TELEPHONE ENCOUNTER
Caller: Mei Crane    Relationship: Self    Best call back number: [phone]      What is the best time to reach you: ANYTIME     Who are you requesting to speak with (clinical staff, provider,  specific staff member): MA        What was the call regarding: PATIENT IS CALLING IN SHE STATES THAT SHE FINISHED HER MEDICATION BUT IS STILL NOT FEELING WELL, HAS THAT COUGH IN CHEST THAT HURTS. SHE IS NOT SURE WHAT ELSE SHE NEEDS TO DO OR IF SHE NEEDS TO COME  BACK IN.    Do you require a callback: YES

## 2023-01-03 DIAGNOSIS — E61.2 MAGNESIUM DEFICIENCY: ICD-10-CM

## 2023-01-03 RX ORDER — LANOLIN ALCOHOL/MO/W.PET/CERES
CREAM (GRAM) TOPICAL
Qty: 28 TABLET | Refills: 5 | Status: SHIPPED | OUTPATIENT
Start: 2023-01-03

## 2023-01-05 DIAGNOSIS — I48.91 ATRIAL FIBRILLATION, NEW ONSET: ICD-10-CM

## 2023-01-05 RX ORDER — APIXABAN 5 MG/1
TABLET, FILM COATED ORAL
Qty: 60 TABLET | Refills: 5 | Status: SHIPPED | OUTPATIENT
Start: 2023-01-05

## 2023-03-07 DIAGNOSIS — R60.0 EDEMA OF BOTH LOWER LEGS: ICD-10-CM

## 2023-03-07 DIAGNOSIS — G25.81 RLS (RESTLESS LEGS SYNDROME): ICD-10-CM

## 2023-03-08 RX ORDER — FUROSEMIDE 20 MG/1
TABLET ORAL
Qty: 28 TABLET | Refills: 5 | Status: SHIPPED | OUTPATIENT
Start: 2023-03-08

## 2023-03-08 RX ORDER — AMLODIPINE BESYLATE 10 MG/1
TABLET ORAL
Qty: 30 TABLET | Refills: 5 | Status: SHIPPED | OUTPATIENT
Start: 2023-03-08

## 2023-03-08 RX ORDER — GABAPENTIN 300 MG/1
CAPSULE ORAL
Qty: 28 CAPSULE | Refills: 5 | Status: SHIPPED | OUTPATIENT
Start: 2023-03-08

## 2023-03-08 RX ORDER — ATORVASTATIN CALCIUM 80 MG/1
TABLET, FILM COATED ORAL
Qty: 30 TABLET | Refills: 5 | Status: SHIPPED | OUTPATIENT
Start: 2023-03-08

## 2023-03-08 RX ORDER — POTASSIUM CHLORIDE 750 MG/1
TABLET, FILM COATED, EXTENDED RELEASE ORAL
Qty: 28 TABLET | Refills: 5 | Status: SHIPPED | OUTPATIENT
Start: 2023-03-08

## 2023-03-08 RX ORDER — METOPROLOL TARTRATE 50 MG/1
TABLET, FILM COATED ORAL
Qty: 60 TABLET | Refills: 5 | Status: SHIPPED | OUTPATIENT
Start: 2023-03-08

## 2023-03-08 RX ORDER — DULOXETIN HYDROCHLORIDE 20 MG/1
CAPSULE, DELAYED RELEASE ORAL
Qty: 30 CAPSULE | Refills: 5 | Status: SHIPPED | OUTPATIENT
Start: 2023-03-08

## 2023-03-29 ENCOUNTER — TELEPHONE (OUTPATIENT)
Dept: FAMILY MEDICINE CLINIC | Facility: CLINIC | Age: 88
End: 2023-03-29
Payer: MEDICARE

## 2023-03-29 DIAGNOSIS — E78.2 MIXED HYPERLIPIDEMIA: Primary | ICD-10-CM

## 2023-03-29 DIAGNOSIS — E03.9 ACQUIRED HYPOTHYROIDISM: ICD-10-CM

## 2023-03-29 NOTE — TELEPHONE ENCOUNTER
Need lab orders - Patient moved from 4-3-23 to Friday, 3-31-23 to get labs done in our this office.

## 2023-04-01 LAB
ALBUMIN SERPL-MCNC: 4 G/DL (ref 3.5–5.2)
ALBUMIN/GLOB SERPL: 1.7 G/DL
ALP SERPL-CCNC: 162 U/L (ref 39–117)
ALT SERPL-CCNC: 12 U/L (ref 1–33)
AST SERPL-CCNC: 14 U/L (ref 1–32)
BILIRUB SERPL-MCNC: 0.3 MG/DL (ref 0–1.2)
BUN SERPL-MCNC: 15 MG/DL (ref 8–23)
BUN/CREAT SERPL: 9.1 (ref 7–25)
CALCIUM SERPL-MCNC: 9.3 MG/DL (ref 8.2–9.6)
CHLORIDE SERPL-SCNC: 102 MMOL/L (ref 98–107)
CHOLEST SERPL-MCNC: 160 MG/DL (ref 0–200)
CHOLEST/HDLC SERPL: 2.91 {RATIO}
CO2 SERPL-SCNC: 24.8 MMOL/L (ref 22–29)
CREAT SERPL-MCNC: 1.64 MG/DL (ref 0.57–1)
EGFRCR SERPLBLD CKD-EPI 2021: 28.4 ML/MIN/1.73
GLOBULIN SER CALC-MCNC: 2.4 GM/DL
GLUCOSE SERPL-MCNC: 120 MG/DL (ref 65–99)
HDLC SERPL-MCNC: 55 MG/DL (ref 40–60)
LDLC SERPL CALC-MCNC: 81 MG/DL (ref 0–100)
POTASSIUM SERPL-SCNC: 4.2 MMOL/L (ref 3.5–5.2)
PROT SERPL-MCNC: 6.4 G/DL (ref 6–8.5)
SODIUM SERPL-SCNC: 140 MMOL/L (ref 136–145)
TRIGL SERPL-MCNC: 138 MG/DL (ref 0–150)
TSH SERPL DL<=0.005 MIU/L-ACNC: 6.03 UIU/ML (ref 0.27–4.2)
VLDLC SERPL CALC-MCNC: 24 MG/DL (ref 5–40)

## 2023-04-10 ENCOUNTER — OFFICE VISIT (OUTPATIENT)
Dept: FAMILY MEDICINE CLINIC | Facility: CLINIC | Age: 88
End: 2023-04-10
Payer: MEDICARE

## 2023-04-10 VITALS
DIASTOLIC BLOOD PRESSURE: 61 MMHG | OXYGEN SATURATION: 91 % | BODY MASS INDEX: 26.91 KG/M2 | SYSTOLIC BLOOD PRESSURE: 119 MMHG | HEIGHT: 64 IN | HEART RATE: 96 BPM | WEIGHT: 157.6 LBS

## 2023-04-10 DIAGNOSIS — Z00.00 MEDICARE ANNUAL WELLNESS VISIT, SUBSEQUENT: Primary | ICD-10-CM

## 2023-04-10 DIAGNOSIS — N19 RENAL FAILURE, UNSPECIFIED CHRONICITY: ICD-10-CM

## 2023-04-10 DIAGNOSIS — E03.9 ACQUIRED HYPOTHYROIDISM: ICD-10-CM

## 2023-04-10 DIAGNOSIS — G25.81 RLS (RESTLESS LEGS SYNDROME): ICD-10-CM

## 2023-04-10 RX ORDER — GABAPENTIN 300 MG/1
300 CAPSULE ORAL 2 TIMES DAILY
Qty: 56 CAPSULE | Refills: 5 | Status: SHIPPED | OUTPATIENT
Start: 2023-04-10

## 2023-04-10 RX ORDER — LEVOTHYROXINE SODIUM 0.1 MG/1
100 TABLET ORAL DAILY
Qty: 28 TABLET | Refills: 5 | Status: SHIPPED | OUTPATIENT
Start: 2023-04-10

## 2023-04-10 NOTE — PATIENT INSTRUCTIONS
Medicare Wellness  Personal Prevention Plan of Service     Date of Office Visit:    Encounter Provider:  COCO Garcia  Place of Service:  [facility]  Patient Name: Mei Crane  :  1926    As part of the Medicare Wellness portion of your visit today, we are providing you with this personalized preventive plan of services (PPPS). This plan is based upon recommendations of the [place] Preventive Services Task Force (USPSTF) and the Advisory Committee on Immunization Practices (ACIP).    This lists the preventive care services that should be considered, and provides dates of when you are due. Items listed as completed are up-to-date and do not require any further intervention.    Health Maintenance   Topic Date Due    TDAP/TD VACCINES (1 - Tdap) Never done    COVID-19 Vaccine (3 - Booster for Moderna series) 2021    ANNUAL WELLNESS VISIT  2023    INFLUENZA VACCINE  2023    LIPID PANEL  2024    Pneumococcal Vaccine 65+  Completed    MAMMOGRAM  Discontinued    DXA SCAN  Discontinued    ZOSTER VACCINE  Discontinued       No orders of the defined types were placed in this encounter.      No follow-ups on file.

## 2023-04-10 NOTE — PROGRESS NOTES
The ABCs of the Annual Wellness Visit  Subsequent Medicare Wellness Visit    Subjective    Mei Crane is a 89 y.o. female who presents for a Subsequent Medicare Wellness Visit.    The following portions of the patient's history were reviewed and   updated as appropriate: allergies, current medications, past family history, past medical history, past social history, past surgical history and problem list.    Compared to one year ago, the patient feels her physical   health is the same.    Compared to one year ago, the patient feels her mental   health is the same.    Recent Hospitalizations:  She was not admitted to the hospital during the last year.       Current Medical Providers:  Patient Care Team:  Lexie Toussaint APRN as PCP - General (Family Medicine)  Cecil Jacob OD (Optometry)  Yannick Santo MD as Consulting Physician (Ophthalmology)  Nik Rodriguez MD as Consulting Physician (Orthopedic Surgery)  Lisa Dudley APRN as Nurse Practitioner (Neurology)  Koko Kennedy MD as Consulting Physician (Cardiology)    Outpatient Medications Prior to Visit   Medication Sig Dispense Refill   • acetaminophen (TYLENOL) 325 MG tablet Take 2 tablets by mouth Every 4 (Four) Hours As Needed for Mild Pain .     • amLODIPine (NORVASC) 10 MG tablet TAKE ONE TABLET BY MOUTH EVERY DAY 30 tablet 5   • atorvastatin (LIPITOR) 80 MG tablet TAKE ONE TABLET BY MOUTH AT BEDTIME 30 tablet 5   • baclofen (LIORESAL) 10 MG tablet Take 1/2 tab to 1 tab three times a day as needed. 30 tablet 0   • DULoxetine (CYMBALTA) 20 MG capsule TAKE ONE CAPSULE BY MOUTH EVERY DAY 30 capsule 5   • Eliquis 5 MG tablet tablet TAKE ONE TABLET BY MOUTH TWICE DAILY 60 tablet 5   • furosemide (LASIX) 20 MG tablet TAKE ONE TABLET BY MOUTH EVERY DAY 28 tablet 5   • Magnesium Oxide 400 (240 Mg) MG tablet TAKE ONE TABLET BY MOUTH EVERY DAY 28 tablet 5   • metoprolol tartrate (LOPRESSOR) 50 MG tablet TAKE ONE TABLET BY MOUTH TWICE  DAILY 60 tablet 5   • pantoprazole (PROTONIX) 40 MG EC tablet TAKE ONE TABLET BY MOUTH EVERY DAY 90 tablet 2   • potassium chloride 10 MEQ CR tablet TAKE ONE TABLET BY MOUTH EVERY DAY 28 tablet 5   • prednisoLONE acetate (PRED FORTE) 1 % ophthalmic suspension Administer 1 drop into the left eye Daily.  3   • gabapentin (NEURONTIN) 300 MG capsule TAKE ONE CAPSULE BY MOUTH AT BEDTIME 28 capsule 5   • levothyroxine (SYNTHROID, LEVOTHROID) 88 MCG tablet TAKE ONE TABLET BY MOUTH EVERY DAY 90 tablet 2     No facility-administered medications prior to visit.       No opioid medication identified on active medication list. I have reviewed chart for other potential  high risk medication/s and harmful drug interactions in the elderly.          Aspirin is not on active medication list.  Aspirin use is contraindicated for this patient due to: current use of Eliquis.  .    Patient Active Problem List   Diagnosis   • HTN (hypertension), benign   • Acquired hypothyroidism   • Hyperlipidemia   • Gastroesophageal reflux disease without esophagitis   • Glaucoma   • Macular degeneration   • Anemia   • Osteopenia   • CKD (chronic kidney disease), stage III (CMS/HCC)   • B12 deficiency   • Calculus of gallbladder without cholecystitis   • DDD (degenerative disc disease), lumbar   • Trochanteric bursitis of right hip   • Bilateral leg weakness   • Elevated troponin   • Weakness   • Dizziness   • Hallucinations, visual   • CVA (cerebrovascular accident)   • Embolic stroke   • Hypokalemia   • Hyponatremia   • History of embolic stroke   • Chest pain   • Atrial fibrillation   • Thiazide diuretics causing adverse effect in therapeutic use   • Closed intertrochanteric fracture of hip, right, initial encounter   • Closed fracture of rib of right side     Advance Care Planning   Advance Care Planning     Advance Directive is not on file.  ACP discussion was declined by the patient. Patient does not have an advance directive, declines further  "assistance.     Objective    Vitals:    04/10/23 1135   BP: 119/61   BP Location: Left arm   Patient Position: Sitting   Cuff Size: Adult   Pulse: 96   SpO2: 91%   Weight: 71.5 kg (157 lb 9.6 oz)   Height: 162.6 cm (64.02\")     Estimated body mass index is 27.04 kg/m² as calculated from the following:    Height as of this encounter: 162.6 cm (64.02\").    Weight as of this encounter: 71.5 kg (157 lb 9.6 oz).    BMI is >= 25 and <30. (Overweight) The following options were offered after discussion;:  .       Does the patient have evidence of cognitive impairment? No    Lab Results   Component Value Date    CHLPL 160 03/31/2023    TRIG 138 03/31/2023    HDL 55 03/31/2023    LDL 81 03/31/2023    VLDL 24 03/31/2023        HEALTH RISK ASSESSMENT    Smoking Status:  Social History     Tobacco Use   Smoking Status Never   Smokeless Tobacco Never     Alcohol Consumption:  Social History     Substance and Sexual Activity   Alcohol Use Yes    Comment: occasionally     Fall Risk Screen:    STEADI Fall Risk Assessment was completed, and patient is at MODERATE risk for falls. Assessment completed on:4/10/2023    Depression Screening:  PHQ-2/PHQ-9 Depression Screening 4/10/2023   Little Interest or Pleasure in Doing Things 0-->not at all   Feeling Down, Depressed or Hopeless 0-->not at all   Trouble Falling or Staying Asleep, or Sleeping Too Much 0-->not at all   Feeling Tired or Having Little Energy 0-->not at all   Poor Appetite or Overeating 0-->not at all   Feeling Bad about Yourself - or that You are a Failure or Have Let Yourself or Your Family Down 0-->not at all   Trouble Concentrating on Things, Such as Reading the Newspaper or Watching Television 0-->not at all   Moving or Speaking So Slowly that Other People Could Have Noticed? Or the Opposite - Being So Fidgety 0-->not at all   Thoughts that You Would be Better Off Dead or of Hurting Yourself in Some Way 0-->not at all   PHQ-9: Brief Depression Severity Measure Score 0 "   If You Checked Off Any Problems, How Difficult Have These Problems Made It For You to Do Your Work, Take Care of Things at Home, or Get Along with Other People? not difficult at all       Health Habits and Functional and Cognitive Screening:  Functional & Cognitive Status 4/10/2023   Do you have difficulty preparing food and eating? No   Do you have difficulty bathing yourself, getting dressed or grooming yourself? No   Do you have difficulty using the toilet? No   Do you have difficulty moving around from place to place? No   Do you have trouble with steps or getting out of a bed or a chair? No   Current Diet Well Balanced Diet   Dental Exam Up to date        Dental Exam Comment -   Eye Exam Up to date        Eye Exam Comment -   Exercise (times per week) 3 times per week   Current Exercises Include Walking   Current Exercise Activities Include -   Do you need help using the phone?  No   Are you deaf or do you have serious difficulty hearing?  No   Do you need help with transportation? No   Do you need help shopping? No   Do you need help preparing meals?  No   Do you need help with housework?  No   Do you need help with laundry? No   Do you need help taking your medications? No   Do you need help managing money? No   Do you ever drive or ride in a car without wearing a seat belt? No   Have you felt unusual stress, anger or loneliness in the last month? -   Who do you live with? -   If you need help, do you have trouble finding someone available to you? -   Have you been bothered in the last four weeks by sexual problems? -   Do you have difficulty concentrating, remembering or making decisions? -       Age-appropriate Screening Schedule:  Refer to the list below for future screening recommendations based on patient's age, sex and/or medical conditions. Orders for these recommended tests are listed in the plan section. The patient has been provided with a written plan.    Health Maintenance   Topic Date Due   •  "COVID-19 Vaccine (3 - Booster for Moderna series) 04/12/2023 (Originally 5/8/2021)   • TDAP/TD VACCINES (1 - Tdap) 04/10/2024 (Originally 2/14/1945)   • ANNUAL WELLNESS VISIT  04/11/2023   • INFLUENZA VACCINE  08/01/2023   • LIPID PANEL  03/31/2024   • Pneumococcal Vaccine 65+  Completed   • MAMMOGRAM  Discontinued   • DXA SCAN  Discontinued   • ZOSTER VACCINE  Discontinued                  CMS Preventative Services Quick Reference  Risk Factors Identified During Encounter  None Identified  The above risks/problems have been discussed with the patient.  Pertinent information has been shared with the patient in the After Visit Summary.  An After Visit Summary and PPPS were made available to the patient.    Follow Up:   Next Medicare Wellness visit to be scheduled in 1 year.       Additional E&M Note during same encounter follows:  Patient has multiple medical problems which are significant and separately identifiable that require additional work above and beyond the Medicare Wellness Visit.      Chief Complaint  Annual Exam (MEDICARE WELLNESS, SUBSEQUENT)    Subjective        HPI  Mei Crane is also being seen today for her thyroid disease follow up and to get refills on her Gabapentin.          Objective   Vital Signs:  /61 (BP Location: Left arm, Patient Position: Sitting, Cuff Size: Adult)   Pulse 96   Ht 162.6 cm (64.02\")   Wt 71.5 kg (157 lb 9.6 oz)   SpO2 91%   BMI 27.04 kg/m²     Physical Exam  Vitals reviewed.   Constitutional:       Appearance: Normal appearance.   Cardiovascular:      Rate and Rhythm: Normal rate and regular rhythm.      Pulses: Normal pulses.      Heart sounds: Normal heart sounds.   Pulmonary:      Effort: Pulmonary effort is normal.      Breath sounds: Normal breath sounds.   Skin:     General: Skin is warm and dry.   Neurological:      Mental Status: She is alert and oriented to person, place, and time.   Psychiatric:      Comments: No acute distress            Common " labs    Common Labs 3/31/23 3/31/23    1020 1020   Glucose 120 (A)    BUN 15    Creatinine 1.64 (A)    Sodium 140    Potassium 4.2    Chloride 102    Calcium 9.3    Total Protein 6.4    Albumin 4.0    Total Bilirubin 0.3    Alkaline Phosphatase 162 (A)    AST (SGOT) 14    ALT (SGPT) 12    Total Cholesterol  160   Triglycerides  138   HDL Cholesterol  55   LDL Cholesterol   81   (A) Abnormal value       Comments are available for some flowsheets but are not being displayed.           TSH    TSH 10/5/22 3/31/23   TSH 0.130 (A) 6.030 (A)   (A) Abnormal value                       Assessment and Plan   Diagnoses and all orders for this visit:    1. Medicare annual wellness visit, subsequent (Primary)    2. RLS (restless legs syndrome)  -     gabapentin (NEURONTIN) 300 MG capsule; Take 1 capsule by mouth 2 (Two) Times a Day.  Dispense: 56 capsule; Refill: 5    3. Acquired hypothyroidism  -     levothyroxine (Synthroid) 100 MCG tablet; Take 1 tablet by mouth Daily.  Dispense: 28 tablet; Refill: 5    4. Renal failure, unspecified chronicity  -     Ambulatory Referral to Nephrology    Ms. Crane does not appear to be in any acute distress.  I have refilled her Gabapentin today. I did increase to frequency form once a day to twice a day.  She is to take Gabapentin 300 mg twice a day.  I have reviewed her labs with her today. Her renal function is worsening. I am referring her to nephrology for further evaluation.  Her TSH is also elevated. I have increased the Levothyroxine to 100 mcg daily form 88 mcg.    CSA date: 9/22/20    Last UDT: 4/11/22    Next UDT: 10/2023    Last CSE: 10/10/22    Next CSE: 10/9/23    PANCHO report reviewed and verified: 4/10/23    Refills allowed on: 5 refills prior to 10/9/23         Follow Up   Return in about 6 months (around 10/10/2023), or if symptoms worsen or fail to improve, for CSE/ f/u thyroid.  Patient was given instructions and counseling regarding her condition or for health  maintenance advice. Please see specific information pulled into the AVS if appropriate.

## 2023-08-03 ENCOUNTER — LAB (OUTPATIENT)
Dept: LAB | Facility: HOSPITAL | Age: 88
End: 2023-08-03
Payer: MEDICARE

## 2023-08-03 ENCOUNTER — CONSULT (OUTPATIENT)
Dept: ONCOLOGY | Facility: CLINIC | Age: 88
End: 2023-08-03
Payer: MEDICARE

## 2023-08-03 VITALS
HEART RATE: 87 BPM | RESPIRATION RATE: 18 BRPM | DIASTOLIC BLOOD PRESSURE: 64 MMHG | BODY MASS INDEX: 25.23 KG/M2 | HEIGHT: 64 IN | SYSTOLIC BLOOD PRESSURE: 111 MMHG | TEMPERATURE: 97.9 F | WEIGHT: 147.8 LBS | OXYGEN SATURATION: 89 %

## 2023-08-03 DIAGNOSIS — D64.9 NORMOCYTIC ANEMIA: ICD-10-CM

## 2023-08-03 DIAGNOSIS — R76.8 HIGH TOTAL SERUM IGG: Primary | ICD-10-CM

## 2023-08-03 DIAGNOSIS — R76.8 HIGH TOTAL SERUM IGG: ICD-10-CM

## 2023-08-03 DIAGNOSIS — D47.2 MONOCLONAL GAMMOPATHY: ICD-10-CM

## 2023-08-03 LAB
ALBUMIN SERPL-MCNC: 4 G/DL (ref 3.5–5.2)
ALBUMIN/GLOB SERPL: 1.7 G/DL
ALP SERPL-CCNC: 132 U/L (ref 39–117)
ALT SERPL W P-5'-P-CCNC: <5 U/L (ref 1–33)
ANION GAP SERPL CALCULATED.3IONS-SCNC: 12.8 MMOL/L (ref 5–15)
AST SERPL-CCNC: 17 U/L (ref 1–32)
BASOPHILS # BLD AUTO: 0.05 10*3/MM3 (ref 0–0.2)
BASOPHILS NFR BLD AUTO: 0.6 % (ref 0–1.5)
BILIRUB SERPL-MCNC: 0.4 MG/DL (ref 0–1.2)
BUN SERPL-MCNC: 16 MG/DL (ref 8–23)
BUN/CREAT SERPL: 11.2 (ref 7–25)
CALCIUM SPEC-SCNC: 8.9 MG/DL (ref 8.2–9.6)
CHLORIDE SERPL-SCNC: 103 MMOL/L (ref 98–107)
CO2 SERPL-SCNC: 25.2 MMOL/L (ref 22–29)
CREAT SERPL-MCNC: 1.43 MG/DL (ref 0.6–1.1)
DEPRECATED RDW RBC AUTO: 43.3 FL (ref 37–54)
EGFRCR SERPLBLD CKD-EPI 2021: 33.4 ML/MIN/1.73
EOSINOPHIL # BLD AUTO: 0.37 10*3/MM3 (ref 0–0.4)
EOSINOPHIL NFR BLD AUTO: 4.6 % (ref 0.3–6.2)
ERYTHROCYTE [DISTWIDTH] IN BLOOD BY AUTOMATED COUNT: 14 % (ref 12.3–15.4)
FERRITIN SERPL-MCNC: 27 NG/ML (ref 13–150)
GLOBULIN UR ELPH-MCNC: 2.3 GM/DL
GLUCOSE SERPL-MCNC: 92 MG/DL (ref 65–99)
HCT VFR BLD AUTO: 36.9 % (ref 34–46.6)
HGB BLD-MCNC: 11.4 G/DL (ref 12–15.9)
HGB RETIC QN AUTO: 31 PG (ref 29.8–36.1)
IMM GRANULOCYTES # BLD AUTO: 0.03 10*3/MM3 (ref 0–0.05)
IMM GRANULOCYTES NFR BLD AUTO: 0.4 % (ref 0–0.5)
IMM RETICS NFR: 11.3 % (ref 3–15.8)
IRON 24H UR-MRATE: 46 MCG/DL (ref 37–145)
IRON SATN MFR SERPL: 12 % (ref 20–50)
LYMPHOCYTES # BLD AUTO: 3.3 10*3/MM3 (ref 0.7–3.1)
LYMPHOCYTES NFR BLD AUTO: 40.7 % (ref 19.6–45.3)
MCH RBC QN AUTO: 26.2 PG (ref 26.6–33)
MCHC RBC AUTO-ENTMCNC: 30.9 G/DL (ref 31.5–35.7)
MCV RBC AUTO: 84.8 FL (ref 79–97)
MONOCYTES # BLD AUTO: 0.7 10*3/MM3 (ref 0.1–0.9)
MONOCYTES NFR BLD AUTO: 8.6 % (ref 5–12)
NEUTROPHILS NFR BLD AUTO: 3.65 10*3/MM3 (ref 1.7–7)
NEUTROPHILS NFR BLD AUTO: 45.1 % (ref 42.7–76)
NRBC BLD AUTO-RTO: 0 /100 WBC (ref 0–0.2)
PLATELET # BLD AUTO: 216 10*3/MM3 (ref 140–450)
PMV BLD AUTO: 10.8 FL (ref 6–12)
POTASSIUM SERPL-SCNC: 4.2 MMOL/L (ref 3.5–5.2)
PROT SERPL-MCNC: 6.3 G/DL (ref 6–8.5)
RBC # BLD AUTO: 4.35 10*6/MM3 (ref 3.77–5.28)
RETICS # AUTO: 0.05 10*6/MM3 (ref 0.02–0.13)
RETICS/RBC NFR AUTO: 1.08 % (ref 0.7–1.9)
SODIUM SERPL-SCNC: 141 MMOL/L (ref 136–145)
TIBC SERPL-MCNC: 381 MCG/DL (ref 298–536)
TRANSFERRIN SERPL-MCNC: 272 MG/DL (ref 200–360)
VIT B12 BLD-MCNC: 593 PG/ML (ref 211–946)
WBC NRBC COR # BLD: 8.1 10*3/MM3 (ref 3.4–10.8)

## 2023-08-03 PROCEDURE — 1160F RVW MEDS BY RX/DR IN RCRD: CPT | Performed by: INTERNAL MEDICINE

## 2023-08-03 PROCEDURE — 80053 COMPREHEN METABOLIC PANEL: CPT | Performed by: INTERNAL MEDICINE

## 2023-08-03 PROCEDURE — 85046 RETICYTE/HGB CONCENTRATE: CPT | Performed by: INTERNAL MEDICINE

## 2023-08-03 PROCEDURE — 85025 COMPLETE CBC W/AUTO DIFF WBC: CPT

## 2023-08-03 PROCEDURE — 84466 ASSAY OF TRANSFERRIN: CPT | Performed by: INTERNAL MEDICINE

## 2023-08-03 PROCEDURE — 83540 ASSAY OF IRON: CPT | Performed by: INTERNAL MEDICINE

## 2023-08-03 PROCEDURE — 82728 ASSAY OF FERRITIN: CPT | Performed by: INTERNAL MEDICINE

## 2023-08-03 PROCEDURE — 1126F AMNT PAIN NOTED NONE PRSNT: CPT | Performed by: INTERNAL MEDICINE

## 2023-08-03 PROCEDURE — 36415 COLL VENOUS BLD VENIPUNCTURE: CPT

## 2023-08-03 PROCEDURE — 1159F MED LIST DOCD IN RCRD: CPT | Performed by: INTERNAL MEDICINE

## 2023-08-03 PROCEDURE — 99204 OFFICE O/P NEW MOD 45 MIN: CPT | Performed by: INTERNAL MEDICINE

## 2023-08-03 PROCEDURE — 82607 VITAMIN B-12: CPT | Performed by: INTERNAL MEDICINE

## 2023-08-03 RX ORDER — NITROFURANTOIN 25; 75 MG/1; MG/1
CAPSULE ORAL
COMMUNITY
Start: 2023-05-05

## 2023-08-04 LAB
ALBUMIN SERPL ELPH-MCNC: 3.5 G/DL (ref 2.9–4.4)
ALBUMIN/GLOB SERPL: 1.4 {RATIO} (ref 0.7–1.7)
ALPHA1 GLOB SERPL ELPH-MCNC: 0.2 G/DL (ref 0–0.4)
ALPHA2 GLOB SERPL ELPH-MCNC: 0.9 G/DL (ref 0.4–1)
B-GLOBULIN SERPL ELPH-MCNC: 0.9 G/DL (ref 0.7–1.3)
FOLATE BLD-MCNC: 316 NG/ML
FOLATE RBC-MCNC: 908 NG/ML
GAMMA GLOB SERPL ELPH-MCNC: 0.7 G/DL (ref 0.4–1.8)
GLOBULIN SER-MCNC: 2.6 G/DL (ref 2.2–3.9)
HCT VFR BLD AUTO: 34.8 % (ref 34–46.6)
IGA SERPL-MCNC: 132 MG/DL (ref 64–422)
IGG SERPL-MCNC: 776 MG/DL (ref 586–1602)
IGM SERPL-MCNC: 14 MG/DL (ref 26–217)
INTERPRETATION SERPL IEP-IMP: ABNORMAL
KAPPA LC FREE SER-MCNC: 39.2 MG/L (ref 3.3–19.4)
KAPPA LC FREE/LAMBDA FREE SER: 1.85 {RATIO} (ref 0.26–1.65)
LABORATORY COMMENT REPORT: ABNORMAL
LAMBDA LC FREE SERPL-MCNC: 21.2 MG/L (ref 5.7–26.3)
M PROTEIN SERPL ELPH-MCNC: 0.1 G/DL
PROT SERPL-MCNC: 6.1 G/DL (ref 6–8.5)

## 2023-08-07 ENCOUNTER — TELEPHONE (OUTPATIENT)
Dept: ONCOLOGY | Facility: CLINIC | Age: 88
End: 2023-08-07
Payer: MEDICARE

## 2023-08-07 NOTE — TELEPHONE ENCOUNTER
----- Message from Kenney Butts MD sent at 8/4/2023  5:23 PM EDT -----  Please call her and let her know that the abnormal protein level is very small at 0.1 g, which is the lowest level we can measure.  This is unlikely to cause her any problems.  I will see her back as scheduled. CHRIS

## 2023-08-18 DIAGNOSIS — G25.81 RLS (RESTLESS LEGS SYNDROME): ICD-10-CM

## 2023-08-18 DIAGNOSIS — E03.9 ACQUIRED HYPOTHYROIDISM: ICD-10-CM

## 2023-08-18 DIAGNOSIS — R60.0 EDEMA OF BOTH LOWER LEGS: ICD-10-CM

## 2023-08-21 DIAGNOSIS — R60.0 EDEMA OF BOTH LOWER LEGS: ICD-10-CM

## 2023-08-21 RX ORDER — ATORVASTATIN CALCIUM 80 MG/1
TABLET, FILM COATED ORAL
Qty: 30 TABLET | Refills: 5 | Status: SHIPPED | OUTPATIENT
Start: 2023-08-21

## 2023-08-21 RX ORDER — POTASSIUM CHLORIDE 750 MG/1
TABLET, FILM COATED, EXTENDED RELEASE ORAL
Qty: 28 TABLET | Refills: 5 | Status: SHIPPED | OUTPATIENT
Start: 2023-08-21

## 2023-08-21 RX ORDER — LEVOTHYROXINE SODIUM 0.1 MG/1
TABLET ORAL
Qty: 28 TABLET | Refills: 5 | Status: SHIPPED | OUTPATIENT
Start: 2023-08-21

## 2023-08-21 RX ORDER — AMLODIPINE BESYLATE 10 MG/1
TABLET ORAL
Qty: 30 TABLET | Refills: 5 | Status: SHIPPED | OUTPATIENT
Start: 2023-08-21

## 2023-08-21 RX ORDER — GABAPENTIN 300 MG/1
CAPSULE ORAL
Qty: 56 CAPSULE | Refills: 5 | OUTPATIENT
Start: 2023-08-21

## 2023-08-21 RX ORDER — DULOXETIN HYDROCHLORIDE 20 MG/1
CAPSULE, DELAYED RELEASE ORAL
Qty: 30 CAPSULE | Refills: 5 | Status: SHIPPED | OUTPATIENT
Start: 2023-08-21

## 2023-08-21 RX ORDER — FUROSEMIDE 20 MG/1
TABLET ORAL
Qty: 28 TABLET | Refills: 5 | Status: SHIPPED | OUTPATIENT
Start: 2023-08-21

## 2023-08-21 RX ORDER — METOPROLOL TARTRATE 50 MG/1
TABLET, FILM COATED ORAL
Qty: 60 TABLET | Refills: 5 | Status: SHIPPED | OUTPATIENT
Start: 2023-08-21

## 2023-08-22 DIAGNOSIS — G25.81 RLS (RESTLESS LEGS SYNDROME): ICD-10-CM

## 2023-08-22 NOTE — TELEPHONE ENCOUNTER
Caller: [facility] [address] [place] [phone] [place] [phone] FX    Relationship: Pharmacy    Best call back number: [phone]    Requested Prescriptions:   Requested Prescriptions     Pending Prescriptions Disp Refills    gabapentin (NEURONTIN) 300 MG capsule 56 capsule 5     Sig: Take 1 capsule by mouth 2 (Two) Times a Day.        Pharmacy where request should be sent: [facility] [place] [address] [place] [phone] [place] [phone] FX     Last office visit with prescribing clinician: 4/10/2023   Last telemedicine visit with prescribing clinician: Visit date not found   Next office visit with prescribing clinician: 10/9/2023     Additional details provided by patient: PER PHARMACY, PATIENTS BUBBLE PACKS GOT WET AND HER SUPPLY OF MEDICATION WAS RUINED. PHARMACY IS REQUESTING NEW PRESCRIPTION TO REPLACE.     Juanjose Becerra Rep   08/22/23 13:50 EDT

## 2023-08-23 RX ORDER — GABAPENTIN 300 MG/1
300 CAPSULE ORAL 2 TIMES DAILY
Qty: 56 CAPSULE | Refills: 5 | OUTPATIENT
Start: 2023-08-23

## 2023-09-08 DIAGNOSIS — G25.81 RLS (RESTLESS LEGS SYNDROME): ICD-10-CM

## 2023-09-08 RX ORDER — GABAPENTIN 300 MG/1
300 CAPSULE ORAL 2 TIMES DAILY
Qty: 56 CAPSULE | Refills: 5 | OUTPATIENT
Start: 2023-09-08

## 2023-09-08 NOTE — TELEPHONE ENCOUNTER
Caller: Royce Mei ESTRADA    Relationship: Self    Best call back number: [phone]     Requested Prescriptions:   Requested Prescriptions     Pending Prescriptions Disp Refills    gabapentin (NEURONTIN) 300 MG capsule 56 capsule 5     Sig: Take 1 capsule by mouth 2 (Two) Times a Day.        Pharmacy where request should be sent: [facility] [address] [facility] [phone] [facility] [phone] FX     Last office visit with prescribing clinician: 4/10/2023   Last telemedicine visit with prescribing clinician: Visit date not found   Next office visit with prescribing clinician: 10/9/2023     Additional details provided by patient: PATIENT STATES THAT SHE NEEDS A REFILL OF MEDICATION TO LAST UNTIL HER NEXT APPOINTMENT ON 10/9/23    Does the patient have less than a 3 day supply:  [] Yes  [x] No    Would you like a call back once the refill request has been completed: [] Yes [x] No    If the office needs to give you a call back, can they leave a voicemail: [] Yes [x] No    Juanjose Coon   09/08/23 14:00 EDT

## 2023-09-08 NOTE — TELEPHONE ENCOUNTER
Spoke with Mei, we discussed needing to make an appointment for a refill on her medication. She said she was unsure of what to do at this time so she will call back if she wishes to make one.

## 2023-09-11 RX ORDER — GABAPENTIN 300 MG/1
CAPSULE ORAL
Qty: 56 CAPSULE | Refills: 5 | OUTPATIENT
Start: 2023-09-11

## 2023-09-20 ENCOUNTER — OFFICE VISIT (OUTPATIENT)
Dept: FAMILY MEDICINE CLINIC | Facility: CLINIC | Age: 88
End: 2023-09-20
Payer: MEDICARE

## 2023-09-20 VITALS
BODY MASS INDEX: 24.87 KG/M2 | HEART RATE: 89 BPM | DIASTOLIC BLOOD PRESSURE: 70 MMHG | HEIGHT: 64 IN | OXYGEN SATURATION: 97 % | WEIGHT: 145.7 LBS | SYSTOLIC BLOOD PRESSURE: 112 MMHG

## 2023-09-20 DIAGNOSIS — G25.81 RLS (RESTLESS LEGS SYNDROME): ICD-10-CM

## 2023-09-20 DIAGNOSIS — E03.9 ACQUIRED HYPOTHYROIDISM: Primary | ICD-10-CM

## 2023-09-20 DIAGNOSIS — Z79.899 ENCOUNTER FOR LONG-TERM (CURRENT) USE OF HIGH-RISK MEDICATION: ICD-10-CM

## 2023-09-20 PROCEDURE — 1159F MED LIST DOCD IN RCRD: CPT | Performed by: NURSE PRACTITIONER

## 2023-09-20 PROCEDURE — 1160F RVW MEDS BY RX/DR IN RCRD: CPT | Performed by: NURSE PRACTITIONER

## 2023-09-20 PROCEDURE — 99214 OFFICE O/P EST MOD 30 MIN: CPT | Performed by: NURSE PRACTITIONER

## 2023-09-20 RX ORDER — GABAPENTIN 300 MG/1
300 CAPSULE ORAL 2 TIMES DAILY
Qty: 60 CAPSULE | Refills: 5 | Status: SHIPPED | OUTPATIENT
Start: 2023-09-20

## 2023-11-27 DIAGNOSIS — E61.2 MAGNESIUM DEFICIENCY: ICD-10-CM

## 2023-11-27 RX ORDER — LANOLIN ALCOHOL/MO/W.PET/CERES
CREAM (GRAM) TOPICAL
Qty: 28 TABLET | Refills: 5 | Status: SHIPPED | OUTPATIENT
Start: 2023-11-27

## 2023-12-23 ENCOUNTER — READMISSION MANAGEMENT (OUTPATIENT)
Dept: CALL CENTER | Facility: HOSPITAL | Age: 88
End: 2023-12-23
Payer: MEDICARE

## 2023-12-24 NOTE — OUTREACH NOTE
Prep Survey      Flowsheet Row Responses   [facility] facility patient discharged from? Non-[facility]   Is LACE score < 7 ? Non-BH Discharge   Eligibility [facility]   Date of Discharge 12/23/23   Discharge diagnosis Encounter for surgical aftercare following surgery on the circulatory system   Does the patient have one of the following disease processes/diagnoses(primary or secondary)? Other   Prep survey completed? Yes            JULES HACKETT - Registered Nurse

## 2023-12-26 ENCOUNTER — TRANSITIONAL CARE MANAGEMENT TELEPHONE ENCOUNTER (OUTPATIENT)
Dept: CALL CENTER | Facility: HOSPITAL | Age: 88
End: 2023-12-26
Payer: MEDICARE

## 2023-12-26 NOTE — OUTREACH NOTE
Call Center TCM Note      Flowsheet Row Responses   [facility] patient discharged from? Non-   Does the patient have one of the following disease processes/diagnoses(primary or secondary)? Other   TCM attempt successful? Yes   Call start time 1221   Call end time 1229   Discharge diagnosis Encounter for surgical aftercare following surgery on the circulatory system   Person spoke with today (if not patient) and relationship sister   Meds reviewed with patient/caregiver? Yes   Is the patient having any side effects they believe may be caused by any medication additions or changes? No   Does the patient have all medications ordered at discharge? Yes   Is the patient taking all medications as directed (includes completed medication regime)? Yes   Comments Patient has a hospital followup on 1/2/2023 with Lexie SEPULVEDA on 1/2/2023   Does the patient have an appointment with their PCP within 7-14 days of discharge? Yes   Psychosocial issues? No   Did the patient receive a copy of their discharge instructions? Yes   Nursing interventions Reviewed instructions with patient   What is the patient's perception of their health status since discharge? Improving   Is the patient/caregiver able to teach back signs and symptoms related to disease process for when to call PCP? Yes   Is the patient/caregiver able to teach back signs and symptoms related to disease process for when to call 911? Yes   Is the patient/caregiver able to teach back the hierarchy of who to call/visit for symptoms/problems? PCP, Specialist, Home health nurse, Urgent Care, ED, 911 Yes   If the patient is a current smoker, are they able to teach back resources for cessation? Not a smoker   TCM call completed? Yes   Wrap up additional comments Patient is home from [facility]. No needs at this time.   Call end time 1229   Would this patient benefit from a Referral to [facility] Social Work? No   Is the patient interested in additional  calls from an ambulatory ? No            Chris Keller RN    12/26/2023, 12:29 EST

## 2024-01-02 ENCOUNTER — OFFICE VISIT (OUTPATIENT)
Dept: FAMILY MEDICINE CLINIC | Facility: CLINIC | Age: 89
End: 2024-01-02
Payer: MEDICARE

## 2024-01-02 VITALS
SYSTOLIC BLOOD PRESSURE: 126 MMHG | HEIGHT: 64 IN | HEART RATE: 96 BPM | BODY MASS INDEX: 24.3 KG/M2 | DIASTOLIC BLOOD PRESSURE: 78 MMHG | WEIGHT: 142.3 LBS | OXYGEN SATURATION: 98 %

## 2024-01-02 DIAGNOSIS — Z09 HOSPITAL DISCHARGE FOLLOW-UP: ICD-10-CM

## 2024-01-02 DIAGNOSIS — Z95.2 S/P AORTIC VALVE REPLACEMENT: Primary | ICD-10-CM

## 2024-01-02 NOTE — PROGRESS NOTES
Patient and responsible adult given discharge instructions with no questions regarding instructions. Jennifer score 20/20. Pain level 0/10.  Discharged from unit via ambulatory. Patient discharged to home.pt up tp bathroom prior to discharge without problems, voided a large amount.     Transitional Care Follow Up Visit  Subjective     Mei Crane is a 89 y.o. female who presents for a transitional care management visit.    Within 48 business hours after discharge our office contacted her via telephone to coordinate her care and needs.      I reviewed and discussed the details of that call along with the discharge summary, hospital problems, inpatient lab results, inpatient diagnostic studies, and consultation reports with Mei.     Current outpatient and discharge medications have been reconciled for the patient.  Reviewed by: COCO Garcia          12/23/2023    11:31 PM   Date of TCM Phone Call   [facility]   Date of Discharge 12/23/2023     Risk for Readmission (LACE) No data recorded    History of Present Illness  Ms. Crane presents today accompanied by her care giver. She is 6 weeks s/p aortic valve replacement. She was released from inpatient rehab last week. She state she is tired but is doing well. She dies chest pain or palpitations. She is short of breath at times. She is wearing home oxygen.      Course During Hospital Stay:  stable     The following portions of the patient's history were reviewed and updated as appropriate: allergies, current medications, past family history, past medical history, past social history, past surgical history, and problem list.        Current Outpatient Medications:     acetaminophen (TYLENOL) 325 MG tablet, Take 2 tablets by mouth Every 4 (Four) Hours As Needed for Mild Pain ., Disp: , Rfl:     atorvastatin (LIPITOR) 80 MG tablet, TAKE ONE TABLET BY MOUTH AT BEDTIME, Disp: 30 tablet, Rfl: 5    Eliquis 5 MG tablet tablet, TAKE ONE TABLET BY MOUTH TWICE DAILY, Disp: 60 tablet, Rfl: 5    levothyroxine (SYNTHROID, LEVOTHROID) 100 MCG tablet, TAKE ONE TABLET BY MOUTH EVERY DAY, Disp: 28 tablet, Rfl: 5    prednisoLONE acetate (PRED FORTE) 1 % ophthalmic suspension, Administer 1 drop into the left eye Daily.,  Disp: , Rfl: 3    amLODIPine (NORVASC) 10 MG tablet, TAKE ONE TABLET BY MOUTH EVERY DAY (Patient not taking: Reported on 1/2/2024), Disp: 30 tablet, Rfl: 5    metoprolol tartrate (LOPRESSOR) 50 MG tablet, TAKE ONE TABLET BY MOUTH TWICE DAILY (Patient not taking: Reported on 1/2/2024), Disp: 60 tablet, Rfl: 5     Review of Systems   Constitutional:  Positive for fatigue (with any activity).   HENT: Negative.     Respiratory:  Positive for shortness of breath (wearing home oxygen).    Cardiovascular:  Negative for chest pain, palpitations and leg swelling.   Gastrointestinal: Negative.    Genitourinary: Negative.    Musculoskeletal: Negative.    Neurological: Negative.    Psychiatric/Behavioral: Negative.         Objective   Physical Exam  Vitals reviewed.   Constitutional:       Appearance: Normal appearance. She is not ill-appearing.   Cardiovascular:      Rate and Rhythm: Normal rate. Rhythm irregular.      Pulses: Normal pulses.      Heart sounds: Normal heart sounds.   Pulmonary:      Effort: Pulmonary effort is normal.      Breath sounds: Normal breath sounds.   Skin:     General: Skin is warm and dry.   Neurological:      Mental Status: She is alert and oriented to person, place, and time.   Psychiatric:      Comments: No acute distress         Assessment & Plan   Diagnoses and all orders for this visit:    1. S/P aortic valve replacement (Primary)    2. Hospital discharge follow-up    Ms. Crane appears to be doing well today.  I have reviewed and reconciled her medications with her today. She is questioning whether to take the amlodipine and  Metorprolo tartrate, which was not on her discharge medication list from the rehab. She sees her cardiologist on Thursday and I have asked her to follow up with the cardiologist about this.   She is to continue the other medications as prescribed.

## 2024-01-05 ENCOUNTER — PATIENT ROUNDING (BHMG ONLY) (OUTPATIENT)
Dept: FAMILY MEDICINE CLINIC | Facility: CLINIC | Age: 89
End: 2024-01-05
Payer: MEDICARE

## 2024-01-07 NOTE — PROGRESS NOTES
Rounded with patient and made sure her concerns were addressed during visit.  Patient will follow up at next scheduled office visit.

## 2024-01-11 ENCOUNTER — TELEPHONE (OUTPATIENT)
Dept: FAMILY MEDICINE CLINIC | Facility: CLINIC | Age: 89
End: 2024-01-11
Payer: MEDICARE

## 2024-01-11 NOTE — TELEPHONE ENCOUNTER
[facility] ph #[phone] has order changes she needs to consult with Lexie Toussaint on.  Please call on 1-12-24 AM.

## 2024-01-25 ENCOUNTER — TELEPHONE (OUTPATIENT)
Dept: FAMILY MEDICINE CLINIC | Facility: CLINIC | Age: 89
End: 2024-01-25
Payer: MEDICARE

## 2024-01-25 DIAGNOSIS — I48.91 ATRIAL FIBRILLATION, NEW ONSET: ICD-10-CM

## 2024-01-25 NOTE — TELEPHONE ENCOUNTER
[facility] spoke to Mei's caregiver and told her they are out of Eliquis and wanted to know if they can substitute the medication.  Lexie Mackhaus out but will send msg to get back when she returns.  Advise contacting [facility] to discuss further.  Patient may have to pay up to $500.  Advise patient to contact insurance and he cardiologist.

## 2024-01-25 NOTE — TELEPHONE ENCOUNTER
Caller: Royce Mei ESTRADA    Relationship: Self    Best call back number:     [phone] (Home)     Requested Prescriptions:   Requested Prescriptions     Pending Prescriptions Disp Refills    apixaban (Eliquis) 5 MG tablet tablet 60 tablet 5     Sig: Take 1 tablet by mouth 2 (Two) Times a Day.        Pharmacy where [facility] be sent: [facility] [place] [id number] [facility] [phone] [facility] [phone] FX     Last office visit with prescribing clinician: 1/2/2024   Last telemedicine visit with prescribing clinician: Visit date not found   Next office visit with prescribing clinician: 4/22/2024     Additional details provided by patient: PATIENT WENT TO [facility] ON 01/12/2024 AND HER MEDICATION ELIQUIS SOMEHOW GOT DISCONTINUED FROM HER BUBBLE-HU MEDS FROM THE PHARMACY AND SHE HAS BEEN OFF OF THIS MEDICATION EVER SINCE AND PATIENT IS ASKING IF SHE IS STILL SUPPOSED TO BE ON THIS MEDICATION AND IF SO SHE NEEDS A NEW PRESCRIPTION SENT TO PHARMACY ASAP    PLEASE CALL PATIENT BACK REGARDING THIS ASAP    Does the patient have less than a 3 day supply:  [x] Yes  [] No    Would you like a call back once the refill request has been completed: [x] Yes [] No    If the office needs to give you a call back, can they leave a voicemail: [x] Yes [] No    Juanjose Plata Rep   01/25/24 09:43 EST

## 2024-01-25 NOTE — TELEPHONE ENCOUNTER
Patient is needing enough days supply to get her until her next bubble pack which she stated the days supply is 28, and she takes 1 in the morning, 1 at night. Please set this to go on her next bubble pack. Thank you.

## 2024-01-25 NOTE — TELEPHONE ENCOUNTER
[facility] - [place], [place] - [address] [phone] [facility] [phone] FX     Reference Number (if applicable): [id number]    Pharmacy representative phone number: [phone]    What medication are you calling in regards to: apixaban (Eliquis) 5 MG tablet tablet     What question does the pharmacy have: REQUIRE ADDITIONAL INFORMATION REGARDING PRIOR AUTHORIZATION. REQUESTS CALL BACK TO DISCUSS FURTHER

## 2024-01-26 DIAGNOSIS — I48.91 ATRIAL FIBRILLATION, NEW ONSET: ICD-10-CM

## 2024-01-26 NOTE — TELEPHONE ENCOUNTER
Caller: Mei Crane    Relationship: Self    Best call back number: [phone]    Who are you requesting to speak with (clinical staff, provider,  specific staff member): CLINICAL STAFF    What was the call regarding: STATED THAT THE PATIENT HAS BEEN WITHOUT THE MEDICATION FOR A WEEK NOW AND THAT THEY ARE WANTING TO MAKE SURE THAT IT IS CALLED IN FOR THEM TO AT LEAST GET A FEW OF THE MEDICATION. STATED THAT [facility] NO LONGER WILL CARRY THE MEDICATION AND THAT THE INSURANCE ADVISED IT BE SENT TO [employer] [employer]. [address] [phone] [facility] [phone] FX PLEASE CALL AND ADVISE

## 2024-01-26 NOTE — TELEPHONE ENCOUNTER
Spoke with Mei, I informed her that she can get the Eliquis filled somewhere temporary to get her back on the medication and we can send it to the mail order for her long term supply. She can take the prescription to [facility] and they will be able to put it in her bubble packs for her. She wants to have it filled at [facility] in [place] for this one time and the long term will be through [facility].

## 2024-02-17 DIAGNOSIS — I48.91 ATRIAL FIBRILLATION, NEW ONSET: ICD-10-CM

## 2024-02-19 RX ORDER — APIXABAN 5 MG/1
5 TABLET, FILM COATED ORAL 2 TIMES DAILY
Qty: 60 TABLET | Refills: 5 | OUTPATIENT
Start: 2024-02-19

## 2024-02-23 DIAGNOSIS — E03.9 ACQUIRED HYPOTHYROIDISM: ICD-10-CM

## 2024-02-26 RX ORDER — LEVOTHYROXINE SODIUM 0.1 MG/1
TABLET ORAL
Qty: 28 TABLET | Refills: 5 | Status: SHIPPED | OUTPATIENT
Start: 2024-02-26

## 2024-03-05 DIAGNOSIS — R60.0 EDEMA OF BOTH LOWER LEGS: ICD-10-CM

## 2024-03-06 RX ORDER — AMLODIPINE BESYLATE 10 MG/1
TABLET ORAL
Qty: 30 TABLET | Refills: 5 | Status: SHIPPED | OUTPATIENT
Start: 2024-03-06

## 2024-03-06 RX ORDER — ATORVASTATIN CALCIUM 80 MG/1
TABLET, FILM COATED ORAL
Qty: 30 TABLET | Refills: 5 | Status: SHIPPED | OUTPATIENT
Start: 2024-03-06

## 2024-03-06 RX ORDER — POTASSIUM CHLORIDE 750 MG/1
TABLET, FILM COATED, EXTENDED RELEASE ORAL
Qty: 28 TABLET | Refills: 5 | Status: SHIPPED | OUTPATIENT
Start: 2024-03-06

## 2024-03-06 RX ORDER — DULOXETIN HYDROCHLORIDE 20 MG/1
CAPSULE, DELAYED RELEASE ORAL
Qty: 30 CAPSULE | Refills: 5 | Status: SHIPPED | OUTPATIENT
Start: 2024-03-06

## 2024-03-11 RX ORDER — CLOPIDOGREL BISULFATE 75 MG/1
75 TABLET ORAL DAILY
Qty: 45 TABLET | Refills: 0 | Status: SHIPPED | OUTPATIENT
Start: 2024-03-11

## 2024-03-27 RX ORDER — METOPROLOL TARTRATE 50 MG/1
TABLET, FILM COATED ORAL
Qty: 60 TABLET | Refills: 5 | Status: SHIPPED | OUTPATIENT
Start: 2024-03-27

## 2024-04-04 DIAGNOSIS — G25.81 RLS (RESTLESS LEGS SYNDROME): ICD-10-CM

## 2024-04-08 RX ORDER — GABAPENTIN 300 MG/1
300 CAPSULE ORAL 2 TIMES DAILY
Qty: 60 CAPSULE | Refills: 5 | OUTPATIENT
Start: 2024-04-08

## 2024-04-08 RX ORDER — CLOPIDOGREL BISULFATE 75 MG/1
75 TABLET ORAL DAILY
Qty: 45 TABLET | Refills: 5 | Status: SHIPPED | OUTPATIENT
Start: 2024-04-08

## 2024-04-18 ENCOUNTER — TELEPHONE (OUTPATIENT)
Dept: ONCOLOGY | Facility: CLINIC | Age: 89
End: 2024-04-18
Payer: MEDICARE

## 2024-04-18 NOTE — TELEPHONE ENCOUNTER
Caller: TRANG SALGUERO    Relationship to patient: Caregiver (non-relative)    Best call back number: [phone]    Chief complaint: PATIENT PCP CALLED HER LABS DONE ON 4/17/2024 SHOWED HER HEMOGLOBIN (SEE IN CARE EVERYWHERE 9.6) WAS LOW AND EXTREMELY ANEMIC     Type of visit: LAB & F/U 1     Requested date: AS SOON AS POSSIBLE     If rescheduling, when is the original appointment: 2/21/2024

## 2024-04-24 NOTE — PROGRESS NOTES
"[facility] CBC GROUP OUTPATIENT FOLLOW UP CLINIC VISIT    REASON FOR FOLLOW-UP:    IgG kappa monoclonal gammopathy     HISTORY OF PRESENT ILLNESS:  Mei Crane is a 89 y.o. female who returns today for follow up of the above issue.      She had a TAVR procedure done earlier this year.  She reports ongoing fatigue.  She denies any bleeding.  She is on Eliquis.  She has occasional nausea but no vomiting.  She remains oxygen dependent.    REVIEW OF SYSTEMS:  As per HPI    PHYSICAL EXAMINATION:    Vitals:    04/25/24 0945   BP: 135/80   Pulse: 79   Resp: 18   Temp: 97.5 °F (36.4 °C)   TempSrc: Temporal   SpO2: 99%   Weight: 57.9 kg (127 lb 11.2 oz)   Height: 162.6 cm (64.02\")   PainSc: 0-No pain       General:  No acute distress, awake, alert and oriented.  Wearing oxygen.  Sitting in a wheelchair.  Skin:  Warm and dry, no visible rash  HEENT:  Normocephalic/atraumatic.   Chest:  Normal respiratory effort.  Lungs clear to auscultation bilaterally.  Heart: Irregularly irregular with a grade 2 out of 6 holosystolic murmur  Extremities:  No visible clubbing, cyanosis, or edema  Neuro/psych:  Grossly nonfocal.  Normal mood and affect.        DIAGNOSTIC DATA:  CBC & Differential (04/25/2024 09:31)  Retic With IRF & RET-He (04/25/2024 09:31)    IMAGING:    None reviewed    ASSESSMENT:  This is a 89 y.o. female with:    *Stage IIIb chronic kidney disease  Being followed by Dr. Romero with nephrology.     *IgG kappa monoclonal gammopathy  Immunofixation on 6/21/2023 showed an IgG kappa monoclonal protein.  No serum protein electrophoresis was performed.  Serum free light chain kappa/lambda ratio was mildly elevated at 2.17 with a free kappa light chain mildly elevated at 42.1 and a normal lambda light chain of 19.4.  Labs on 7/18/2023 showed a hemoglobin of 10.9, MCV 84, normal white blood cell count and platelet count, creatinine 1.68, calcium 8.6, normal total protein at 6.3 and normal albumin of 4.1.  8/3/2023: " 0.1 g IgG kappa M spike, serum free light chain kappa/lambda ratio mildly elevated at 1.5  4/25/2024: Labs pending     *Normocytic anemia  Likely result of chronic kidney disease  8/3/2023: Hemoglobin 11.4   4/25/2024: Hemoglobin 9.5, from 9.6 on 4/17, from 7.6 on 1/14.  On iron every other day which is constipating her     *Paroxysmal atrial fibrillation  Anticoagulated with Eliquis     *Constipation related to oral iron    PLAN:   Continue oral iron, increase to daily if possible  Follow-up serum protein studies from today.  Assuming he remains stable, follow-up annually.  Follow-up with cardiology and nephrology as scheduled  She states she does not need an antiemetic today  I will see her back in about 3 months for follow-up with labs.  IV iron if appropriate.  I do not see a role for Procrit at this time assuming her hemoglobin remains near 10.

## 2024-04-25 ENCOUNTER — OFFICE VISIT (OUTPATIENT)
Dept: ONCOLOGY | Facility: CLINIC | Age: 89
End: 2024-04-25
Payer: MEDICARE

## 2024-04-25 ENCOUNTER — LAB (OUTPATIENT)
Dept: LAB | Facility: HOSPITAL | Age: 89
End: 2024-04-25
Payer: MEDICARE

## 2024-04-25 VITALS
WEIGHT: 127.7 LBS | HEART RATE: 79 BPM | HEIGHT: 64 IN | RESPIRATION RATE: 18 BRPM | DIASTOLIC BLOOD PRESSURE: 80 MMHG | OXYGEN SATURATION: 99 % | BODY MASS INDEX: 21.8 KG/M2 | TEMPERATURE: 97.5 F | SYSTOLIC BLOOD PRESSURE: 135 MMHG

## 2024-04-25 DIAGNOSIS — D47.2 MONOCLONAL GAMMOPATHY: ICD-10-CM

## 2024-04-25 DIAGNOSIS — D63.1 ANEMIA DUE TO STAGE 3B CHRONIC KIDNEY DISEASE: Primary | ICD-10-CM

## 2024-04-25 DIAGNOSIS — N18.32 ANEMIA DUE TO STAGE 3B CHRONIC KIDNEY DISEASE: Primary | ICD-10-CM

## 2024-04-25 DIAGNOSIS — R76.8 HIGH TOTAL SERUM IGG: ICD-10-CM

## 2024-04-25 DIAGNOSIS — D64.9 NORMOCYTIC ANEMIA: ICD-10-CM

## 2024-04-25 LAB
BASOPHILS # BLD AUTO: 0.04 10*3/MM3 (ref 0–0.2)
BASOPHILS NFR BLD AUTO: 0.5 % (ref 0–1.5)
DEPRECATED RDW RBC AUTO: 53.8 FL (ref 37–54)
EOSINOPHIL # BLD AUTO: 0.28 10*3/MM3 (ref 0–0.4)
EOSINOPHIL NFR BLD AUTO: 3.8 % (ref 0.3–6.2)
ERYTHROCYTE [DISTWIDTH] IN BLOOD BY AUTOMATED COUNT: 17.5 % (ref 12.3–15.4)
HCT VFR BLD AUTO: 33.1 % (ref 34–46.6)
HGB BLD-MCNC: 9.5 G/DL (ref 12–15.9)
HGB RETIC QN AUTO: 28.2 PG (ref 29.8–36.1)
IMM GRANULOCYTES # BLD AUTO: 0.03 10*3/MM3 (ref 0–0.05)
IMM GRANULOCYTES NFR BLD AUTO: 0.4 % (ref 0–0.5)
IMM RETICS NFR: 15 % (ref 3–15.8)
LYMPHOCYTES # BLD AUTO: 1.89 10*3/MM3 (ref 0.7–3.1)
LYMPHOCYTES NFR BLD AUTO: 26 % (ref 19.6–45.3)
MCH RBC QN AUTO: 24.4 PG (ref 26.6–33)
MCHC RBC AUTO-ENTMCNC: 28.7 G/DL (ref 31.5–35.7)
MCV RBC AUTO: 84.9 FL (ref 79–97)
MONOCYTES # BLD AUTO: 0.56 10*3/MM3 (ref 0.1–0.9)
MONOCYTES NFR BLD AUTO: 7.7 % (ref 5–12)
NEUTROPHILS NFR BLD AUTO: 4.48 10*3/MM3 (ref 1.7–7)
NEUTROPHILS NFR BLD AUTO: 61.6 % (ref 42.7–76)
NRBC BLD AUTO-RTO: 0 /100 WBC (ref 0–0.2)
PLATELET # BLD AUTO: 221 10*3/MM3 (ref 140–450)
PMV BLD AUTO: 10.8 FL (ref 6–12)
RBC # BLD AUTO: 3.9 10*6/MM3 (ref 3.77–5.28)
RETICS # AUTO: 0.03 10*6/MM3 (ref 0.02–0.13)
RETICS/RBC NFR AUTO: 0.78 % (ref 0.7–1.9)
WBC NRBC COR # BLD AUTO: 7.28 10*3/MM3 (ref 3.4–10.8)

## 2024-04-25 PROCEDURE — 1126F AMNT PAIN NOTED NONE PRSNT: CPT | Performed by: INTERNAL MEDICINE

## 2024-04-25 PROCEDURE — 1159F MED LIST DOCD IN RCRD: CPT | Performed by: INTERNAL MEDICINE

## 2024-04-25 PROCEDURE — 1160F RVW MEDS BY RX/DR IN RCRD: CPT | Performed by: INTERNAL MEDICINE

## 2024-04-25 PROCEDURE — 85046 RETICYTE/HGB CONCENTRATE: CPT

## 2024-04-25 PROCEDURE — 85025 COMPLETE CBC W/AUTO DIFF WBC: CPT

## 2024-04-25 PROCEDURE — 99214 OFFICE O/P EST MOD 30 MIN: CPT | Performed by: INTERNAL MEDICINE

## 2024-04-25 PROCEDURE — 36415 COLL VENOUS BLD VENIPUNCTURE: CPT

## 2024-04-25 RX ORDER — METOPROLOL SUCCINATE 50 MG/1
1.5 TABLET, EXTENDED RELEASE ORAL DAILY
COMMUNITY
Start: 2024-01-14

## 2024-04-25 RX ORDER — LANOLIN ALCOHOL/MO/W.PET/CERES
1 CREAM (GRAM) TOPICAL DAILY
COMMUNITY
Start: 2024-04-04

## 2024-04-25 RX ORDER — GABAPENTIN 300 MG/1
1 CAPSULE ORAL EVERY 12 HOURS SCHEDULED
COMMUNITY
Start: 2024-03-11

## 2024-04-25 RX ORDER — FUROSEMIDE 20 MG/1
20 TABLET ORAL DAILY
COMMUNITY

## 2024-04-25 RX ORDER — DEXAMETHASONE 2 MG/1
TABLET ORAL
COMMUNITY
Start: 2024-01-14

## 2024-04-25 RX ORDER — PANTOPRAZOLE SODIUM 40 MG/1
1 TABLET, DELAYED RELEASE ORAL DAILY
COMMUNITY
Start: 2024-04-04

## 2024-04-26 LAB
ALBUMIN SERPL ELPH-MCNC: 3.9 G/DL (ref 2.9–4.4)
ALBUMIN/GLOB SERPL: 1.8 {RATIO} (ref 0.7–1.7)
ALPHA1 GLOB SERPL ELPH-MCNC: 0.2 G/DL (ref 0–0.4)
ALPHA2 GLOB SERPL ELPH-MCNC: 0.7 G/DL (ref 0.4–1)
B-GLOBULIN SERPL ELPH-MCNC: 0.8 G/DL (ref 0.7–1.3)
GAMMA GLOB SERPL ELPH-MCNC: 0.5 G/DL (ref 0.4–1.8)
GLOBULIN SER-MCNC: 2.2 G/DL (ref 2.2–3.9)
IGA SERPL-MCNC: 105 MG/DL (ref 64–422)
IGG SERPL-MCNC: 647 MG/DL (ref 586–1602)
IGM SERPL-MCNC: 15 MG/DL (ref 26–217)
INTERPRETATION SERPL IEP-IMP: ABNORMAL
KAPPA LC FREE SER-MCNC: 36.4 MG/L (ref 3.3–19.4)
KAPPA LC FREE/LAMBDA FREE SER: 1.8 {RATIO} (ref 0.26–1.65)
LABORATORY COMMENT REPORT: ABNORMAL
LAMBDA LC FREE SERPL-MCNC: 20.2 MG/L (ref 5.7–26.3)
M PROTEIN SERPL ELPH-MCNC: ABNORMAL G/DL
PROT SERPL-MCNC: 6.1 G/DL (ref 6–8.5)

## 2024-04-30 RX ORDER — PANTOPRAZOLE SODIUM 40 MG/1
40 TABLET, DELAYED RELEASE ORAL DAILY
Qty: 90 TABLET | Refills: 2 | Status: SHIPPED | OUTPATIENT
Start: 2024-04-30

## 2024-06-20 DIAGNOSIS — E61.2 MAGNESIUM DEFICIENCY: ICD-10-CM

## 2024-06-21 RX ORDER — LANOLIN ALCOHOL/MO/W.PET/CERES
1 CREAM (GRAM) TOPICAL DAILY
Qty: 28 TABLET | Refills: 5 | OUTPATIENT
Start: 2024-06-21

## 2024-07-25 NOTE — PROGRESS NOTES
"[facility] CBC GROUP OUTPATIENT FOLLOW UP CLINIC VISIT    REASON FOR FOLLOW-UP:    IgG kappa monoclonal gammopathy   Anemia    HISTORY OF PRESENT ILLNESS:  Mei Crane is a 89 y.o. female who returns today for follow up of the above issue.      She is going longer periods of time without oxygen.  She has had some mild orthostatic symptoms on 3 occasions over the past week or so.  No syncope.  She has been on iron once daily.  She tolerates this well with some extra fiber to prevent constipation.  No bleeding.  She remains on Eliquis 2.5 mg twice daily.    REVIEW OF SYSTEMS:  As per HPI    PHYSICAL EXAMINATION:    Vitals:    07/30/24 0919   BP: 116/72   Pulse: 75   Resp: 16   Temp: 98 °F (36.7 °C)   TempSrc: Oral   SpO2: 96%   Weight: 58.7 kg (129 lb 4.8 oz)   Height: 162.6 cm (64.02\")   PainSc: 0-No pain       General:  No acute distress, awake, alert and oriented.  Wearing oxygen.  Sitting in a wheelchair.  Skin:  Warm and dry, no visible rash  HEENT:  Normocephalic/atraumatic.   Chest:  Normal respiratory effort.  Lungs clear to auscultation bilaterally.  Heart: RRR with a grade 1 out of 6 holosystolic murmur  Extremities:  No visible clubbing, cyanosis, or edema  Neuro/psych:  Grossly nonfocal.  Normal mood and affect.      DIAGNOSTIC DATA:  CBC & Differential (07/30/2024 09:01)  Retic With IRF & RET-He (07/30/2024 09:01)    IMAGING:    None reviewed    ASSESSMENT:  This is a 89 y.o. female with:    *Stage IIIb chronic kidney disease  Being followed by Dr. Romero with nephrology.  Creatinine stable at 1.52 today     *IgG kappa monoclonal gammopathy  Immunofixation on 6/21/2023 showed an IgG kappa monoclonal protein.  No serum protein electrophoresis was performed.  Serum free light chain kappa/lambda ratio was mildly elevated at 2.17 with a free kappa light chain mildly elevated at 42.1 and a normal lambda light chain of 19.4.  Labs on 7/18/2023 showed a hemoglobin of 10.9, MCV 84, normal white blood " cell count and platelet count, creatinine 1.68, calcium 8.6, normal total protein at 6.3 and normal albumin of 4.1.  8/3/2023: 0.1 g IgG kappa M spike, serum free light chain kappa/lambda ratio mildly elevated at 1.5  4/25/2024: Labs with presence of a monoclonal protein unclear, no M spike, essentially normal serum free light chain kappa/lambda ratio at 1.8     *Normocytic anemia  Likely result of chronic kidney disease  8/3/2023: Hemoglobin 11.4   4/25/2024: Hemoglobin 9.5, from 9.6 on 4/17, from 7.6 on 1/14.  Tolerating daily iron with extra fiber to prevent constipation  7/30/2024: Hemoglobin 9.7, MCV 88.0.  Ferritin 55, iron improved at 54, from 10 .     *Paroxysmal atrial fibrillation  Anticoagulated with Eliquis 2.5 mg bid     *Constipation related to oral iron    *H/o TAVR    *Orthostasis    PLAN:   If orthostasis persists, follow up with cardiology at [facility] earlier than scheduled appointments in October/November  Continue iron once daily  No plans to follow serum protein studies in the future  Follow-up in 6 months.  No role for Procrit at this time.    33 minutes in this visit today communicating with her and her caregiver, examining her, placing orders, documenting the encounter.

## 2024-07-30 ENCOUNTER — LAB (OUTPATIENT)
Dept: LAB | Facility: HOSPITAL | Age: 89
End: 2024-07-30
Payer: MEDICARE

## 2024-07-30 ENCOUNTER — OFFICE VISIT (OUTPATIENT)
Dept: ONCOLOGY | Facility: CLINIC | Age: 89
End: 2024-07-30
Payer: MEDICARE

## 2024-07-30 VITALS
SYSTOLIC BLOOD PRESSURE: 116 MMHG | HEART RATE: 75 BPM | HEIGHT: 64 IN | TEMPERATURE: 98 F | BODY MASS INDEX: 22.07 KG/M2 | OXYGEN SATURATION: 96 % | WEIGHT: 129.3 LBS | RESPIRATION RATE: 16 BRPM | DIASTOLIC BLOOD PRESSURE: 72 MMHG

## 2024-07-30 DIAGNOSIS — D63.1 ANEMIA DUE TO STAGE 3B CHRONIC KIDNEY DISEASE: ICD-10-CM

## 2024-07-30 DIAGNOSIS — D47.2 MONOCLONAL GAMMOPATHY: ICD-10-CM

## 2024-07-30 DIAGNOSIS — N18.32 ANEMIA DUE TO STAGE 3B CHRONIC KIDNEY DISEASE: ICD-10-CM

## 2024-07-30 DIAGNOSIS — N18.32 ANEMIA DUE TO STAGE 3B CHRONIC KIDNEY DISEASE: Primary | ICD-10-CM

## 2024-07-30 DIAGNOSIS — D63.1 ANEMIA DUE TO STAGE 3B CHRONIC KIDNEY DISEASE: Primary | ICD-10-CM

## 2024-07-30 LAB
ALBUMIN SERPL-MCNC: 3.7 G/DL (ref 3.5–5.2)
ALBUMIN/GLOB SERPL: 1.4 G/DL
ALP SERPL-CCNC: 92 U/L (ref 39–117)
ALT SERPL W P-5'-P-CCNC: 6 U/L (ref 1–33)
ANION GAP SERPL CALCULATED.3IONS-SCNC: 11.1 MMOL/L (ref 5–15)
AST SERPL-CCNC: 19 U/L (ref 1–32)
BASOPHILS # BLD AUTO: 0.02 10*3/MM3 (ref 0–0.2)
BASOPHILS NFR BLD AUTO: 0.3 % (ref 0–1.5)
BILIRUB SERPL-MCNC: 0.2 MG/DL (ref 0–1.2)
BUN SERPL-MCNC: 22 MG/DL (ref 8–23)
BUN/CREAT SERPL: 14.5 (ref 7–25)
CALCIUM SPEC-SCNC: 8.8 MG/DL (ref 8.2–9.6)
CHLORIDE SERPL-SCNC: 106 MMOL/L (ref 98–107)
CO2 SERPL-SCNC: 25.9 MMOL/L (ref 22–29)
CREAT SERPL-MCNC: 1.52 MG/DL (ref 0.57–1)
DEPRECATED RDW RBC AUTO: 46.9 FL (ref 37–54)
EGFRCR SERPLBLD CKD-EPI 2021: 30.9 ML/MIN/1.73
EOSINOPHIL # BLD AUTO: 0.35 10*3/MM3 (ref 0–0.4)
EOSINOPHIL NFR BLD AUTO: 5.5 % (ref 0.3–6.2)
ERYTHROCYTE [DISTWIDTH] IN BLOOD BY AUTOMATED COUNT: 14.6 % (ref 12.3–15.4)
FERRITIN SERPL-MCNC: 55.2 NG/ML (ref 13–150)
GLOBULIN UR ELPH-MCNC: 2.6 GM/DL
GLUCOSE SERPL-MCNC: 83 MG/DL (ref 65–99)
HCT VFR BLD AUTO: 31.4 % (ref 34–46.6)
HGB BLD-MCNC: 9.7 G/DL (ref 12–15.9)
HGB RETIC QN AUTO: 30.3 PG (ref 29.8–36.1)
IMM GRANULOCYTES # BLD AUTO: 0.02 10*3/MM3 (ref 0–0.05)
IMM GRANULOCYTES NFR BLD AUTO: 0.3 % (ref 0–0.5)
IMM RETICS NFR: 7.9 % (ref 3–15.8)
IRON 24H UR-MRATE: 54 MCG/DL (ref 37–145)
IRON SATN MFR SERPL: 15 % (ref 20–50)
LYMPHOCYTES # BLD AUTO: 1.55 10*3/MM3 (ref 0.7–3.1)
LYMPHOCYTES NFR BLD AUTO: 24.1 % (ref 19.6–45.3)
MCH RBC QN AUTO: 27.2 PG (ref 26.6–33)
MCHC RBC AUTO-ENTMCNC: 30.9 G/DL (ref 31.5–35.7)
MCV RBC AUTO: 88 FL (ref 79–97)
MONOCYTES # BLD AUTO: 0.59 10*3/MM3 (ref 0.1–0.9)
MONOCYTES NFR BLD AUTO: 9.2 % (ref 5–12)
NEUTROPHILS NFR BLD AUTO: 3.89 10*3/MM3 (ref 1.7–7)
NEUTROPHILS NFR BLD AUTO: 60.6 % (ref 42.7–76)
NRBC BLD AUTO-RTO: 0 /100 WBC (ref 0–0.2)
PLATELET # BLD AUTO: 199 10*3/MM3 (ref 140–450)
PMV BLD AUTO: 10.4 FL (ref 6–12)
POTASSIUM SERPL-SCNC: 3.5 MMOL/L (ref 3.5–5.2)
PROT SERPL-MCNC: 6.3 G/DL (ref 6–8.5)
RBC # BLD AUTO: 3.57 10*6/MM3 (ref 3.77–5.28)
RETICS # AUTO: 0.03 10*6/MM3 (ref 0.02–0.13)
RETICS/RBC NFR AUTO: 0.8 % (ref 0.7–1.9)
SODIUM SERPL-SCNC: 143 MMOL/L (ref 136–145)
TIBC SERPL-MCNC: 356 MCG/DL (ref 298–536)
TRANSFERRIN SERPL-MCNC: 239 MG/DL (ref 200–360)
VIT B12 BLD-MCNC: 602 PG/ML (ref 211–946)
WBC NRBC COR # BLD AUTO: 6.42 10*3/MM3 (ref 3.4–10.8)

## 2024-07-30 PROCEDURE — 83540 ASSAY OF IRON: CPT

## 2024-07-30 PROCEDURE — 84466 ASSAY OF TRANSFERRIN: CPT

## 2024-07-30 PROCEDURE — 1160F RVW MEDS BY RX/DR IN RCRD: CPT | Performed by: INTERNAL MEDICINE

## 2024-07-30 PROCEDURE — 1126F AMNT PAIN NOTED NONE PRSNT: CPT | Performed by: INTERNAL MEDICINE

## 2024-07-30 PROCEDURE — 85025 COMPLETE CBC W/AUTO DIFF WBC: CPT

## 2024-07-30 PROCEDURE — 99214 OFFICE O/P EST MOD 30 MIN: CPT | Performed by: INTERNAL MEDICINE

## 2024-07-30 PROCEDURE — 36415 COLL VENOUS BLD VENIPUNCTURE: CPT

## 2024-07-30 PROCEDURE — 82728 ASSAY OF FERRITIN: CPT

## 2024-07-30 PROCEDURE — 82607 VITAMIN B-12: CPT | Performed by: INTERNAL MEDICINE

## 2024-07-30 PROCEDURE — 1159F MED LIST DOCD IN RCRD: CPT | Performed by: INTERNAL MEDICINE

## 2024-07-30 PROCEDURE — 80053 COMPREHEN METABOLIC PANEL: CPT

## 2024-07-30 PROCEDURE — 85046 RETICYTE/HGB CONCENTRATE: CPT

## 2024-07-31 LAB
FOLATE BLD-MCNC: 283 NG/ML
FOLATE RBC-MCNC: 950 NG/ML
HCT VFR BLD AUTO: 29.8 % (ref 34–46.6)

## 2024-08-13 DIAGNOSIS — E03.9 ACQUIRED HYPOTHYROIDISM: ICD-10-CM

## 2024-08-14 DIAGNOSIS — R60.0 EDEMA OF BOTH LOWER LEGS: ICD-10-CM

## 2024-08-14 RX ORDER — AMLODIPINE BESYLATE 10 MG/1
TABLET ORAL
Qty: 30 TABLET | Refills: 5 | OUTPATIENT
Start: 2024-08-14

## 2024-08-14 RX ORDER — POTASSIUM CHLORIDE 750 MG/1
TABLET, FILM COATED, EXTENDED RELEASE ORAL
Qty: 28 TABLET | Refills: 5 | Status: SHIPPED | OUTPATIENT
Start: 2024-08-14

## 2024-08-14 RX ORDER — ATORVASTATIN CALCIUM 80 MG/1
TABLET, FILM COATED ORAL
Qty: 30 TABLET | Refills: 5 | OUTPATIENT
Start: 2024-08-14

## 2024-08-14 RX ORDER — LEVOTHYROXINE SODIUM 0.1 MG/1
TABLET ORAL
Qty: 28 TABLET | Refills: 5 | OUTPATIENT
Start: 2024-08-14

## 2024-08-14 RX ORDER — DULOXETIN HYDROCHLORIDE 20 MG/1
CAPSULE, DELAYED RELEASE ORAL
Qty: 30 CAPSULE | Refills: 5 | OUTPATIENT
Start: 2024-08-14

## 2024-08-15 DIAGNOSIS — E03.9 ACQUIRED HYPOTHYROIDISM: ICD-10-CM

## 2024-08-15 RX ORDER — LEVOTHYROXINE SODIUM 0.1 MG/1
TABLET ORAL
Qty: 28 TABLET | Refills: 5 | OUTPATIENT
Start: 2024-08-15

## 2024-08-15 RX ORDER — DULOXETIN HYDROCHLORIDE 20 MG/1
CAPSULE, DELAYED RELEASE ORAL
Qty: 30 CAPSULE | Refills: 5 | OUTPATIENT
Start: 2024-08-15

## 2024-08-15 RX ORDER — AMLODIPINE BESYLATE 10 MG/1
TABLET ORAL
Qty: 30 TABLET | Refills: 5 | OUTPATIENT
Start: 2024-08-15

## 2024-08-15 RX ORDER — ATORVASTATIN CALCIUM 80 MG/1
TABLET, FILM COATED ORAL
Qty: 30 TABLET | Refills: 5 | OUTPATIENT
Start: 2024-08-15

## 2024-08-21 RX ORDER — ATORVASTATIN CALCIUM 80 MG/1
TABLET, FILM COATED ORAL
Qty: 30 TABLET | Refills: 5 | OUTPATIENT
Start: 2024-08-21

## 2024-09-10 RX ORDER — ATORVASTATIN CALCIUM 80 MG/1
TABLET, FILM COATED ORAL
Qty: 30 TABLET | Refills: 5 | OUTPATIENT
Start: 2024-09-10

## 2024-12-23 RX ORDER — PANTOPRAZOLE SODIUM 40 MG/1
40 TABLET, DELAYED RELEASE ORAL DAILY
Qty: 90 TABLET | Refills: 2 | OUTPATIENT
Start: 2024-12-23

## 2024-12-23 RX ORDER — CLOPIDOGREL BISULFATE 75 MG/1
75 TABLET ORAL DAILY
Qty: 90 TABLET | OUTPATIENT
Start: 2024-12-23

## 2025-01-23 RX ORDER — CLOPIDOGREL BISULFATE 75 MG/1
75 TABLET ORAL DAILY
Qty: 32 TABLET | OUTPATIENT
Start: 2025-01-23

## 2025-01-28 NOTE — PROGRESS NOTES
"[facility] CBC GROUP OUTPATIENT FOLLOW UP CLINIC VISIT    REASON FOR FOLLOW-UP:    IgG kappa monoclonal gammopathy   Anemia    HISTORY OF PRESENT ILLNESS:  Mei Crane is a 89 y.o. female who returns today for follow up of the above issue.      She had a left hip fracture back in November.  Thankfully, she has done well since then.  She is on ferrous sulfate twice daily.  She tolerates this well.  She remains anticoagulated with Eliquis.    REVIEW OF SYSTEMS:  As per HPI    PHYSICAL EXAMINATION:    Vitals:    01/29/25 1046   BP: 128/74   Pulse: 81   Resp: 16   Temp: 97.8 °F (36.6 °C)   TempSrc: Oral   SpO2: 99%   Weight: 60.6 kg (133 lb 11.2 oz)   Height: 162.6 cm (64.02\")   PainSc: 0-No pain       General:  No acute distress, awake, alert and oriented.  Ambulatory with a walker.  Skin:  Warm and dry, no visible rash  HEENT:  Normocephalic/atraumatic.   Chest:  Normal respiratory effort.   Extremities:  No visible clubbing, cyanosis, or edema  Neuro/psych:  Grossly nonfocal.  Normal mood and affect.      DIAGNOSTIC DATA:  CBC & Differential (01/29/2025 10:36)  Comprehensive Metabolic Panel (01/29/2025 10:36)  Ferritin (01/29/2025 10:36)  Iron Profile (01/29/2025 10:36)  Retic With IRF & RET-He (01/29/2025 10:36)    IMAGING:    None reviewed    ASSESSMENT:  This is a 89 y.o. female with:    *Stage IIIb chronic kidney disease  Being followed by Dr. Romero with nephrology.  Creatinine a little higher than baseline at 1.72 today.  She has a follow-up appointment with nephrology in April which she will try to move up.  She is taking Lasix every day and may need to back off on this.     *IgG kappa monoclonal gammopathy  Immunofixation on 6/21/2023 showed an IgG kappa monoclonal protein.  No serum protein electrophoresis was performed.  Serum free light chain kappa/lambda ratio was mildly elevated at 2.17 with a free kappa light chain mildly elevated at 42.1 and a normal lambda light chain of 19.4.  Labs on " 7/18/2023 showed a hemoglobin of 10.9, MCV 84, normal white blood cell count and platelet count, creatinine 1.68, calcium 8.6, normal total protein at 6.3 and normal albumin of 4.1.  8/3/2023: 0.1 g IgG kappa M spike, serum free light chain kappa/lambda ratio mildly elevated at 1.5  4/25/2024: Labs with presence of a monoclonal protein unclear, no M spike, essentially normal serum free light chain kappa/lambda ratio at 1.8  Plan to repeat labs in 6 months     *Normocytic anemia  Likely result of chronic kidney disease  8/3/2023: Hemoglobin 11.4   4/25/2024: Hemoglobin 9.5, from 9.6 on 4/17, from 7.6 on 1/14.  Tolerating daily iron with extra fiber to prevent constipation  7/30/2024: Hemoglobin 9.7, MCV 88.0.  Ferritin 55, iron improved at 54, from 10 .  1/29/2025: Hemoglobin improved significantly at 11.2.  Ferritin improved at 89.  Continue ferrous sulfate twice daily.     *Paroxysmal atrial fibrillation  Anticoagulated with Eliquis 2.5 mg bid     *Constipation related to oral iron    *H/o TAVR    *Orthostasis    *Left hip fracture in November 2024, recovering nicely    PLAN:   Continue ferrous sulfate twice daily.  She tolerates this well.  Hemoglobin has improved significantly.  She will try to get a sooner appointment with nephrology.  She may need to back off on her Lasix.  Creatinine is a little bit higher than her baseline today.  Follow-up in 6 months with labs

## 2025-01-29 ENCOUNTER — LAB (OUTPATIENT)
Dept: LAB | Facility: HOSPITAL | Age: OVER 89
End: 2025-01-29
Payer: MEDICARE

## 2025-01-29 ENCOUNTER — OFFICE VISIT (OUTPATIENT)
Dept: ONCOLOGY | Facility: CLINIC | Age: OVER 89
End: 2025-01-29
Payer: MEDICARE

## 2025-01-29 VITALS
BODY MASS INDEX: 22.83 KG/M2 | TEMPERATURE: 97.8 F | RESPIRATION RATE: 16 BRPM | WEIGHT: 133.7 LBS | SYSTOLIC BLOOD PRESSURE: 128 MMHG | OXYGEN SATURATION: 99 % | DIASTOLIC BLOOD PRESSURE: 74 MMHG | HEIGHT: 64 IN | HEART RATE: 81 BPM

## 2025-01-29 DIAGNOSIS — N18.32 ANEMIA DUE TO STAGE 3B CHRONIC KIDNEY DISEASE: Primary | ICD-10-CM

## 2025-01-29 DIAGNOSIS — D63.1 ANEMIA DUE TO STAGE 3B CHRONIC KIDNEY DISEASE: ICD-10-CM

## 2025-01-29 DIAGNOSIS — D47.2 MONOCLONAL GAMMOPATHY: ICD-10-CM

## 2025-01-29 DIAGNOSIS — D63.1 ANEMIA DUE TO STAGE 3B CHRONIC KIDNEY DISEASE: Primary | ICD-10-CM

## 2025-01-29 DIAGNOSIS — N18.32 ANEMIA DUE TO STAGE 3B CHRONIC KIDNEY DISEASE: ICD-10-CM

## 2025-01-29 LAB
ALBUMIN SERPL-MCNC: 3.8 G/DL (ref 3.5–5.2)
ALBUMIN/GLOB SERPL: 1.5 G/DL
ALP SERPL-CCNC: 118 U/L (ref 39–117)
ALT SERPL W P-5'-P-CCNC: 9 U/L (ref 1–33)
ANION GAP SERPL CALCULATED.3IONS-SCNC: 11.6 MMOL/L (ref 5–15)
AST SERPL-CCNC: 17 U/L (ref 1–32)
BASOPHILS # BLD AUTO: 0.05 10*3/MM3 (ref 0–0.2)
BASOPHILS NFR BLD AUTO: 0.5 % (ref 0–1.5)
BILIRUB SERPL-MCNC: 0.2 MG/DL (ref 0–1.2)
BUN SERPL-MCNC: 22 MG/DL (ref 8–23)
BUN/CREAT SERPL: 12.8 (ref 7–25)
CALCIUM SPEC-SCNC: 8.5 MG/DL (ref 8.2–9.6)
CHLORIDE SERPL-SCNC: 107 MMOL/L (ref 98–107)
CO2 SERPL-SCNC: 19.4 MMOL/L (ref 22–29)
CREAT SERPL-MCNC: 1.72 MG/DL (ref 0.57–1)
DEPRECATED RDW RBC AUTO: 49.5 FL (ref 37–54)
EGFRCR SERPLBLD CKD-EPI 2021: 26.6 ML/MIN/1.73
EOSINOPHIL # BLD AUTO: 0.42 10*3/MM3 (ref 0–0.4)
EOSINOPHIL NFR BLD AUTO: 4.2 % (ref 0.3–6.2)
ERYTHROCYTE [DISTWIDTH] IN BLOOD BY AUTOMATED COUNT: 14.8 % (ref 12.3–15.4)
FERRITIN SERPL-MCNC: 89.3 NG/ML (ref 13–150)
GLOBULIN UR ELPH-MCNC: 2.5 GM/DL
GLUCOSE SERPL-MCNC: 87 MG/DL (ref 65–99)
HCT VFR BLD AUTO: 36.7 % (ref 34–46.6)
HGB BLD-MCNC: 11.2 G/DL (ref 12–15.9)
HGB RETIC QN AUTO: 31.6 PG (ref 29.8–36.1)
IMM GRANULOCYTES # BLD AUTO: 0.03 10*3/MM3 (ref 0–0.05)
IMM GRANULOCYTES NFR BLD AUTO: 0.3 % (ref 0–0.5)
IMM RETICS NFR: 8.2 % (ref 3–15.8)
IRON 24H UR-MRATE: 63 MCG/DL (ref 37–145)
IRON SATN MFR SERPL: 19 % (ref 20–50)
LYMPHOCYTES # BLD AUTO: 2.21 10*3/MM3 (ref 0.7–3.1)
LYMPHOCYTES NFR BLD AUTO: 22.3 % (ref 19.6–45.3)
MCH RBC QN AUTO: 28 PG (ref 26.6–33)
MCHC RBC AUTO-ENTMCNC: 30.5 G/DL (ref 31.5–35.7)
MCV RBC AUTO: 91.8 FL (ref 79–97)
MONOCYTES # BLD AUTO: 0.82 10*3/MM3 (ref 0.1–0.9)
MONOCYTES NFR BLD AUTO: 8.3 % (ref 5–12)
NEUTROPHILS NFR BLD AUTO: 6.39 10*3/MM3 (ref 1.7–7)
NEUTROPHILS NFR BLD AUTO: 64.4 % (ref 42.7–76)
NRBC BLD AUTO-RTO: 0 /100 WBC (ref 0–0.2)
PLATELET # BLD AUTO: 214 10*3/MM3 (ref 140–450)
PMV BLD AUTO: 9.8 FL (ref 6–12)
POTASSIUM SERPL-SCNC: 4.1 MMOL/L (ref 3.5–5.2)
PROT SERPL-MCNC: 6.3 G/DL (ref 6–8.5)
RBC # BLD AUTO: 4 10*6/MM3 (ref 3.77–5.28)
RETICS # AUTO: 0.03 10*6/MM3 (ref 0.02–0.13)
RETICS/RBC NFR AUTO: 0.77 % (ref 0.7–1.9)
SODIUM SERPL-SCNC: 138 MMOL/L (ref 136–145)
TIBC SERPL-MCNC: 340 MCG/DL (ref 298–536)
TRANSFERRIN SERPL-MCNC: 228 MG/DL (ref 200–360)
WBC NRBC COR # BLD AUTO: 9.92 10*3/MM3 (ref 3.4–10.8)

## 2025-01-29 PROCEDURE — 36415 COLL VENOUS BLD VENIPUNCTURE: CPT

## 2025-01-29 PROCEDURE — 84466 ASSAY OF TRANSFERRIN: CPT

## 2025-01-29 PROCEDURE — 82728 ASSAY OF FERRITIN: CPT

## 2025-01-29 PROCEDURE — 83540 ASSAY OF IRON: CPT

## 2025-01-29 PROCEDURE — 80053 COMPREHEN METABOLIC PANEL: CPT

## 2025-01-29 PROCEDURE — 85025 COMPLETE CBC W/AUTO DIFF WBC: CPT

## 2025-01-29 PROCEDURE — 85046 RETICYTE/HGB CONCENTRATE: CPT

## 2025-02-21 RX ORDER — PANTOPRAZOLE SODIUM 40 MG/1
40 TABLET, DELAYED RELEASE ORAL DAILY
Qty: 30 TABLET | OUTPATIENT
Start: 2025-02-21

## (undated) DEVICE — GLV SURG SENSICARE PI MIC PF SZ7 LF STRL

## (undated) DEVICE — GLV SURG SIGNATURE ESSENTIAL PF LTX SZ8.5

## (undated) DEVICE — PENCL E/S ULTRAVAC TELESCP NOSE HOLSTR 10FT

## (undated) DEVICE — INTERTAN LAG SCREW DRILL: Brand: TRIGEN

## (undated) DEVICE — TRAP FLD MINIVAC MEGADYNE 100ML

## (undated) DEVICE — SYR LUERLOK 20CC BX/50

## (undated) DEVICE — PK HIP PINNING 40

## (undated) DEVICE — SOL ISO/ALC RUB 70PCT 4OZ

## (undated) DEVICE — STPLR SKIN VISISTAT WD 35CT

## (undated) DEVICE — DRP C/ARMOR

## (undated) DEVICE — APPL CHLORAPREP HI/LITE 26ML ORNG

## (undated) DEVICE — GLV SURG BIOGEL LTX PF 7

## (undated) DEVICE — 4.0MM LONG AO PILOT DRILL: Brand: TRIGEN

## (undated) DEVICE — GUIDE PIN 3.2MM X 343MM: Brand: TRIGEN

## (undated) DEVICE — SPNG GZ WOVN 4X4IN 12PLY 10/BX STRL

## (undated) DEVICE — GLV SURG PREMIERPRO ORTHO LTX PF SZ8.5 BRN

## (undated) DEVICE — MAT FLR ABSORBENT LG 4FT 10 2.5FT

## (undated) DEVICE — NEEDLE, QUINCKE, 20GX3.5": Brand: MEDLINE

## (undated) DEVICE — DRSNG SURESITE WNDW 4X4.5

## (undated) DEVICE — VIOLET BRAIDED (POLYGLACTIN 910), SYNTHETIC ABSORBABLE SUTURE: Brand: COATED VICRYL

## (undated) DEVICE — WEBRIL* CAST PADDING: Brand: DEROYAL

## (undated) DEVICE — BNDG ELAS CO-FLEX SLF ADHR 6IN 5YD LF STRL